# Patient Record
Sex: MALE | Race: WHITE | Employment: OTHER | URBAN - METROPOLITAN AREA
[De-identification: names, ages, dates, MRNs, and addresses within clinical notes are randomized per-mention and may not be internally consistent; named-entity substitution may affect disease eponyms.]

---

## 2017-06-21 ENCOUNTER — HOSPITAL ENCOUNTER (OUTPATIENT)
Dept: GENERAL RADIOLOGY | Age: 82
Discharge: HOME | End: 2017-06-21
Payer: MEDICARE

## 2017-06-21 DIAGNOSIS — K21.9 ESOPHAGEAL REFLUX: ICD-10-CM

## 2017-06-21 PROCEDURE — 71020 XR CHEST PA LAT: CPT

## 2017-07-27 PROBLEM — N40.1 BENIGN NON-NODULAR PROSTATIC HYPERPLASIA WITH LOWER URINARY TRACT SYMPTOMS: Status: ACTIVE | Noted: 2017-07-27

## 2017-08-16 ENCOUNTER — APPOINTMENT (RX ONLY)
Dept: URBAN - METROPOLITAN AREA CLINIC 349 | Facility: CLINIC | Age: 82
Setting detail: DERMATOLOGY
End: 2017-08-16

## 2017-08-16 DIAGNOSIS — D18.0 HEMANGIOMA: ICD-10-CM

## 2017-08-16 DIAGNOSIS — L82.0 INFLAMED SEBORRHEIC KERATOSIS: ICD-10-CM

## 2017-08-16 DIAGNOSIS — L57.8 OTHER SKIN CHANGES DUE TO CHRONIC EXPOSURE TO NONIONIZING RADIATION: ICD-10-CM

## 2017-08-16 DIAGNOSIS — L21.8 OTHER SEBORRHEIC DERMATITIS: ICD-10-CM

## 2017-08-16 DIAGNOSIS — L81.4 OTHER MELANIN HYPERPIGMENTATION: ICD-10-CM

## 2017-08-16 DIAGNOSIS — L82.1 OTHER SEBORRHEIC KERATOSIS: ICD-10-CM

## 2017-08-16 PROBLEM — D18.01 HEMANGIOMA OF SKIN AND SUBCUTANEOUS TISSUE: Status: ACTIVE | Noted: 2017-08-16

## 2017-08-16 PROBLEM — H91.90 UNSPECIFIED HEARING LOSS, UNSPECIFIED EAR: Status: ACTIVE | Noted: 2017-08-16

## 2017-08-16 PROCEDURE — 99214 OFFICE O/P EST MOD 30 MIN: CPT | Mod: 25

## 2017-08-16 PROCEDURE — ? LIQUID NITROGEN

## 2017-08-16 PROCEDURE — ? COUNSELING

## 2017-08-16 PROCEDURE — ? PRESCRIPTION

## 2017-08-16 PROCEDURE — ? RECOMMENDATIONS

## 2017-08-16 PROCEDURE — 17110 DESTRUCTION B9 LES UP TO 14: CPT

## 2017-08-16 RX ORDER — KETOCONAZOLE 20.5 MG/ML
SHAMPOO, SUSPENSION TOPICAL
Qty: 1 | Refills: 4 | Status: ERX | COMMUNITY
Start: 2017-08-16

## 2017-08-16 RX ORDER — MOMETASONE FUROATE 1 MG/ML
LOTION TOPICAL
Qty: 1 | Refills: 3 | Status: ERX | COMMUNITY
Start: 2017-08-16

## 2017-08-16 RX ADMIN — MOMETASONE FUROATE: 1 LOTION TOPICAL at 00:00

## 2017-08-16 RX ADMIN — KETOCONAZOLE: 20.5 SHAMPOO, SUSPENSION TOPICAL at 00:00

## 2017-08-16 ASSESSMENT — LOCATION ZONE DERM
LOCATION ZONE: SCALP
LOCATION ZONE: TRUNK
LOCATION ZONE: FACE
LOCATION ZONE: HAND
LOCATION ZONE: ARM

## 2017-08-16 ASSESSMENT — LOCATION DETAILED DESCRIPTION DERM
LOCATION DETAILED: STERNAL NOTCH
LOCATION DETAILED: LEFT RADIAL DORSAL HAND
LOCATION DETAILED: SUPERIOR THORACIC SPINE
LOCATION DETAILED: LEFT LATERAL SUPERIOR CHEST
LOCATION DETAILED: RIGHT PROXIMAL POSTERIOR UPPER ARM
LOCATION DETAILED: LEFT PROXIMAL POSTERIOR UPPER ARM
LOCATION DETAILED: RIGHT MEDIAL SUPERIOR CHEST
LOCATION DETAILED: LEFT INFERIOR MEDIAL FOREHEAD
LOCATION DETAILED: RIGHT SUPERIOR UPPER BACK
LOCATION DETAILED: LEFT POSTERIOR SHOULDER
LOCATION DETAILED: RIGHT SUPERIOR FOREHEAD
LOCATION DETAILED: LEFT CHIN
LOCATION DETAILED: LEFT VENTRAL PROXIMAL FOREARM
LOCATION DETAILED: RIGHT ULNAR DORSAL HAND
LOCATION DETAILED: LEFT MEDIAL INFERIOR CHEST
LOCATION DETAILED: RIGHT DISTAL LATERAL POSTERIOR UPPER ARM
LOCATION DETAILED: LEFT MEDIAL ZYGOMA
LOCATION DETAILED: PERIUMBILICAL SKIN
LOCATION DETAILED: LEFT CENTRAL MALAR CHEEK
LOCATION DETAILED: RIGHT CENTRAL MALAR CHEEK
LOCATION DETAILED: RIGHT VENTRAL PROXIMAL FOREARM
LOCATION DETAILED: RIGHT SUPERIOR PARIETAL SCALP
LOCATION DETAILED: RIGHT SUPERIOR MEDIAL MIDBACK
LOCATION DETAILED: LEFT ANTERIOR DISTAL UPPER ARM
LOCATION DETAILED: RIGHT CENTRAL FRONTAL SCALP
LOCATION DETAILED: RIGHT ANTERIOR PROXIMAL UPPER ARM
LOCATION DETAILED: LEFT SUPERIOR MEDIAL LOWER BACK
LOCATION DETAILED: RIGHT SUPERIOR LATERAL UPPER BACK

## 2017-08-16 ASSESSMENT — LOCATION SIMPLE DESCRIPTION DERM
LOCATION SIMPLE: CHIN
LOCATION SIMPLE: LEFT SHOULDER
LOCATION SIMPLE: SCALP
LOCATION SIMPLE: LEFT FOREARM
LOCATION SIMPLE: RIGHT UPPER ARM
LOCATION SIMPLE: CHEST
LOCATION SIMPLE: LEFT ZYGOMA
LOCATION SIMPLE: LEFT LOWER BACK
LOCATION SIMPLE: ABDOMEN
LOCATION SIMPLE: LEFT CHEEK
LOCATION SIMPLE: RIGHT CHEEK
LOCATION SIMPLE: RIGHT FOREHEAD
LOCATION SIMPLE: UPPER BACK
LOCATION SIMPLE: RIGHT FOREARM
LOCATION SIMPLE: RIGHT SCALP
LOCATION SIMPLE: RIGHT HAND
LOCATION SIMPLE: LEFT FOREHEAD
LOCATION SIMPLE: RIGHT UPPER BACK
LOCATION SIMPLE: LEFT UPPER ARM
LOCATION SIMPLE: LEFT HAND
LOCATION SIMPLE: RIGHT LOWER BACK

## 2017-08-16 NOTE — PROCEDURE: LIQUID NITROGEN
Render Post-Care Instructions In Note?: yes
Consent: The patient's consent was obtained including but not limited to risks of crusting, scabbing, blistering, scarring, darker or lighter pigmentary change, recurrence, incomplete removal and infection.
Detail Level: Detailed
Number Of Freeze-Thaw Cycles: 2 freeze-thaw cycles
Pared With?: 15 blade
Add 52 Modifier (Optional): no
Post-Care Instructions: I reviewed with the patient in detail post-care instructions. Patient is to wear sunprotection, and avoid picking at any of the treated lesions. Pt may apply Vaseline to crusted or scabbing areas.
Medical Necessity Information: It is in your best interest to select a reason for this procedure from the list below. All of these items fulfill various CMS LCD requirements except the new and changing color options.
Medical Necessity Clause: This procedure was medically necessary because the lesions that were treated were: irritated by cleansing and clothes

## 2017-08-16 NOTE — PROCEDURE: RECOMMENDATIONS
Detail Level: Zone
Recommendations (Free Text): Shampoo with Ketoconazole shampoo\\nMometasone solution twice daily x 2 wks then once daily as needed

## 2018-02-14 ENCOUNTER — APPOINTMENT (RX ONLY)
Dept: URBAN - METROPOLITAN AREA CLINIC 349 | Facility: CLINIC | Age: 83
Setting detail: DERMATOLOGY
End: 2018-02-14

## 2018-02-14 ENCOUNTER — RX ONLY (OUTPATIENT)
Age: 83
Setting detail: RX ONLY
End: 2018-02-14

## 2018-02-14 DIAGNOSIS — L85.3 XEROSIS CUTIS: ICD-10-CM

## 2018-02-14 DIAGNOSIS — L21.8 OTHER SEBORRHEIC DERMATITIS: ICD-10-CM | Status: IMPROVED

## 2018-02-14 DIAGNOSIS — D18.0 HEMANGIOMA: ICD-10-CM

## 2018-02-14 DIAGNOSIS — L81.4 OTHER MELANIN HYPERPIGMENTATION: ICD-10-CM

## 2018-02-14 DIAGNOSIS — L82.1 OTHER SEBORRHEIC KERATOSIS: ICD-10-CM

## 2018-02-14 PROBLEM — D18.01 HEMANGIOMA OF SKIN AND SUBCUTANEOUS TISSUE: Status: ACTIVE | Noted: 2018-02-14

## 2018-02-14 PROBLEM — J30.1 ALLERGIC RHINITIS DUE TO POLLEN: Status: ACTIVE | Noted: 2018-02-14

## 2018-02-14 PROBLEM — K21.9 GASTRO-ESOPHAGEAL REFLUX DISEASE WITHOUT ESOPHAGITIS: Status: ACTIVE | Noted: 2018-02-14

## 2018-02-14 PROCEDURE — 99214 OFFICE O/P EST MOD 30 MIN: CPT

## 2018-02-14 PROCEDURE — ? OTHER

## 2018-02-14 PROCEDURE — ? COUNSELING

## 2018-02-14 RX ORDER — KETOCONAZOLE 20.5 MG/ML
SHAMPOO, SUSPENSION TOPICAL
Qty: 1 | Refills: 4 | Status: ERX

## 2018-02-14 ASSESSMENT — LOCATION DETAILED DESCRIPTION DERM
LOCATION DETAILED: RIGHT SUPERIOR MEDIAL LOWER BACK
LOCATION DETAILED: RIGHT DISTAL PRETIBIAL REGION
LOCATION DETAILED: RIGHT CENTRAL PARIETAL SCALP
LOCATION DETAILED: RIGHT ANTECUBITAL SKIN
LOCATION DETAILED: LEFT CLAVICULAR SKIN
LOCATION DETAILED: RIGHT CLAVICULAR SKIN
LOCATION DETAILED: LEFT PROXIMAL POSTERIOR UPPER ARM
LOCATION DETAILED: LEFT LATERAL SUPERIOR CHEST
LOCATION DETAILED: LEFT DISTAL CALF
LOCATION DETAILED: LEFT CENTRAL FRONTAL SCALP
LOCATION DETAILED: LEFT SUPERIOR LATERAL MIDBACK
LOCATION DETAILED: RIGHT LATERAL SUPERIOR CHEST
LOCATION DETAILED: RIGHT ANTERIOR DISTAL THIGH
LOCATION DETAILED: EPIGASTRIC SKIN
LOCATION DETAILED: LEFT CLAVICULAR NECK
LOCATION DETAILED: RIGHT SUPERIOR LATERAL UPPER BACK
LOCATION DETAILED: RIGHT PROXIMAL PRETIBIAL REGION
LOCATION DETAILED: LEFT INFERIOR CENTRAL MALAR CHEEK
LOCATION DETAILED: LEFT CENTRAL PARIETAL SCALP
LOCATION DETAILED: LEFT SUPERIOR MEDIAL LOWER BACK
LOCATION DETAILED: LEFT INFERIOR LATERAL MALAR CHEEK
LOCATION DETAILED: RIGHT CENTRAL FRONTAL SCALP
LOCATION DETAILED: RIGHT LATERAL UPPER BACK
LOCATION DETAILED: LEFT INFERIOR FOREHEAD
LOCATION DETAILED: RIGHT INFERIOR LATERAL NECK
LOCATION DETAILED: LEFT LATERAL UPPER BACK
LOCATION DETAILED: RIGHT PROXIMAL POSTERIOR UPPER ARM
LOCATION DETAILED: LEFT DISTAL DORSAL FOREARM
LOCATION DETAILED: LEFT SUPERIOR LATERAL UPPER BACK
LOCATION DETAILED: RIGHT INFERIOR LATERAL LOWER BACK
LOCATION DETAILED: LEFT DISTAL POSTERIOR UPPER ARM
LOCATION DETAILED: LEFT LATERAL INFERIOR CHEST
LOCATION DETAILED: RIGHT CENTRAL MALAR CHEEK
LOCATION DETAILED: PERIUMBILICAL SKIN
LOCATION DETAILED: RIGHT DISTAL POSTERIOR UPPER ARM
LOCATION DETAILED: LEFT INFERIOR LATERAL UPPER BACK
LOCATION DETAILED: LEFT PROXIMAL PRETIBIAL REGION
LOCATION DETAILED: RIGHT PROXIMAL LATERAL CALF
LOCATION DETAILED: LEFT ANTERIOR PROXIMAL THIGH
LOCATION DETAILED: RIGHT SUPERIOR UPPER BACK
LOCATION DETAILED: RIGHT DISTAL POSTERIOR THIGH
LOCATION DETAILED: LEFT BUTTOCK
LOCATION DETAILED: INFERIOR THORACIC SPINE
LOCATION DETAILED: RIGHT INFERIOR LATERAL MALAR CHEEK
LOCATION DETAILED: LEFT ANTERIOR PROXIMAL UPPER ARM
LOCATION DETAILED: LEFT ANTERIOR LATERAL DISTAL UPPER ARM
LOCATION DETAILED: LEFT PROXIMAL CALF
LOCATION DETAILED: RIGHT PROXIMAL DORSAL FOREARM
LOCATION DETAILED: RIGHT DISTAL DORSAL FOREARM
LOCATION DETAILED: RIGHT SUPERIOR PARIETAL SCALP
LOCATION DETAILED: LEFT DISTAL POSTERIOR THIGH
LOCATION DETAILED: RIGHT ANTERIOR PROXIMAL UPPER ARM

## 2018-02-14 ASSESSMENT — LOCATION SIMPLE DESCRIPTION DERM
LOCATION SIMPLE: RIGHT THIGH
LOCATION SIMPLE: LEFT ANTERIOR NECK
LOCATION SIMPLE: LEFT CHEEK
LOCATION SIMPLE: LEFT UPPER BACK
LOCATION SIMPLE: LEFT LOWER BACK
LOCATION SIMPLE: LEFT UPPER ARM
LOCATION SIMPLE: LEFT BUTTOCK
LOCATION SIMPLE: LEFT THIGH
LOCATION SIMPLE: LEFT FOREARM
LOCATION SIMPLE: ABDOMEN
LOCATION SIMPLE: RIGHT CLAVICULAR SKIN
LOCATION SIMPLE: SCALP
LOCATION SIMPLE: LEFT FOREHEAD
LOCATION SIMPLE: RIGHT SCALP
LOCATION SIMPLE: RIGHT CHEEK
LOCATION SIMPLE: RIGHT ELBOW
LOCATION SIMPLE: RIGHT UPPER BACK
LOCATION SIMPLE: LEFT CALF
LOCATION SIMPLE: UPPER BACK
LOCATION SIMPLE: RIGHT POSTERIOR THIGH
LOCATION SIMPLE: RIGHT LOWER BACK
LOCATION SIMPLE: LEFT PRETIBIAL REGION
LOCATION SIMPLE: CHEST
LOCATION SIMPLE: RIGHT UPPER ARM
LOCATION SIMPLE: LEFT SCALP
LOCATION SIMPLE: RIGHT FOREARM
LOCATION SIMPLE: RIGHT ANTERIOR NECK
LOCATION SIMPLE: LEFT CLAVICULAR SKIN
LOCATION SIMPLE: LEFT POSTERIOR THIGH
LOCATION SIMPLE: RIGHT PRETIBIAL REGION
LOCATION SIMPLE: RIGHT CALF

## 2018-02-14 ASSESSMENT — LOCATION ZONE DERM
LOCATION ZONE: FACE
LOCATION ZONE: LEG
LOCATION ZONE: TRUNK
LOCATION ZONE: SCALP
LOCATION ZONE: NECK
LOCATION ZONE: ARM

## 2018-02-14 NOTE — PROCEDURE: OTHER
Note Text (......Xxx Chief Complaint.): Photographs taken with pt permission
Detail Level: Zone
Other (Free Text): Pt advised to shower with lukewarm water never hot and to use a moisturizing body wash. Apply Cetaphil moisture cream to damp skin after showering.
Other (Free Text): Advised to continue using the shampoo with the Zinc alternating with the Ketoconazole shampoo

## 2018-05-07 PROBLEM — E66.01 SEVERE OBESITY (BMI 35.0-39.9) WITH COMORBIDITY (HCC): Status: ACTIVE | Noted: 2018-05-07

## 2018-10-23 ENCOUNTER — HOSPITAL ENCOUNTER (OUTPATIENT)
Dept: GENERAL RADIOLOGY | Age: 83
Discharge: HOME | End: 2018-10-23
Payer: MEDICARE

## 2018-10-23 DIAGNOSIS — I10 HIGH BLOOD PRESSURE: ICD-10-CM

## 2018-10-23 DIAGNOSIS — I50.9 HEART FAILURE, UNSPECIFIED (HCC): ICD-10-CM

## 2018-10-23 PROCEDURE — 71046 X-RAY EXAM CHEST 2 VIEWS: CPT

## 2019-08-22 NOTE — PROGRESS NOTES
Patient pre-assessment complete for BIV ICD generator change with Dr Karely Bain scheduled for 19 at 12:30pm, arrival time 10:30am. Patient verified using . Patient instructed to bring all home medications in labeled bottles on the day of procedure. NPO status reinforced. Patient instructed to HOLD lasix the am of procedure. Instructed they can take all other medications excluding vitamins & supplements. Patient verbalizes understanding of all instructions & denies any questions at this time.

## 2019-08-22 NOTE — PROGRESS NOTES
Called to pre-assess for  ICD gen change with DR Mau Hogue, Scheduled 8/26/19. No answer & message left.

## 2019-08-23 ENCOUNTER — HOSPITAL ENCOUNTER (OUTPATIENT)
Dept: LAB | Age: 84
Discharge: HOME | End: 2019-08-23
Payer: MEDICARE

## 2019-08-23 DIAGNOSIS — I42.8 NONISCHEMIC CARDIOMYOPATHY (HCC): ICD-10-CM

## 2019-08-23 DIAGNOSIS — Z95.810 IMPLANTABLE CARDIOVERTER-DEFIBRILLATOR (ICD) IN SITU: ICD-10-CM

## 2019-08-23 LAB
ANION GAP SERPL CALC-SCNC: 7 MMOL/L (ref 7–16)
BASOPHILS # BLD: 0 K/UL (ref 0–0.2)
BASOPHILS NFR BLD: 1 % (ref 0–2)
BUN SERPL-MCNC: 18 MG/DL (ref 8–23)
CALCIUM SERPL-MCNC: 8.7 MG/DL (ref 8.3–10.4)
CHLORIDE SERPL-SCNC: 109 MMOL/L (ref 98–107)
CO2 SERPL-SCNC: 27 MMOL/L (ref 21–32)
CREAT SERPL-MCNC: 1 MG/DL (ref 0.8–1.5)
DIFFERENTIAL METHOD BLD: ABNORMAL
EOSINOPHIL # BLD: 0.2 K/UL (ref 0–0.8)
EOSINOPHIL NFR BLD: 3 % (ref 0.5–7.8)
ERYTHROCYTE [DISTWIDTH] IN BLOOD BY AUTOMATED COUNT: 13.2 % (ref 11.9–14.6)
GLUCOSE SERPL-MCNC: 100 MG/DL (ref 65–100)
HCT VFR BLD AUTO: 42.2 % (ref 41.1–50.3)
HGB BLD-MCNC: 14.1 G/DL (ref 13.6–17.2)
IMM GRANULOCYTES # BLD AUTO: 0 K/UL (ref 0–0.5)
IMM GRANULOCYTES NFR BLD AUTO: 0 % (ref 0–5)
LYMPHOCYTES # BLD: 1.4 K/UL (ref 0.5–4.6)
LYMPHOCYTES NFR BLD: 27 % (ref 13–44)
MAGNESIUM SERPL-MCNC: 2.3 MG/DL (ref 1.8–2.4)
MCH RBC QN AUTO: 31.7 PG (ref 26.1–32.9)
MCHC RBC AUTO-ENTMCNC: 33.4 G/DL (ref 31.4–35)
MCV RBC AUTO: 94.8 FL (ref 79.6–97.8)
MONOCYTES # BLD: 0.5 K/UL (ref 0.1–1.3)
MONOCYTES NFR BLD: 9 % (ref 4–12)
NEUTS SEG # BLD: 3 K/UL (ref 1.7–8.2)
NEUTS SEG NFR BLD: 59 % (ref 43–78)
NRBC # BLD: 0 K/UL (ref 0–0.2)
PLATELET # BLD AUTO: 141 K/UL (ref 150–450)
PMV BLD AUTO: 9.8 FL (ref 9.4–12.3)
POTASSIUM SERPL-SCNC: 4.1 MMOL/L (ref 3.5–5.1)
RBC # BLD AUTO: 4.45 M/UL (ref 4.23–5.6)
SODIUM SERPL-SCNC: 143 MMOL/L (ref 136–145)
WBC # BLD AUTO: 5 K/UL (ref 4.3–11.1)

## 2019-08-23 PROCEDURE — 80048 BASIC METABOLIC PNL TOTAL CA: CPT

## 2019-08-23 PROCEDURE — 83735 ASSAY OF MAGNESIUM: CPT

## 2019-08-23 PROCEDURE — 36415 COLL VENOUS BLD VENIPUNCTURE: CPT

## 2019-08-23 PROCEDURE — 85025 COMPLETE CBC W/AUTO DIFF WBC: CPT

## 2019-08-25 ENCOUNTER — ANESTHESIA EVENT (OUTPATIENT)
Dept: SURGERY | Age: 84
End: 2019-08-25
Payer: MEDICARE

## 2019-08-26 ENCOUNTER — HOSPITAL ENCOUNTER (OUTPATIENT)
Age: 84
Setting detail: OUTPATIENT SURGERY
Discharge: HOME | End: 2019-08-26
Attending: INTERNAL MEDICINE | Admitting: INTERNAL MEDICINE
Payer: MEDICARE

## 2019-08-26 ENCOUNTER — APPOINTMENT (OUTPATIENT)
Dept: CARDIAC CATH/INVASIVE PROCEDURES | Age: 84
End: 2019-08-26
Payer: MEDICARE

## 2019-08-26 ENCOUNTER — ANESTHESIA (OUTPATIENT)
Dept: SURGERY | Age: 84
End: 2019-08-26
Payer: MEDICARE

## 2019-08-26 VITALS
SYSTOLIC BLOOD PRESSURE: 114 MMHG | WEIGHT: 280 LBS | HEIGHT: 72 IN | BODY MASS INDEX: 37.93 KG/M2 | OXYGEN SATURATION: 93 % | RESPIRATION RATE: 16 BRPM | DIASTOLIC BLOOD PRESSURE: 58 MMHG | TEMPERATURE: 98.6 F | HEART RATE: 75 BPM

## 2019-08-26 LAB
ATRIAL RATE: 78 BPM
CALCULATED R AXIS, ECG10: -123 DEGREES
CALCULATED T AXIS, ECG11: 59 DEGREES
DIAGNOSIS, 93000: NORMAL
P-R INTERVAL, ECG05: 212 MS
Q-T INTERVAL, ECG07: 466 MS
QRS DURATION, ECG06: 152 MS
QTC CALCULATION (BEZET), ECG08: 534 MS
VENTRICULAR RATE, ECG03: 79 BPM

## 2019-08-26 PROCEDURE — 74011000272 HC RX REV CODE- 272: Performed by: INTERNAL MEDICINE

## 2019-08-26 PROCEDURE — 74011000250 HC RX REV CODE- 250: Performed by: INTERNAL MEDICINE

## 2019-08-26 PROCEDURE — 77030022704 HC SUT VLOC COVD -B

## 2019-08-26 PROCEDURE — C1882 AICD, OTHER THAN SING/DUAL: HCPCS

## 2019-08-26 PROCEDURE — 93005 ELECTROCARDIOGRAM TRACING: CPT | Performed by: INTERNAL MEDICINE

## 2019-08-26 PROCEDURE — 76060000032 HC ANESTHESIA 0.5 TO 1 HR: Performed by: INTERNAL MEDICINE

## 2019-08-26 PROCEDURE — 74011250636 HC RX REV CODE- 250/636: Performed by: ANESTHESIOLOGY

## 2019-08-26 PROCEDURE — 74011250636 HC RX REV CODE- 250/636

## 2019-08-26 PROCEDURE — 77030028698 HC BLD TISS PLSM MEDT -D

## 2019-08-26 PROCEDURE — 74011250636 HC RX REV CODE- 250/636: Performed by: INTERNAL MEDICINE

## 2019-08-26 PROCEDURE — 77030012935 HC DRSG AQUACEL BMS -B

## 2019-08-26 PROCEDURE — 77030031139 HC SUT VCRL2 J&J -A

## 2019-08-26 PROCEDURE — 33264 RMVL & RPLCMT DFB GEN MLT LD: CPT

## 2019-08-26 RX ORDER — LIDOCAINE HYDROCHLORIDE 20 MG/ML
INJECTION, SOLUTION EPIDURAL; INFILTRATION; INTRACAUDAL; PERINEURAL AS NEEDED
Status: DISCONTINUED | OUTPATIENT
Start: 2019-08-26 | End: 2019-08-26 | Stop reason: HOSPADM

## 2019-08-26 RX ORDER — HYDROMORPHONE HYDROCHLORIDE 2 MG/ML
0.2 INJECTION, SOLUTION INTRAMUSCULAR; INTRAVENOUS; SUBCUTANEOUS
Status: DISCONTINUED | OUTPATIENT
Start: 2019-08-26 | End: 2019-08-26 | Stop reason: HOSPADM

## 2019-08-26 RX ORDER — PROPOFOL 10 MG/ML
INJECTION, EMULSION INTRAVENOUS AS NEEDED
Status: DISCONTINUED | OUTPATIENT
Start: 2019-08-26 | End: 2019-08-26 | Stop reason: HOSPADM

## 2019-08-26 RX ORDER — CLINDAMYCIN PHOSPHATE 900 MG/50ML
900 INJECTION INTRAVENOUS
Status: COMPLETED | OUTPATIENT
Start: 2019-08-26 | End: 2019-08-26

## 2019-08-26 RX ORDER — NALOXONE HYDROCHLORIDE 0.4 MG/ML
0.04 INJECTION, SOLUTION INTRAMUSCULAR; INTRAVENOUS; SUBCUTANEOUS
Status: DISCONTINUED | OUTPATIENT
Start: 2019-08-26 | End: 2019-08-26 | Stop reason: HOSPADM

## 2019-08-26 RX ORDER — CLINDAMYCIN PHOSPHATE 900 MG/50ML
900 INJECTION INTRAVENOUS ONCE
Status: COMPLETED | OUTPATIENT
Start: 2019-08-26 | End: 2019-08-26

## 2019-08-26 RX ORDER — PROPOFOL 10 MG/ML
INJECTION, EMULSION INTRAVENOUS
Status: DISCONTINUED | OUTPATIENT
Start: 2019-08-26 | End: 2019-08-26 | Stop reason: HOSPADM

## 2019-08-26 RX ORDER — CEFAZOLIN SODIUM IN 0.9 % NACL 2 G/50 ML
2 INTRAVENOUS SOLUTION, PIGGYBACK (ML) INTRAVENOUS ONCE
Status: DISCONTINUED | OUTPATIENT
Start: 2019-08-26 | End: 2019-08-26 | Stop reason: CLARIF

## 2019-08-26 RX ORDER — SODIUM CHLORIDE 0.9 % (FLUSH) 0.9 %
5 SYRINGE (ML) INJECTION AS NEEDED
Status: DISCONTINUED | OUTPATIENT
Start: 2019-08-26 | End: 2019-08-26 | Stop reason: HOSPADM

## 2019-08-26 RX ORDER — FENTANYL CITRATE 50 UG/ML
100 INJECTION, SOLUTION INTRAMUSCULAR; INTRAVENOUS ONCE
Status: DISCONTINUED | OUTPATIENT
Start: 2019-08-26 | End: 2019-08-26 | Stop reason: HOSPADM

## 2019-08-26 RX ORDER — LIDOCAINE HYDROCHLORIDE 10 MG/ML
0.1 INJECTION INFILTRATION; PERINEURAL AS NEEDED
Status: DISCONTINUED | OUTPATIENT
Start: 2019-08-26 | End: 2019-08-26 | Stop reason: HOSPADM

## 2019-08-26 RX ORDER — MIDAZOLAM HYDROCHLORIDE 1 MG/ML
2 INJECTION, SOLUTION INTRAMUSCULAR; INTRAVENOUS
Status: DISCONTINUED | OUTPATIENT
Start: 2019-08-26 | End: 2019-08-26 | Stop reason: HOSPADM

## 2019-08-26 RX ORDER — CEFAZOLIN SODIUM/WATER 2 G/20 ML
2 SYRINGE (ML) INTRAVENOUS ONCE
Status: DISCONTINUED | OUTPATIENT
Start: 2019-08-26 | End: 2019-08-26 | Stop reason: DRUGHIGH

## 2019-08-26 RX ORDER — SODIUM CHLORIDE, SODIUM LACTATE, POTASSIUM CHLORIDE, CALCIUM CHLORIDE 600; 310; 30; 20 MG/100ML; MG/100ML; MG/100ML; MG/100ML
100 INJECTION, SOLUTION INTRAVENOUS CONTINUOUS
Status: DISCONTINUED | OUTPATIENT
Start: 2019-08-26 | End: 2019-08-26 | Stop reason: HOSPADM

## 2019-08-26 RX ORDER — BUPIVACAINE HYDROCHLORIDE AND EPINEPHRINE 5; 5 MG/ML; UG/ML
50 INJECTION, SOLUTION PERINEURAL ONCE
Status: COMPLETED | OUTPATIENT
Start: 2019-08-26 | End: 2019-08-26

## 2019-08-26 RX ORDER — MIDAZOLAM HYDROCHLORIDE 1 MG/ML
2 INJECTION, SOLUTION INTRAMUSCULAR; INTRAVENOUS ONCE
Status: DISCONTINUED | OUTPATIENT
Start: 2019-08-26 | End: 2019-08-26 | Stop reason: HOSPADM

## 2019-08-26 RX ORDER — OXYCODONE HYDROCHLORIDE 5 MG/1
5 TABLET ORAL
Status: DISCONTINUED | OUTPATIENT
Start: 2019-08-26 | End: 2019-08-26 | Stop reason: HOSPADM

## 2019-08-26 RX ADMIN — HYDROMORPHONE HYDROCHLORIDE 0.2 MG: 2 INJECTION, SOLUTION INTRAMUSCULAR; INTRAVENOUS; SUBCUTANEOUS at 14:36

## 2019-08-26 RX ADMIN — CLINDAMYCIN PHOSPHATE 900 MG: 900 INJECTION, SOLUTION INTRAVENOUS at 13:24

## 2019-08-26 RX ADMIN — SODIUM CHLORIDE, SODIUM LACTATE, POTASSIUM CHLORIDE, AND CALCIUM CHLORIDE: 600; 310; 30; 20 INJECTION, SOLUTION INTRAVENOUS at 13:18

## 2019-08-26 RX ADMIN — LIDOCAINE HYDROCHLORIDE 40 MG: 20 INJECTION, SOLUTION EPIDURAL; INFILTRATION; INTRACAUDAL; PERINEURAL at 13:23

## 2019-08-26 RX ADMIN — PROPOFOL 140 MCG/KG/MIN: 10 INJECTION, EMULSION INTRAVENOUS at 13:25

## 2019-08-26 RX ADMIN — NEOMYCIN AND POLYMYXIN B SULFATES: 40; 200000 SOLUTION IRRIGATION at 13:26

## 2019-08-26 RX ADMIN — PROPOFOL 50 MG: 10 INJECTION, EMULSION INTRAVENOUS at 13:23

## 2019-08-26 RX ADMIN — CLINDAMYCIN PHOSPHATE 900 MG: 900 INJECTION, SOLUTION INTRAVENOUS at 15:40

## 2019-08-26 RX ADMIN — HYDROMORPHONE HYDROCHLORIDE 0.2 MG: 2 INJECTION, SOLUTION INTRAMUSCULAR; INTRAVENOUS; SUBCUTANEOUS at 14:53

## 2019-08-26 RX ADMIN — BUPIVACAINE HYDROCHLORIDE AND EPINEPHRINE BITARTRATE 250 MG: 5; .005 INJECTION, SOLUTION PERINEURAL at 13:25

## 2019-08-26 NOTE — PROGRESS NOTES
Patient up to bedside, vital signs and site stable. Patient ambulated to bathroom without difficulty. Patient voided without difficulty. Vascular site stable. 1545 Discharge instructions and home medications reviewed with patient. Time allowed for questions and answers. 1600 Patient ambulated second time without difficulty. Site stable after ambulation. Peripheral IV sites dc'd without difficulty with tips intact. 1610 Patient discharged to home with family.

## 2019-08-26 NOTE — PROCEDURES
Procedure: BiV ICD Generator Removal/Replacement without DFTs    Pre-Procedure Diagnosis  1. Chronic systolic heart failure, ejection fraction of 40%. 2. Non ischemic dilated cardiomyopathy. 3. Class II heart failure symptoms. 4. BiV ICD YOLI    Procedure Performed  1. Insertion of [place] biventricular implantable cardioverter defibrillator. Anesthesia: MAC     Estimated Blood Loss: Less than 10 mL     Specimens: * No specimens in log *     Patient Information and Indications: The procedure, indications, risks, benefits, and alternatives were discussed with the patient and family members, who desired to proceed after questions were answered and informed consent was documented. Methods: After informed consent was obtained, the patient was brought to the Electrophysiology Laboratory in a fasting state and was prepped and draped in sterile fashion. Prophylactic antibiotic was administered prior to skin incision: (clindamycin 900mg). Conscious sedation was administered with continuous oxygen saturation measurement and blood pressure measurement by Anesthesia. Local anesthetic (lidocaine) was delivered to the left pectoral region and an incision was made parallel to the deltopectoral groove directly over the prior surgical scar. The subcutaneous pocket was opened using blunt dissection and electrocautery, and adequate hemostasis was established. The device was freed from overlying fibrotic tissue and the leads freed to give enough slack for device exchange. The leads were individually removed from the old generator and tested using the PSA revealing excellent pacing/sensing parameters. The lead pins were then cleaned with antibiotic soaked gauze, dried gently, and attached to a new biventricular ICD generator. Pins were directly observed to pass the tip electrode, and the ring hex wrench screws were secured, and leads tug tested. The device and leads were gently positioned within the pocket.  The pocket was irrigated copiously with a saline antimicrobial solution. The device was and leads were tested a second time prior to pocket closure. The wound was closed with multiple layers of absorbable suture followed by skin closure with staples. The patient tolerated the procedure well and left the lab in good condition. Lead Data:    Pulse Generator Model #  Serial # Location Implant   [id number] [facility] [id number] Left Pectoral EXPLANT     [id number] [facility] [id number] Left Pectoral IMPLANT     Lead Model Number  Serial Number Lead position Implant   RA [id number] [facility] [id number] RA [place] Chronic     RV 3500/54 BIO [id number] RV [place] Chronic   LV 1458Q/75 [facility] [id number]  Chronic     Lead Sensitivity and Threshold  Lead R or P sensitivity (mv) Threshold (V) Threshold PW (msec) Impedance (ohms) Final output Voltage (V) Final PW (msec)   RA 0.2 1.0 0.50 340 2.0 0.50   RV 9.6 0.75 0.50 460 2.0 0.50   LV  2.5 1.5 980 2.5 1.5     Bradycardia Settings  Aric Mode LRL URL Pace AVD (ms) Sense AVD (ms) Rate Response Mode Switching Mode SW Rate   DDDR 75 120 170 130 YES YES 80       Tachycardia Settings  Zone Type VT-1 VT-2 VF   ON/OFF/  MONITOR ON ON ON   Zone Rate 181  100 Intervals 200 250   1st Therapy Type ATP-burst x3 ATP X3 Shock   Energy (J) 85%  36   2nd Therapy Type Shock SHOCK Shock   Energy (J) 36 36 40   3rd Therapy Type Shock SHOCK Shock   Energy (J) 40 40 40   4th Therapy Type Shock SHOCK Shock   Energy (J) 40 40 40   5th Therapy Type Shock SHOCK Shock   Energy (J) 40 40 40   6th Therapy Type   Shock   Energy (J)   40     Defibrillation Threshold Testing  DFT# How induced Successful test? Shock Imped (ohms) Energy (J) Charge time (sec) Rescue needed? Defib threshold (J)   NA            Contrast: 0 ml    Complications: None    IMPRESSION: 1) Successful BiV ICD generator replacement without DFTs. Russell Marquez MD, MS  Clinical Cardiac Electrophysiology  8/26/2019  2:06 PM

## 2019-08-26 NOTE — PROGRESS NOTES
Report received from [address] [place], [place] [address]ball [place]. Procedural findings communicated. Intra procedural  medication administration reviewed. Progression of care discussed.      Patient received into [address] [facility] 5 post Gen change    Access site without bleeding or swelling yes    Dressing dry and intact yes    Patient instructed to limit movement to left upper extremity    Routine post procedural vital signs and site assessment initiated yes

## 2019-08-26 NOTE — ANESTHESIA PREPROCEDURE EVALUATION
Relevant Problems   No relevant active problems       Anesthetic History   No history of anesthetic complications            Review of Systems / Medical History  Patient summary reviewed and pertinent labs reviewed    Pulmonary  Within defined limits        Undiagnosed apnea         Neuro/Psych   Within defined limits           Cardiovascular    Hypertension: well controlled  Valvular problems/murmurs: mitral insufficiency and aortic insufficiency        Pacemaker and CAD    Exercise tolerance: >4 METS  Comments: Echo 2017 - EF 40-45%, LVH, severe AI and MR   GI/Hepatic/Renal     GERD: well controlled           Endo/Other        Obesity     Other Findings            Physical Exam    Airway  Mallampati: II  TM Distance: 4 - 6 cm  Neck ROM: normal range of motion   Mouth opening: Normal     Cardiovascular  Regular rate and rhythm,  S1 and S2 normal,  no murmur, click, rub, or gallop             Dental    Dentition: Lower partial plate     Pulmonary  Breath sounds clear to auscultation               Abdominal         Other Findings            Anesthetic Plan    ASA: 4  Anesthesia type: total IV anesthesia and general - backup          Induction: Intravenous  Anesthetic plan and risks discussed with: Patient and Spouse

## 2019-08-26 NOTE — PROGRESS NOTES
Patient received to [address] [place] [address] room # 9  Ambulatory from [facility]. Patient scheduled for BI V ICD gen change today with Dr Johny Hughes. Procedure reviewed & questions answered, voiced good understanding consent obtained & placed on chart. All medications and medical history reviewed. Will prep patient per orders. Patient & family updated on plan of care.       The patient has a fraility score of 3-MANAGING WELL, based on ability to perform ADLs by self

## 2019-08-26 NOTE — DISCHARGE INSTRUCTIONS
Post Op Device Instructions      Incision & Dressing Care   Please keep Aquacel dressing on wound until your 1-2 week follow up in device clinic. The dressing promotes healing and is meant to stay on the wound. Aquacel dressing is waterproof. After one week you may shower, cleaning the wound with soap and landers. Do not use any lotions, creams, or ointments on the incision. Avoid manipulating the device or leads with your fingers. Do not massage or excessively rub the implant site.      For four weeks after your implant   Do not lift anything heavier than a gallon of milk.   Do not raise your elbow above the level of your shoulder on the left implant side.   Avoid excessive pushing, pulling, or twisting.     Call you doctor for any of the following:   Any signs of infection, including redness, swelling or pain at the incision site, or a   temperature of 101° F or greater.   If you have twitching chest muscles, hiccups that won't stop, or a swollen arm on the   side of the incision.   Any feelings of light-headedness, chest pain, or shortness of breath.   Please notify your doctors and dentists that you have a pacemaker       You should not drive until 1 week after your implant or after your first follow-up appointment, whichever comes first. At that time, you can discuss when you may return to your regular driving routine.       About 1 month after implant, you will receive a card by mail from the company who manufactured your ICD. Your device was manufactured by [employer]. You should carry this card with you at all times.  Adriel Ryder call the device clinic or Shady Cabrera (EP Nurse) [phone] if you have questions or concerns about your device.  Please call the hospital if it is after 5 pm or the weekend please page the on call cardiologist at [facility] at [phone]  [address] will need to have your device checked 1- 2 weeks after the procedure and should have an appointment with the device clinic.

## 2019-08-26 NOTE — PROGRESS NOTES
Patient's family at bedside, patient and family updated on plan of care. Call light within reach. Patient given beverage.

## 2019-08-26 NOTE — ANESTHESIA POSTPROCEDURE EVALUATION
Procedure(s):  BIV ICD GEN CHANGE.    total IV anesthesia, general - backup    Anesthesia Post Evaluation      Multimodal analgesia: multimodal analgesia used between 6 hours prior to anesthesia start to PACU discharge  Patient location during evaluation: PACU  Patient participation: complete - patient participated  Level of consciousness: awake and awake and alert  Pain management: adequate  Airway patency: patent  Anesthetic complications: no  Cardiovascular status: acceptable  Respiratory status: acceptable  Hydration status: acceptable  Post anesthesia nausea and vomiting:  controlled      No vitals data found for the desired time range.

## 2019-11-06 ENCOUNTER — HOSPITAL ENCOUNTER (OUTPATIENT)
Dept: GENERAL RADIOLOGY | Age: 84
Discharge: HOME | End: 2019-11-06
Payer: MEDICARE

## 2019-11-06 DIAGNOSIS — R06.02 SHORTNESS OF BREATH: ICD-10-CM

## 2019-11-06 PROCEDURE — 71046 X-RAY EXAM CHEST 2 VIEWS: CPT

## 2020-01-01 ENCOUNTER — HOSPITAL ENCOUNTER (OUTPATIENT)
Dept: GENERAL RADIOLOGY | Age: 85
Discharge: HOME | End: 2020-11-05
Payer: MEDICARE

## 2020-01-01 DIAGNOSIS — I10 ESSENTIAL HYPERTENSION, MALIGNANT: ICD-10-CM

## 2020-01-01 PROCEDURE — 71046 X-RAY EXAM CHEST 2 VIEWS: CPT

## 2020-09-14 ENCOUNTER — HOSPITAL ENCOUNTER (OUTPATIENT)
Dept: LAB | Age: 85
Discharge: HOME | End: 2020-09-14
Payer: MEDICARE

## 2020-09-14 DIAGNOSIS — I48.0 PAF (PAROXYSMAL ATRIAL FIBRILLATION) (HCC): ICD-10-CM

## 2020-09-14 LAB
ANION GAP SERPL CALC-SCNC: 4 MMOL/L (ref 7–16)
BASOPHILS # BLD: 0 K/UL (ref 0–0.2)
BASOPHILS NFR BLD: 1 % (ref 0–2)
BUN SERPL-MCNC: 20 MG/DL (ref 8–23)
CALCIUM SERPL-MCNC: 8.6 MG/DL (ref 8.3–10.4)
CHLORIDE SERPL-SCNC: 107 MMOL/L (ref 98–107)
CO2 SERPL-SCNC: 27 MMOL/L (ref 21–32)
CREAT SERPL-MCNC: 1.13 MG/DL (ref 0.8–1.5)
DIFFERENTIAL METHOD BLD: ABNORMAL
EOSINOPHIL # BLD: 0.2 K/UL (ref 0–0.8)
EOSINOPHIL NFR BLD: 4 % (ref 0.5–7.8)
ERYTHROCYTE [DISTWIDTH] IN BLOOD BY AUTOMATED COUNT: 13.1 % (ref 11.9–14.6)
GLUCOSE SERPL-MCNC: 112 MG/DL (ref 65–100)
HCT VFR BLD AUTO: 43 % (ref 41.1–50.3)
HGB BLD-MCNC: 14 G/DL (ref 13.6–17.2)
IMM GRANULOCYTES # BLD AUTO: 0 K/UL (ref 0–0.5)
IMM GRANULOCYTES NFR BLD AUTO: 0 % (ref 0–5)
LYMPHOCYTES # BLD: 1.7 K/UL (ref 0.5–4.6)
LYMPHOCYTES NFR BLD: 30 % (ref 13–44)
MCH RBC QN AUTO: 31.6 PG (ref 26.1–32.9)
MCHC RBC AUTO-ENTMCNC: 32.6 G/DL (ref 31.4–35)
MCV RBC AUTO: 97.1 FL (ref 79.6–97.8)
MONOCYTES # BLD: 0.5 K/UL (ref 0.1–1.3)
MONOCYTES NFR BLD: 9 % (ref 4–12)
NEUTS SEG # BLD: 3.2 K/UL (ref 1.7–8.2)
NEUTS SEG NFR BLD: 57 % (ref 43–78)
NRBC # BLD: 0 K/UL (ref 0–0.2)
PLATELET # BLD AUTO: 145 K/UL (ref 150–450)
PMV BLD AUTO: 10.5 FL (ref 9.4–12.3)
POTASSIUM SERPL-SCNC: 4.1 MMOL/L (ref 3.5–5.1)
RBC # BLD AUTO: 4.43 M/UL (ref 4.23–5.6)
SODIUM SERPL-SCNC: 138 MMOL/L (ref 136–145)
WBC # BLD AUTO: 5.6 K/UL (ref 4.3–11.1)

## 2020-09-14 PROCEDURE — 80048 BASIC METABOLIC PNL TOTAL CA: CPT

## 2020-09-14 PROCEDURE — 85025 COMPLETE CBC W/AUTO DIFF WBC: CPT

## 2020-09-14 PROCEDURE — 36415 COLL VENOUS BLD VENIPUNCTURE: CPT

## 2020-09-24 ENCOUNTER — HOSPITAL ENCOUNTER (OUTPATIENT)
Dept: LAB | Age: 85
Discharge: HOME | End: 2020-09-24
Payer: MEDICARE

## 2020-09-24 DIAGNOSIS — I48.0 PAROXYSMAL ATRIAL FIBRILLATION (HCC): ICD-10-CM

## 2020-09-24 LAB
ANION GAP SERPL CALC-SCNC: 3 MMOL/L (ref 7–16)
BUN SERPL-MCNC: 15 MG/DL (ref 8–23)
CALCIUM SERPL-MCNC: 9 MG/DL (ref 8.3–10.4)
CHLORIDE SERPL-SCNC: 104 MMOL/L (ref 98–107)
CO2 SERPL-SCNC: 31 MMOL/L (ref 21–32)
CREAT SERPL-MCNC: 0.96 MG/DL (ref 0.8–1.5)
GLUCOSE SERPL-MCNC: 119 MG/DL (ref 65–100)
HCT VFR BLD AUTO: 42.6 % (ref 41.1–50.3)
HGB BLD-MCNC: 14 G/DL (ref 13.6–17.2)
POTASSIUM SERPL-SCNC: 4.1 MMOL/L (ref 3.5–5.1)
SODIUM SERPL-SCNC: 138 MMOL/L (ref 136–145)

## 2020-09-24 PROCEDURE — 36415 COLL VENOUS BLD VENIPUNCTURE: CPT

## 2020-09-24 PROCEDURE — 85018 HEMOGLOBIN: CPT

## 2020-09-24 PROCEDURE — 80048 BASIC METABOLIC PNL TOTAL CA: CPT

## 2020-11-17 PROBLEM — I48.0 PAROXYSMAL ATRIAL FIBRILLATION (HCC): Status: ACTIVE | Noted: 2020-01-01

## 2021-01-01 ENCOUNTER — APPOINTMENT (OUTPATIENT)
Dept: ULTRASOUND IMAGING | Age: 86
DRG: 308 | End: 2021-01-01
Attending: FAMILY MEDICINE
Payer: MEDICARE

## 2021-01-01 ENCOUNTER — HOSPITAL ENCOUNTER (INPATIENT)
Age: 86
LOS: 10 days | DRG: 308 | End: 2021-10-18
Attending: EMERGENCY MEDICINE | Admitting: FAMILY MEDICINE
Payer: MEDICARE

## 2021-01-01 ENCOUNTER — HOSPITAL ENCOUNTER (INPATIENT)
Age: 86
LOS: 6 days | Discharge: NURSING, REHAB OR LONG-TERM CARE | DRG: 871 | End: 2021-08-24
Attending: EMERGENCY MEDICINE | Admitting: INTERNAL MEDICINE
Payer: MEDICARE

## 2021-01-01 ENCOUNTER — APPOINTMENT (OUTPATIENT)
Dept: GENERAL RADIOLOGY | Age: 86
DRG: 871 | End: 2021-01-01
Attending: INTERNAL MEDICINE
Payer: MEDICARE

## 2021-01-01 ENCOUNTER — APPOINTMENT (OUTPATIENT)
Dept: GENERAL RADIOLOGY | Age: 86
DRG: 308 | End: 2021-01-01
Attending: FAMILY MEDICINE
Payer: MEDICARE

## 2021-01-01 ENCOUNTER — APPOINTMENT (OUTPATIENT)
Dept: GENERAL RADIOLOGY | Age: 86
DRG: 308 | End: 2021-01-01
Attending: INTERNAL MEDICINE
Payer: MEDICARE

## 2021-01-01 ENCOUNTER — APPOINTMENT (OUTPATIENT)
Dept: CT IMAGING | Age: 86
DRG: 308 | End: 2021-01-01
Attending: FAMILY MEDICINE
Payer: MEDICARE

## 2021-01-01 ENCOUNTER — HOSPITAL ENCOUNTER (OUTPATIENT)
Dept: LAB | Age: 86
Discharge: HOME | End: 2021-10-06

## 2021-01-01 ENCOUNTER — APPOINTMENT (OUTPATIENT)
Dept: NON INVASIVE DIAGNOSTICS | Age: 86
DRG: 308 | End: 2021-01-01
Attending: INTERNAL MEDICINE
Payer: MEDICARE

## 2021-01-01 ENCOUNTER — APPOINTMENT (OUTPATIENT)
Dept: GENERAL RADIOLOGY | Age: 86
DRG: 871 | End: 2021-01-01
Attending: EMERGENCY MEDICINE
Payer: MEDICARE

## 2021-01-01 ENCOUNTER — APPOINTMENT (OUTPATIENT)
Dept: GENERAL RADIOLOGY | Age: 86
DRG: 308 | End: 2021-01-01
Attending: EMERGENCY MEDICINE
Payer: MEDICARE

## 2021-01-01 ENCOUNTER — HOSPICE ADMISSION (OUTPATIENT)
Dept: HOSPICE | Facility: HOSPICE | Age: 86
End: 2021-01-01
Payer: MEDICARE

## 2021-01-01 VITALS
WEIGHT: 288.1 LBS | RESPIRATION RATE: 35 BRPM | DIASTOLIC BLOOD PRESSURE: 68 MMHG | BODY MASS INDEX: 38.18 KG/M2 | HEART RATE: 86 BPM | TEMPERATURE: 98.3 F | OXYGEN SATURATION: 99 % | HEIGHT: 73 IN | SYSTOLIC BLOOD PRESSURE: 110 MMHG

## 2021-01-01 VITALS
HEART RATE: 66 BPM | BODY MASS INDEX: 35.94 KG/M2 | DIASTOLIC BLOOD PRESSURE: 90 MMHG | OXYGEN SATURATION: 95 % | SYSTOLIC BLOOD PRESSURE: 134 MMHG | RESPIRATION RATE: 20 BRPM | WEIGHT: 271.17 LBS | TEMPERATURE: 97.8 F | HEIGHT: 73 IN

## 2021-01-01 DIAGNOSIS — N17.0 ACUTE KIDNEY INJURY (AKI) WITH ACUTE TUBULAR NECROSIS (ATN) (HCC): ICD-10-CM

## 2021-01-01 DIAGNOSIS — N17.9 AKI (ACUTE KIDNEY INJURY) (HCC): ICD-10-CM

## 2021-01-01 DIAGNOSIS — J96.21 ACUTE ON CHRONIC RESPIRATORY FAILURE WITH HYPOXIA AND HYPERCAPNIA (HCC): ICD-10-CM

## 2021-01-01 DIAGNOSIS — U07.1 SEPSIS DUE TO COVID-19 (HCC): ICD-10-CM

## 2021-01-01 DIAGNOSIS — R54 FRAILTY: ICD-10-CM

## 2021-01-01 DIAGNOSIS — I10 PRIMARY HYPERTENSION: Chronic | ICD-10-CM

## 2021-01-01 DIAGNOSIS — I43 CARDIOMYOPATHY DUE TO COVID-19 VIRUS (HCC): ICD-10-CM

## 2021-01-01 DIAGNOSIS — K72.00 ACUTE LIVER FAILURE WITHOUT HEPATIC COMA: ICD-10-CM

## 2021-01-01 DIAGNOSIS — I48.0 PAROXYSMAL ATRIAL FIBRILLATION (HCC): ICD-10-CM

## 2021-01-01 DIAGNOSIS — A41.89 SEPSIS DUE TO COVID-19 (HCC): ICD-10-CM

## 2021-01-01 DIAGNOSIS — I50.43 ACUTE ON CHRONIC COMBINED SYSTOLIC AND DIASTOLIC CONGESTIVE HEART FAILURE (HCC): ICD-10-CM

## 2021-01-01 DIAGNOSIS — R57.0 CARDIOGENIC SHOCK (HCC): ICD-10-CM

## 2021-01-01 DIAGNOSIS — G93.40 ENCEPHALOPATHY: ICD-10-CM

## 2021-01-01 DIAGNOSIS — U07.1 PNEUMONIA DUE TO COVID-19 VIRUS: ICD-10-CM

## 2021-01-01 DIAGNOSIS — U07.1 ACUTE HYPOXEMIC RESPIRATORY FAILURE DUE TO COVID-19 (HCC): ICD-10-CM

## 2021-01-01 DIAGNOSIS — I42.8 NONISCHEMIC CARDIOMYOPATHY (HCC): ICD-10-CM

## 2021-01-01 DIAGNOSIS — E78.01 FAMILIAL HYPERCHOLESTEROLEMIA: Chronic | ICD-10-CM

## 2021-01-01 DIAGNOSIS — I10 ESSENTIAL HYPERTENSION: ICD-10-CM

## 2021-01-01 DIAGNOSIS — Z51.5 ENCOUNTER FOR PALLIATIVE CARE: ICD-10-CM

## 2021-01-01 DIAGNOSIS — R41.82 ALTERED MENTAL STATUS, UNSPECIFIED ALTERED MENTAL STATUS TYPE: ICD-10-CM

## 2021-01-01 DIAGNOSIS — Z86.16 PERSONAL HISTORY OF COVID-19: Chronic | ICD-10-CM

## 2021-01-01 DIAGNOSIS — J96.22 ACUTE ON CHRONIC RESPIRATORY FAILURE WITH HYPOXIA AND HYPERCAPNIA (HCC): ICD-10-CM

## 2021-01-01 DIAGNOSIS — E66.01 SEVERE OBESITY (BMI 35.0-39.9) WITH COMORBIDITY (HCC): ICD-10-CM

## 2021-01-01 DIAGNOSIS — U07.1 CARDIOMYOPATHY DUE TO COVID-19 VIRUS (HCC): ICD-10-CM

## 2021-01-01 DIAGNOSIS — I48.91 ATRIAL FIBRILLATION WITH RVR (HCC): ICD-10-CM

## 2021-01-01 DIAGNOSIS — J96.01 ACUTE HYPOXEMIC RESPIRATORY FAILURE DUE TO COVID-19 (HCC): ICD-10-CM

## 2021-01-01 DIAGNOSIS — I50.20 HFREF (HEART FAILURE WITH REDUCED EJECTION FRACTION) (HCC): Chronic | ICD-10-CM

## 2021-01-01 DIAGNOSIS — J12.82 PNEUMONIA DUE TO COVID-19 VIRUS: ICD-10-CM

## 2021-01-01 DIAGNOSIS — A41.9 SEPSIS, DUE TO UNSPECIFIED ORGANISM, UNSPECIFIED WHETHER ACUTE ORGAN DYSFUNCTION PRESENT (HCC): Primary | ICD-10-CM

## 2021-01-01 DIAGNOSIS — J81.0 ACUTE PULMONARY EDEMA (HCC): ICD-10-CM

## 2021-01-01 DIAGNOSIS — I50.23 ACUTE ON CHRONIC SYSTOLIC HEART FAILURE (HCC): ICD-10-CM

## 2021-01-01 DIAGNOSIS — J96.01 ACUTE RESPIRATORY FAILURE WITH HYPOXIA (HCC): Primary | ICD-10-CM

## 2021-01-01 LAB
ALBUMIN SERPL-MCNC: 2.5 G/DL (ref 3.2–4.6)
ALBUMIN SERPL-MCNC: 2.6 G/DL (ref 3.2–4.6)
ALBUMIN SERPL-MCNC: 2.7 G/DL (ref 3.2–4.6)
ALBUMIN SERPL-MCNC: 2.8 G/DL (ref 3.2–4.6)
ALBUMIN SERPL-MCNC: 2.9 G/DL (ref 3.2–4.6)
ALBUMIN SERPL-MCNC: 2.9 G/DL (ref 3.2–4.6)
ALBUMIN SERPL-MCNC: 3 G/DL (ref 3.2–4.6)
ALBUMIN SERPL-MCNC: 3.1 G/DL (ref 3.2–4.6)
ALBUMIN SERPL-MCNC: 3.1 G/DL (ref 3.2–4.6)
ALBUMIN SERPL-MCNC: 3.3 G/DL (ref 3.2–4.6)
ALBUMIN/GLOB SERPL: 0.8 {RATIO} (ref 1.2–3.5)
ALBUMIN/GLOB SERPL: 0.9 {RATIO} (ref 1.2–3.5)
ALBUMIN/GLOB SERPL: 1 {RATIO} (ref 1.2–3.5)
ALBUMIN/GLOB SERPL: 1 {RATIO} (ref 1.2–3.5)
ALBUMIN/GLOB SERPL: 1.1 {RATIO} (ref 1.2–3.5)
ALBUMIN/GLOB SERPL: 1.1 {RATIO} (ref 1.2–3.5)
ALBUMIN/GLOB SERPL: 1.2 {RATIO} (ref 1.2–3.5)
ALBUMIN/GLOB SERPL: 1.4 {RATIO} (ref 1.2–3.5)
ALP SERPL-CCNC: 111 U/L (ref 50–136)
ALP SERPL-CCNC: 117 U/L (ref 50–136)
ALP SERPL-CCNC: 47 U/L (ref 50–136)
ALP SERPL-CCNC: 47 U/L (ref 50–136)
ALP SERPL-CCNC: 49 U/L (ref 50–136)
ALP SERPL-CCNC: 49 U/L (ref 50–136)
ALP SERPL-CCNC: 57 U/L (ref 50–136)
ALP SERPL-CCNC: 62 U/L (ref 50–136)
ALP SERPL-CCNC: 65 U/L (ref 50–136)
ALP SERPL-CCNC: 68 U/L (ref 50–136)
ALP SERPL-CCNC: 80 U/L (ref 50–136)
ALP SERPL-CCNC: 86 U/L (ref 50–136)
ALP SERPL-CCNC: 90 U/L (ref 50–136)
ALP SERPL-CCNC: 92 U/L (ref 50–136)
ALT SERPL-CCNC: 112 U/L (ref 12–65)
ALT SERPL-CCNC: 221 U/L (ref 12–65)
ALT SERPL-CCNC: 274 U/L (ref 12–65)
ALT SERPL-CCNC: 30 U/L (ref 12–65)
ALT SERPL-CCNC: 354 U/L (ref 12–65)
ALT SERPL-CCNC: 355 U/L (ref 12–65)
ALT SERPL-CCNC: 396 U/L (ref 12–65)
ALT SERPL-CCNC: 48 U/L (ref 12–65)
ALT SERPL-CCNC: 537 U/L (ref 12–65)
ALT SERPL-CCNC: 54 U/L (ref 12–65)
ALT SERPL-CCNC: 648 U/L (ref 12–65)
ALT SERPL-CCNC: 891 U/L (ref 12–65)
ALT SERPL-CCNC: 91 U/L (ref 12–65)
ALT SERPL-CCNC: 990 U/L (ref 12–65)
AMMONIA PLAS-SCNC: 26 UMOL/L (ref 11–32)
AMMONIA PLAS-SCNC: 55 UMOL/L (ref 11–32)
ANION GAP SERPL CALC-SCNC: 2 MMOL/L (ref 7–16)
ANION GAP SERPL CALC-SCNC: 3 MMOL/L (ref 7–16)
ANION GAP SERPL CALC-SCNC: 3 MMOL/L (ref 7–16)
ANION GAP SERPL CALC-SCNC: 4 MMOL/L (ref 7–16)
ANION GAP SERPL CALC-SCNC: 6 MMOL/L (ref 7–16)
ANION GAP SERPL CALC-SCNC: 7 MMOL/L (ref 7–16)
ANION GAP SERPL CALC-SCNC: 9 MMOL/L (ref 7–16)
ANION GAP SERPL CALC-SCNC: 9 MMOL/L (ref 7–16)
APPEARANCE UR: CLEAR
APTT PPP: 33.4 SEC (ref 24.1–35.1)
ARTERIAL PATENCY WRIST A: POSITIVE
AST SERPL-CCNC: 110 U/L (ref 15–37)
AST SERPL-CCNC: 139 U/L (ref 15–37)
AST SERPL-CCNC: 24 U/L (ref 15–37)
AST SERPL-CCNC: 241 U/L (ref 15–37)
AST SERPL-CCNC: 241 U/L (ref 15–37)
AST SERPL-CCNC: 297 U/L (ref 15–37)
AST SERPL-CCNC: 336 U/L (ref 15–37)
AST SERPL-CCNC: 374 U/L (ref 15–37)
AST SERPL-CCNC: 45 U/L (ref 15–37)
AST SERPL-CCNC: 50 U/L (ref 15–37)
AST SERPL-CCNC: 63 U/L (ref 15–37)
AST SERPL-CCNC: 64 U/L (ref 15–37)
AST SERPL-CCNC: 91 U/L (ref 15–37)
AST SERPL-CCNC: 92 U/L (ref 15–37)
ATRIAL RATE: 102 BPM
ATRIAL RATE: 119 BPM
ATRIAL RATE: 125 BPM
ATRIAL RATE: 394 BPM
BACTERIA SPEC CULT: NORMAL
BACTERIA URNS QL MICRO: ABNORMAL /HPF
BASE DEFICIT BLD-SCNC: 0.4 MMOL/L
BASE DEFICIT BLD-SCNC: 0.5 MMOL/L
BASE DEFICIT BLD-SCNC: 1.9 MMOL/L
BASE DEFICIT BLD-SCNC: 2 MMOL/L
BASE DEFICIT BLD-SCNC: 2 MMOL/L
BASE DEFICIT BLD-SCNC: 2.6 MMOL/L
BASE DEFICIT BLD-SCNC: 4 MMOL/L
BASE EXCESS BLD CALC-SCNC: 0.8 MMOL/L
BASE EXCESS BLD CALC-SCNC: 3.1 MMOL/L
BASOPHILS # BLD: 0 K/UL (ref 0–0.2)
BASOPHILS # BLD: 0.1 K/UL (ref 0–0.2)
BASOPHILS NFR BLD: 0 % (ref 0–2)
BASOPHILS NFR BLD: 1 % (ref 0–2)
BDY SITE: ABNORMAL
BDY SITE: NORMAL
BILIRUB DIRECT SERPL-MCNC: 0.8 MG/DL
BILIRUB DIRECT SERPL-MCNC: 0.9 MG/DL
BILIRUB DIRECT SERPL-MCNC: 0.9 MG/DL
BILIRUB DIRECT SERPL-MCNC: 1 MG/DL
BILIRUB SERPL-MCNC: 0.6 MG/DL (ref 0.2–1.1)
BILIRUB SERPL-MCNC: 0.7 MG/DL (ref 0.2–1.1)
BILIRUB SERPL-MCNC: 0.8 MG/DL (ref 0.2–1.1)
BILIRUB SERPL-MCNC: 0.9 MG/DL (ref 0.2–1.1)
BILIRUB SERPL-MCNC: 1 MG/DL (ref 0.2–1.1)
BILIRUB SERPL-MCNC: 1.4 MG/DL (ref 0.2–1.1)
BILIRUB SERPL-MCNC: 1.5 MG/DL (ref 0.2–1.1)
BILIRUB SERPL-MCNC: 1.6 MG/DL (ref 0.2–1.1)
BILIRUB SERPL-MCNC: 1.7 MG/DL (ref 0.2–1.1)
BILIRUB SERPL-MCNC: 1.7 MG/DL (ref 0.2–1.1)
BILIRUB SERPL-MCNC: 1.9 MG/DL (ref 0.2–1.1)
BILIRUB UR QL: NEGATIVE
BNP SERPL-MCNC: 7218 PG/ML
BNP SERPL-MCNC: 8992 PG/ML
BNP SERPL-MCNC: ABNORMAL PG/ML
BUN SERPL-MCNC: 19 MG/DL (ref 8–23)
BUN SERPL-MCNC: 27 MG/DL (ref 8–23)
BUN SERPL-MCNC: 31 MG/DL (ref 8–23)
BUN SERPL-MCNC: 32 MG/DL (ref 8–23)
BUN SERPL-MCNC: 32 MG/DL (ref 8–23)
BUN SERPL-MCNC: 33 MG/DL (ref 8–23)
BUN SERPL-MCNC: 34 MG/DL (ref 8–23)
BUN SERPL-MCNC: 37 MG/DL (ref 8–23)
BUN SERPL-MCNC: 38 MG/DL (ref 8–23)
BUN SERPL-MCNC: 39 MG/DL (ref 8–23)
BUN SERPL-MCNC: 41 MG/DL (ref 8–23)
BUN SERPL-MCNC: 44 MG/DL (ref 8–23)
BUN SERPL-MCNC: 57 MG/DL (ref 8–23)
BUN SERPL-MCNC: 59 MG/DL (ref 8–23)
BUN SERPL-MCNC: 65 MG/DL (ref 8–23)
BUN SERPL-MCNC: 68 MG/DL (ref 8–23)
CALCIUM SERPL-MCNC: 8 MG/DL (ref 8.3–10.4)
CALCIUM SERPL-MCNC: 8.4 MG/DL (ref 8.3–10.4)
CALCIUM SERPL-MCNC: 8.4 MG/DL (ref 8.3–10.4)
CALCIUM SERPL-MCNC: 8.5 MG/DL (ref 8.3–10.4)
CALCIUM SERPL-MCNC: 8.6 MG/DL (ref 8.3–10.4)
CALCIUM SERPL-MCNC: 8.8 MG/DL (ref 8.3–10.4)
CALCIUM SERPL-MCNC: 8.9 MG/DL (ref 8.3–10.4)
CALCIUM SERPL-MCNC: 9 MG/DL (ref 8.3–10.4)
CALCIUM SERPL-MCNC: 9.1 MG/DL (ref 8.3–10.4)
CALCIUM SERPL-MCNC: 9.2 MG/DL (ref 8.3–10.4)
CALCIUM SERPL-MCNC: 9.2 MG/DL (ref 8.3–10.4)
CALCIUM SERPL-MCNC: 9.3 MG/DL (ref 8.3–10.4)
CALCIUM SERPL-MCNC: 9.3 MG/DL (ref 8.3–10.4)
CALCIUM SERPL-MCNC: 9.4 MG/DL (ref 8.3–10.4)
CALCIUM SERPL-MCNC: 9.5 MG/DL (ref 8.3–10.4)
CALCIUM SERPL-MCNC: 9.6 MG/DL (ref 8.3–10.4)
CALCULATED R AXIS, ECG10: -177 DEGREES
CALCULATED R AXIS, ECG10: -24 DEGREES
CALCULATED R AXIS, ECG10: -60 DEGREES
CALCULATED R AXIS, ECG10: -64 DEGREES
CALCULATED T AXIS, ECG11: 115 DEGREES
CALCULATED T AXIS, ECG11: 118 DEGREES
CALCULATED T AXIS, ECG11: 150 DEGREES
CALCULATED T AXIS, ECG11: 26 DEGREES
CASTS URNS QL MICRO: ABNORMAL /LPF
CHLORIDE SERPL-SCNC: 102 MMOL/L (ref 98–107)
CHLORIDE SERPL-SCNC: 103 MMOL/L (ref 98–107)
CHLORIDE SERPL-SCNC: 103 MMOL/L (ref 98–107)
CHLORIDE SERPL-SCNC: 104 MMOL/L (ref 98–107)
CHLORIDE SERPL-SCNC: 105 MMOL/L (ref 98–107)
CHLORIDE SERPL-SCNC: 105 MMOL/L (ref 98–107)
CHLORIDE SERPL-SCNC: 106 MMOL/L (ref 98–107)
CHLORIDE SERPL-SCNC: 106 MMOL/L (ref 98–107)
CHLORIDE SERPL-SCNC: 107 MMOL/L (ref 98–107)
CHLORIDE SERPL-SCNC: 108 MMOL/L (ref 98–107)
CHLORIDE SERPL-SCNC: 109 MMOL/L (ref 98–107)
CHLORIDE SERPL-SCNC: 113 MMOL/L (ref 98–107)
CO2 BLD-SCNC: 27 MMOL/L (ref 13–23)
CO2 BLD-SCNC: 30 MMOL/L (ref 13–23)
CO2 BLD-SCNC: 30 MMOL/L (ref 13–23)
CO2 SERPL-SCNC: 25 MMOL/L (ref 21–32)
CO2 SERPL-SCNC: 26 MMOL/L (ref 21–32)
CO2 SERPL-SCNC: 27 MMOL/L (ref 21–32)
CO2 SERPL-SCNC: 28 MMOL/L (ref 21–32)
CO2 SERPL-SCNC: 28 MMOL/L (ref 21–32)
CO2 SERPL-SCNC: 29 MMOL/L (ref 21–32)
CO2 SERPL-SCNC: 30 MMOL/L (ref 21–32)
CO2 SERPL-SCNC: 32 MMOL/L (ref 21–32)
COLOR UR: ABNORMAL
CORTIS AM PEAK SERPL-MCNC: 54 UG/DL (ref 7–25)
CORTIS BS SERPL-MCNC: 38.4 UG/DL
COVID-19 RAPID TEST, COVR: DETECTED
COVID-19 RAPID TEST, COVR: NOT DETECTED
CREAT SERPL-MCNC: 0.88 MG/DL (ref 0.8–1.5)
CREAT SERPL-MCNC: 0.89 MG/DL (ref 0.8–1.5)
CREAT SERPL-MCNC: 0.89 MG/DL (ref 0.8–1.5)
CREAT SERPL-MCNC: 0.9 MG/DL (ref 0.8–1.5)
CREAT SERPL-MCNC: 0.96 MG/DL (ref 0.8–1.5)
CREAT SERPL-MCNC: 1.03 MG/DL (ref 0.8–1.5)
CREAT SERPL-MCNC: 1.08 MG/DL (ref 0.8–1.5)
CREAT SERPL-MCNC: 1.24 MG/DL (ref 0.8–1.5)
CREAT SERPL-MCNC: 1.35 MG/DL (ref 0.8–1.5)
CREAT SERPL-MCNC: 1.38 MG/DL (ref 0.8–1.5)
CREAT SERPL-MCNC: 1.48 MG/DL (ref 0.8–1.5)
CREAT SERPL-MCNC: 1.55 MG/DL (ref 0.8–1.5)
CREAT SERPL-MCNC: 1.63 MG/DL (ref 0.8–1.5)
CREAT SERPL-MCNC: 1.64 MG/DL (ref 0.8–1.5)
CREAT SERPL-MCNC: 1.65 MG/DL (ref 0.8–1.5)
CREAT SERPL-MCNC: 1.81 MG/DL (ref 0.8–1.5)
CREAT SERPL-MCNC: 1.92 MG/DL (ref 0.8–1.5)
CRP SERPL-MCNC: 0.7 MG/DL (ref 0–0.9)
CRP SERPL-MCNC: 4.8 MG/DL (ref 0–0.9)
CRP SERPL-MCNC: 5.2 MG/DL (ref 0–0.9)
CRP SERPL-MCNC: 7 MG/DL (ref 0–0.9)
CRYSTALS URNS QL MICRO: ABNORMAL /LPF
D DIMER PPP FEU-MCNC: 0.83 UG/ML(FEU)
D DIMER PPP FEU-MCNC: 1.48 UG/ML(FEU)
DIAGNOSIS, 93000: NORMAL
DIFFERENTIAL METHOD BLD: ABNORMAL
ECHO AO ASC DIAM: 4.33 CM
ECHO AO ROOT DIAM: 4.5 CM
ECHO AV AREA PEAK VELOCITY: 1.97 CM2
ECHO AV AREA PEAK VELOCITY: 3.31 CM2
ECHO AV AREA PEAK VELOCITY: 3.33 CM2
ECHO AV AREA PEAK VELOCITY: 3.58 CM2
ECHO AV AREA PEAK VELOCITY: 6.04 CM2
ECHO AV PEAK GRADIENT: 5.18 MMHG
ECHO AV PEAK VELOCITY: 106.52 CM/S
ECHO EST RA PRESSURE: 5 MMHG
ECHO LA AREA 4C: 29.09 CM2
ECHO LA MAJOR AXIS: 5.66 CM
ECHO LA MINOR AXIS: 2.38 CM
ECHO LA VOL 2C: 103.25 ML (ref 18–58)
ECHO LA VOL 4C: 95.49 ML (ref 18–58)
ECHO LA VOL BP: 105.75 ML (ref 18–58)
ECHO LA VOL/BSA BIPLANE: 44.43 ML/M2 (ref 16–28)
ECHO LA VOLUME INDEX A2C: 43.38 ML/M2 (ref 16–28)
ECHO LA VOLUME INDEX A4C: 40.12 ML/M2 (ref 16–28)
ECHO LV INTERNAL DIMENSION DIASTOLIC: 6.81 CM (ref 4.2–5.9)
ECHO LV INTERNAL DIMENSION SYSTOLIC: 5.1 CM
ECHO LV IVSD: 1.15 CM (ref 0.6–1)
ECHO LV MASS 2D: 362.7 G (ref 88–224)
ECHO LV MASS INDEX 2D: 152.4 G/M2 (ref 49–115)
ECHO LV POSTERIOR WALL DIASTOLIC: 1.13 CM (ref 0.6–1)
ECHO LVOT DIAM: 2.21 CM
ECHO LVOT PEAK GRADIENT: 4.09 MMHG
ECHO LVOT PEAK VELOCITY: 99.14 CM/S
ECHO MV EROA PISA: 0.16 CM2
ECHO MV REGURGITANT RADIUS PISA: 0.51 CM
ECHO MV REGURGITANT VOLUME: 15.01 ML
ECHO MV REGURGITANT VTIA: 96.13 CM
ECHO RIGHT VENTRICULAR SYSTOLIC PRESSURE (RVSP): 35 MMHG
ECHO RV INTERNAL DIMENSION: 4.46 CM
ECHO RV TAPSE: 1.7 CM (ref 1.5–2)
ECHO TV REGURGITANT MAX VELOCITY: 245.66 CM/S
ECHO TV REGURGITANT MAX VELOCITY: 386.64 CM/S
ECHO TV REGURGITANT PEAK GRADIENT: 24.3 MMHG
EOSINOPHIL # BLD: 0 K/UL (ref 0–0.8)
EOSINOPHIL # BLD: 0.1 K/UL (ref 0–0.8)
EOSINOPHIL NFR BLD: 0 % (ref 0.5–7.8)
EOSINOPHIL NFR BLD: 1 % (ref 0.5–7.8)
EPI CELLS #/AREA URNS HPF: ABNORMAL /HPF
ERYTHROCYTE [DISTWIDTH] IN BLOOD BY AUTOMATED COUNT: 13.2 % (ref 11.9–14.6)
ERYTHROCYTE [DISTWIDTH] IN BLOOD BY AUTOMATED COUNT: 13.5 % (ref 11.9–14.6)
ERYTHROCYTE [DISTWIDTH] IN BLOOD BY AUTOMATED COUNT: 13.6 % (ref 11.9–14.6)
ERYTHROCYTE [DISTWIDTH] IN BLOOD BY AUTOMATED COUNT: 13.6 % (ref 11.9–14.6)
ERYTHROCYTE [DISTWIDTH] IN BLOOD BY AUTOMATED COUNT: 13.9 % (ref 11.9–14.6)
ERYTHROCYTE [DISTWIDTH] IN BLOOD BY AUTOMATED COUNT: 15.9 % (ref 11.9–14.6)
ERYTHROCYTE [DISTWIDTH] IN BLOOD BY AUTOMATED COUNT: 15.9 % (ref 11.9–14.6)
ERYTHROCYTE [DISTWIDTH] IN BLOOD BY AUTOMATED COUNT: 16 % (ref 11.9–14.6)
ERYTHROCYTE [DISTWIDTH] IN BLOOD BY AUTOMATED COUNT: 16.1 % (ref 11.9–14.6)
ERYTHROCYTE [DISTWIDTH] IN BLOOD BY AUTOMATED COUNT: 16.2 % (ref 11.9–14.6)
ERYTHROCYTE [DISTWIDTH] IN BLOOD BY AUTOMATED COUNT: 16.3 % (ref 11.9–14.6)
ERYTHROCYTE [DISTWIDTH] IN BLOOD BY AUTOMATED COUNT: 16.5 % (ref 11.9–14.6)
EST. AVERAGE GLUCOSE BLD GHB EST-MCNC: 140 MG/DL
FIO2, L/MIN - FIO2P: 10
FIO2, L/MIN - FIO2P: 4
GAS FLOW.O2 O2 DELIVERY SYS: ABNORMAL L/MIN
GAS FLOW.O2 O2 DELIVERY SYS: NORMAL L/MIN
GLOBULIN SER CALC-MCNC: 2.3 G/DL (ref 2.3–3.5)
GLOBULIN SER CALC-MCNC: 2.4 G/DL (ref 2.3–3.5)
GLOBULIN SER CALC-MCNC: 2.6 G/DL (ref 2.3–3.5)
GLOBULIN SER CALC-MCNC: 2.7 G/DL (ref 2.3–3.5)
GLOBULIN SER CALC-MCNC: 2.8 G/DL (ref 2.3–3.5)
GLOBULIN SER CALC-MCNC: 2.9 G/DL (ref 2.3–3.5)
GLOBULIN SER CALC-MCNC: 2.9 G/DL (ref 2.3–3.5)
GLOBULIN SER CALC-MCNC: 3.1 G/DL (ref 2.3–3.5)
GLOBULIN SER CALC-MCNC: 3.2 G/DL (ref 2.3–3.5)
GLOBULIN SER CALC-MCNC: 3.4 G/DL (ref 2.3–3.5)
GLOBULIN SER CALC-MCNC: 3.7 G/DL (ref 2.3–3.5)
GLOBULIN SER CALC-MCNC: 3.8 G/DL (ref 2.3–3.5)
GLUCOSE BLD STRIP.AUTO-MCNC: 105 MG/DL (ref 65–100)
GLUCOSE BLD STRIP.AUTO-MCNC: 115 MG/DL (ref 65–100)
GLUCOSE BLD STRIP.AUTO-MCNC: 116 MG/DL (ref 65–100)
GLUCOSE BLD STRIP.AUTO-MCNC: 117 MG/DL (ref 65–100)
GLUCOSE BLD STRIP.AUTO-MCNC: 120 MG/DL (ref 65–100)
GLUCOSE BLD STRIP.AUTO-MCNC: 123 MG/DL (ref 65–100)
GLUCOSE BLD STRIP.AUTO-MCNC: 129 MG/DL (ref 65–100)
GLUCOSE BLD STRIP.AUTO-MCNC: 130 MG/DL (ref 65–100)
GLUCOSE BLD STRIP.AUTO-MCNC: 131 MG/DL (ref 65–100)
GLUCOSE BLD STRIP.AUTO-MCNC: 132 MG/DL (ref 65–100)
GLUCOSE BLD STRIP.AUTO-MCNC: 135 MG/DL (ref 65–100)
GLUCOSE BLD STRIP.AUTO-MCNC: 136 MG/DL (ref 65–100)
GLUCOSE BLD STRIP.AUTO-MCNC: 138 MG/DL (ref 65–100)
GLUCOSE BLD STRIP.AUTO-MCNC: 138 MG/DL (ref 65–100)
GLUCOSE BLD STRIP.AUTO-MCNC: 139 MG/DL (ref 65–100)
GLUCOSE BLD STRIP.AUTO-MCNC: 140 MG/DL (ref 65–100)
GLUCOSE BLD STRIP.AUTO-MCNC: 141 MG/DL (ref 65–100)
GLUCOSE BLD STRIP.AUTO-MCNC: 142 MG/DL (ref 65–100)
GLUCOSE BLD STRIP.AUTO-MCNC: 143 MG/DL (ref 65–100)
GLUCOSE BLD STRIP.AUTO-MCNC: 146 MG/DL (ref 65–100)
GLUCOSE BLD STRIP.AUTO-MCNC: 146 MG/DL (ref 65–100)
GLUCOSE BLD STRIP.AUTO-MCNC: 147 MG/DL (ref 65–100)
GLUCOSE BLD STRIP.AUTO-MCNC: 148 MG/DL (ref 65–100)
GLUCOSE BLD STRIP.AUTO-MCNC: 150 MG/DL (ref 65–100)
GLUCOSE BLD STRIP.AUTO-MCNC: 151 MG/DL (ref 65–100)
GLUCOSE BLD STRIP.AUTO-MCNC: 151 MG/DL (ref 65–100)
GLUCOSE BLD STRIP.AUTO-MCNC: 152 MG/DL (ref 65–100)
GLUCOSE BLD STRIP.AUTO-MCNC: 153 MG/DL (ref 65–100)
GLUCOSE BLD STRIP.AUTO-MCNC: 153 MG/DL (ref 65–100)
GLUCOSE BLD STRIP.AUTO-MCNC: 154 MG/DL (ref 65–100)
GLUCOSE BLD STRIP.AUTO-MCNC: 155 MG/DL (ref 65–100)
GLUCOSE BLD STRIP.AUTO-MCNC: 155 MG/DL (ref 65–100)
GLUCOSE BLD STRIP.AUTO-MCNC: 157 MG/DL (ref 65–100)
GLUCOSE BLD STRIP.AUTO-MCNC: 158 MG/DL (ref 65–100)
GLUCOSE BLD STRIP.AUTO-MCNC: 159 MG/DL (ref 65–100)
GLUCOSE BLD STRIP.AUTO-MCNC: 161 MG/DL (ref 65–100)
GLUCOSE BLD STRIP.AUTO-MCNC: 162 MG/DL (ref 65–100)
GLUCOSE BLD STRIP.AUTO-MCNC: 164 MG/DL (ref 65–100)
GLUCOSE BLD STRIP.AUTO-MCNC: 166 MG/DL (ref 65–100)
GLUCOSE BLD STRIP.AUTO-MCNC: 166 MG/DL (ref 65–100)
GLUCOSE BLD STRIP.AUTO-MCNC: 168 MG/DL (ref 65–100)
GLUCOSE BLD STRIP.AUTO-MCNC: 168 MG/DL (ref 65–100)
GLUCOSE BLD STRIP.AUTO-MCNC: 170 MG/DL (ref 65–100)
GLUCOSE BLD STRIP.AUTO-MCNC: 174 MG/DL (ref 65–100)
GLUCOSE BLD STRIP.AUTO-MCNC: 176 MG/DL (ref 65–100)
GLUCOSE BLD STRIP.AUTO-MCNC: 183 MG/DL (ref 65–100)
GLUCOSE BLD STRIP.AUTO-MCNC: 187 MG/DL (ref 65–100)
GLUCOSE BLD STRIP.AUTO-MCNC: 196 MG/DL (ref 65–100)
GLUCOSE BLD STRIP.AUTO-MCNC: 198 MG/DL (ref 65–100)
GLUCOSE BLD STRIP.AUTO-MCNC: 272 MG/DL (ref 65–100)
GLUCOSE SERPL-MCNC: 106 MG/DL (ref 65–100)
GLUCOSE SERPL-MCNC: 122 MG/DL (ref 65–100)
GLUCOSE SERPL-MCNC: 135 MG/DL (ref 65–100)
GLUCOSE SERPL-MCNC: 143 MG/DL (ref 65–100)
GLUCOSE SERPL-MCNC: 143 MG/DL (ref 65–100)
GLUCOSE SERPL-MCNC: 147 MG/DL (ref 65–100)
GLUCOSE SERPL-MCNC: 151 MG/DL (ref 65–100)
GLUCOSE SERPL-MCNC: 153 MG/DL (ref 65–100)
GLUCOSE SERPL-MCNC: 154 MG/DL (ref 65–100)
GLUCOSE SERPL-MCNC: 160 MG/DL (ref 65–100)
GLUCOSE SERPL-MCNC: 161 MG/DL (ref 65–100)
GLUCOSE SERPL-MCNC: 165 MG/DL (ref 65–100)
GLUCOSE SERPL-MCNC: 169 MG/DL (ref 65–100)
GLUCOSE SERPL-MCNC: 180 MG/DL (ref 65–100)
GLUCOSE SERPL-MCNC: 252 MG/DL (ref 65–100)
GLUCOSE SERPL-MCNC: 84 MG/DL (ref 65–100)
GLUCOSE UR STRIP.AUTO-MCNC: NEGATIVE MG/DL
GRAM STN SPEC: NORMAL
HAV IGM SER QL: NONREACTIVE
HBA1C MFR BLD: 6.5 % (ref 4.2–6.3)
HBV CORE IGM SER QL: NONREACTIVE
HBV SURFACE AG SER QL: NONREACTIVE
HCO3 BLD-SCNC: 22.3 MMOL/L (ref 22–26)
HCO3 BLD-SCNC: 23.4 MMOL/L (ref 22–26)
HCO3 BLD-SCNC: 24.5 MMOL/L (ref 22–26)
HCO3 BLD-SCNC: 24.6 MMOL/L (ref 22–26)
HCO3 BLD-SCNC: 25.2 MMOL/L (ref 22–26)
HCO3 BLD-SCNC: 25.8 MMOL/L (ref 22–26)
HCO3 BLD-SCNC: 26.6 MMOL/L (ref 22–26)
HCO3 BLD-SCNC: 28.7 MMOL/L (ref 22–26)
HCO3 BLD-SCNC: 29.2 MMOL/L (ref 22–26)
HCT VFR BLD AUTO: 33.2 % (ref 41.1–50.3)
HCT VFR BLD AUTO: 33.5 % (ref 41.1–50.3)
HCT VFR BLD AUTO: 33.5 % (ref 41.1–50.3)
HCT VFR BLD AUTO: 35.1 % (ref 41.1–50.3)
HCT VFR BLD AUTO: 35.6 % (ref 41.1–50.3)
HCT VFR BLD AUTO: 36 % (ref 41.1–50.3)
HCT VFR BLD AUTO: 37.7 % (ref 41.1–50.3)
HCT VFR BLD AUTO: 38.5 % (ref 41.1–50.3)
HCT VFR BLD AUTO: 38.9 % (ref 41.1–50.3)
HCT VFR BLD AUTO: 39.8 % (ref 41.1–50.3)
HCT VFR BLD AUTO: 40.5 % (ref 41.1–50.3)
HCT VFR BLD AUTO: 40.6 % (ref 41.1–50.3)
HCT VFR BLD AUTO: 42 % (ref 41.1–50.3)
HCT VFR BLD AUTO: 42.6 % (ref 41.1–50.3)
HCT VFR BLD AUTO: 43.5 % (ref 41.1–50.3)
HCT VFR BLD AUTO: 43.6 % (ref 41.1–50.3)
HCV AB SER QL: NONREACTIVE
HGB BLD-MCNC: 10.1 G/DL (ref 13.6–17.2)
HGB BLD-MCNC: 10.2 G/DL (ref 13.6–17.2)
HGB BLD-MCNC: 10.7 G/DL (ref 13.6–17.2)
HGB BLD-MCNC: 10.8 G/DL (ref 13.6–17.2)
HGB BLD-MCNC: 11 G/DL (ref 13.6–17.2)
HGB BLD-MCNC: 11.4 G/DL (ref 13.6–17.2)
HGB BLD-MCNC: 11.8 G/DL (ref 13.6–17.2)
HGB BLD-MCNC: 12 G/DL (ref 13.6–17.2)
HGB BLD-MCNC: 12.6 G/DL (ref 13.6–17.2)
HGB BLD-MCNC: 12.9 G/DL (ref 13.6–17.2)
HGB BLD-MCNC: 13.1 G/DL (ref 13.6–17.2)
HGB BLD-MCNC: 13.4 G/DL (ref 13.6–17.2)
HGB BLD-MCNC: 13.4 G/DL (ref 13.6–17.2)
HGB BLD-MCNC: 13.8 G/DL (ref 13.6–17.2)
HGB BLD-MCNC: 14.1 G/DL (ref 13.6–17.2)
HGB BLD-MCNC: 9.9 G/DL (ref 13.6–17.2)
HGB UR QL STRIP: ABNORMAL
IMM GRANULOCYTES # BLD AUTO: 0.1 K/UL (ref 0–0.5)
IMM GRANULOCYTES # BLD AUTO: 0.2 K/UL (ref 0–0.5)
IMM GRANULOCYTES # BLD AUTO: 0.2 K/UL (ref 0–0.5)
IMM GRANULOCYTES # BLD AUTO: 0.4 K/UL (ref 0–0.5)
IMM GRANULOCYTES # BLD AUTO: 0.5 K/UL (ref 0–0.5)
IMM GRANULOCYTES # BLD AUTO: 0.6 K/UL (ref 0–0.5)
IMM GRANULOCYTES NFR BLD AUTO: 1 % (ref 0–5)
IMM GRANULOCYTES NFR BLD AUTO: 2 % (ref 0–5)
IMM GRANULOCYTES NFR BLD AUTO: 2 % (ref 0–5)
IMM GRANULOCYTES NFR BLD AUTO: 3 % (ref 0–5)
IMM GRANULOCYTES NFR BLD AUTO: 3 % (ref 0–5)
IMM GRANULOCYTES NFR BLD AUTO: 5 % (ref 0–5)
INR PPP: 1.4
INSPIRATION.DURATION SETTING TIME VENT: 0.9 SEC
INSPIRATION.DURATION SETTING TIME VENT: 1 SEC
INSPIRATION.DURATION SETTING TIME VENT: 1 SEC
KETONES UR QL STRIP.AUTO: ABNORMAL MG/DL
LACTATE SERPL-SCNC: 1 MMOL/L (ref 0.4–2)
LACTATE SERPL-SCNC: 1.2 MMOL/L (ref 0.4–2)
LACTATE SERPL-SCNC: 1.5 MMOL/L (ref 0.4–2)
LACTATE SERPL-SCNC: 2 MMOL/L (ref 0.4–2)
LACTATE SERPL-SCNC: 2.1 MMOL/L (ref 0.4–2)
LACTATE SERPL-SCNC: 2.2 MMOL/L (ref 0.4–2)
LACTATE SERPL-SCNC: 3.1 MMOL/L (ref 0.4–2)
LEUKOCYTE ESTERASE UR QL STRIP.AUTO: ABNORMAL
LIPASE SERPL-CCNC: 338 U/L (ref 73–393)
LYMPHOCYTES # BLD: 0.7 K/UL (ref 0.5–4.6)
LYMPHOCYTES # BLD: 0.7 K/UL (ref 0.5–4.6)
LYMPHOCYTES # BLD: 0.8 K/UL (ref 0.5–4.6)
LYMPHOCYTES # BLD: 0.9 K/UL (ref 0.5–4.6)
LYMPHOCYTES # BLD: 2.3 K/UL (ref 0.5–4.6)
LYMPHOCYTES # BLD: 2.6 K/UL (ref 0.5–4.6)
LYMPHOCYTES NFR BLD: 11 % (ref 13–44)
LYMPHOCYTES NFR BLD: 12 % (ref 13–44)
LYMPHOCYTES NFR BLD: 14 % (ref 13–44)
LYMPHOCYTES NFR BLD: 19 % (ref 13–44)
LYMPHOCYTES NFR BLD: 33 % (ref 13–44)
LYMPHOCYTES NFR BLD: 6 % (ref 13–44)
LYMPHOCYTES NFR BLD: 7 % (ref 13–44)
LYMPHOCYTES NFR BLD: 8 % (ref 13–44)
MAGNESIUM SERPL-MCNC: 2.2 MG/DL (ref 1.8–2.4)
MAGNESIUM SERPL-MCNC: 2.3 MG/DL (ref 1.8–2.4)
MAGNESIUM SERPL-MCNC: 2.3 MG/DL (ref 1.8–2.4)
MAGNESIUM SERPL-MCNC: 2.4 MG/DL (ref 1.8–2.4)
MAGNESIUM SERPL-MCNC: 2.4 MG/DL (ref 1.8–2.4)
MAGNESIUM SERPL-MCNC: 2.5 MG/DL (ref 1.8–2.4)
MAGNESIUM SERPL-MCNC: 2.5 MG/DL (ref 1.8–2.4)
MAGNESIUM SERPL-MCNC: 2.6 MG/DL (ref 1.8–2.4)
MCH RBC QN AUTO: 30.2 PG (ref 26.1–32.9)
MCH RBC QN AUTO: 30.7 PG (ref 26.1–32.9)
MCH RBC QN AUTO: 31 PG (ref 26.1–32.9)
MCH RBC QN AUTO: 31.1 PG (ref 26.1–32.9)
MCH RBC QN AUTO: 31.2 PG (ref 26.1–32.9)
MCH RBC QN AUTO: 31.3 PG (ref 26.1–32.9)
MCH RBC QN AUTO: 31.3 PG (ref 26.1–32.9)
MCH RBC QN AUTO: 31.4 PG (ref 26.1–32.9)
MCH RBC QN AUTO: 31.5 PG (ref 26.1–32.9)
MCH RBC QN AUTO: 31.5 PG (ref 26.1–32.9)
MCH RBC QN AUTO: 31.6 PG (ref 26.1–32.9)
MCH RBC QN AUTO: 31.6 PG (ref 26.1–32.9)
MCH RBC QN AUTO: 31.7 PG (ref 26.1–32.9)
MCH RBC QN AUTO: 32.3 PG (ref 26.1–32.9)
MCH RBC QN AUTO: 32.3 PG (ref 26.1–32.9)
MCH RBC QN AUTO: 32.6 PG (ref 26.1–32.9)
MCHC RBC AUTO-ENTMCNC: 29.8 G/DL (ref 31.4–35)
MCHC RBC AUTO-ENTMCNC: 30.1 G/DL (ref 31.4–35)
MCHC RBC AUTO-ENTMCNC: 30.1 G/DL (ref 31.4–35)
MCHC RBC AUTO-ENTMCNC: 30.2 G/DL (ref 31.4–35)
MCHC RBC AUTO-ENTMCNC: 30.3 G/DL (ref 31.4–35)
MCHC RBC AUTO-ENTMCNC: 30.4 G/DL (ref 31.4–35)
MCHC RBC AUTO-ENTMCNC: 30.6 G/DL (ref 31.4–35)
MCHC RBC AUTO-ENTMCNC: 30.8 G/DL (ref 31.4–35)
MCHC RBC AUTO-ENTMCNC: 31.2 G/DL (ref 31.4–35)
MCHC RBC AUTO-ENTMCNC: 31.5 G/DL (ref 31.4–35)
MCHC RBC AUTO-ENTMCNC: 31.7 G/DL (ref 31.4–35)
MCHC RBC AUTO-ENTMCNC: 31.7 G/DL (ref 31.4–35)
MCHC RBC AUTO-ENTMCNC: 31.8 G/DL (ref 31.4–35)
MCHC RBC AUTO-ENTMCNC: 31.9 G/DL (ref 31.4–35)
MCHC RBC AUTO-ENTMCNC: 32.3 G/DL (ref 31.4–35)
MCHC RBC AUTO-ENTMCNC: 32.3 G/DL (ref 31.4–35)
MCV RBC AUTO: 100.5 FL (ref 79.6–97.8)
MCV RBC AUTO: 102.3 FL (ref 79.6–97.8)
MCV RBC AUTO: 102.6 FL (ref 79.6–97.8)
MCV RBC AUTO: 102.6 FL (ref 79.6–97.8)
MCV RBC AUTO: 103.4 FL (ref 79.6–97.8)
MCV RBC AUTO: 103.8 FL (ref 79.6–97.8)
MCV RBC AUTO: 104.6 FL (ref 79.6–97.8)
MCV RBC AUTO: 106.1 FL (ref 79.6–97.8)
MCV RBC AUTO: 106.8 FL (ref 79.6–97.8)
MCV RBC AUTO: 107 FL (ref 79.6–97.8)
MCV RBC AUTO: 93.4 FL (ref 79.6–97.8)
MCV RBC AUTO: 96.3 FL (ref 79.6–97.8)
MCV RBC AUTO: 96.7 FL (ref 79.6–97.8)
MCV RBC AUTO: 97.8 FL (ref 79.6–97.8)
MCV RBC AUTO: 98.8 FL (ref 79.6–97.8)
MCV RBC AUTO: 99.8 FL (ref 79.6–97.8)
MM INDURATION POC: 0 MM (ref 0–5)
MONOCYTES # BLD: 0.2 K/UL (ref 0.1–1.3)
MONOCYTES # BLD: 0.3 K/UL (ref 0.1–1.3)
MONOCYTES # BLD: 0.4 K/UL (ref 0.1–1.3)
MONOCYTES # BLD: 0.5 K/UL (ref 0.1–1.3)
MONOCYTES # BLD: 0.5 K/UL (ref 0.1–1.3)
MONOCYTES # BLD: 0.7 K/UL (ref 0.1–1.3)
MONOCYTES # BLD: 0.8 K/UL (ref 0.1–1.3)
MONOCYTES # BLD: 1 K/UL (ref 0.1–1.3)
MONOCYTES NFR BLD: 2 % (ref 4–12)
MONOCYTES NFR BLD: 4 % (ref 4–12)
MONOCYTES NFR BLD: 5 % (ref 4–12)
MONOCYTES NFR BLD: 5 % (ref 4–12)
MONOCYTES NFR BLD: 6 % (ref 4–12)
MONOCYTES NFR BLD: 6 % (ref 4–12)
MONOCYTES NFR BLD: 7 % (ref 4–12)
MONOCYTES NFR BLD: 7 % (ref 4–12)
MUCOUS THREADS URNS QL MICRO: ABNORMAL /LPF
NEUTS SEG # BLD: 10.1 K/UL (ref 1.7–8.2)
NEUTS SEG # BLD: 10.6 K/UL (ref 1.7–8.2)
NEUTS SEG # BLD: 4 K/UL (ref 1.7–8.2)
NEUTS SEG # BLD: 5.3 K/UL (ref 1.7–8.2)
NEUTS SEG # BLD: 6.1 K/UL (ref 1.7–8.2)
NEUTS SEG # BLD: 7.1 K/UL (ref 1.7–8.2)
NEUTS SEG # BLD: 7.6 K/UL (ref 1.7–8.2)
NEUTS SEG # BLD: 9 K/UL (ref 1.7–8.2)
NEUTS SEG # BLD: 9.3 K/UL (ref 1.7–8.2)
NEUTS SEG # BLD: 9.6 K/UL (ref 1.7–8.2)
NEUTS SEG NFR BLD: 57 % (ref 43–78)
NEUTS SEG NFR BLD: 70 % (ref 43–78)
NEUTS SEG NFR BLD: 79 % (ref 43–78)
NEUTS SEG NFR BLD: 82 % (ref 43–78)
NEUTS SEG NFR BLD: 83 % (ref 43–78)
NEUTS SEG NFR BLD: 85 % (ref 43–78)
NEUTS SEG NFR BLD: 86 % (ref 43–78)
NITRITE UR QL STRIP.AUTO: NEGATIVE
NRBC # BLD: 0 K/UL (ref 0–0.2)
NRBC # BLD: 0.03 K/UL (ref 0–0.2)
NRBC # BLD: 0.03 K/UL (ref 0–0.2)
NRBC # BLD: 0.07 K/UL (ref 0–0.2)
NRBC # BLD: 0.08 K/UL (ref 0–0.2)
NRBC # BLD: 0.19 K/UL (ref 0–0.2)
O2/TOTAL GAS SETTING VFR VENT: 100 %
O2/TOTAL GAS SETTING VFR VENT: 30 %
O2/TOTAL GAS SETTING VFR VENT: 35 %
O2/TOTAL GAS SETTING VFR VENT: 40 %
O2/TOTAL GAS SETTING VFR VENT: 50 %
O2/TOTAL GAS SETTING VFR VENT: 60 %
O2/TOTAL GAS SETTING VFR VENT: 60 %
OTHER OBSERVATIONS,UCOM: ABNORMAL
PAW @ MEAN EXP FLOW ON VENT: 14 CMH2O
PCO2 BLD: 36 MMHG (ref 35–45)
PCO2 BLD: 40.9 MMHG (ref 35–45)
PCO2 BLD: 41 MMHG (ref 35–45)
PCO2 BLD: 44.7 MMHG (ref 35–45)
PCO2 BLD: 46.3 MMHG (ref 35–45)
PCO2 BLD: 50.7 MMHG (ref 35–45)
PCO2 BLD: 56.9 MMHG (ref 35–45)
PCO2 BLD: 57.5 MMHG (ref 35–45)
PCO2 BLD: 88 MMHG (ref 35–45)
PEEP RESPIRATORY: 10 CMH2O
PEEP RESPIRATORY: 8 CMH2O
PEEP RESPIRATORY: 8 CMH2O
PH BLD: 7.12 [PH] (ref 7.35–7.45)
PH BLD: 7.24 [PH] (ref 7.35–7.45)
PH BLD: 7.27 [PH] (ref 7.35–7.45)
PH BLD: 7.36 [PH] (ref 7.35–7.45)
PH BLD: 7.37 [PH] (ref 7.35–7.45)
PH BLD: 7.39 [PH] (ref 7.35–7.45)
PH BLD: 7.4 [PH] (ref 7.35–7.45)
PH UR STRIP: 6 [PH] (ref 5–9)
PHOSPHATE SERPL-MCNC: 2.8 MG/DL (ref 2.3–3.7)
PHOSPHATE SERPL-MCNC: 3.1 MG/DL (ref 2.3–3.7)
PHOSPHATE SERPL-MCNC: 3.5 MG/DL (ref 2.3–3.7)
PHOSPHATE SERPL-MCNC: 5 MG/DL (ref 2.3–3.7)
PHOSPHATE SERPL-MCNC: 5 MG/DL (ref 2.3–3.7)
PIP ISTAT,IPIP: 16
PIP ISTAT,IPIP: 16
PIP ISTAT,IPIP: 22
PIP ISTAT,IPIP: 6
PLATELET # BLD AUTO: 107 K/UL (ref 150–450)
PLATELET # BLD AUTO: 118 K/UL (ref 150–450)
PLATELET # BLD AUTO: 120 K/UL (ref 150–450)
PLATELET # BLD AUTO: 132 K/UL (ref 150–450)
PLATELET # BLD AUTO: 143 K/UL (ref 150–450)
PLATELET # BLD AUTO: 143 K/UL (ref 150–450)
PLATELET # BLD AUTO: 152 K/UL (ref 150–450)
PLATELET # BLD AUTO: 155 K/UL (ref 150–450)
PLATELET # BLD AUTO: 157 K/UL (ref 150–450)
PLATELET # BLD AUTO: 172 K/UL (ref 150–450)
PLATELET # BLD AUTO: 198 K/UL (ref 150–450)
PLATELET # BLD AUTO: 203 K/UL (ref 150–450)
PLATELET # BLD AUTO: 221 K/UL (ref 150–450)
PLATELET # BLD AUTO: 247 K/UL (ref 150–450)
PLATELET # BLD AUTO: 251 K/UL (ref 150–450)
PLATELET # BLD AUTO: 98 K/UL (ref 150–450)
PMV BLD AUTO: 10 FL (ref 9.4–12.3)
PMV BLD AUTO: 10.3 FL (ref 9.4–12.3)
PMV BLD AUTO: 10.6 FL (ref 9.4–12.3)
PMV BLD AUTO: 10.9 FL (ref 9.4–12.3)
PMV BLD AUTO: 11.1 FL (ref 9.4–12.3)
PMV BLD AUTO: 11.2 FL (ref 9.4–12.3)
PMV BLD AUTO: 11.3 FL (ref 9.4–12.3)
PMV BLD AUTO: 11.4 FL (ref 9.4–12.3)
PMV BLD AUTO: 11.4 FL (ref 9.4–12.3)
PMV BLD AUTO: 11.7 FL (ref 9.4–12.3)
PMV BLD AUTO: 11.9 FL (ref 9.4–12.3)
PO2 BLD: 120 MMHG (ref 75–100)
PO2 BLD: 241 MMHG (ref 75–100)
PO2 BLD: 346 MMHG (ref 75–100)
PO2 BLD: 67 MMHG (ref 75–100)
PO2 BLD: 73 MMHG (ref 75–100)
PO2 BLD: 79 MMHG (ref 75–100)
PO2 BLD: 82 MMHG (ref 75–100)
PO2 BLD: 87 MMHG (ref 75–100)
PO2 BLD: 99 MMHG (ref 75–100)
POTASSIUM SERPL-SCNC: 4 MMOL/L (ref 3.5–5.1)
POTASSIUM SERPL-SCNC: 4 MMOL/L (ref 3.5–5.1)
POTASSIUM SERPL-SCNC: 4.1 MMOL/L (ref 3.5–5.1)
POTASSIUM SERPL-SCNC: 4.2 MMOL/L (ref 3.5–5.1)
POTASSIUM SERPL-SCNC: 4.4 MMOL/L (ref 3.5–5.1)
POTASSIUM SERPL-SCNC: 4.4 MMOL/L (ref 3.5–5.1)
POTASSIUM SERPL-SCNC: 4.5 MMOL/L (ref 3.5–5.1)
POTASSIUM SERPL-SCNC: 4.6 MMOL/L (ref 3.5–5.1)
POTASSIUM SERPL-SCNC: 4.7 MMOL/L (ref 3.5–5.1)
POTASSIUM SERPL-SCNC: 4.8 MMOL/L (ref 3.5–5.1)
POTASSIUM SERPL-SCNC: 5 MMOL/L (ref 3.5–5.1)
POTASSIUM SERPL-SCNC: 5.1 MMOL/L (ref 3.5–5.1)
POTASSIUM SERPL-SCNC: 5.2 MMOL/L (ref 3.5–5.1)
POTASSIUM SERPL-SCNC: 5.3 MMOL/L (ref 3.5–5.1)
PPD POC: NEGATIVE NEGATIVE
PRESSURE SUPPORT SETTING VENT: 10 CMH2O
PRESSURE SUPPORT SETTING VENT: 10 CMH2O
PROCALCITONIN SERPL-MCNC: 0.2 NG/ML
PROCALCITONIN SERPL-MCNC: <0.05 NG/ML
PROT SERPL-MCNC: 5.2 G/DL (ref 6.3–8.2)
PROT SERPL-MCNC: 5.4 G/DL (ref 6.3–8.2)
PROT SERPL-MCNC: 5.4 G/DL (ref 6.3–8.2)
PROT SERPL-MCNC: 5.5 G/DL (ref 6.3–8.2)
PROT SERPL-MCNC: 5.5 G/DL (ref 6.3–8.2)
PROT SERPL-MCNC: 5.6 G/DL (ref 6.3–8.2)
PROT SERPL-MCNC: 5.7 G/DL (ref 6.3–8.2)
PROT SERPL-MCNC: 5.9 G/DL (ref 6.3–8.2)
PROT SERPL-MCNC: 6 G/DL (ref 6.3–8.2)
PROT SERPL-MCNC: 6 G/DL (ref 6.3–8.2)
PROT SERPL-MCNC: 6.1 G/DL (ref 6.3–8.2)
PROT SERPL-MCNC: 6.1 G/DL (ref 6.3–8.2)
PROT SERPL-MCNC: 6.6 G/DL (ref 6.3–8.2)
PROT SERPL-MCNC: 6.9 G/DL (ref 6.3–8.2)
PROT UR STRIP-MCNC: 30 MG/DL
PROTHROMBIN TIME: 17.2 SEC (ref 12.6–14.5)
Q-T INTERVAL, ECG07: 324 MS
Q-T INTERVAL, ECG07: 342 MS
Q-T INTERVAL, ECG07: 342 MS
Q-T INTERVAL, ECG07: 398 MS
QRS DURATION, ECG06: 136 MS
QRS DURATION, ECG06: 150 MS
QRS DURATION, ECG06: 152 MS
QRS DURATION, ECG06: 154 MS
QTC CALCULATION (BEZET), ECG08: 460 MS
QTC CALCULATION (BEZET), ECG08: 500 MS
QTC CALCULATION (BEZET), ECG08: 526 MS
QTC CALCULATION (BEZET), ECG08: 535 MS
RBC # BLD AUTO: 3.13 M/UL (ref 4.23–5.6)
RBC # BLD AUTO: 3.13 M/UL (ref 4.23–5.6)
RBC # BLD AUTO: 3.24 M/UL (ref 4.23–5.6)
RBC # BLD AUTO: 3.43 M/UL (ref 4.23–5.6)
RBC # BLD AUTO: 3.43 M/UL (ref 4.23–5.6)
RBC # BLD AUTO: 3.51 M/UL (ref 4.23–5.6)
RBC # BLD AUTO: 3.53 M/UL (ref 4.23–5.6)
RBC # BLD AUTO: 3.68 M/UL (ref 4.23–5.6)
RBC # BLD AUTO: 3.79 M/UL (ref 4.23–5.6)
RBC # BLD AUTO: 4.07 M/UL (ref 4.23–5.6)
RBC # BLD AUTO: 4.11 M/UL (ref 4.23–5.6)
RBC # BLD AUTO: 4.19 M/UL (ref 4.23–5.6)
RBC # BLD AUTO: 4.24 M/UL (ref 4.23–5.6)
RBC # BLD AUTO: 4.36 M/UL (ref 4.23–5.6)
RBC # BLD AUTO: 4.36 M/UL (ref 4.23–5.6)
RBC # BLD AUTO: 4.67 M/UL (ref 4.23–5.6)
RBC #/AREA URNS HPF: ABNORMAL /HPF
SAO2 % BLD: 91.1 % (ref 95–98)
SAO2 % BLD: 92 % (ref 95–98)
SAO2 % BLD: 95 % (ref 95–98)
SAO2 % BLD: 95.6 % (ref 95–98)
SAO2 % BLD: 96.2 % (ref 95–98)
SAO2 % BLD: 97.7 % (ref 95–98)
SAO2 % BLD: 98.6 % (ref 95–98)
SAO2 % BLD: 99.7 % (ref 95–98)
SAO2 % BLD: 99.9 % (ref 95–98)
SARS-COV-2 AB, IGG QL: REACTIVE
SARS-COV-2, COV2: NORMAL
SERVICE CMNT-IMP: 5
SERVICE CMNT-IMP: ABNORMAL
SERVICE CMNT-IMP: NORMAL
SODIUM SERPL-SCNC: 134 MMOL/L (ref 136–145)
SODIUM SERPL-SCNC: 134 MMOL/L (ref 138–145)
SODIUM SERPL-SCNC: 135 MMOL/L (ref 138–145)
SODIUM SERPL-SCNC: 138 MMOL/L (ref 136–145)
SODIUM SERPL-SCNC: 138 MMOL/L (ref 136–145)
SODIUM SERPL-SCNC: 138 MMOL/L (ref 138–145)
SODIUM SERPL-SCNC: 139 MMOL/L (ref 136–145)
SODIUM SERPL-SCNC: 140 MMOL/L (ref 136–145)
SODIUM SERPL-SCNC: 140 MMOL/L (ref 138–145)
SODIUM SERPL-SCNC: 141 MMOL/L (ref 138–145)
SODIUM SERPL-SCNC: 142 MMOL/L (ref 136–145)
SODIUM SERPL-SCNC: 142 MMOL/L (ref 138–145)
SOURCE, COVRS: ABNORMAL
SOURCE, COVRS: NORMAL
SPECIMEN TYPE: ABNORMAL
SPECIMEN TYPE: NORMAL
T4 FREE SERPL-MCNC: 1.3 NG/DL (ref 0.78–1.46)
TROPONIN-HIGH SENSITIVITY: 41 PG/ML (ref 0–14)
TSH SERPL DL<=0.005 MIU/L-ACNC: 0.99 UIU/ML (ref 0.36–3.74)
TSH SERPL DL<=0.005 MIU/L-ACNC: 1.84 UIU/ML (ref 0.36–3.74)
UROBILINOGEN UR QL STRIP.AUTO: 1 EU/DL (ref 0.2–1)
VANCOMYCIN SERPL-MCNC: 12.3 UG/ML
VANCOMYCIN SERPL-MCNC: 13.5 UG/ML
VANCOMYCIN SERPL-MCNC: 15.7 UG/ML
VANCOMYCIN SERPL-MCNC: 17.9 UG/ML
VANCOMYCIN SERPL-MCNC: 19.6 UG/ML
VANCOMYCIN SERPL-MCNC: 22 UG/ML
VANCOMYCIN SERPL-MCNC: 23 UG/ML
VENTILATION MODE VENT: ABNORMAL
VENTILATION MODE VENT: NORMAL
VENTILATION MODE VENT: NORMAL
VENTRICULAR RATE, ECG03: 109 BPM
VENTRICULAR RATE, ECG03: 109 BPM
VENTRICULAR RATE, ECG03: 142 BPM
VENTRICULAR RATE, ECG03: 143 BPM
VT SETTING VENT: 500 ML
WBC # BLD AUTO: 10.4 K/UL (ref 4.3–11.1)
WBC # BLD AUTO: 11.1 K/UL (ref 4.3–11.1)
WBC # BLD AUTO: 11.9 K/UL (ref 4.3–11.1)
WBC # BLD AUTO: 13 K/UL (ref 4.3–11.1)
WBC # BLD AUTO: 13.4 K/UL (ref 4.3–11.1)
WBC # BLD AUTO: 14.3 K/UL (ref 4.3–11.1)
WBC # BLD AUTO: 6.4 K/UL (ref 4.3–11.1)
WBC # BLD AUTO: 6.6 K/UL (ref 4.3–11.1)
WBC # BLD AUTO: 6.7 K/UL (ref 4.3–11.1)
WBC # BLD AUTO: 7 K/UL (ref 4.3–11.1)
WBC # BLD AUTO: 7.1 K/UL (ref 4.3–11.1)
WBC # BLD AUTO: 7.2 K/UL (ref 4.3–11.1)
WBC # BLD AUTO: 7.3 K/UL (ref 4.3–11.1)
WBC # BLD AUTO: 8.3 K/UL (ref 4.3–11.1)
WBC # BLD AUTO: 8.8 K/UL (ref 4.3–11.1)
WBC # BLD AUTO: 9.3 K/UL (ref 4.3–11.1)
WBC URNS QL MICRO: ABNORMAL /HPF

## 2021-01-01 PROCEDURE — 74011250637 HC RX REV CODE- 250/637: Performed by: FAMILY MEDICINE

## 2021-01-01 PROCEDURE — 80053 COMPREHEN METABOLIC PANEL: CPT

## 2021-01-01 PROCEDURE — 71045 X-RAY EXAM CHEST 1 VIEW: CPT

## 2021-01-01 PROCEDURE — 80074 ACUTE HEPATITIS PANEL: CPT

## 2021-01-01 PROCEDURE — 74011000258 HC RX REV CODE- 258: Performed by: INTERNAL MEDICINE

## 2021-01-01 PROCEDURE — 74011250636 HC RX REV CODE- 250/636: Performed by: INTERNAL MEDICINE

## 2021-01-01 PROCEDURE — 65270000029 HC RM PRIVATE

## 2021-01-01 PROCEDURE — 74011636637 HC RX REV CODE- 636/637: Performed by: INTERNAL MEDICINE

## 2021-01-01 PROCEDURE — 36556 INSERT NON-TUNNEL CV CATH: CPT

## 2021-01-01 PROCEDURE — 74011250637 HC RX REV CODE- 250/637: Performed by: INTERNAL MEDICINE

## 2021-01-01 PROCEDURE — 84100 ASSAY OF PHOSPHORUS: CPT

## 2021-01-01 PROCEDURE — 74011250636 HC RX REV CODE- 250/636: Performed by: EMERGENCY MEDICINE

## 2021-01-01 PROCEDURE — 74011636637 HC RX REV CODE- 636/637: Performed by: FAMILY MEDICINE

## 2021-01-01 PROCEDURE — 86140 C-REACTIVE PROTEIN: CPT

## 2021-01-01 PROCEDURE — 82962 GLUCOSE BLOOD TEST: CPT

## 2021-01-01 PROCEDURE — 94640 AIRWAY INHALATION TREATMENT: CPT

## 2021-01-01 PROCEDURE — 99233 SBSQ HOSP IP/OBS HIGH 50: CPT | Performed by: INTERNAL MEDICINE

## 2021-01-01 PROCEDURE — 84145 PROCALCITONIN (PCT): CPT

## 2021-01-01 PROCEDURE — 77030041974 HC CATH SYS PERIPH TELE -B

## 2021-01-01 PROCEDURE — 77010033678 HC OXYGEN DAILY

## 2021-01-01 PROCEDURE — 94003 VENT MGMT INPAT SUBQ DAY: CPT

## 2021-01-01 PROCEDURE — 74011000250 HC RX REV CODE- 250: Performed by: FAMILY MEDICINE

## 2021-01-01 PROCEDURE — 36600 WITHDRAWAL OF ARTERIAL BLOOD: CPT

## 2021-01-01 PROCEDURE — 82140 ASSAY OF AMMONIA: CPT

## 2021-01-01 PROCEDURE — 36592 COLLECT BLOOD FROM PICC: CPT

## 2021-01-01 PROCEDURE — 86580 TB INTRADERMAL TEST: CPT | Performed by: INTERNAL MEDICINE

## 2021-01-01 PROCEDURE — 36415 COLL VENOUS BLD VENIPUNCTURE: CPT

## 2021-01-01 PROCEDURE — 77030034112 HC PRB ESOPH/RET TEMP CSZ -A

## 2021-01-01 PROCEDURE — 87635 SARS-COV-2 COVID-19 AMP PRB: CPT

## 2021-01-01 PROCEDURE — 51702 INSERT TEMP BLADDER CATH: CPT

## 2021-01-01 PROCEDURE — 74011000250 HC RX REV CODE- 250: Performed by: INTERNAL MEDICINE

## 2021-01-01 PROCEDURE — 93005 ELECTROCARDIOGRAM TRACING: CPT | Performed by: STUDENT IN AN ORGANIZED HEALTH CARE EDUCATION/TRAINING PROGRAM

## 2021-01-01 PROCEDURE — 2709999900 HC NON-CHARGEABLE SUPPLY

## 2021-01-01 PROCEDURE — 92526 ORAL FUNCTION THERAPY: CPT

## 2021-01-01 PROCEDURE — P9047 ALBUMIN (HUMAN), 25%, 50ML: HCPCS | Performed by: FAMILY MEDICINE

## 2021-01-01 PROCEDURE — 51798 US URINE CAPACITY MEASURE: CPT

## 2021-01-01 PROCEDURE — 71046 X-RAY EXAM CHEST 2 VIEWS: CPT

## 2021-01-01 PROCEDURE — 76450000000

## 2021-01-01 PROCEDURE — 82803 BLOOD GASES ANY COMBINATION: CPT

## 2021-01-01 PROCEDURE — 77030041247 HC PROTECTOR HEEL HEELMEDIX MDII -B

## 2021-01-01 PROCEDURE — 85025 COMPLETE CBC W/AUTO DIFF WBC: CPT

## 2021-01-01 PROCEDURE — 65610000006 HC RM INTENSIVE CARE

## 2021-01-01 PROCEDURE — 74011250636 HC RX REV CODE- 250/636: Performed by: FAMILY MEDICINE

## 2021-01-01 PROCEDURE — 97530 THERAPEUTIC ACTIVITIES: CPT

## 2021-01-01 PROCEDURE — 94660 CPAP INITIATION&MGMT: CPT

## 2021-01-01 PROCEDURE — 99285 EMERGENCY DEPT VISIT HI MDM: CPT

## 2021-01-01 PROCEDURE — 80048 BASIC METABOLIC PNL TOTAL CA: CPT

## 2021-01-01 PROCEDURE — 65660000000 HC RM CCU STEPDOWN

## 2021-01-01 PROCEDURE — 83690 ASSAY OF LIPASE: CPT

## 2021-01-01 PROCEDURE — 80202 ASSAY OF VANCOMYCIN: CPT

## 2021-01-01 PROCEDURE — 74011000258 HC RX REV CODE- 258: Performed by: FAMILY MEDICINE

## 2021-01-01 PROCEDURE — 80076 HEPATIC FUNCTION PANEL: CPT

## 2021-01-01 PROCEDURE — 94761 N-INVAS EAR/PLS OXIMETRY MLT: CPT

## 2021-01-01 PROCEDURE — 99223 1ST HOSP IP/OBS HIGH 75: CPT | Performed by: INTERNAL MEDICINE

## 2021-01-01 PROCEDURE — 83735 ASSAY OF MAGNESIUM: CPT

## 2021-01-01 PROCEDURE — 96376 TX/PRO/DX INJ SAME DRUG ADON: CPT

## 2021-01-01 PROCEDURE — 94760 N-INVAS EAR/PLS OXIMETRY 1: CPT

## 2021-01-01 PROCEDURE — 83036 HEMOGLOBIN GLYCOSYLATED A1C: CPT

## 2021-01-01 PROCEDURE — 97166 OT EVAL MOD COMPLEX 45 MIN: CPT

## 2021-01-01 PROCEDURE — 96365 THER/PROPH/DIAG IV INF INIT: CPT

## 2021-01-01 PROCEDURE — 87040 BLOOD CULTURE FOR BACTERIA: CPT

## 2021-01-01 PROCEDURE — 84439 ASSAY OF FREE THYROXINE: CPT

## 2021-01-01 PROCEDURE — 99232 SBSQ HOSP IP/OBS MODERATE 35: CPT | Performed by: INTERNAL MEDICINE

## 2021-01-01 PROCEDURE — 97112 NEUROMUSCULAR REEDUCATION: CPT

## 2021-01-01 PROCEDURE — 77030021668 HC NEB PREFIL KT VYRM -A

## 2021-01-01 PROCEDURE — 84443 ASSAY THYROID STIM HORMONE: CPT

## 2021-01-01 PROCEDURE — 83605 ASSAY OF LACTIC ACID: CPT

## 2021-01-01 PROCEDURE — B548ZZA ULTRASONOGRAPHY OF SUPERIOR VENA CAVA, GUIDANCE: ICD-10-PCS | Performed by: EMERGENCY MEDICINE

## 2021-01-01 PROCEDURE — 99291 CRITICAL CARE FIRST HOUR: CPT | Performed by: INTERNAL MEDICINE

## 2021-01-01 PROCEDURE — 74011000258 HC RX REV CODE- 258: Performed by: EMERGENCY MEDICINE

## 2021-01-01 PROCEDURE — 97165 OT EVAL LOW COMPLEX 30 MIN: CPT

## 2021-01-01 PROCEDURE — 96374 THER/PROPH/DIAG INJ IV PUSH: CPT

## 2021-01-01 PROCEDURE — 93005 ELECTROCARDIOGRAM TRACING: CPT

## 2021-01-01 PROCEDURE — 82533 TOTAL CORTISOL: CPT

## 2021-01-01 PROCEDURE — 85027 COMPLETE CBC AUTOMATED: CPT

## 2021-01-01 PROCEDURE — 97162 PT EVAL MOD COMPLEX 30 MIN: CPT

## 2021-01-01 PROCEDURE — 77030040393 HC DRSG OPTIFOAM GENT MDII -B

## 2021-01-01 PROCEDURE — 86769 SARS-COV-2 COVID-19 ANTIBODY: CPT

## 2021-01-01 PROCEDURE — 74018 RADEX ABDOMEN 1 VIEW: CPT

## 2021-01-01 PROCEDURE — 83880 ASSAY OF NATRIURETIC PEPTIDE: CPT

## 2021-01-01 PROCEDURE — XW033E5 INTRODUCTION OF REMDESIVIR ANTI-INFECTIVE INTO PERIPHERAL VEIN, PERCUTANEOUS APPROACH, NEW TECHNOLOGY GROUP 5: ICD-10-PCS | Performed by: INTERNAL MEDICINE

## 2021-01-01 PROCEDURE — 85730 THROMBOPLASTIN TIME PARTIAL: CPT

## 2021-01-01 PROCEDURE — 94002 VENT MGMT INPAT INIT DAY: CPT

## 2021-01-01 PROCEDURE — 99231 SBSQ HOSP IP/OBS SF/LOW 25: CPT | Performed by: INTERNAL MEDICINE

## 2021-01-01 PROCEDURE — 0T9B70Z DRAINAGE OF BLADDER WITH DRAINAGE DEVICE, VIA NATURAL OR ARTIFICIAL OPENING: ICD-10-PCS | Performed by: EMERGENCY MEDICINE

## 2021-01-01 PROCEDURE — 85379 FIBRIN DEGRADATION QUANT: CPT

## 2021-01-01 PROCEDURE — 99222 1ST HOSP IP/OBS MODERATE 55: CPT | Performed by: INTERNAL MEDICINE

## 2021-01-01 PROCEDURE — 74011250637 HC RX REV CODE- 250/637: Performed by: EMERGENCY MEDICINE

## 2021-01-01 PROCEDURE — 92610 EVALUATE SWALLOWING FUNCTION: CPT

## 2021-01-01 PROCEDURE — BT40ZZZ ULTRASONOGRAPHY OF BLADDER: ICD-10-PCS | Performed by: FAMILY MEDICINE

## 2021-01-01 PROCEDURE — 75810000455 HC PLCMT CENT VENOUS CATH LVL 2 5182

## 2021-01-01 PROCEDURE — 84484 ASSAY OF TROPONIN QUANT: CPT

## 2021-01-01 PROCEDURE — 82565 ASSAY OF CREATININE: CPT

## 2021-01-01 PROCEDURE — 87086 URINE CULTURE/COLONY COUNT: CPT

## 2021-01-01 PROCEDURE — 93005 ELECTROCARDIOGRAM TRACING: CPT | Performed by: EMERGENCY MEDICINE

## 2021-01-01 PROCEDURE — 86580 TB INTRADERMAL TEST: CPT | Performed by: FAMILY MEDICINE

## 2021-01-01 PROCEDURE — 77030020847 HC STATLOK BARD -A

## 2021-01-01 PROCEDURE — 94664 DEMO&/EVAL PT USE INHALER: CPT

## 2021-01-01 PROCEDURE — 76937 US GUIDE VASCULAR ACCESS: CPT

## 2021-01-01 PROCEDURE — 96375 TX/PRO/DX INJ NEW DRUG ADDON: CPT

## 2021-01-01 PROCEDURE — C8929 TTE W OR WO FOL WCON,DOPPLER: HCPCS

## 2021-01-01 PROCEDURE — 96361 HYDRATE IV INFUSION ADD-ON: CPT

## 2021-01-01 PROCEDURE — 65610000001 HC ROOM ICU GENERAL

## 2021-01-01 PROCEDURE — 77030014008 HC SPNG HEMSTAT J&J -C

## 2021-01-01 PROCEDURE — 70450 CT HEAD/BRAIN W/O DYE: CPT

## 2021-01-01 PROCEDURE — 02HV33Z INSERTION OF INFUSION DEVICE INTO SUPERIOR VENA CAVA, PERCUTANEOUS APPROACH: ICD-10-PCS | Performed by: EMERGENCY MEDICINE

## 2021-01-01 PROCEDURE — 5A1945Z RESPIRATORY VENTILATION, 24-96 CONSECUTIVE HOURS: ICD-10-PCS | Performed by: INTERNAL MEDICINE

## 2021-01-01 PROCEDURE — 99233 SBSQ HOSP IP/OBS HIGH 50: CPT | Performed by: NURSE PRACTITIONER

## 2021-01-01 PROCEDURE — 76700 US EXAM ABDOM COMPLETE: CPT

## 2021-01-01 PROCEDURE — 97535 SELF CARE MNGMENT TRAINING: CPT

## 2021-01-01 PROCEDURE — 74011250637 HC RX REV CODE- 250/637: Performed by: PHYSICIAN ASSISTANT

## 2021-01-01 PROCEDURE — 77030013032 HC MSK BPAP/CPAP FISP -B

## 2021-01-01 PROCEDURE — 94762 N-INVAS EAR/PLS OXIMTRY CONT: CPT

## 2021-01-01 PROCEDURE — 74011000250 HC RX REV CODE- 250

## 2021-01-01 PROCEDURE — 85610 PROTHROMBIN TIME: CPT

## 2021-01-01 PROCEDURE — 36591 DRAW BLOOD OFF VENOUS DEVICE: CPT

## 2021-01-01 PROCEDURE — 87070 CULTURE OTHR SPECIMN AEROBIC: CPT

## 2021-01-01 PROCEDURE — 81001 URINALYSIS AUTO W/SCOPE: CPT

## 2021-01-01 PROCEDURE — 3336500001 HSPC ELECTION

## 2021-01-01 RX ORDER — FAMOTIDINE 20 MG/1
20 TABLET, FILM COATED ORAL DAILY
Status: DISCONTINUED | OUTPATIENT
Start: 2021-01-01 | End: 2021-01-01

## 2021-01-01 RX ORDER — VANCOMYCIN HYDROCHLORIDE
1250 ONCE
Status: COMPLETED | OUTPATIENT
Start: 2021-01-01 | End: 2021-01-01

## 2021-01-01 RX ORDER — VANCOMYCIN HYDROCHLORIDE
1250 EVERY 24 HOURS
Status: DISCONTINUED | OUTPATIENT
Start: 2021-01-01 | End: 2021-01-01

## 2021-01-01 RX ORDER — PANTOPRAZOLE SODIUM 40 MG/1
40 TABLET, DELAYED RELEASE ORAL DAILY
Status: DISCONTINUED | OUTPATIENT
Start: 2021-01-01 | End: 2021-01-01

## 2021-01-01 RX ORDER — CARVEDILOL 3.12 MG/1
3.12 TABLET ORAL 2 TIMES DAILY WITH MEALS
Status: DISCONTINUED | OUTPATIENT
Start: 2021-01-01 | End: 2021-01-01

## 2021-01-01 RX ORDER — METFORMIN HYDROCHLORIDE 500 MG/1
500 TABLET ORAL
Qty: 30 TABLET | Refills: 0 | Status: ON HOLD
Start: 2021-01-01 | End: 2021-01-01

## 2021-01-01 RX ORDER — SODIUM CHLORIDE 0.9 % (FLUSH) 0.9 %
5-10 SYRINGE (ML) INJECTION EVERY 8 HOURS
Status: DISCONTINUED | OUTPATIENT
Start: 2021-01-01 | End: 2021-01-01

## 2021-01-01 RX ORDER — DEXAMETHASONE SODIUM PHOSPHATE 100 MG/10ML
6 INJECTION INTRAMUSCULAR; INTRAVENOUS EVERY 24 HOURS
Status: DISCONTINUED | OUTPATIENT
Start: 2021-01-01 | End: 2021-01-01 | Stop reason: HOSPADM

## 2021-01-01 RX ORDER — DEXTROSE 40 %
15 GEL (GRAM) ORAL AS NEEDED
Status: DISCONTINUED | OUTPATIENT
Start: 2021-01-01 | End: 2021-01-01 | Stop reason: HOSPADM

## 2021-01-01 RX ORDER — ARFORMOTEROL TARTRATE 15 UG/2ML
15 SOLUTION RESPIRATORY (INHALATION)
Status: DISCONTINUED | OUTPATIENT
Start: 2021-01-01 | End: 2021-01-01

## 2021-01-01 RX ORDER — BENZONATATE 100 MG/1
100 CAPSULE ORAL
Status: DISCONTINUED | OUTPATIENT
Start: 2021-01-01 | End: 2021-01-01 | Stop reason: HOSPADM

## 2021-01-01 RX ORDER — DILTIAZEM HYDROCHLORIDE 30 MG/1
30 TABLET, FILM COATED ORAL EVERY 6 HOURS
Status: DISCONTINUED | OUTPATIENT
Start: 2021-01-01 | End: 2021-01-01

## 2021-01-01 RX ORDER — NOREPINEPHRINE BITARTRATE/D5W 4MG/250ML
PLASTIC BAG, INJECTION (ML) INTRAVENOUS
Status: ACTIVE
Start: 2021-01-01 | End: 2021-01-01

## 2021-01-01 RX ORDER — ALBUMIN HUMAN 250 G/1000ML
12.5 SOLUTION INTRAVENOUS ONCE
Status: COMPLETED | OUTPATIENT
Start: 2021-01-01 | End: 2021-01-01

## 2021-01-01 RX ORDER — ACETAMINOPHEN 500 MG
1000 TABLET ORAL
Status: COMPLETED | OUTPATIENT
Start: 2021-01-01 | End: 2021-01-01

## 2021-01-01 RX ORDER — TAMSULOSIN HYDROCHLORIDE 0.4 MG/1
0.4 CAPSULE ORAL DAILY
Status: DISCONTINUED | OUTPATIENT
Start: 2021-01-01 | End: 2021-01-01 | Stop reason: HOSPADM

## 2021-01-01 RX ORDER — SODIUM CHLORIDE, SODIUM LACTATE, POTASSIUM CHLORIDE, CALCIUM CHLORIDE 600; 310; 30; 20 MG/100ML; MG/100ML; MG/100ML; MG/100ML
75 INJECTION, SOLUTION INTRAVENOUS CONTINUOUS
Status: DISCONTINUED | OUTPATIENT
Start: 2021-01-01 | End: 2021-01-01

## 2021-01-01 RX ORDER — DEXMEDETOMIDINE HYDROCHLORIDE 4 UG/ML
.1-1.5 INJECTION, SOLUTION INTRAVENOUS
Status: DISCONTINUED | OUTPATIENT
Start: 2021-01-01 | End: 2021-01-01

## 2021-01-01 RX ORDER — ACETAMINOPHEN 325 MG/1
650 TABLET ORAL
COMMUNITY

## 2021-01-01 RX ORDER — MIDODRINE HYDROCHLORIDE 5 MG/1
10 TABLET ORAL EVERY 8 HOURS
Status: DISCONTINUED | OUTPATIENT
Start: 2021-01-01 | End: 2021-01-01

## 2021-01-01 RX ORDER — DILTIAZEM HYDROCHLORIDE 120 MG/1
240 CAPSULE, COATED, EXTENDED RELEASE ORAL DAILY
Status: DISCONTINUED | OUTPATIENT
Start: 2021-01-01 | End: 2021-01-01 | Stop reason: HOSPADM

## 2021-01-01 RX ORDER — SODIUM CHLORIDE 0.9 % (FLUSH) 0.9 %
5-40 SYRINGE (ML) INJECTION AS NEEDED
Status: DISCONTINUED | OUTPATIENT
Start: 2021-01-01 | End: 2021-01-01 | Stop reason: HOSPADM

## 2021-01-01 RX ORDER — NOREPINEPHRINE BITARTRATE/D5W 4MG/250ML
.5-3 PLASTIC BAG, INJECTION (ML) INTRAVENOUS
Status: DISCONTINUED | OUTPATIENT
Start: 2021-01-01 | End: 2021-01-01

## 2021-01-01 RX ORDER — ALBUMIN HUMAN 250 G/1000ML
25 SOLUTION INTRAVENOUS ONCE
Status: COMPLETED | OUTPATIENT
Start: 2021-01-01 | End: 2021-01-01

## 2021-01-01 RX ORDER — ATROPINE SULFATE 10 MG/ML
3 SOLUTION/ DROPS OPHTHALMIC
Status: DISCONTINUED | OUTPATIENT
Start: 2021-01-01 | End: 2021-01-01 | Stop reason: HOSPADM

## 2021-01-01 RX ORDER — DOXYCYCLINE 100 MG/1
100 CAPSULE ORAL 2 TIMES DAILY
COMMUNITY
Start: 2021-01-01 | End: 2021-01-01

## 2021-01-01 RX ORDER — AZITHROMYCIN 250 MG/1
250 TABLET, FILM COATED ORAL DAILY
COMMUNITY
Start: 2021-01-01 | End: 2021-01-01

## 2021-01-01 RX ORDER — SODIUM CHLORIDE 0.9 % (FLUSH) 0.9 %
5-10 SYRINGE (ML) INJECTION AS NEEDED
Status: DISCONTINUED | OUTPATIENT
Start: 2021-01-01 | End: 2021-01-01 | Stop reason: HOSPADM

## 2021-01-01 RX ORDER — DEXTROSE 50 % IN WATER (D50W) INTRAVENOUS SYRINGE
25-50 AS NEEDED
Status: DISCONTINUED | OUTPATIENT
Start: 2021-01-01 | End: 2021-01-01 | Stop reason: HOSPADM

## 2021-01-01 RX ORDER — LISINOPRIL 5 MG/1
5 TABLET ORAL DAILY
Status: DISCONTINUED | OUTPATIENT
Start: 2021-01-01 | End: 2021-01-01 | Stop reason: HOSPADM

## 2021-01-01 RX ORDER — ONDANSETRON 2 MG/ML
4 INJECTION INTRAMUSCULAR; INTRAVENOUS
Status: DISCONTINUED | OUTPATIENT
Start: 2021-01-01 | End: 2021-01-01

## 2021-01-01 RX ORDER — ALBUTEROL SULFATE 0.83 MG/ML
2.5 SOLUTION RESPIRATORY (INHALATION)
Status: DISCONTINUED | OUTPATIENT
Start: 2021-01-01 | End: 2021-01-01 | Stop reason: HOSPADM

## 2021-01-01 RX ORDER — ONDANSETRON 4 MG/1
4 TABLET, ORALLY DISINTEGRATING ORAL
Status: DISCONTINUED | OUTPATIENT
Start: 2021-01-01 | End: 2021-01-01

## 2021-01-01 RX ORDER — INSULIN GLARGINE 100 [IU]/ML
4 INJECTION, SOLUTION SUBCUTANEOUS
COMMUNITY

## 2021-01-01 RX ORDER — FLUCONAZOLE 2 MG/ML
200 INJECTION, SOLUTION INTRAVENOUS ONCE
Status: DISPENSED | OUTPATIENT
Start: 2021-01-01 | End: 2021-01-01

## 2021-01-01 RX ORDER — DILTIAZEM HYDROCHLORIDE 240 MG/1
240 CAPSULE, COATED, EXTENDED RELEASE ORAL DAILY
Qty: 30 CAPSULE | Refills: 0 | Status: ON HOLD
Start: 2021-01-01 | End: 2021-01-01

## 2021-01-01 RX ORDER — LABETALOL HYDROCHLORIDE 5 MG/ML
10 INJECTION, SOLUTION INTRAVENOUS
Status: DISCONTINUED | OUTPATIENT
Start: 2021-01-01 | End: 2021-01-01 | Stop reason: HOSPADM

## 2021-01-01 RX ORDER — FAMOTIDINE 40 MG/5ML
40 POWDER, FOR SUSPENSION ORAL DAILY
Status: DISCONTINUED | OUTPATIENT
Start: 2021-01-01 | End: 2021-01-01

## 2021-01-01 RX ORDER — AMIODARONE HYDROCHLORIDE 200 MG/1
200 TABLET ORAL DAILY
COMMUNITY

## 2021-01-01 RX ORDER — INSULIN LISPRO 100 [IU]/ML
INJECTION, SOLUTION INTRAVENOUS; SUBCUTANEOUS
Status: DISCONTINUED | OUTPATIENT
Start: 2021-01-01 | End: 2021-01-01 | Stop reason: HOSPADM

## 2021-01-01 RX ORDER — INSULIN LISPRO 100 [IU]/ML
INJECTION, SOLUTION INTRAVENOUS; SUBCUTANEOUS
Qty: 1 VIAL | Refills: 0 | Status: ON HOLD
Start: 2021-01-01 | End: 2021-01-01

## 2021-01-01 RX ORDER — ALBUMIN HUMAN 250 G/1000ML
25 SOLUTION INTRAVENOUS EVERY 6 HOURS
Status: COMPLETED | OUTPATIENT
Start: 2021-01-01 | End: 2021-01-01

## 2021-01-01 RX ORDER — FUROSEMIDE 40 MG/1
40 TABLET ORAL DAILY
Status: DISCONTINUED | OUTPATIENT
Start: 2021-01-01 | End: 2021-01-01 | Stop reason: HOSPADM

## 2021-01-01 RX ORDER — FAMOTIDINE 20 MG/1
20 TABLET, FILM COATED ORAL 2 TIMES DAILY
Status: DISCONTINUED | OUTPATIENT
Start: 2021-01-01 | End: 2021-01-01 | Stop reason: HOSPADM

## 2021-01-01 RX ORDER — VANCOMYCIN/0.9 % SOD CHLORIDE 1.5G/250ML
1500 PLASTIC BAG, INJECTION (ML) INTRAVENOUS EVERY 24 HOURS
Status: DISCONTINUED | OUTPATIENT
Start: 2021-01-01 | End: 2021-01-01

## 2021-01-01 RX ORDER — PHENYLEPHRINE 40 MG/250 ML (160 MCG/ML) IN 0.9 % SODIUM CHLORIDE IV
10-300 SOLUTION
Status: DISCONTINUED | OUTPATIENT
Start: 2021-01-01 | End: 2021-01-01 | Stop reason: SDUPTHER

## 2021-01-01 RX ORDER — BUMETANIDE 0.25 MG/ML
0.5 INJECTION INTRAMUSCULAR; INTRAVENOUS 2 TIMES DAILY
Status: DISPENSED | OUTPATIENT
Start: 2021-01-01 | End: 2021-01-01

## 2021-01-01 RX ORDER — CLOTRIMAZOLE 10 MG/1
10 LOZENGE ORAL; TOPICAL
Status: DISCONTINUED | OUTPATIENT
Start: 2021-01-01 | End: 2021-01-01

## 2021-01-01 RX ORDER — FAMOTIDINE 20 MG/1
20 TABLET, FILM COATED ORAL 2 TIMES DAILY
Status: DISCONTINUED | OUTPATIENT
Start: 2021-01-01 | End: 2021-01-01

## 2021-01-01 RX ORDER — INSULIN GLARGINE 100 [IU]/ML
10 INJECTION, SOLUTION SUBCUTANEOUS DAILY
Status: DISCONTINUED | OUTPATIENT
Start: 2021-01-01 | End: 2021-01-01

## 2021-01-01 RX ORDER — NYSTATIN 100000 [USP'U]/ML
500000 SUSPENSION ORAL 4 TIMES DAILY
Status: DISCONTINUED | OUTPATIENT
Start: 2021-01-01 | End: 2021-01-01

## 2021-01-01 RX ORDER — SODIUM CHLORIDE 0.9 % (FLUSH) 0.9 %
5-40 SYRINGE (ML) INJECTION EVERY 8 HOURS
Status: DISCONTINUED | OUTPATIENT
Start: 2021-01-01 | End: 2021-01-01 | Stop reason: HOSPADM

## 2021-01-01 RX ORDER — FUROSEMIDE 10 MG/ML
40 INJECTION INTRAMUSCULAR; INTRAVENOUS EVERY 12 HOURS
Status: DISCONTINUED | OUTPATIENT
Start: 2021-01-01 | End: 2021-01-01

## 2021-01-01 RX ORDER — PREDNISONE 20 MG/1
20 TABLET ORAL
COMMUNITY
Start: 2021-01-01 | End: 2021-01-01

## 2021-01-01 RX ORDER — ALBUTEROL SULFATE 90 UG/1
2 AEROSOL, METERED RESPIRATORY (INHALATION)
COMMUNITY

## 2021-01-01 RX ORDER — CARVEDILOL 25 MG/1
25 TABLET ORAL 2 TIMES DAILY WITH MEALS
Status: DISCONTINUED | OUTPATIENT
Start: 2021-01-01 | End: 2021-01-01 | Stop reason: HOSPADM

## 2021-01-01 RX ORDER — DILTIAZEM HYDROCHLORIDE 30 MG/1
60 TABLET, FILM COATED ORAL
Status: DISCONTINUED | OUTPATIENT
Start: 2021-01-01 | End: 2021-01-01

## 2021-01-01 RX ORDER — VANCOMYCIN HYDROCHLORIDE
1250
Status: DISCONTINUED | OUTPATIENT
Start: 2021-01-01 | End: 2021-01-01

## 2021-01-01 RX ORDER — OMEPRAZOLE 20 MG/1
20 CAPSULE, DELAYED RELEASE ORAL DAILY
COMMUNITY

## 2021-01-01 RX ORDER — ACETAMINOPHEN 650 MG/1
650 SUPPOSITORY RECTAL
Status: DISCONTINUED | OUTPATIENT
Start: 2021-01-01 | End: 2021-01-01 | Stop reason: HOSPADM

## 2021-01-01 RX ORDER — FENTANYL CITRATE-0.9 % NACL/PF 25 MCG/ML
0-200 PLASTIC BAG, INJECTION (ML) INJECTION
Status: DISCONTINUED | OUTPATIENT
Start: 2021-01-01 | End: 2021-01-01

## 2021-01-01 RX ORDER — DEXAMETHASONE 6 MG/1
6 TABLET ORAL DAILY
Qty: 3 TABLET | Refills: 0 | Status: ON HOLD
Start: 2021-01-01 | End: 2021-01-01

## 2021-01-01 RX ORDER — DEXAMETHASONE SODIUM PHOSPHATE 100 MG/10ML
10 INJECTION INTRAMUSCULAR; INTRAVENOUS
Status: COMPLETED | OUTPATIENT
Start: 2021-01-01 | End: 2021-01-01

## 2021-01-01 RX ORDER — MORPHINE SULFATE 2 MG/ML
2 INJECTION, SOLUTION INTRAMUSCULAR; INTRAVENOUS
Status: DISCONTINUED | OUTPATIENT
Start: 2021-01-01 | End: 2021-01-01 | Stop reason: HOSPADM

## 2021-01-01 RX ORDER — SPIRONOLACTONE 25 MG/1
25 TABLET ORAL DAILY
Status: DISCONTINUED | OUTPATIENT
Start: 2021-01-01 | End: 2021-01-01

## 2021-01-01 RX ORDER — IPRATROPIUM BROMIDE AND ALBUTEROL SULFATE 2.5; .5 MG/3ML; MG/3ML
3 SOLUTION RESPIRATORY (INHALATION)
Status: DISCONTINUED | OUTPATIENT
Start: 2021-01-01 | End: 2021-01-01 | Stop reason: HOSPADM

## 2021-01-01 RX ORDER — DOBUTAMINE HYDROCHLORIDE 200 MG/100ML
0-10 INJECTION INTRAVENOUS
Status: DISCONTINUED | OUTPATIENT
Start: 2021-01-01 | End: 2021-01-01

## 2021-01-01 RX ORDER — DILTIAZEM HYDROCHLORIDE 5 MG/ML
INJECTION INTRAVENOUS
Status: COMPLETED
Start: 2021-01-01 | End: 2021-01-01

## 2021-01-01 RX ORDER — LANOLIN ALCOHOL/MO/W.PET/CERES
100 CREAM (GRAM) TOPICAL DAILY
Status: DISCONTINUED | OUTPATIENT
Start: 2021-01-01 | End: 2021-01-01

## 2021-01-01 RX ORDER — ONDANSETRON 2 MG/ML
4 INJECTION INTRAMUSCULAR; INTRAVENOUS
Status: DISCONTINUED | OUTPATIENT
Start: 2021-01-01 | End: 2021-01-01 | Stop reason: HOSPADM

## 2021-01-01 RX ORDER — BUDESONIDE 0.5 MG/2ML
500 INHALANT ORAL
Status: DISCONTINUED | OUTPATIENT
Start: 2021-01-01 | End: 2021-01-01

## 2021-01-01 RX ORDER — LORAZEPAM 2 MG/ML
1 INJECTION INTRAMUSCULAR
Status: DISCONTINUED | OUTPATIENT
Start: 2021-01-01 | End: 2021-01-01 | Stop reason: HOSPADM

## 2021-01-01 RX ORDER — ACETAMINOPHEN 325 MG/1
650 TABLET ORAL
Status: DISCONTINUED | OUTPATIENT
Start: 2021-01-01 | End: 2021-01-01 | Stop reason: HOSPADM

## 2021-01-01 RX ORDER — POLYETHYLENE GLYCOL 3350 17 G/17G
17 POWDER, FOR SOLUTION ORAL DAILY PRN
Status: DISCONTINUED | OUTPATIENT
Start: 2021-01-01 | End: 2021-01-01 | Stop reason: HOSPADM

## 2021-01-01 RX ORDER — DILTIAZEM HYDROCHLORIDE 5 MG/ML
10 INJECTION INTRAVENOUS ONCE
Status: COMPLETED | OUTPATIENT
Start: 2021-01-01 | End: 2021-01-01

## 2021-01-01 RX ORDER — ASCORBIC ACID 500 MG
1000 TABLET ORAL 2 TIMES DAILY
COMMUNITY

## 2021-01-01 RX ORDER — SODIUM CHLORIDE 0.9 % (FLUSH) 0.9 %
5-40 SYRINGE (ML) INJECTION EVERY 8 HOURS
Status: DISCONTINUED | OUTPATIENT
Start: 2021-01-01 | End: 2021-01-01

## 2021-01-01 RX ADMIN — Medication 10 ML: at 05:19

## 2021-01-01 RX ADMIN — ALBUMIN (HUMAN) 25 G: 0.25 INJECTION, SOLUTION INTRAVENOUS at 00:17

## 2021-01-01 RX ADMIN — ALBUMIN (HUMAN) 25 G: 0.25 INJECTION, SOLUTION INTRAVENOUS at 05:42

## 2021-01-01 RX ADMIN — CEFEPIME HYDROCHLORIDE 2 G: 2 INJECTION, POWDER, FOR SOLUTION INTRAVENOUS at 16:54

## 2021-01-01 RX ADMIN — NOREPINEPHRINE-DEXTROSE IV SOLUTION 4 MG/250ML-5% 10 MCG/MIN: 4-5/250 SOLUTION at 08:09

## 2021-01-01 RX ADMIN — Medication 10 ML: at 05:20

## 2021-01-01 RX ADMIN — NOREPINEPHRINE-DEXTROSE IV SOLUTION 4 MG/250ML-5% 10 MCG/MIN: 4-5/250 SOLUTION at 14:04

## 2021-01-01 RX ADMIN — Medication 5 ML: at 17:32

## 2021-01-01 RX ADMIN — NYSTATIN 500000 UNITS: 100000 SUSPENSION ORAL at 13:00

## 2021-01-01 RX ADMIN — BUMETANIDE 0.5 MG: 0.25 INJECTION INTRAMUSCULAR; INTRAVENOUS at 11:17

## 2021-01-01 RX ADMIN — Medication 10 ML: at 22:22

## 2021-01-01 RX ADMIN — CARVEDILOL 25 MG: 25 TABLET, FILM COATED ORAL at 18:02

## 2021-01-01 RX ADMIN — Medication 10 ML: at 23:32

## 2021-01-01 RX ADMIN — APIXABAN 2.5 MG: 2.5 TABLET, FILM COATED ORAL at 20:57

## 2021-01-01 RX ADMIN — Medication 10 ML: at 15:09

## 2021-01-01 RX ADMIN — Medication 10 ML: at 06:00

## 2021-01-01 RX ADMIN — PHENYLEPHRINE HYDROCHLORIDE 30 MCG/MIN: 10 INJECTION INTRAVENOUS at 14:03

## 2021-01-01 RX ADMIN — NYSTATIN 500000 UNITS: 100000 SUSPENSION ORAL at 09:18

## 2021-01-01 RX ADMIN — CEFEPIME HYDROCHLORIDE 2 G: 2 INJECTION, POWDER, FOR SOLUTION INTRAVENOUS at 18:02

## 2021-01-01 RX ADMIN — FUROSEMIDE 40 MG: 40 TABLET ORAL at 08:00

## 2021-01-01 RX ADMIN — SODIUM CHLORIDE 5 MG/HR: 900 INJECTION, SOLUTION INTRAVENOUS at 22:24

## 2021-01-01 RX ADMIN — APIXABAN 2.5 MG: 2.5 TABLET, FILM COATED ORAL at 20:15

## 2021-01-01 RX ADMIN — CEFEPIME HYDROCHLORIDE 2 G: 2 INJECTION, POWDER, FOR SOLUTION INTRAVENOUS at 05:40

## 2021-01-01 RX ADMIN — AMIODARONE HYDROCHLORIDE 0.5 MG/MIN: 50 INJECTION, SOLUTION INTRAVENOUS at 14:03

## 2021-01-01 RX ADMIN — Medication 10 ML: at 22:19

## 2021-01-01 RX ADMIN — INSULIN HUMAN 2 UNITS: 100 INJECTION, SOLUTION PARENTERAL at 00:07

## 2021-01-01 RX ADMIN — AMIODARONE HYDROCHLORIDE 0.5 MG/MIN: 50 INJECTION, SOLUTION INTRAVENOUS at 14:30

## 2021-01-01 RX ADMIN — BUDESONIDE 500 MCG: 0.5 INHALANT RESPIRATORY (INHALATION) at 20:43

## 2021-01-01 RX ADMIN — Medication 5 ML: at 05:16

## 2021-01-01 RX ADMIN — PERFLUTREN 1 ML: 6.52 INJECTION, SUSPENSION INTRAVENOUS at 14:20

## 2021-01-01 RX ADMIN — INSULIN HUMAN 2 UNITS: 100 INJECTION, SOLUTION PARENTERAL at 19:57

## 2021-01-01 RX ADMIN — FUROSEMIDE 40 MG: 40 TABLET ORAL at 08:46

## 2021-01-01 RX ADMIN — BUDESONIDE 500 MCG: 0.5 INHALANT RESPIRATORY (INHALATION) at 20:27

## 2021-01-01 RX ADMIN — CARVEDILOL 25 MG: 25 TABLET, FILM COATED ORAL at 08:40

## 2021-01-01 RX ADMIN — NYSTATIN 500000 UNITS: 100000 SUSPENSION ORAL at 21:43

## 2021-01-01 RX ADMIN — INSULIN HUMAN 2 UNITS: 100 INJECTION, SOLUTION PARENTERAL at 17:37

## 2021-01-01 RX ADMIN — ALBUMIN (HUMAN) 25 G: 0.25 INJECTION, SOLUTION INTRAVENOUS at 18:13

## 2021-01-01 RX ADMIN — BUDESONIDE 500 MCG: 0.5 INHALANT RESPIRATORY (INHALATION) at 20:44

## 2021-01-01 RX ADMIN — NYSTATIN 500000 UNITS: 100000 SUSPENSION ORAL at 21:53

## 2021-01-01 RX ADMIN — VANCOMYCIN HYDROCHLORIDE 1250 MG: 10 INJECTION, POWDER, LYOPHILIZED, FOR SOLUTION INTRAVENOUS at 14:32

## 2021-01-01 RX ADMIN — TAMSULOSIN HYDROCHLORIDE 0.4 MG: 0.4 CAPSULE ORAL at 08:46

## 2021-01-01 RX ADMIN — ARFORMOTEROL TARTRATE 15 MCG: 15 SOLUTION RESPIRATORY (INHALATION) at 21:48

## 2021-01-01 RX ADMIN — DILTIAZEM HYDROCHLORIDE 60 MG: 30 TABLET, FILM COATED ORAL at 21:26

## 2021-01-01 RX ADMIN — CARVEDILOL 3.12 MG: 3.12 TABLET, FILM COATED ORAL at 08:17

## 2021-01-01 RX ADMIN — DEXAMETHASONE SODIUM PHOSPHATE 6 MG: 10 INJECTION, SOLUTION INTRAMUSCULAR; INTRAVENOUS at 23:15

## 2021-01-01 RX ADMIN — NOREPINEPHRINE-DEXTROSE IV SOLUTION 4 MG/250ML-5% 4 MCG/MIN: 4-5/250 SOLUTION at 05:16

## 2021-01-01 RX ADMIN — CEFEPIME HYDROCHLORIDE 2 G: 2 INJECTION, POWDER, FOR SOLUTION INTRAVENOUS at 17:38

## 2021-01-01 RX ADMIN — Medication 5 ML: at 21:34

## 2021-01-01 RX ADMIN — APIXABAN 5 MG: 5 TABLET, FILM COATED ORAL at 08:00

## 2021-01-01 RX ADMIN — Medication 10 ML: at 05:00

## 2021-01-01 RX ADMIN — DILTIAZEM HYDROCHLORIDE 60 MG: 30 TABLET, FILM COATED ORAL at 21:06

## 2021-01-01 RX ADMIN — Medication 10 ML: at 05:14

## 2021-01-01 RX ADMIN — PHENYLEPHRINE HYDROCHLORIDE 245 MCG/MIN: 10 INJECTION INTRAVENOUS at 09:25

## 2021-01-01 RX ADMIN — Medication 10 ML: at 21:53

## 2021-01-01 RX ADMIN — BUDESONIDE 500 MCG: 0.5 INHALANT RESPIRATORY (INHALATION) at 20:32

## 2021-01-01 RX ADMIN — INSULIN HUMAN 2 UNITS: 100 INJECTION, SOLUTION PARENTERAL at 22:11

## 2021-01-01 RX ADMIN — INSULIN HUMAN 2 UNITS: 100 INJECTION, SOLUTION PARENTERAL at 11:52

## 2021-01-01 RX ADMIN — REMDESIVIR 100 MG: 100 INJECTION, POWDER, LYOPHILIZED, FOR SOLUTION INTRAVENOUS at 13:29

## 2021-01-01 RX ADMIN — FAMOTIDINE 20 MG: 20 TABLET, FILM COATED ORAL at 16:53

## 2021-01-01 RX ADMIN — INSULIN HUMAN 2 UNITS: 100 INJECTION, SOLUTION PARENTERAL at 16:05

## 2021-01-01 RX ADMIN — NYSTATIN 500000 UNITS: 100000 SUSPENSION ORAL at 11:21

## 2021-01-01 RX ADMIN — Medication 10 ML: at 06:52

## 2021-01-01 RX ADMIN — NYSTATIN 500000 UNITS: 100000 SUSPENSION ORAL at 14:34

## 2021-01-01 RX ADMIN — Medication 10 ML: at 13:25

## 2021-01-01 RX ADMIN — Medication 40 ML: at 05:01

## 2021-01-01 RX ADMIN — PHENYLEPHRINE HYDROCHLORIDE 225 MCG/MIN: 10 INJECTION INTRAVENOUS at 16:14

## 2021-01-01 RX ADMIN — VANCOMYCIN HYDROCHLORIDE 1000 MG: 1 INJECTION, POWDER, LYOPHILIZED, FOR SOLUTION INTRAVENOUS at 19:32

## 2021-01-01 RX ADMIN — INSULIN LISPRO 1 UNITS: 100 INJECTION, SOLUTION INTRAVENOUS; SUBCUTANEOUS at 08:41

## 2021-01-01 RX ADMIN — ALBUMIN (HUMAN) 25 G: 0.25 INJECTION, SOLUTION INTRAVENOUS at 17:55

## 2021-01-01 RX ADMIN — CEFEPIME HYDROCHLORIDE 2 G: 2 INJECTION, POWDER, FOR SOLUTION INTRAVENOUS at 05:58

## 2021-01-01 RX ADMIN — Medication 10 ML: at 14:05

## 2021-01-01 RX ADMIN — FUROSEMIDE 40 MG: 40 TABLET ORAL at 11:50

## 2021-01-01 RX ADMIN — TAMSULOSIN HYDROCHLORIDE 0.4 MG: 0.4 CAPSULE ORAL at 09:08

## 2021-01-01 RX ADMIN — Medication 10 ML: at 21:16

## 2021-01-01 RX ADMIN — PHENYLEPHRINE HYDROCHLORIDE 115 MCG/MIN: 10 INJECTION INTRAVENOUS at 01:51

## 2021-01-01 RX ADMIN — NOREPINEPHRINE BITARTRATE 15 MCG/MIN: 1 INJECTION, SOLUTION INTRAVENOUS at 11:02

## 2021-01-01 RX ADMIN — CEFEPIME HYDROCHLORIDE 2 G: 2 INJECTION, POWDER, FOR SOLUTION INTRAVENOUS at 05:47

## 2021-01-01 RX ADMIN — REMDESIVIR 100 MG: 100 INJECTION, POWDER, LYOPHILIZED, FOR SOLUTION INTRAVENOUS at 14:05

## 2021-01-01 RX ADMIN — APIXABAN 5 MG: 5 TABLET, FILM COATED ORAL at 08:44

## 2021-01-01 RX ADMIN — VANCOMYCIN HYDROCHLORIDE 1500 MG: 10 INJECTION, POWDER, LYOPHILIZED, FOR SOLUTION INTRAVENOUS at 11:15

## 2021-01-01 RX ADMIN — AMIODARONE HYDROCHLORIDE 0.5 MG/MIN: 50 INJECTION, SOLUTION INTRAVENOUS at 22:16

## 2021-01-01 RX ADMIN — APIXABAN 5 MG: 5 TABLET, FILM COATED ORAL at 08:46

## 2021-01-01 RX ADMIN — CARVEDILOL 25 MG: 25 TABLET, FILM COATED ORAL at 07:59

## 2021-01-01 RX ADMIN — CEFEPIME HYDROCHLORIDE 2 G: 2 INJECTION, POWDER, FOR SOLUTION INTRAVENOUS at 17:20

## 2021-01-01 RX ADMIN — DILTIAZEM HYDROCHLORIDE 60 MG: 30 TABLET, FILM COATED ORAL at 16:52

## 2021-01-01 RX ADMIN — DILTIAZEM HYDROCHLORIDE 60 MG: 30 TABLET, FILM COATED ORAL at 05:18

## 2021-01-01 RX ADMIN — SODIUM CHLORIDE 10 MG/HR: 900 INJECTION, SOLUTION INTRAVENOUS at 05:46

## 2021-01-01 RX ADMIN — Medication 10 ML: at 21:12

## 2021-01-01 RX ADMIN — VANCOMYCIN HYDROCHLORIDE 1000 MG: 1 INJECTION, POWDER, LYOPHILIZED, FOR SOLUTION INTRAVENOUS at 08:56

## 2021-01-01 RX ADMIN — BUDESONIDE 500 MCG: 0.5 INHALANT RESPIRATORY (INHALATION) at 10:36

## 2021-01-01 RX ADMIN — Medication 100 MG: at 09:06

## 2021-01-01 RX ADMIN — SODIUM CHLORIDE 2.5 MG/HR: 900 INJECTION, SOLUTION INTRAVENOUS at 17:49

## 2021-01-01 RX ADMIN — PHENYLEPHRINE HYDROCHLORIDE 245 MCG/MIN: 10 INJECTION INTRAVENOUS at 02:22

## 2021-01-01 RX ADMIN — NYSTATIN 500000 UNITS: 100000 SUSPENSION ORAL at 17:37

## 2021-01-01 RX ADMIN — Medication 10 ML: at 13:08

## 2021-01-01 RX ADMIN — Medication 10 ML: at 13:48

## 2021-01-01 RX ADMIN — ARFORMOTEROL TARTRATE 15 MCG: 15 SOLUTION RESPIRATORY (INHALATION) at 08:11

## 2021-01-01 RX ADMIN — THIAMINE HYDROCHLORIDE 100 MG: 100 INJECTION, SOLUTION INTRAMUSCULAR; INTRAVENOUS at 09:08

## 2021-01-01 RX ADMIN — ARFORMOTEROL TARTRATE 15 MCG: 15 SOLUTION RESPIRATORY (INHALATION) at 20:44

## 2021-01-01 RX ADMIN — DEXAMETHASONE SODIUM PHOSPHATE 6 MG: 10 INJECTION, SOLUTION INTRAMUSCULAR; INTRAVENOUS at 23:10

## 2021-01-01 RX ADMIN — ARFORMOTEROL TARTRATE 15 MCG: 15 SOLUTION RESPIRATORY (INHALATION) at 19:13

## 2021-01-01 RX ADMIN — AMIODARONE HYDROCHLORIDE 150 MG: 50 INJECTION, SOLUTION INTRAVENOUS at 18:54

## 2021-01-01 RX ADMIN — NOREPINEPHRINE-DEXTROSE IV SOLUTION 4 MG/250ML-5% 8 MCG/MIN: 4-5/250 SOLUTION at 15:09

## 2021-01-01 RX ADMIN — APIXABAN 5 MG: 5 TABLET, FILM COATED ORAL at 23:05

## 2021-01-01 RX ADMIN — AMIODARONE HYDROCHLORIDE 150 MG: 50 INJECTION, SOLUTION INTRAVENOUS at 10:46

## 2021-01-01 RX ADMIN — APIXABAN 5 MG: 5 TABLET, FILM COATED ORAL at 08:03

## 2021-01-01 RX ADMIN — CARVEDILOL 25 MG: 25 TABLET, FILM COATED ORAL at 08:44

## 2021-01-01 RX ADMIN — VANCOMYCIN HYDROCHLORIDE 2500 MG: 10 INJECTION, POWDER, LYOPHILIZED, FOR SOLUTION INTRAVENOUS at 10:12

## 2021-01-01 RX ADMIN — NOREPINEPHRINE BITARTRATE 8 MCG/MIN: 1 INJECTION, SOLUTION INTRAVENOUS at 23:59

## 2021-01-01 RX ADMIN — INSULIN HUMAN 2 UNITS: 100 INJECTION, SOLUTION PARENTERAL at 16:41

## 2021-01-01 RX ADMIN — APIXABAN 5 MG: 5 TABLET, FILM COATED ORAL at 21:16

## 2021-01-01 RX ADMIN — APIXABAN 5 MG: 5 TABLET, FILM COATED ORAL at 10:21

## 2021-01-01 RX ADMIN — REMDESIVIR 100 MG: 100 INJECTION, POWDER, LYOPHILIZED, FOR SOLUTION INTRAVENOUS at 15:13

## 2021-01-01 RX ADMIN — NYSTATIN 500000 UNITS: 100000 SUSPENSION ORAL at 08:05

## 2021-01-01 RX ADMIN — APIXABAN 2.5 MG: 2.5 TABLET, FILM COATED ORAL at 20:53

## 2021-01-01 RX ADMIN — Medication 10 ML: at 14:15

## 2021-01-01 RX ADMIN — FAMOTIDINE 20 MG: 20 TABLET ORAL at 09:06

## 2021-01-01 RX ADMIN — BUDESONIDE 500 MCG: 0.5 INHALANT RESPIRATORY (INHALATION) at 08:45

## 2021-01-01 RX ADMIN — NYSTATIN 500000 UNITS: 100000 SUSPENSION ORAL at 18:03

## 2021-01-01 RX ADMIN — DOBUTAMINE IN DEXTROSE 10 MCG/KG/MIN: 200 INJECTION, SOLUTION INTRAVENOUS at 12:11

## 2021-01-01 RX ADMIN — APIXABAN 5 MG: 5 TABLET, FILM COATED ORAL at 17:25

## 2021-01-01 RX ADMIN — Medication 10 ML: at 13:07

## 2021-01-01 RX ADMIN — NYSTATIN 500000 UNITS: 100000 SUSPENSION ORAL at 12:11

## 2021-01-01 RX ADMIN — APIXABAN 5 MG: 5 TABLET, FILM COATED ORAL at 08:06

## 2021-01-01 RX ADMIN — Medication 5 ML: at 21:37

## 2021-01-01 RX ADMIN — APIXABAN 5 MG: 5 TABLET, FILM COATED ORAL at 08:20

## 2021-01-01 RX ADMIN — MIDODRINE HYDROCHLORIDE 10 MG: 5 TABLET ORAL at 05:20

## 2021-01-01 RX ADMIN — APIXABAN 5 MG: 5 TABLET, FILM COATED ORAL at 12:30

## 2021-01-01 RX ADMIN — DILTIAZEM HYDROCHLORIDE 60 MG: 30 TABLET, FILM COATED ORAL at 17:22

## 2021-01-01 RX ADMIN — DILTIAZEM HYDROCHLORIDE 10 MG: 5 INJECTION INTRAVENOUS at 13:15

## 2021-01-01 RX ADMIN — APIXABAN 5 MG: 5 TABLET, FILM COATED ORAL at 08:41

## 2021-01-01 RX ADMIN — BUDESONIDE 500 MCG: 0.5 INHALANT RESPIRATORY (INHALATION) at 19:20

## 2021-01-01 RX ADMIN — NYSTATIN 500000 UNITS: 100000 SUSPENSION ORAL at 17:19

## 2021-01-01 RX ADMIN — Medication 10 ML: at 06:16

## 2021-01-01 RX ADMIN — AMIODARONE HYDROCHLORIDE 0.5 MG/MIN: 50 INJECTION, SOLUTION INTRAVENOUS at 05:06

## 2021-01-01 RX ADMIN — APIXABAN 5 MG: 5 TABLET, FILM COATED ORAL at 21:14

## 2021-01-01 RX ADMIN — PHENYLEPHRINE HYDROCHLORIDE 10 MCG/MIN: 10 INJECTION INTRAVENOUS at 10:08

## 2021-01-01 RX ADMIN — CARVEDILOL 3.12 MG: 3.12 TABLET, FILM COATED ORAL at 16:06

## 2021-01-01 RX ADMIN — PHENYLEPHRINE HYDROCHLORIDE 250 MCG/MIN: 10 INJECTION INTRAVENOUS at 19:04

## 2021-01-01 RX ADMIN — DILTIAZEM HYDROCHLORIDE 60 MG: 30 TABLET, FILM COATED ORAL at 12:00

## 2021-01-01 RX ADMIN — FENTANYL CITRATE 50 MCG/HR: 50 INJECTION, SOLUTION INTRAMUSCULAR; INTRAVENOUS at 15:25

## 2021-01-01 RX ADMIN — SODIUM CHLORIDE, SODIUM LACTATE, POTASSIUM CHLORIDE, AND CALCIUM CHLORIDE 100 ML/HR: 600; 310; 30; 20 INJECTION, SOLUTION INTRAVENOUS at 17:28

## 2021-01-01 RX ADMIN — FAMOTIDINE 20 MG: 20 TABLET ORAL at 08:18

## 2021-01-01 RX ADMIN — REMDESIVIR 200 MG: 100 INJECTION, POWDER, LYOPHILIZED, FOR SOLUTION INTRAVENOUS at 15:13

## 2021-01-01 RX ADMIN — Medication 10 ML: at 21:55

## 2021-01-01 RX ADMIN — CEFEPIME HYDROCHLORIDE 2 G: 2 INJECTION, POWDER, FOR SOLUTION INTRAVENOUS at 16:46

## 2021-01-01 RX ADMIN — Medication 40 ML: at 05:49

## 2021-01-01 RX ADMIN — CARVEDILOL 3.12 MG: 3.12 TABLET, FILM COATED ORAL at 09:06

## 2021-01-01 RX ADMIN — FAMOTIDINE 20 MG: 20 TABLET, FILM COATED ORAL at 08:40

## 2021-01-01 RX ADMIN — Medication 10 ML: at 01:52

## 2021-01-01 RX ADMIN — SODIUM CHLORIDE 500 ML: 900 INJECTION, SOLUTION INTRAVENOUS at 13:28

## 2021-01-01 RX ADMIN — APIXABAN 5 MG: 5 TABLET, FILM COATED ORAL at 21:34

## 2021-01-01 RX ADMIN — INSULIN GLARGINE 10 UNITS: 100 INJECTION, SOLUTION SUBCUTANEOUS at 11:50

## 2021-01-01 RX ADMIN — Medication 10 ML: at 21:44

## 2021-01-01 RX ADMIN — Medication 100 MG: at 10:20

## 2021-01-01 RX ADMIN — APIXABAN 5 MG: 5 TABLET, FILM COATED ORAL at 20:00

## 2021-01-01 RX ADMIN — DEXAMETHASONE SODIUM PHOSPHATE 6 MG: 10 INJECTION, SOLUTION INTRAMUSCULAR; INTRAVENOUS at 23:59

## 2021-01-01 RX ADMIN — CARVEDILOL 25 MG: 25 TABLET, FILM COATED ORAL at 16:52

## 2021-01-01 RX ADMIN — NYSTATIN 500000 UNITS: 100000 SUSPENSION ORAL at 21:16

## 2021-01-01 RX ADMIN — DEXAMETHASONE SODIUM PHOSPHATE 10 MG: 10 INJECTION, SOLUTION INTRAMUSCULAR; INTRAVENOUS at 09:33

## 2021-01-01 RX ADMIN — Medication 5 ML: at 21:46

## 2021-01-01 RX ADMIN — NYSTATIN 500000 UNITS: 100000 SUSPENSION ORAL at 09:06

## 2021-01-01 RX ADMIN — ARFORMOTEROL TARTRATE 15 MCG: 15 SOLUTION RESPIRATORY (INHALATION) at 07:30

## 2021-01-01 RX ADMIN — TUBERCULIN PURIFIED PROTEIN DERIVATIVE 5 UNITS: 5 INJECTION, SOLUTION INTRADERMAL at 14:19

## 2021-01-01 RX ADMIN — NYSTATIN 500000 UNITS: 100000 SUSPENSION ORAL at 18:19

## 2021-01-01 RX ADMIN — VANCOMYCIN HYDROCHLORIDE 1250 MG: 10 INJECTION, POWDER, LYOPHILIZED, FOR SOLUTION INTRAVENOUS at 15:08

## 2021-01-01 RX ADMIN — NYSTATIN 500000 UNITS: 100000 SUSPENSION ORAL at 08:17

## 2021-01-01 RX ADMIN — BUMETANIDE 0.5 MG: 0.25 INJECTION INTRAMUSCULAR; INTRAVENOUS at 18:19

## 2021-01-01 RX ADMIN — TAMSULOSIN HYDROCHLORIDE 0.4 MG: 0.4 CAPSULE ORAL at 08:41

## 2021-01-01 RX ADMIN — FAMOTIDINE 20 MG: 20 TABLET ORAL at 11:16

## 2021-01-01 RX ADMIN — ARFORMOTEROL TARTRATE 15 MCG: 15 SOLUTION RESPIRATORY (INHALATION) at 08:41

## 2021-01-01 RX ADMIN — CEFEPIME HYDROCHLORIDE 2 G: 2 INJECTION, POWDER, FOR SOLUTION INTRAVENOUS at 05:11

## 2021-01-01 RX ADMIN — AMIODARONE HYDROCHLORIDE 0.5 MG/MIN: 50 INJECTION, SOLUTION INTRAVENOUS at 20:00

## 2021-01-01 RX ADMIN — LORAZEPAM 1 MG: 2 INJECTION INTRAMUSCULAR; INTRAVENOUS at 04:38

## 2021-01-01 RX ADMIN — BUDESONIDE 500 MCG: 0.5 INHALANT RESPIRATORY (INHALATION) at 19:13

## 2021-01-01 RX ADMIN — ACETAMINOPHEN 1000 MG: 500 TABLET ORAL at 15:35

## 2021-01-01 RX ADMIN — SODIUM CHLORIDE, SODIUM LACTATE, POTASSIUM CHLORIDE, AND CALCIUM CHLORIDE 75 ML/HR: 600; 310; 30; 20 INJECTION, SOLUTION INTRAVENOUS at 04:27

## 2021-01-01 RX ADMIN — DILTIAZEM HYDROCHLORIDE 60 MG: 30 TABLET, FILM COATED ORAL at 12:34

## 2021-01-01 RX ADMIN — DILTIAZEM HYDROCHLORIDE 60 MG: 30 TABLET, FILM COATED ORAL at 16:45

## 2021-01-01 RX ADMIN — INSULIN LISPRO 1 UNITS: 100 INJECTION, SOLUTION INTRAVENOUS; SUBCUTANEOUS at 16:56

## 2021-01-01 RX ADMIN — Medication 30 ML: at 22:19

## 2021-01-01 RX ADMIN — APIXABAN 2.5 MG: 2.5 TABLET, FILM COATED ORAL at 08:17

## 2021-01-01 RX ADMIN — AMIODARONE HYDROCHLORIDE 0.5 MG/MIN: 50 INJECTION, SOLUTION INTRAVENOUS at 11:25

## 2021-01-01 RX ADMIN — APIXABAN 2.5 MG: 2.5 TABLET, FILM COATED ORAL at 09:06

## 2021-01-01 RX ADMIN — DOBUTAMINE IN DEXTROSE 10 MCG/KG/MIN: 200 INJECTION, SOLUTION INTRAVENOUS at 05:20

## 2021-01-01 RX ADMIN — BUDESONIDE 500 MCG: 0.5 INHALANT RESPIRATORY (INHALATION) at 07:30

## 2021-01-01 RX ADMIN — Medication 20 ML: at 14:00

## 2021-01-01 RX ADMIN — Medication 100 MG: at 10:46

## 2021-01-01 RX ADMIN — DOBUTAMINE IN DEXTROSE 10 MCG/KG/MIN: 200 INJECTION, SOLUTION INTRAVENOUS at 19:57

## 2021-01-01 RX ADMIN — DOBUTAMINE IN DEXTROSE 10 MCG/KG/MIN: 200 INJECTION, SOLUTION INTRAVENOUS at 21:34

## 2021-01-01 RX ADMIN — FUROSEMIDE 40 MG: 40 TABLET ORAL at 08:40

## 2021-01-01 RX ADMIN — CARVEDILOL 3.12 MG: 3.12 TABLET, FILM COATED ORAL at 17:56

## 2021-01-01 RX ADMIN — FENTANYL CITRATE 50 MCG/HR: 50 INJECTION, SOLUTION INTRAMUSCULAR; INTRAVENOUS at 07:02

## 2021-01-01 RX ADMIN — PHENYLEPHRINE HYDROCHLORIDE 250 MCG/MIN: 10 INJECTION INTRAVENOUS at 12:25

## 2021-01-01 RX ADMIN — CEFEPIME HYDROCHLORIDE 1 G: 1 INJECTION, POWDER, FOR SOLUTION INTRAMUSCULAR; INTRAVENOUS at 15:45

## 2021-01-01 RX ADMIN — DILTIAZEM HYDROCHLORIDE 240 MG: 120 CAPSULE, COATED, EXTENDED RELEASE ORAL at 10:35

## 2021-01-01 RX ADMIN — APIXABAN 5 MG: 5 TABLET, FILM COATED ORAL at 21:07

## 2021-01-01 RX ADMIN — Medication 40 ML: at 22:21

## 2021-01-01 RX ADMIN — ALBUMIN (HUMAN) 25 G: 0.25 INJECTION, SOLUTION INTRAVENOUS at 17:15

## 2021-01-01 RX ADMIN — BUDESONIDE 500 MCG: 0.5 INHALANT RESPIRATORY (INHALATION) at 08:12

## 2021-01-01 RX ADMIN — FAMOTIDINE 20 MG: 20 TABLET, FILM COATED ORAL at 09:08

## 2021-01-01 RX ADMIN — INSULIN HUMAN 2 UNITS: 100 INJECTION, SOLUTION PARENTERAL at 23:54

## 2021-01-01 RX ADMIN — DILTIAZEM HYDROCHLORIDE 60 MG: 30 TABLET, FILM COATED ORAL at 21:14

## 2021-01-01 RX ADMIN — VANCOMYCIN HYDROCHLORIDE 2500 MG: 10 INJECTION, POWDER, LYOPHILIZED, FOR SOLUTION INTRAVENOUS at 15:50

## 2021-01-01 RX ADMIN — VANCOMYCIN HYDROCHLORIDE 1000 MG: 1 INJECTION, POWDER, LYOPHILIZED, FOR SOLUTION INTRAVENOUS at 08:09

## 2021-01-01 RX ADMIN — FAMOTIDINE 20 MG: 20 TABLET ORAL at 17:34

## 2021-01-01 RX ADMIN — DILTIAZEM HYDROCHLORIDE 60 MG: 30 TABLET, FILM COATED ORAL at 08:40

## 2021-01-01 RX ADMIN — CEFEPIME HYDROCHLORIDE 2 G: 2 INJECTION, POWDER, FOR SOLUTION INTRAVENOUS at 19:24

## 2021-01-01 RX ADMIN — LORAZEPAM 1 MG: 2 INJECTION INTRAMUSCULAR; INTRAVENOUS at 14:27

## 2021-01-01 RX ADMIN — INSULIN LISPRO 1 UNITS: 100 INJECTION, SOLUTION INTRAVENOUS; SUBCUTANEOUS at 08:06

## 2021-01-01 RX ADMIN — FAMOTIDINE 20 MG: 20 TABLET ORAL at 09:18

## 2021-01-01 RX ADMIN — FAMOTIDINE 20 MG: 20 TABLET ORAL at 11:33

## 2021-01-01 RX ADMIN — Medication 10 ML: at 15:17

## 2021-01-01 RX ADMIN — INSULIN GLARGINE 10 UNITS: 100 INJECTION, SOLUTION SUBCUTANEOUS at 08:00

## 2021-01-01 RX ADMIN — DILTIAZEM HYDROCHLORIDE 60 MG: 30 TABLET, FILM COATED ORAL at 11:39

## 2021-01-01 RX ADMIN — DOBUTAMINE IN DEXTROSE 6 MCG/KG/MIN: 200 INJECTION, SOLUTION INTRAVENOUS at 14:31

## 2021-01-01 RX ADMIN — DOBUTAMINE IN DEXTROSE 10 MCG/KG/MIN: 200 INJECTION, SOLUTION INTRAVENOUS at 13:38

## 2021-01-01 RX ADMIN — Medication 10 ML: at 21:41

## 2021-01-01 RX ADMIN — ARFORMOTEROL TARTRATE 15 MCG: 15 SOLUTION RESPIRATORY (INHALATION) at 08:45

## 2021-01-01 RX ADMIN — IPRATROPIUM BROMIDE AND ALBUTEROL SULFATE 3 ML: .5; 3 SOLUTION RESPIRATORY (INHALATION) at 10:37

## 2021-01-01 RX ADMIN — DILTIAZEM HYDROCHLORIDE 60 MG: 30 TABLET, FILM COATED ORAL at 18:02

## 2021-01-01 RX ADMIN — ARFORMOTEROL TARTRATE 15 MCG: 15 SOLUTION RESPIRATORY (INHALATION) at 20:10

## 2021-01-01 RX ADMIN — TAMSULOSIN HYDROCHLORIDE 0.4 MG: 0.4 CAPSULE ORAL at 08:06

## 2021-01-01 RX ADMIN — TUBERCULIN PURIFIED PROTEIN DERIVATIVE 5 UNITS: 5 INJECTION, SOLUTION INTRADERMAL at 15:58

## 2021-01-01 RX ADMIN — APIXABAN 5 MG: 5 TABLET, FILM COATED ORAL at 11:33

## 2021-01-01 RX ADMIN — Medication 10 ML: at 05:07

## 2021-01-01 RX ADMIN — CEFEPIME HYDROCHLORIDE 2 G: 2 INJECTION, POWDER, FOR SOLUTION INTRAVENOUS at 23:20

## 2021-01-01 RX ADMIN — DOBUTAMINE IN DEXTROSE 8 MCG/KG/MIN: 200 INJECTION, SOLUTION INTRAVENOUS at 03:41

## 2021-01-01 RX ADMIN — Medication 10 ML: at 05:41

## 2021-01-01 RX ADMIN — CEFEPIME HYDROCHLORIDE 2 G: 2 INJECTION, POWDER, FOR SOLUTION INTRAVENOUS at 05:32

## 2021-01-01 RX ADMIN — CEFEPIME HYDROCHLORIDE 2 G: 2 INJECTION, POWDER, FOR SOLUTION INTRAVENOUS at 17:25

## 2021-01-01 RX ADMIN — MIDODRINE HYDROCHLORIDE 10 MG: 5 TABLET ORAL at 21:00

## 2021-01-01 RX ADMIN — DILTIAZEM HYDROCHLORIDE 60 MG: 30 TABLET, FILM COATED ORAL at 12:32

## 2021-01-01 RX ADMIN — INSULIN LISPRO 1 UNITS: 100 INJECTION, SOLUTION INTRAVENOUS; SUBCUTANEOUS at 08:04

## 2021-01-01 RX ADMIN — FAMOTIDINE 20 MG: 20 TABLET, FILM COATED ORAL at 08:04

## 2021-01-01 RX ADMIN — Medication 10 ML: at 05:06

## 2021-01-01 RX ADMIN — FAMOTIDINE 20 MG: 20 TABLET ORAL at 17:19

## 2021-01-01 RX ADMIN — FAMOTIDINE 20 MG: 20 TABLET ORAL at 17:07

## 2021-01-01 RX ADMIN — Medication 10 ML: at 14:00

## 2021-01-01 RX ADMIN — MIDODRINE HYDROCHLORIDE 10 MG: 5 TABLET ORAL at 13:44

## 2021-01-01 RX ADMIN — CARVEDILOL 25 MG: 25 TABLET, FILM COATED ORAL at 08:03

## 2021-01-01 RX ADMIN — Medication 100 MG: at 08:18

## 2021-01-01 RX ADMIN — FAMOTIDINE 20 MG: 20 TABLET ORAL at 10:20

## 2021-01-01 RX ADMIN — BENZONATATE 100 MG: 100 CAPSULE ORAL at 21:58

## 2021-01-01 RX ADMIN — INSULIN HUMAN 2 UNITS: 100 INJECTION, SOLUTION PARENTERAL at 17:08

## 2021-01-01 RX ADMIN — NYSTATIN 500000 UNITS: 100000 SUSPENSION ORAL at 17:08

## 2021-01-01 RX ADMIN — AMIODARONE HYDROCHLORIDE 0.5 MG/MIN: 50 INJECTION, SOLUTION INTRAVENOUS at 19:59

## 2021-01-01 RX ADMIN — Medication 10 ML: at 06:17

## 2021-01-01 RX ADMIN — SODIUM CHLORIDE 2.5 MG/HR: 900 INJECTION, SOLUTION INTRAVENOUS at 15:18

## 2021-01-01 RX ADMIN — CEFEPIME HYDROCHLORIDE 2 G: 2 INJECTION, POWDER, FOR SOLUTION INTRAVENOUS at 05:14

## 2021-01-01 RX ADMIN — NOREPINEPHRINE-DEXTROSE IV SOLUTION 4 MG/250ML-5% 15 MCG/MIN: 4-5/250 SOLUTION at 07:00

## 2021-01-01 RX ADMIN — Medication 100 MG: at 08:05

## 2021-01-01 RX ADMIN — FAMOTIDINE 20 MG: 20 TABLET ORAL at 18:24

## 2021-01-01 RX ADMIN — DILTIAZEM HYDROCHLORIDE 60 MG: 30 TABLET, FILM COATED ORAL at 09:08

## 2021-01-01 RX ADMIN — APIXABAN 5 MG: 5 TABLET, FILM COATED ORAL at 17:37

## 2021-01-01 RX ADMIN — FUROSEMIDE 40 MG: 40 TABLET ORAL at 08:06

## 2021-01-01 RX ADMIN — PHENYLEPHRINE HYDROCHLORIDE 235 MCG/MIN: 10 INJECTION INTRAVENOUS at 22:21

## 2021-01-01 RX ADMIN — BUDESONIDE 500 MCG: 0.5 INHALANT RESPIRATORY (INHALATION) at 21:48

## 2021-01-01 RX ADMIN — CEFEPIME HYDROCHLORIDE 2 G: 2 INJECTION, POWDER, FOR SOLUTION INTRAVENOUS at 05:34

## 2021-01-01 RX ADMIN — FAMOTIDINE 20 MG: 20 TABLET ORAL at 08:20

## 2021-01-01 RX ADMIN — SODIUM CHLORIDE, SODIUM LACTATE, POTASSIUM CHLORIDE, AND CALCIUM CHLORIDE 75 ML/HR: 600; 310; 30; 20 INJECTION, SOLUTION INTRAVENOUS at 05:16

## 2021-01-01 RX ADMIN — REMDESIVIR 100 MG: 100 INJECTION, POWDER, LYOPHILIZED, FOR SOLUTION INTRAVENOUS at 12:32

## 2021-01-01 RX ADMIN — FAMOTIDINE 20 MG: 20 TABLET, FILM COATED ORAL at 16:46

## 2021-01-01 RX ADMIN — FAMOTIDINE 20 MG: 20 TABLET, FILM COATED ORAL at 18:02

## 2021-01-01 RX ADMIN — AMIODARONE HYDROCHLORIDE 1 MG/MIN: 50 INJECTION, SOLUTION INTRAVENOUS at 12:13

## 2021-01-01 RX ADMIN — BUDESONIDE 500 MCG: 0.5 INHALANT RESPIRATORY (INHALATION) at 08:11

## 2021-01-01 RX ADMIN — SPIRONOLACTONE 25 MG: 25 TABLET ORAL at 17:55

## 2021-01-01 RX ADMIN — CARVEDILOL 3.12 MG: 3.12 TABLET, FILM COATED ORAL at 08:05

## 2021-01-01 RX ADMIN — AMIODARONE HYDROCHLORIDE 0.5 MG/MIN: 50 INJECTION, SOLUTION INTRAVENOUS at 18:54

## 2021-01-01 RX ADMIN — APIXABAN 5 MG: 5 TABLET, FILM COATED ORAL at 11:16

## 2021-01-01 RX ADMIN — CEFEPIME HYDROCHLORIDE 2 G: 2 INJECTION, POWDER, FOR SOLUTION INTRAVENOUS at 05:00

## 2021-01-01 RX ADMIN — CARVEDILOL 25 MG: 25 TABLET, FILM COATED ORAL at 17:22

## 2021-01-01 RX ADMIN — FUROSEMIDE 40 MG: 10 INJECTION, SOLUTION INTRAMUSCULAR; INTRAVENOUS at 17:55

## 2021-01-01 RX ADMIN — ALBUMIN (HUMAN) 25 G: 0.25 INJECTION, SOLUTION INTRAVENOUS at 11:49

## 2021-01-01 RX ADMIN — CEFEPIME HYDROCHLORIDE 2 G: 2 INJECTION, POWDER, FOR SOLUTION INTRAVENOUS at 05:50

## 2021-01-01 RX ADMIN — VANCOMYCIN HYDROCHLORIDE 1250 MG: 10 INJECTION, POWDER, LYOPHILIZED, FOR SOLUTION INTRAVENOUS at 06:24

## 2021-01-01 RX ADMIN — CARVEDILOL 25 MG: 25 TABLET, FILM COATED ORAL at 08:46

## 2021-01-01 RX ADMIN — NYSTATIN 500000 UNITS: 100000 SUSPENSION ORAL at 17:07

## 2021-01-01 RX ADMIN — APIXABAN 5 MG: 5 TABLET, FILM COATED ORAL at 21:26

## 2021-01-01 RX ADMIN — Medication 10 ML: at 05:40

## 2021-01-01 RX ADMIN — Medication 10 ML: at 14:17

## 2021-01-01 RX ADMIN — Medication 100 MG: at 11:33

## 2021-01-01 RX ADMIN — ACETAMINOPHEN 1000 MG: 500 TABLET ORAL at 09:23

## 2021-01-01 RX ADMIN — DILTIAZEM HYDROCHLORIDE 30 MG: 30 TABLET, FILM COATED ORAL at 14:16

## 2021-01-01 RX ADMIN — NOREPINEPHRINE-DEXTROSE IV SOLUTION 4 MG/250ML-5% 15 MCG/MIN: 4-5/250 SOLUTION at 01:51

## 2021-01-01 RX ADMIN — BUDESONIDE 500 MCG: 0.5 INHALANT RESPIRATORY (INHALATION) at 08:41

## 2021-01-01 RX ADMIN — APIXABAN 2.5 MG: 2.5 TABLET, FILM COATED ORAL at 08:05

## 2021-01-01 RX ADMIN — Medication 10 ML: at 21:26

## 2021-01-01 RX ADMIN — NYSTATIN 500000 UNITS: 100000 SUSPENSION ORAL at 21:00

## 2021-01-01 RX ADMIN — ARFORMOTEROL TARTRATE 15 MCG: 15 SOLUTION RESPIRATORY (INHALATION) at 19:56

## 2021-01-01 RX ADMIN — FAMOTIDINE 20 MG: 20 TABLET, FILM COATED ORAL at 17:22

## 2021-01-01 RX ADMIN — FAMOTIDINE 40 MG: 40 POWDER, FOR SUSPENSION ORAL at 08:44

## 2021-01-01 RX ADMIN — INSULIN HUMAN 6 UNITS: 100 INJECTION, SOLUTION PARENTERAL at 21:47

## 2021-01-01 RX ADMIN — INSULIN HUMAN 2 UNITS: 100 INJECTION, SOLUTION PARENTERAL at 05:58

## 2021-01-01 RX ADMIN — INSULIN LISPRO 1 UNITS: 100 INJECTION, SOLUTION INTRAVENOUS; SUBCUTANEOUS at 16:52

## 2021-01-01 RX ADMIN — DOBUTAMINE IN DEXTROSE 10 MCG/KG/MIN: 200 INJECTION, SOLUTION INTRAVENOUS at 22:21

## 2021-01-01 RX ADMIN — CEFEPIME HYDROCHLORIDE 2 G: 2 INJECTION, POWDER, FOR SOLUTION INTRAVENOUS at 17:37

## 2021-01-01 RX ADMIN — CARVEDILOL 25 MG: 25 TABLET, FILM COATED ORAL at 17:25

## 2021-01-01 RX ADMIN — Medication 10 ML: at 06:53

## 2021-01-01 RX ADMIN — FAMOTIDINE 20 MG: 20 TABLET, FILM COATED ORAL at 08:06

## 2021-01-01 RX ADMIN — ARFORMOTEROL TARTRATE 15 MCG: 15 SOLUTION RESPIRATORY (INHALATION) at 20:43

## 2021-01-01 RX ADMIN — INSULIN HUMAN 2 UNITS: 100 INJECTION, SOLUTION PARENTERAL at 06:03

## 2021-01-01 RX ADMIN — DILTIAZEM HYDROCHLORIDE 60 MG: 30 TABLET, FILM COATED ORAL at 08:46

## 2021-01-01 RX ADMIN — ARFORMOTEROL TARTRATE 15 MCG: 15 SOLUTION RESPIRATORY (INHALATION) at 08:34

## 2021-01-01 RX ADMIN — Medication 100 MG: at 11:16

## 2021-01-01 RX ADMIN — SODIUM CHLORIDE, SODIUM LACTATE, POTASSIUM CHLORIDE, AND CALCIUM CHLORIDE 75 ML/HR: 600; 310; 30; 20 INJECTION, SOLUTION INTRAVENOUS at 09:51

## 2021-01-01 RX ADMIN — BUDESONIDE 500 MCG: 0.5 INHALANT RESPIRATORY (INHALATION) at 08:34

## 2021-01-01 RX ADMIN — INSULIN LISPRO 1 UNITS: 100 INJECTION, SOLUTION INTRAVENOUS; SUBCUTANEOUS at 08:46

## 2021-01-01 RX ADMIN — ARFORMOTEROL TARTRATE 15 MCG: 15 SOLUTION RESPIRATORY (INHALATION) at 20:27

## 2021-01-01 RX ADMIN — Medication 10 ML: at 21:01

## 2021-01-01 RX ADMIN — Medication 10 ML: at 13:11

## 2021-01-01 RX ADMIN — NOREPINEPHRINE-DEXTROSE IV SOLUTION 4 MG/250ML-5% 10 MCG/MIN: 4-5/250 SOLUTION at 00:34

## 2021-01-01 RX ADMIN — FAMOTIDINE 20 MG: 20 TABLET, FILM COATED ORAL at 08:46

## 2021-01-01 RX ADMIN — NOREPINEPHRINE-DEXTROSE IV SOLUTION 4 MG/250ML-5% 8 MCG/MIN: 4-5/250 SOLUTION at 20:51

## 2021-01-01 RX ADMIN — CEFEPIME HYDROCHLORIDE 2 G: 2 INJECTION, POWDER, FOR SOLUTION INTRAVENOUS at 06:43

## 2021-01-01 RX ADMIN — CLOTRIMAZOLE 10 MG: 10 LOZENGE ORAL; TOPICAL at 08:20

## 2021-01-01 RX ADMIN — NOREPINEPHRINE BITARTRATE 10 MCG/MIN: 1 INJECTION, SOLUTION INTRAVENOUS at 06:59

## 2021-01-01 RX ADMIN — ALBUMIN (HUMAN) 25 G: 0.25 INJECTION, SOLUTION INTRAVENOUS at 05:18

## 2021-01-01 RX ADMIN — Medication 100 MG: at 09:18

## 2021-01-01 RX ADMIN — FAMOTIDINE 20 MG: 20 TABLET ORAL at 08:05

## 2021-01-01 RX ADMIN — NYSTATIN 500000 UNITS: 100000 SUSPENSION ORAL at 12:06

## 2021-01-01 RX ADMIN — ALBUMIN (HUMAN) 25 G: 0.25 INJECTION, SOLUTION INTRAVENOUS at 12:01

## 2021-01-01 RX ADMIN — DILTIAZEM HYDROCHLORIDE 60 MG: 30 TABLET, FILM COATED ORAL at 21:46

## 2021-01-01 RX ADMIN — CLOTRIMAZOLE 10 MG: 10 LOZENGE ORAL; TOPICAL at 11:49

## 2021-01-01 RX ADMIN — NYSTATIN 500000 UNITS: 100000 SUSPENSION ORAL at 10:21

## 2021-01-01 RX ADMIN — DEXAMETHASONE SODIUM PHOSPHATE 6 MG: 10 INJECTION, SOLUTION INTRAMUSCULAR; INTRAVENOUS at 01:52

## 2021-01-01 RX ADMIN — BUDESONIDE 500 MCG: 0.5 INHALANT RESPIRATORY (INHALATION) at 19:56

## 2021-01-01 RX ADMIN — ACETAMINOPHEN 650 MG: 325 TABLET ORAL at 17:39

## 2021-01-01 RX ADMIN — CARVEDILOL 25 MG: 25 TABLET, FILM COATED ORAL at 16:46

## 2021-01-01 RX ADMIN — INSULIN HUMAN 2 UNITS: 100 INJECTION, SOLUTION PARENTERAL at 13:25

## 2021-01-01 RX ADMIN — Medication 10 ML: at 13:10

## 2021-01-01 RX ADMIN — DEXAMETHASONE SODIUM PHOSPHATE 6 MG: 10 INJECTION, SOLUTION INTRAMUSCULAR; INTRAVENOUS at 23:21

## 2021-01-01 RX ADMIN — Medication 10 ML: at 23:15

## 2021-01-01 RX ADMIN — FUROSEMIDE 40 MG: 40 TABLET ORAL at 08:03

## 2021-01-01 RX ADMIN — ALBUMIN (HUMAN) 25 G: 0.25 INJECTION, SOLUTION INTRAVENOUS at 23:53

## 2021-01-01 RX ADMIN — BUDESONIDE 500 MCG: 0.5 INHALANT RESPIRATORY (INHALATION) at 20:10

## 2021-01-01 RX ADMIN — DOBUTAMINE IN DEXTROSE 10 MCG/KG/MIN: 200 INJECTION, SOLUTION INTRAVENOUS at 06:21

## 2021-01-01 RX ADMIN — APIXABAN 5 MG: 5 TABLET, FILM COATED ORAL at 09:18

## 2021-01-01 RX ADMIN — APIXABAN 5 MG: 5 TABLET, FILM COATED ORAL at 20:22

## 2021-01-01 RX ADMIN — ARFORMOTEROL TARTRATE 15 MCG: 15 SOLUTION RESPIRATORY (INHALATION) at 19:20

## 2021-01-01 RX ADMIN — ARFORMOTEROL TARTRATE 15 MCG: 15 SOLUTION RESPIRATORY (INHALATION) at 08:12

## 2021-01-01 RX ADMIN — Medication 10 ML: at 17:36

## 2021-01-01 RX ADMIN — Medication 10 ML: at 14:14

## 2021-01-01 RX ADMIN — INSULIN HUMAN 2 UNITS: 100 INJECTION, SOLUTION PARENTERAL at 08:40

## 2021-01-01 RX ADMIN — ARFORMOTEROL TARTRATE 15 MCG: 15 SOLUTION RESPIRATORY (INHALATION) at 20:32

## 2021-01-01 RX ADMIN — Medication 10 ML: at 05:32

## 2021-01-01 RX ADMIN — Medication 10 ML: at 17:37

## 2021-01-01 RX ADMIN — Medication 10 ML: at 05:59

## 2021-01-01 RX ADMIN — INSULIN HUMAN 2 UNITS: 100 INJECTION, SOLUTION PARENTERAL at 17:25

## 2021-01-01 RX ADMIN — CEFEPIME HYDROCHLORIDE 1 G: 1 INJECTION, POWDER, FOR SOLUTION INTRAMUSCULAR; INTRAVENOUS at 09:25

## 2021-01-01 RX ADMIN — APIXABAN 5 MG: 5 TABLET, FILM COATED ORAL at 21:46

## 2021-01-01 RX ADMIN — ALBUMIN (HUMAN) 12.5 G: 0.25 INJECTION, SOLUTION INTRAVENOUS at 09:02

## 2021-01-01 RX ADMIN — CEFEPIME HYDROCHLORIDE 2 G: 2 INJECTION, POWDER, FOR SOLUTION INTRAVENOUS at 05:42

## 2021-01-01 RX ADMIN — PHENYLEPHRINE HYDROCHLORIDE 265 MCG/MIN: 10 INJECTION INTRAVENOUS at 05:37

## 2021-01-01 RX ADMIN — VANCOMYCIN HYDROCHLORIDE 750 MG: 750 INJECTION, POWDER, LYOPHILIZED, FOR SOLUTION INTRAVENOUS at 11:52

## 2021-01-01 RX ADMIN — CEFEPIME HYDROCHLORIDE 2 G: 2 INJECTION, POWDER, FOR SOLUTION INTRAVENOUS at 17:26

## 2021-01-01 RX ADMIN — INSULIN HUMAN 2 UNITS: 100 INJECTION, SOLUTION PARENTERAL at 12:53

## 2021-01-01 RX ADMIN — LORAZEPAM 1 MG: 2 INJECTION INTRAMUSCULAR; INTRAVENOUS at 17:43

## 2021-01-01 RX ADMIN — TAMSULOSIN HYDROCHLORIDE 0.4 MG: 0.4 CAPSULE ORAL at 08:03

## 2021-01-01 RX ADMIN — ACETAMINOPHEN 650 MG: 325 TABLET ORAL at 07:40

## 2021-01-01 RX ADMIN — INSULIN LISPRO 1 UNITS: 100 INJECTION, SOLUTION INTRAVENOUS; SUBCUTANEOUS at 11:52

## 2021-01-01 RX ADMIN — CARVEDILOL 25 MG: 25 TABLET, FILM COATED ORAL at 08:06

## 2021-01-01 RX ADMIN — FAMOTIDINE 20 MG: 20 TABLET ORAL at 10:46

## 2021-01-01 RX ADMIN — DOBUTAMINE IN DEXTROSE 5 MCG/KG/MIN: 200 INJECTION, SOLUTION INTRAVENOUS at 15:21

## 2021-01-01 RX ADMIN — NYSTATIN 500000 UNITS: 100000 SUSPENSION ORAL at 21:34

## 2021-01-01 RX ADMIN — Medication 40 ML: at 05:21

## 2021-01-01 RX ADMIN — INSULIN LISPRO 1 UNITS: 100 INJECTION, SOLUTION INTRAVENOUS; SUBCUTANEOUS at 18:03

## 2021-01-01 RX ADMIN — APIXABAN 2.5 MG: 2.5 TABLET, FILM COATED ORAL at 10:46

## 2021-01-01 RX ADMIN — CEFEPIME HYDROCHLORIDE 2 G: 2 INJECTION, POWDER, FOR SOLUTION INTRAVENOUS at 17:22

## 2021-01-01 RX ADMIN — DEXAMETHASONE SODIUM PHOSPHATE 6 MG: 10 INJECTION, SOLUTION INTRAMUSCULAR; INTRAVENOUS at 00:07

## 2021-01-01 RX ADMIN — INSULIN HUMAN 2 UNITS: 100 INJECTION, SOLUTION PARENTERAL at 08:13

## 2021-08-18 PROBLEM — R73.9 HYPERGLYCEMIA: Status: ACTIVE | Noted: 2021-01-01

## 2021-08-18 PROBLEM — N17.9 AKI (ACUTE KIDNEY INJURY) (HCC): Status: ACTIVE | Noted: 2021-01-01

## 2021-08-18 PROBLEM — U07.1 ACUTE HYPOXEMIC RESPIRATORY FAILURE DUE TO COVID-19 (HCC): Status: ACTIVE | Noted: 2021-01-01

## 2021-08-18 PROBLEM — A41.89 SEPSIS DUE TO COVID-19 (HCC): Status: ACTIVE | Noted: 2021-01-01

## 2021-08-18 PROBLEM — U07.1 SEPSIS DUE TO COVID-19 (HCC): Status: ACTIVE | Noted: 2021-01-01

## 2021-08-18 PROBLEM — J96.01 ACUTE HYPOXEMIC RESPIRATORY FAILURE DUE TO COVID-19 (HCC): Status: ACTIVE | Noted: 2021-01-01

## 2021-08-18 NOTE — PROGRESS NOTES
Ventilator check complete; patient has a #8. 0 ET tube secured at the 26 at the lip. Breath sounds are diminished. Trachea is midline, Negative for subcutaneous air, and chest excursion is symmetric. Patient is also Negative for cyanosis and is Negative for pitting edema. All alarms are set and audible. Resuscitation bag is at the head of the bed. Ventilator Settings  Mode FIO2 Rate Tidal Volume Pressure PEEP I:E Ratio      100 %       10 cm H2O  10 cm H20         Peak airway pressure: 25 cm H2O   Minute ventilation:       ABG: No results for input(s): PH, PCO2, PO2, HCO3 in the last 72 hours.       Ben Reyez, RT

## 2021-08-18 NOTE — PROGRESS NOTES
Chart reviewed s/p admission to ICU covid positive isolation. Currently intubated/vent -100%. Precedex, fentanyl, Law ordered per MAR. Spoke with wife, Joyce Joshi, via phone. Unit number/security code given. She was surprised when she tested positive on Sunday in ED. States pt took her due to fever and recommendations of CC as due for infusion. She confirms demographics. Pt independent of ADL's and drives self. Using DME last couple of days due to weakness. Unclear if cane/walker. States he has been taking care of her, as she has cancer. They do have one daughter, Kenia [place], that lives in [facility]. No current HH or rehab. Spouse aware CM to follow for d/c needs/POC when stable. Care Management Interventions  PCP Verified by CM: Yes  Mode of Transport at Discharge:  Other (see comment)  Transition of Care Consult (CM Consult): Discharge Planning  Discharge Durable Medical Equipment:  (none currently)  Current Support Network: Lives with Spouse (lives with spouse, Joyce Joshi)  Confirm Follow Up Transport: Self  Freedom of Choice List was Provided with Basic Dialogue that Supports the Patient's Individualized Plan of Care/Goals, Treatment Preferences and Shares the Quality Data Associated with the Providers?: Yes  [employer] Information Provided?:  ([facility]/supplemental)  Discharge Location  Discharge Placement: Unable to determine at this time

## 2021-08-18 NOTE — H&P
Critical Care Note: 8/18/2021    Admission Date: 8/18/2021     Length of Stay: 0 days    Background: 55-year-old male with a history of CAD, Afib, NICM, HTN, LBBB presenting for acute respiratory distress and failure. Both he and his wife have been vaccinated for COVID-19 but apparently she began feeling unwell last week. She then tested positive for COVID-19 infection. Over the past 3 days the patient himself is started feeling poorly, increasing shortness of breath and ultimately was sleeping in a recliner last night due to shortness of breath. Patient's wife woke up this morning and found him laboring to breathe and minimally responsive. EMS were called to the house who found him much the same way. He would open his eyes to prompting but was not really following commands. O2 sats in the 50s and 60s on room air with no history of chronic oxygen requirement. Placed on nonrebreather with some improvement into the high 80s but mentation was not improved. The decision was made in the field to intubate the patient due to concerns about airway compromise and persistent respiratory distress. Patient was given an initial dose of vecuronium and then rocuronium 30 minutes later when transport time was go to be prolonged. EMS reported frothy bloody sputum in the trachea that required suctioning prior to intubation.     ED Events: Intubated in the field, central line placed in ED, fever to 103.5, Vanc, Cefepime, Dexamathasone and tylenol given    ROS: unable to assess/negative except as listed elsewhere  ASSESSMENT AND PLAN (MDM):     NEUROLOGY/PSYCH:                                 Exam: not-responsive currently   Sedation: will add precedex, not needing anything currently, but may wake up from paralytics given by EMS  Analgesia: add fentanyl  Acute Metabolic Encephalopathy: acute illness, COVID  DRIPS: Add Precedex, Fentanyl  IMAGES: none  CARDIOVASCULAR:                                    Exam: IRR, tachy, no edema   Sepsis due to COVID: may need some pressors support, BPs are borderline, will add law with afib RVR   NICM: Will hold lasix for now with borderline BPs and AYLIN, hold BB and ACE with borderline BPs  Afib: may have to give Dilt or digoxin or involve cardiology, perhaps precedex will help slow down a bit as well  Blood pressure (!) 104/55, pulse (!) 118, temperature (!) 103.5 °F (39.7 °C), resp. rate (!) 52, height 6' 1\" (1.854 m), weight 350 lb (158.8 kg), SpO2 93 %. No intake or output data in the 24 hours ending 08/18/21 0955  Recent Labs     08/18/21  0752   BNPNT 7,218*     VASOPRESSORS: add Law if needed    PULMONARY:                                              Exam: clear   Acute hypoxemic/hypercapneic respiratory failure: LTVV, sedation, consider paralysis and proning pending today's course  Pneumonia: probably just COVID, but will get sputum culture, blood cultures, on abx  CXR 8/18: new bilateral/lower pred infiltrates    Results from [place] encounter on 08/18/21    XR CHEST PORT    Narrative  CHEST X-RAY, one view. HISTORY:  COVID pneumonia. Line placement. TECHNIQUE:  AP portable supine view. COMPARISON: Exam earlier today, preprocedure. FINDINGS:  Lungs: No pneumothorax. Infiltrates in both lungs. Costophrenic angles: Blunted on the.  Heart size: is normal.  Pulmonary vasculature: is unremarkable. Aorta: Unremarkable. Included portion of the upper abdomen: is unremarkable. Bones: No gross bony lesions. Other: Left-sided IJ catheter is present with tip over the SVC at the level of  the deena. Cardiac pacemaker, ET tube and feeding tubes are present. Impression  Negative for pneumothorax status post left IJ central venous  catheter placement, with tip projected over SVC at the level of the deena.       Ventilator Settings: Ideal body weight: 79.9 kg (176 lb 2.4 oz)   Mode FIO2 Rate Tidal Volume Pressure PEEP   SIMV, Pressure support  100 %       14 cm H2O 10 cm H20    Peak pressure: 25 cm H2O  MVe: 12.1 l/min   ABG:     Recent Labs     21  0805   PHI 7.24*   PCO2I 57.5*   PO2I 73*   HCO3I 24.5   FIO2I 100     NEPHROLOGY:   AYLIN: hold diuretics for now, get some fluids with meds, will monitor  Recent Labs     21  075      K 5.0   CO2 25      BUN 27*   CREA 1.63*   *   MG 2.2     GASTROENTEROLOGY:                              Exam: soft, + bowel sounds   Nutrition: NPO for now, consider TF tomorrow  Recent Labs     21  075   ALB 3.1*   AST 50*   ALT 30   AP 47*     HEMATOLOGY:                                             Exam: no bleeding, bruising   Anticoagulation: on Eliquis for Afib, continue  Recent Labs     21  075   HGB 13.8   HCT 43.5   *   INR 1.4     INFECTIOUS DISEASE:                                Constitutional Exam: no diaphoresis   Pneumonia due to COVID: Dexa, Remdesivir, he has been vaccinated. Will check abs and consider monoclonal abs if negative. CRP 5 so will hold on Actemra. Severe Sepsis: Check cultures, abx, COVID   Temp (24hrs), Av.5 °F (39.7 °C), Min:103.5 °F (39.7 °C), Max:103.5 °F (39.7 °C)     Recent Labs     21  0752   WBC 8.3   CRP 5.2*      Recent Labs     21  0752   CULT PENDING     ENDOCRINOLOGY/ENT:                               ENT Exam: no goiter, stridor   Hyperglycemia: SSI  No results for input(s): GLUCPOC in the last 72 hours.     No lab exists for component: A1C No results found for: HBA1C, DKX4LVMF, NGG3CLTT, DNT8MWTC  SKIN/MSK:                                                     Exam: no rash, joint pain or redness   no decub, turns, preventive care    PROPHYLAXIS:   Eliquis, PPI    Prior     The patient is critically ill with respiratory failure, circulatory failure and requires high complexity decision making for assessment and support including frequent ventilator adjustment , frequent evaluation and titration of therapies , application of advanced monitoring technologies and extensive interpretation of multiple databases  Time devoted to patient care services described in this note- 15 min face to face/ 30 min total evaluation time    Cumulative time devoted to patient care services by me for day of service -45 min     Geneva Allen MD    LINES:   Triple Lumen Quad lumen central line 08/18/21 Anterior; Left Neck  Urinary Catheter 08/18/21 [place]  Nasogastric Tube 08/18/21  Airway - Endotracheal Tube 08/18/21

## 2021-08-18 NOTE — ED PROVIDER NOTES
24-year-old male with a history of coronary artery disease and cardiomyopathy presenting for acute respiratory distress and failure. Both he and his wife have been vaccinated for COVID-19 but apparently she began feeling unwell last week. She then tested positive for COVID-19 infection. Over the past 3 days the patient himself is started feeling poorly, increasing shortness of breath and ultimately was sleeping in a recliner last night due to shortness of breath. Patient's wife woke up this morning and found him laboring to breathe and minimally responsive. EMS were called to the house who found him much the same way. He would open his eyes to prompting but was not really following commands. O2 sats in the 50s and 60s on room air with no history of chronic oxygen requirement. Placed on nonrebreather with some improvement into the high 80s but mentation was not improved. The decision was made in the field to intubate the patient due to concerns about airway compromise and persistent respiratory distress. Patient was given an initial dose of vecuronium and then rocuronium 30 minutes later when transport time was go to be prolonged. EMS reported frothy bloody sputum in the trachea that required suctioning prior to intubation. The history is provided by the EMS personnel. Respiratory Distress  This is a new problem. The current episode started more than 2 days ago. The problem has been rapidly worsening. Associated symptoms include a fever, cough and orthopnea. It is unknown what precipitated the problem. He has tried nothing for the symptoms. The treatment provided no relief. He has had prior hospitalizations. He has had prior ED visits. He has had prior ICU admissions. Associated medical issues include pneumonia, CAD and heart failure.         Past Medical History:   Diagnosis Date    Abnormal EKG     Arrhythmia     pacemaker/defib placed 2/20/13    CAD (coronary artery disease)     MINIMAL DISEASE 2010    Cardiomyopathy ([facility] [address]     Edema     Ankle    GERD (gastroesophageal reflux disease)     Heart failure (HCC)     HLD (hyperlipidemia) 2016    Hypertension     ICD (implantable cardiac defibrillator) in place 2013    LBBB (left bundle branch block) 2016    Prostatitis        Past Surgical History:   Procedure Laterality Date    HX CHOLECYSTECTOMY      HX COLONOSCOPY      HX HEENT      t and     HX OTHER SURGICAL      ing hernia rpr    HX PACEMAKER  2013    St. flora ICD    HX UROLOGICAL  2012    PROSTATE TUMT    RI ABDOMEN SURGERY PROC UNLISTED      cholecystectomy         Family History:   Problem Relation Age of Onset    Stroke Mother     Heart Surgery Brother 76        CABG       Social History     Socioeconomic History    Marital status:      Spouse name: Not on file    Number of children: Not on file    Years of education: Not on file    Highest education level: Not on file   Occupational History    Not on file   Tobacco Use    Smoking status: Former Smoker     Packs/day: 0.25     Years: 10.00     Pack years: 2.50     Quit date: 1965     Years since quittin.0    Smokeless tobacco: Never Used   Substance and Sexual Activity    Alcohol use: No    Drug use: No    Sexual activity: Not on file   Other Topics Concern    Not on file   Social History Narrative    Not on file     Social Determinants of Health     Financial Resource Strain:     Difficulty of Paying Living Expenses:    Food Insecurity:     Worried About Running Out of Food in the Last Year:     [address] in the Last Year:    Transportation Needs:     Lack of Transportation (Medical):      Lack of Transportation (Non-Medical):    Physical Activity:     Days of Exercise per Week:     Minutes of Exercise per Session:    Stress:     Feeling of Stress :    Social Connections:     Frequency of Communication with Friends and Family:     Frequency of Social Gatherings with Friends and Family:     Attends [employer] witness Services:     Active Member of Clubs or Organizations:     Attends Club or Organization Meetings:     Marital Status:    Intimate Partner Violence:     Fear of Current or Ex-Partner:     Emotionally Abused:     Physically Abused:     Sexually Abused: ALLERGIES: Pcn [penicillins]    Review of Systems   Unable to perform ROS: Acuity of condition   Constitutional: Positive for fever. Respiratory: Positive for cough. Cardiovascular: Positive for orthopnea. Vitals:    08/18/21 0737 08/18/21 0746   BP: (!) 163/95    Pulse: (!) 120 82   Resp: 20 20   Temp: (!) 103.5 °F (39.7 °C)    SpO2: 93% 92%            Physical Exam  Vitals and nursing note reviewed. Constitutional:       General: He is in acute distress. Appearance: He is well-developed. He is obese. He is ill-appearing and toxic-appearing. Comments: Intubated and unresponsive   HENT:      Head: Normocephalic and atraumatic. Mouth/Throat:      Comments: ET tube at 27 at the lips  Eyes:      Conjunctiva/sclera: Conjunctivae normal.      Pupils: Pupils are equal, round, and reactive to light. Cardiovascular:      Rate and Rhythm: Tachycardia present. Rhythm irregular. Heart sounds: Normal heart sounds. Pulmonary:      Effort: Respiratory distress present. Breath sounds: Rhonchi present. Abdominal:      General: Bowel sounds are normal.      Palpations: Abdomen is soft. Genitourinary:     Penis: Normal.    Musculoskeletal:         General: No deformity or signs of injury. Cervical back: Normal range of motion and neck supple. Right lower leg: No edema. Left lower leg: No edema. Skin:     General: Skin is warm. Capillary Refill: Capillary refill takes 2 to 3 seconds. Comments: Diaphoretic   Neurological:      Cranial Nerves: No cranial nerve deficit.       Comments: Intubated and unresponsive          MDM  Number of Diagnoses or Management Options  Diagnosis management comments: 63-year-old male presenting for acute respiratory failure likely secondary to COVID-19.        Amount and/or Complexity of Data Reviewed  Clinical lab tests: ordered and reviewed (Results for orders placed or performed during the hospital encounter of 08/18/21  -COVID-19 RAPID TEST       Result                      Value             Ref Range           Specimen source             NASAL                                 COVID-19 rapid test         Detected (AA)     NOTD           -CULTURE, BLOOD  Specimen: Blood       Result                      Value             Ref Range           Special Requests:                                             LEFT   Antecubital          Culture result:             PENDING                          -CBC WITH AUTOMATED DIFF       Result                      Value             Ref Range           WBC                         8.3               4.3 - 11.1 K*       RBC                         4.36              4.23 - 5.6 M*       HGB                         13.8              13.6 - 17.2 *       HCT                         43.5              41.1 - 50.3 %       MCV                         99.8 (H)          79.6 - 97.8 *       MCH                         31.7              26.1 - 32.9 *       MCHC                        31.7              31.4 - 35.0 *       RDW                         13.5              11.9 - 14.6 %       PLATELET                    107 (L)           150 - 450 K/*       MPV                         11.1              9.4 - 12.3 FL       ABSOLUTE NRBC               0.00              0.0 - 0.2 K/*       DF                          AUTOMATED                             NEUTROPHILS                 86 (H)            43 - 78 %           LYMPHOCYTES                 11 (L)            13 - 44 %           MONOCYTES                   2 (L)             4.0 - 12.0 %        EOSINOPHILS                 0 (L)             0.5 - 7.8 %         BASOPHILS 0                 0.0 - 2.0 %         IMMATURE GRANULOCYTES       1                 0.0 - 5.0 %         ABS. NEUTROPHILS            7.1               1.7 - 8.2 K/*       ABS. LYMPHOCYTES            0.9               0.5 - 4.6 K/*       ABS. MONOCYTES              0.2               0.1 - 1.3 K/*       ABS. EOSINOPHILS            0.0               0.0 - 0.8 K/*       ABS. BASOPHILS              0.0               0.0 - 0.2 K/*       ABS. IMM.  GRANS.            0.1               0.0 - 0.5 K/*  -PROTHROMBIN TIME + INR       Result                      Value             Ref Range           Prothrombin time            17.2 (H)          12.6 - 14.5 *       INR                         1.4                              -PTT       Result                      Value             Ref Range           aPTT                        33.4              24.1 - 35.1 *  -LIPASE       Result                      Value             Ref Range           Lipase                      338               73 - 393 U/L   -MAGNESIUM       Result                      Value             Ref Range           Magnesium                   2.2               1.8 - 2.4 mg*  -METABOLIC PANEL, COMPREHENSIVE       Result                      Value             Ref Range           Sodium                      138               136 - 145 mm*       Potassium                   5.0               3.5 - 5.1 mm*       Chloride                    107               98 - 107 mmo*       CO2                         25                21 - 32 mmol*       Anion gap                   6 (L)             7 - 16 mmol/L       Glucose                     252 (H)           65 - 100 mg/*       BUN                         27 (H)            8 - 23 MG/DL        Creatinine                  1.63 (H)          0.8 - 1.5 MG*       GFR est AA                  52 (L)            >60 ml/min/1*       GFR est non-AA              43 (L)            >60 ml/min/1*       Calcium                     8.0 (L) 8.3 - 10.4 M*       Bilirubin, total            0.7               0.2 - 1.1 MG*       ALT (SGPT)                  30                12 - 65 U/L         AST (SGOT)                  50 (H)            15 - 37 U/L         Alk.  phosphatase            47 (L)            50 - 136 U/L        Protein, total              6.9               6.3 - 8.2 g/*       Albumin                     3.1 (L)           3.2 - 4.6 g/*       Globulin                    3.8 (H)           2.3 - 3.5 g/*       A-G Ratio                   0.8 (L)           1.2 - 3.5      -C REACTIVE PROTEIN, QT       Result                      Value             Ref Range           C-Reactive protein          5.2 (H)           0.0 - 0.9 mg*  -D DIMER       Result                      Value             Ref Range           D DIMER                     1.48 (H)          <0.56 ug/ml(*  -NT-PRO BNP       Result                      Value             Ref Range           NT pro-BNP                  7,218 (H)         <450 PG/ML     -SARS-COV-2       Result                      Value             Ref Range           SARS-CoV-2                                                    Please find results under separate order  -BLOOD GAS, ARTERIAL POC       Result                      Value             Ref Range           Device:                     ADULT VENT                            FIO2 (POC)                  100               %                   pH (POC)                    7.24 (LL)         7.35 - 7.45         pCO2 (POC)                  57.5 (H)          35 - 45 MMHG        pO2 (POC)                   73 (L)            75 - 100 MMHG       HCO3 (POC)                  24.5              22 - 26 MMOL*       sO2 (POC)                   91.1 (L)          95 - 98 %           Base deficit (POC)          4.0               mmol/L              Mode                        SIMV                                  Tidal volume                500               ml PEEP/CPAP (POC)             10                cmH2O               Pressure support            10                cmH2O               Allens test (POC)           Positive                              Site                        RIGHT RADIAL                          Specimen type (POC)         ARTERIAL                              Performed by                                                  Mendez Juarez  )  Tests in the radiology section of CPT®: ordered and reviewed (XR ABD (KUB)    Result Date: 8/18/2021  CHEST X-RAY, one view. HISTORY:  Respiratory failure and history of cardiomyopathy. TECHNIQUE:  AP upright portable view. COMPARISON: November 2020 FINDINGS: Lungs: Diffuse bilateral infiltrates with relative sparing of the upper lung zones. Costophrenic angles: Blunted on the left. Heart size: Borderline. Pulmonary vasculature: Obscured. Aorta: Unremarkable. Included portion of the upper abdomen: is unremarkable. Bones: No gross bony lesions. Other: Left-sided cardiac pacemaker is present. ET tube tip 3 to 4 cm above the deena, below the clavicular heads. Diffuse bilateral infiltrates suspicious for pulmonary edema. Pneumonia could have a similar appearance ET tube in expected location, 3 to 4 cm above deena. Abdominal film for feeding GI tube placement. History: GI tube placement. Technique: Single AP view of the abdomen. IMPRESSION: NG tube tip is projected over the proximal stomach. Advancing 5 to 10 cm to be considered. . . XR CHEST PORT    Result Date: 8/18/2021  CHEST X-RAY, one view. HISTORY:  Respiratory failure and history of cardiomyopathy. TECHNIQUE:  AP upright portable view. COMPARISON: November 2020 FINDINGS: Lungs: Diffuse bilateral infiltrates with relative sparing of the upper lung zones. Costophrenic angles: Blunted on the left. Heart size: Borderline. Pulmonary vasculature: Obscured. Aorta: Unremarkable. Included portion of the upper abdomen: is unremarkable.  Bones: No gross bony lesions. Other: Left-sided cardiac pacemaker is present. ET tube tip 3 to 4 cm above the deena, below the clavicular heads. Diffuse bilateral infiltrates suspicious for pulmonary edema. Pneumonia could have a similar appearance ET tube in expected location, 3 to 4 cm above deena. Abdominal film for feeding GI tube placement. History: GI tube placement. Technique: Single AP view of the abdomen. IMPRESSION: NG tube tip is projected over the proximal stomach. Advancing 5 to 10 cm to be considered. . .     )  Tests in the medicine section of CPT®: ordered and reviewed  Discuss the patient with other providers: yes (Consulted intensivist for admission)  Independent visualization of images, tracings, or specimens: yes ( reviewed imaging)    Risk of Complications, Morbidity, and/or Mortality  Presenting problems: high  Diagnostic procedures: high  Management options: high  General comments: I personally reviewed the patient's vital signs, laboratory tests, and/or radiological findings. I discussed these findings with the patient and their significance. I answered all questions and explained that given these findings there is significant concern for increased morbidity and/or mortality without immediate intervention. As a result, I recommended admission to the hospital, consulted the appropriate service, and transitioned care to that service in improved condition      Patient Progress  Patient progress: improved    ED Course as of Aug 18 0903   Wed Aug 18, 2021   0741 EKG performed interpreted by me shows A. fib rate 143 with a left bundle branch block. QTc is 500. [JS]   9238 Patient's pH shows 7.24 so we will change vent settings to have improved oxygenation.     [JS]      ED Course User Index  [JS] Asad Segovia MD       Critical Care  Performed by: Asad Segovia MD  Authorized by: Asad Segovia MD     Critical care provider statement:     Critical care time (minutes):  60    Critical care was necessary to treat or prevent imminent or life-threatening deterioration of the following conditions:  Sepsis    Critical care was time spent personally by me on the following activities:  Blood draw for specimens, ordering and performing treatments and interventions, development of treatment plan with patient or surrogate, ordering and review of laboratory studies, discussions with consultants, ordering and review of radiographic studies, discussions with primary provider, pulse oximetry, evaluation of patient's response to treatment, re-evaluation of patient's condition, examination of patient, review of old charts, gastric intubation, interpretation of cardiac output measurements, obtaining history from patient or surrogate and vascular access procedures    I assumed direction of critical care for this patient from another provider in my specialty: no    Central Line    Date/Time: 8/18/2021 9:05 AM  Performed by: Vesta Presley MD  Authorized by: Vesta Presley MD     Consent:     Consent obtained:  Emergent situation    Risks discussed:  Arterial puncture and bleeding    Alternatives discussed:  No treatment  Pre-procedure details:     Hand hygiene: Hand hygiene performed prior to insertion      Sterile barrier technique: All elements of maximal sterile technique followed      Skin preparation:  2% chlorhexidine    Skin preparation agent: Skin preparation agent completely dried prior to procedure    Sedation:     Sedation type:  Deep  Anesthesia (see MAR for exact dosages): Anesthesia method:  None  Procedure details:     Location:  L internal jugular    Site selection rationale:  Ease of aCCESS    Patient position:  Flat    Procedural supplies: quad lumen.     Catheter size:  7.5 Fr    Landmarks identified: yes      Ultrasound guidance: yes      Sterile ultrasound techniques: Sterile gel and sterile probe covers were used      Number of attempts:  1    Successful placement: yes    Post-procedure details:     Post-procedure:  Dressing applied and line sutured    Assessment:  Blood return through all ports, free fluid flow, no pneumothorax on x-ray and placement verified by x-ray    Patient tolerance of procedure:   Tolerated well, no immediate complications

## 2021-08-18 NOTE — PROGRESS NOTES
Bedside shift change report given to Ana Mckeon (oncoming nurse) by Jarett Perez (offgoing nurse). Report included the following information SBAR, Kardex, Intake/Output, MAR, Accordion, Recent Results, Med Rec Status, Cardiac Rhythm Afib and Alarm Parameters  dual sign off on Fentanyl.

## 2021-08-18 NOTE — ED TRIAGE NOTES
Pt BIB EMS from home, called out for respiratory distress and decreased mental status. Found in the chair with sats of 50%, placed on a NRB and sats increased to 90%. Intubated with a 8.0 by EMS. Reports that he was answering questions on their arrival. Given 38 mg etomidate, 127mg jennifer, 10 mg vec, and 5 mg versed. States that his wife is COVID +, reports that he had been ill for the last 3 days. States was vaccinated earlier in the year. .  /82

## 2021-08-18 NOTE — PROGRESS NOTES
Initial visit made to patient and a prayer was provided. He was wearing a BIPAP mask and appeared to be resting.       Izabella Farmer, [address] [facility], [facility]

## 2021-08-18 NOTE — PROGRESS NOTES
Pharmacokinetic Consult to Pharmacist    Lynda Obregon is a 80 y.o. male being treated for HAP with vancomycin. Height: 6' 1\" (185.4 cm)  Weight: 158.8 kg (350 lb)  Lab Results   Component Value Date/Time    BUN 27 (H) 08/18/2021 07:52 AM    Creatinine 1.63 (H) 08/18/2021 07:52 AM    WBC 8.3 08/18/2021 07:52 AM    Lactic acid 1.6 09/06/2013 02:46 AM      Estimated Creatinine Clearance: 49.4 mL/min (A) (based on SCr of 1.63 mg/dL (H)). No results found for: Carmell Raw    Day 1 of vancomycin. Goal trough is 15-20. Will give a 2500 mg loading dose, then dose intermittently based on random levels due to poor renal function. Will continue to follow patient and order levels when clinically indicated. Thank you,  Peña Cool, PharmD.   Clinical Pharmacist

## 2021-08-18 NOTE — ACP (ADVANCE CARE PLANNING)
Advance Care Planning     General Advance Care Planning (ACP) Conversation      Date of Conversation: 8/18/2021      Healthcare Decision Maker:     Primary Decision Maker: Ramiro Im - Spouse - [phone]  Click here to complete [employer] including selection of the Healthcare Decision Maker Relationship (ie \"Primary\")      Today we documented Decision Maker(s) consistent with Legal Next of Kin hierarchy. Content/Action Overview:   No LW/HCPOA on file currently. Spouse, legal NOK, unless document presented stating otherwise, if pt can not make own decisions. (currently intubated/vent)    Full code per MD orders.      Length of Voluntary ACP Conversation in minutes:  <16 minutes (Non-Billable)    Tiki Platt RN

## 2021-08-19 PROBLEM — N40.1 BENIGN NON-NODULAR PROSTATIC HYPERPLASIA WITH LOWER URINARY TRACT SYMPTOMS: Chronic | Status: ACTIVE | Noted: 2017-07-27

## 2021-08-19 PROBLEM — E66.01 SEVERE OBESITY (BMI 35.0-39.9) WITH COMORBIDITY (HCC): Chronic | Status: ACTIVE | Noted: 2018-05-07

## 2021-08-19 NOTE — PROGRESS NOTES
This was a visit to a covid patient. I had prayer for the patient outside of the patient's room and I did not enter the room. I received updated information from the patient's nurse concerning this patient's needs. I will continue to follow-up with this patient and offer assistance and prayer.           Damian Johnson, [address] [facility], [facility]

## 2021-08-19 NOTE — PROGRESS NOTES
Ventilator check complete; patient has a #8. 0 ET tube secured at the 26 at the lip. Patient is sedated. Patient is not able to follow commands. Breath sounds are diminished. Trachea is midline, Negative for subcutaneous air, and chest excursion is symmetric. Patient is also Negative for cyanosis and is Negative for pitting edema. All alarms are set and audible. Resuscitation bag is at the head of the bed.       Ventilator Settings  Mode FIO2 Rate Tidal Volume Pressure PEEP I:E Ratio   VC+, SIMV  50 %  24 bpm  500 ml  14 cm H2O  10 cm H20         Peak airway pressure: 24 cm H2O   Minute ventilation: 11.8 l/min       Lilia Flores, RT

## 2021-08-19 NOTE — PROGRESS NOTES
Critical Care Note: 8/19/2021    Admission Date: 8/18/2021     Length of Stay: 1 days    Background: 15-year-old male with a history of CAD, Afib, NICM, HTN, LBBB presenting for acute respiratory distress and failure. Both he and his wife have been vaccinated for COVID-19 but apparently she began feeling unwell last week. She then tested positive for COVID-19 infection. Over the past 3 days the patient himself is started feeling poorly, increasing shortness of breath and ultimately was sleeping in a recliner last night due to shortness of breath. Patient's wife woke up this morning and found him laboring to breathe and minimally responsive. EMS were called to the house who found him much the same way. He would open his eyes to prompting but was not really following commands. O2 sats in the 50s and 60s on room air with no history of chronic oxygen requirement. Placed on nonrebreather with some improvement into the high 80s but mentation was not improved. The decision was made in the field to intubate the patient due to concerns about airway compromise and persistent respiratory distress. Patient was given an initial dose of vecuronium and then rocuronium 30 minutes later when transport time was go to be prolonged. EMS reported frothy bloody sputum in the trachea that required suctioning prior to intubation. 24H Events: awake this morning on minimal sedation, on PSV since 0400, watching TV, calm. On 40% 12/10 PSV. Hard of hearing, but calm and responsive.      ROS: unable to assess/negative except as listed elsewhere  ASSESSMENT AND PLAN (MDM):     NEUROLOGY/PSYCH:                                 Exam: not-responsive currently   Sedation: turn off  Analgesia: off  Acute Metabolic Encephalopathy: acute illness, COVID, improved, calm  DRIPS: turn off  IMAGES: none  CARDIOVASCULAR:                                    Exam: IRR, tachy, no edema   Sepsis due to COVID: BPs improved, difficulty controlling afib currently  NICM: Will hold lasix for now with borderline BPs and AYLIN, hold BB and ACE with borderline BPs  Afib: restart Coreg, may need additional rate control agents as well   Blood pressure (!) 115/59, pulse (!) 117, temperature 99.1 °F (37.3 °C), resp. rate 12, height 6' 1\" (1.854 m), weight 350 lb (158.8 kg), SpO2 97 %. Intake/Output Summary (Last 24 hours) at 8/19/2021 1036  Last data filed at 8/19/2021 9870  Gross per 24 hour   Intake 979.03 ml   Output 825 ml   Net 154.03 ml     Recent Labs     08/18/21  0752   BNPNT 7,218*     VASOPRESSORS: none    PULMONARY:                                              Exam: clear   Acute hypoxemic/hypercapneic respiratory failure: much better today and was able to extubate to AirVo. May remain high risk given age, but hopefully will do well with AirVo. Will monitor in ICU today given high risk with age and COVID  Pneumonia: probably just COVID, but will get sputum culture, blood cultures, on abx  CXR 8/19: stable bilateral/lower pred infiltrates    Results from Hospital Encounter encounter on 08/18/21    XR CHEST SNGL V    Narrative  EXAM: Chest x-ray. INDICATION: Dyspnea. Covid 19. COMPARISON: Yesterday's chest x-ray. TECHNIQUE: Frontal view chest x-ray. FINDINGS: Bilateral lung infiltrates are unchanged on the right and mildly  improved on the left. Again noted is cardiomegaly and a left chest wall  biventricular pacemaker. No pneumothorax or pleural effusion is seen. Support  catheters appear to be in good position. Impression  Unchanged right and mildly improved left lung infiltrates.       Ventilator Settings: Ideal body weight: 79.9 kg (176 lb 2.4 oz)   Mode FIO2 Rate Tidal Volume Pressure PEEP   Spontaneous, Pressure support  40 %    500 ml  8 cm H2O  10 cm H20    Peak pressure: 20 cm H2O  MVe: 13.8 l/min   ABG:     Recent Labs     08/19/21  0430 08/18/21  1725 08/18/21  0805   PHI 7.40 7.37 7.24*   PCO2I 36.0 41.0 57.5*   PO2I 99 82 73*   HCO3I 22.3 23.4 24.5   FIO2I 50 60 100     NEPHROLOGY:   AYLIN: hold diuretics for now, get some fluids with meds, will monitor  Recent Labs     21  0402 21  1213 21  0752     --  138   K 4.5  --  5.0   CO2 26  --  25   *  --  107   BUN 41*  --  27*   CREA 1.35  --  1.63*   *  --  252*   PHOS  --  5.0*  --    MG  --  2.4 2.2     GASTROENTEROLOGY:                              Exam: soft, + bowel sounds   Nutrition: bedside swallow evaluation, and start diet  Recent Labs     21  0752   ALB 3.1*   AST 50*   ALT 30   AP 47*     HEMATOLOGY:                                             Exam: no bleeding, bruising   Anticoagulation: on Eliquis for Afib, continue  Recent Labs     21  0402 21  0752   HGB 13.4* 13.8   HCT 42.0 43.5   PLT 98* 107*   INR  --  1.4     INFECTIOUS DISEASE:                                Constitutional Exam: no diaphoresis   Pneumonia due to COVID: Dexa, Remdesivir, he has been vaccinated. Will check abs and consider monoclonal abs if negative. CRP 5, 7 today so will hold on Actemra with improvement for now.   Severe Sepsis: Check cultures, abx, COVID   Temp (24hrs), Av.8 °F (37.1 °C), Min:97.1 °F (36.2 °C), Max:101.3 °F (38.5 °C)     Recent Labs     21  0402 21  1213 21  0752   WBC 7.3  --  8.3   CRP  --  7.0* 5.2*      Recent Labs     21  1115 21  0752   CULT LIGHT NORMAL RESPIRATORY MARCOS PENDING     ENDOCRINOLOGY/ENT:                               ENT Exam: no goiter, stridor   Hyperglycemia: SSI+Lantus 10  Recent Labs     21  0602 21  1736 21  1253   GLUCPOC 150* 168* 176*    No results found for: HBA1C, XQG9TZOK, CMI7VGNX, PWS1WDLJ  SKIN/MSK:                                                     Exam: no rash, joint pain or redness   no decub, turns, preventive care    PROPHYLAXIS:   Eliquis, PPI    Full Code     The patient is critically ill with respiratory failure, circulatory failure and requires high complexity decision making for assessment and support including frequent ventilator adjustment , frequent evaluation and titration of therapies , application of advanced monitoring technologies and extensive interpretation of multiple databases  Time devoted to patient care services described in this note- 10 min face to face/ 20 min total evaluation time    Cumulative time devoted to patient care services by me for day of service -30 min     Han Sandy MD    LINES:   Triple Lumen Quad lumen central line 08/18/21 Anterior; Left Neck  Urinary Catheter 08/18/21 [place]  Nasogastric Tube 08/18/21  Airway - Endotracheal Tube 08/18/21

## 2021-08-19 NOTE — PROGRESS NOTES
Respiratory Mechanics completed and are as follows:  Weaning Parameters  Spontaneous Breathing Trial Complete: Yes  Resp Rate Observed: 18  VT: 500  Ellis Agitation Sedation Scale (RASS): Drowsy  Patient extubated to 50L /50% AIRVO. Patient is able to communicate and is negative for stridor. Breath sounds are coarse. No complications with extubation.    Extubated Per Dr. Bri Grant, RT

## 2021-08-20 NOTE — PROGRESS NOTES
A follow up visit was made to the patient. Emotional support, spiritual presence and   prayer were provided for the patient.  He was awake and eating breakfast.      Carol Sanford, [address] [facility], [facility]

## 2021-08-20 NOTE — PROGRESS NOTES
Chart reviewed by CM for discharge planning. Per chart review, patient transferred from ICU 8/20. Patient remains on COVID positive isolation. Therapy following. Per therapy evaluation this day, STR vs HH PT pending progress. PPD has been ordered for [facility] potential. CM will assist further with discharge planning, when therapy needs have been confirmed. CM continues to follow plan of care. Disposition: HH vs REHAB.

## 2021-08-20 NOTE — PROGRESS NOTES
TRANSFER - OUT REPORT:    Verbal report given to [facility]) on Venecia Cabello  being transferred to [facility](unit) for routine progression of care       Report consisted of patients Situation, Background, Assessment and   Recommendations(SBAR). Information from the following report(s) SBAR, Kardex, Intake/Output, MAR, Recent Results and Cardiac Rhythm AV Paced/ A. Fib was reviewed with the receiving nurse. Lines:   Peripheral IV 08/20/21 Posterior;Right Forearm (Active)        Opportunity for questions and clarification was provided.       Patient transported with:   O2 @ 3 liters  Registered Nurse   Belongings  Chart

## 2021-08-20 NOTE — PROGRESS NOTES
Lyssa Glover  Admission Date: 8/18/2021         Daily Progress Note: 8/20/2021    The patient's chart is reviewed and the patient is discussed with the staff. Background: 80-year-old male with a history of CAD, Afib, NICM, HTN, LBBB presenting for acute respiratory distress and failure.  Both he and his wife have been vaccinated for COVID-19 but apparently she began feeling unwell last week.  She then tested positive for COVID-19 infection.  Over the past 3 days the patient himself is started feeling poorly, increasing shortness of breath and ultimately was sleeping in a recliner last night due to shortness of breath.  Patient's wife woke up this morning and found him laboring to breathe and minimally responsive.  EMS were called to the house who found him much the same way. Karthik Madison would open his eyes to prompting but was not really following commands.  O2 sats in the 50s and 60s on room air with no history of chronic oxygen requirement.  Placed on nonrebreather with some improvement into the high 80s but mentation was not improved.  The decision was made in the field to intubate the patient due to concerns about airway compromise and persistent respiratory distress.  Patient was given an initial dose of vecuronium and then rocuronium 30 minutes later when transport time was go to be prolonged. EMS reported frothy bloody sputum in the trachea that required suctioning prior to intubation. Subjective:     Extubated yesterday. Did well. Weaned to 3L NC overnight. Still had some difficulty with afib RVR and remained on diltiazem gtt this morning despite being back on Coreg 25 BID. Did get Dilt PO Q6 this morning and HR improving.      Current Facility-Administered Medications   Medication Dose Route Frequency    insulin regular (NOVOLIN R, HUMULIN R) injection   SubCUTAneous AC&HS    tamsulosin (FLOMAX) capsule 0.4 mg  0.4 mg Oral DAILY    famotidine (PEPCID) tablet 20 mg  20 mg Oral BID    apixaban (ELIQUIS) tablet 5 mg  5 mg Oral BID    dilTIAZem IR (CARDIZEM) tablet 60 mg  60 mg Oral AC&HS    [START ON 8/21/2021] vancomycin trough reminder   Other ONCE    vancomycin (VANCOCIN) 1500 mg in  ml infusion  1,500 mg IntraVENous Q24H    furosemide (LASIX) tablet 40 mg  40 mg Oral DAILY    insulin glargine (LANTUS) injection 10 Units  10 Units SubCUTAneous DAILY    dilTIAZem (CARDIZEM) 100 mg in 0.9% sodium chloride (MBP/ADV) 100 mL infusion  0-15 mg/hr IntraVENous TITRATE    carvediloL (COREG) tablet 25 mg  25 mg Oral BID WITH MEALS    [Held by provider] lisinopriL (PRINIVIL, ZESTRIL) tablet 5 mg  5 mg Oral DAILY    sodium chloride (NS) flush 5-40 mL  5-40 mL IntraVENous Q8H    sodium chloride (NS) flush 5-40 mL  5-40 mL IntraVENous PRN    cefepime (MAXIPIME) 2 g in 0.9% sodium chloride (MBP/ADV) 100 mL MBP  2 g IntraVENous Q12H    dexamethasone (DECADRON) 10 mg/mL injection 6 mg  6 mg IntraVENous Q24H    remdesivir 100 mg in 0.9% sodium chloride 250 mL IVPB  100 mg IntraVENous Q24H    acetaminophen (TYLENOL) solution 650 mg  650 mg Per NG tube Q4H PRN     Review of Systems  Constitutional: negative for fever, chills, sweats  Cardiovascular: negative for chest pain, palpitations, syncope, edema  Gastrointestinal:  negative for dysphagia, reflux, vomiting, diarrhea, abdominal pain, or melena  Neurologic:  negative for focal weakness, numbness, headache  Objective:     Vitals:    08/20/21 0802 08/20/21 0831 08/20/21 0902 08/20/21 0931   BP: (!) 144/70 131/70 132/65 122/68   Pulse: 81 78 81 81   Resp: 16  25 26   Temp:       SpO2: 92% 94% 92% 93%   Weight:       Height:           Intake/Output Summary (Last 24 hours) at 8/20/2021 1028  Last data filed at 8/20/2021 0604  Gross per 24 hour   Intake    Output 1200 ml   Net -1200 ml     Physical Exam:   Constitution:  the patient is well developed and in no acute distress  HEENT:  Sclera clear, pupils equal, oral mucosa moist  Respiratory: mildly coarse, some cough with deep breathing, 3L NC  Cardiovascular:  Irr, Irr, rate controlled currently  Gastrointestinal: soft and non-tender; with positive bowel sounds. Musculoskeletal: warm without cyanosis. There is no lower extremity edema. Skin:  no jaundice or rashes, no wounds   Neurologic: no gross neuro deficits     Psychiatric:  alert and oriented x 3    CXR: none today, bilateral infiltrates 8/19    LAB:  Recent Labs     08/20/21 0312 08/19/21  0402 08/18/21  0752   WBC 10.4 7.3 8.3   HGB 12.9* 13.4* 13.8   HCT 40.6* 42.0 43.5   * 98* 107*   INR  --   --  1.4     Recent Labs     08/20/21  0312 08/19/21  0402 08/18/21  1213 08/18/21  0752    141  --  138   K 4.6 4.5  --  5.0   * 109*  --  107   CO2 27 26  --  25   * 153*  --  252*   BUN 38* 41*  --  27*   CREA 0.89 1.35  --  1.63*   MG 2.6*  --  2.4 2.2   CA 8.4 8.5  --  8.0*   PHOS 2.8  --  5.0*  --    ALB  --   --   --  3.1*   AST  --   --   --  50*   ALT  --   --   --  30   AP  --   --   --  47*     Recent Labs     08/20/21  0312 08/18/21  1213 08/18/21  0752   BNPNT  --   --  7,218*   CRP 4.8* 7.0* 5.2*     Recent Labs     08/19/21  0430 08/18/21  1725 08/18/21  0805   PHI 7.40 7.37 7.24*   PCO2I 36.0 41.0 57.5*   PO2I 99 82 73*   HCO3I 22.3 23.4 24.5     Recent Labs     08/18/21  1115 08/18/21  0752   CULT LIGHT NORMAL RESPIRATORY MARCOS NO GROWTH 2 DAYS     Assessment and Plan:  (Medical Decision Making)   Principal Problem:    Acute hypoxemic respiratory failure due to COVID-19 ([facility] (8/18/2021)    Improved, weaned to 3L NC, will move to floor and consult hospitalist to assume care    Active Problems:    Nonischemic cardiomyopathy ([facility] (2/21/2013)    Cardiology evaluation with poor afib control, but EF was improved on most recent echo      HTN (hypertension) (2/21/2013)    Well controlled, ACE I on hold currently      Severe obesity (BMI 35.0-39. 9) with comorbidity ([facility] (5/7/2018)    Increases risk for COVID outcome      Paroxysmal atrial fibrillation (HCC) (11/17/2020)    Better control on Coreg 25 BID and added dilt, cardiology following, appreciate help      Sepsis due to COVID-19 [facility]) (8/18/2021)    Improved, afebrile, CRP downtrend, continue dexa, remdesivir      AYLIN (acute kidney injury) ([facility] (8/18/2021)    resolved      Hyperglycemia (8/18/2021)    SSI, due to steroids and acute illness. More than 50% of the time documented was spent in face-to-face contact with the patient and in the care of the patient on the floor/unit where the patient is located.     Aleks Zafar MD

## 2021-08-20 NOTE — PROGRESS NOTES
ACUTE PHYSICAL THERAPY GOALS:  (Developed with and agreed upon by patient and/or caregiver. )  LTG:  (1.)Mr. Teodora Levin will move from supine to sit and sit to supine, scoot up and down and roll side to side in bed with INDEPENDENCE within 7 treatment day(s). (2.)Mr. Teodora Levin will transfer from bed to chair and chair to bed with MODIFIED INDEPENDENCE using the least restrictive device within 7 treatment day(s). (3.)Mr. Teodora Levin will ambulate with SUPERVISION for 150+ feet with the least restrictive device within 7 treatment day(s). (4.)Mr. Teodora Levin will perform exercises per HEP independently to improve strength and mobility within 7 days.   ________________________________________________________________________________________________      PHYSICAL THERAPY ASSESSMENT: Initial Assessment and PM PT Treatment Day # 1      Sawyer Dan is a 80 y.o. male   PRIMARY DIAGNOSIS: Acute hypoxemic respiratory failure due to COVID-19 [facility]  Acute hypoxemic respiratory failure due to COVID-19 ([facility] [U07.1, J96.01]       Reason for Referral:  COVID respiratory failure  ICD-10: Treatment Diagnosis: Generalized Muscle Weakness (M62.81)  Difficulty in walking, Not elsewhere classified (R26.2)  Other abnormalities of gait and mobility (R26.89)  [place]PAT[place] / [place] / Product Type: Medicare /     ASSESSMENT:     REHAB RECOMMENDATIONS:   Recommendation to date pending progress:  Setting:   Short-term Rehab vs  PT pending progress  Equipment:    To Be Determined     PRIOR LEVEL OF FUNCTION:  (Prior to Hospitalization) INITIAL/CURRENT LEVEL OF FUNCTION:  (Most Recently Demonstrated)   Bed Mobility:   Independent  Sit to Stand:   Independent  Transfers:   Independent  Gait/Mobility:   Modified Independent Bed Mobility:   Not tested  Sit to Stand:   Minimal Assistance  Transfers:   Minimal Assistance  Gait/Mobility:   Minimal Assistance     ASSESSMENT:  Mr. Teodora Levin is admitted from home with COVID 19 respiratory failure. He lives with wife and is independent to modified independent at baseline using cane as needed. Presents sitting up in chair on room air, [place] and seems a little delayed at times during interview/questions. Min assist for sit-stand transfer from low chair with immediate loss of balance and fall back onto chair. Stood again and practiced standing pre-gait activities/marching in place with CGA-min assist. Ambulatory x 30 ft in room with RW, min assist, and chair follow for safety. Performs seated LE exercises with good participation. Currently would recommend rehab at [facility] due to weakness and fall risk; maybe  PT pending progress. SUBJECTIVE:   Mr. Nagi Villa states, \"I know what you're asking, I'm thinking about it. \"    SOCIAL HISTORY/LIVING ENVIRONMENT: Lives with wife. Independent to mod I with cane at times. Drives. No falls.    Home Environment: Private residence  # Steps to Enter: 3  One/Two Story Residence: One story  Living Alone: No  Support Systems: Spouse/Significant Other/Partner  OBJECTIVE:     PAIN: VITAL SIGNS: LINES/DRAINS:   Pre Treatment: Pain Screen  Pain Scale 1: Numeric (0 - 10)  Pain Intensity 1: 0  Post Treatment: 0/10 Vital Signs  O2 Device: None (Room air) none  O2 Device: None (Room air)     GROSS EVALUATION:   Within Functional Limits Abnormal/ Functional Abnormal/ Non-Functional (see comments) Not Tested Comments:   AROM [x] [] [] []    PROM [] [] [] []    Strength [] [x] [] []    Balance [] [x] [] []    Posture [] [x] [] []    Sensation [] [] [] []    Coordination [] [] [] []    Tone [] [] [] []    Edema [] [] [] []    Activity Tolerance [] [x] [] []     [] [] [] []      COGNITION/  PERCEPTION: Intact Impaired   (see comments) Comments:   Orientation [] [x]    Vision [x] []    Hearing [] [x] Hearing aides placed in ears during session   Command Following [] [x] Delayed    Safety Awareness [] [x]     [] []      MOBILITY: I Mod I S SBA CGA Min Mod Max Total NT x2 Comments:   Bed Mobility    Rolling [] [] [] [] [] [] [] [] [] [] []    Supine to Sit [] [] [] [] [] [] [] [] [] [] []    Scooting [] [] [] [] [] [] [] [] [] [] []    Sit to Supine [] [] [] [] [] [] [] [] [] [] []    Transfers    Sit to Stand [] [] [] [] [] [x] [] [] [] [] []    Bed to Chair [] [] [] [] [] [x] [] [] [] [] []    Stand to Sit [] [] [] [] [] [x] [] [] [] [] []    I=Independent, Mod I=Modified Independent, S=Supervision, SBA=Standby Assistance, CGA=Contact Guard Assistance,   Min=Minimal Assistance, Mod=Moderate Assistance, Max=Maximal Assistance, Total=Total Assistance, NT=Not Tested  GAIT: I Mod I S SBA CGA Min Mod Max Total  NT x2 Comments:   Level of Assistance [] [] [] [] [] [x] [] [] [] [] []    Distance 30 ft    DME Rolling Walker    Gait Quality Weakness, unsteady    Weightbearing Status N/A     I=Independent, Mod I=Modified Independent, S=Supervision, SBA=Standby Assistance, CGA=Contact Guard Assistance,   Min=Minimal Assistance, Mod=Moderate Assistance, Max=Maximal Assistance, Total=Total Assistance, NT=Not Tested    [address] [facility] [address] [address] AM-[facility]       How much difficulty does the patient currently have. .. Unable A Lot A Little None   1. Turning over in bed (including adjusting bedclothes, sheets and blankets)? [] 1   [] 2   [x] 3   [] 4   2. Sitting down on and standing up from a chair with arms ( e.g., wheelchair, bedside commode, etc.)   [] 1   [] 2   [x] 3   [] 4   3. Moving from lying on back to sitting on the side of the bed? [] 1   [] 2   [x] 3   [] 4   How much help from another person does the patient currently need. .. Total A Lot A Little None   4. Moving to and from a bed to a chair (including a wheelchair)? [] 1   [] 2   [x] 3   [] 4   5. Need to walk in hospital room? [] 1   [x] 2   [] 3   [] 4   6. Climbing 3-5 steps with a railing?    [] 1   [x] 2   [] 3   [] 4   © 2007, Trustees of [address] [place] [address] [address] [id number], under license der[employer]. All rights reserved     Score:  Initial: 16 Most Recent: X (Date: -- )    Interpretation of Tool:  Represents activities that are increasingly more difficult (i.e. Bed mobility, Transfers, Gait). PLAN:   FREQUENCY/DURATION: PT Plan of Care: 3 times/week for duration of hospital stay or until stated goals are met, whichever comes first.    PROBLEM LIST:   (Skilled intervention is medically necessary to address:)  1. Decreased Activity Tolerance  2. Decreased Balance  3. Decreased Cognition  4. Decreased Coordination  5. Decreased Gait Ability  6. Decreased Strength  7. Decreased Transfer Abilities   INTERVENTIONS PLANNED:   (Benefits and precautions of physical therapy have been discussed with the patient.)  1. Therapeutic Activity  2. Therapeutic Exercise/HEP  3. Neuromuscular Re-education  4. Gait Training  5. Manual Therapy  6. Education     TREATMENT:     EVALUATION: Moderate Complexity : (Untimed Charge)    TREATMENT:   ($$ Therapeutic Activity: 8-22 mins    )  Therapeutic Activity (10 Minutes): Therapeutic activity included Scooting, Transfer Training, Ambulation on level ground, Sitting balance , Standing balance and standing pre-gait activities, LE exercises to improve functional Mobility, Strength, Activity tolerance and balance.     TREATMENT GRID:  N/A    AFTER TREATMENT POSITION/PRECAUTIONS:  Chair, Needs within reach and RN notified    INTERDISCIPLINARY COLLABORATION:  RN/PCT and PT/PTA    TOTAL TREATMENT DURATION:  PT Patient Time In/Time Out  Time In: 1430  Time Out: Gretta [address], [place]

## 2021-08-20 NOTE — PROGRESS NOTES
Bedside and verbal shift change report received from  [address] (offgoing nurse). Report included the following information SBAR, Kardex, Intake/Output, Recent Results and Cardiac Rhythm A Fib. Dual skin assessment completed at bedside:   No pressure injuries noted   (list pertinent skin assessment findings)    Dual verification of gtts completed (name of gtts verified):   Cardizem @ 5 mg/h    Patient is in bed, watching tv. He states pain level 0/10. He is alert and oriented x4. VSS.

## 2021-08-20 NOTE — H&P
[facility] Cardiology Initial Cardiac Evaluation                Date of  Admission: 8/18/2021  7:32 AM     PCP: Yin Song MD  Requested by: Dr Lyla Oseguera  Primary Cardiologist: Dr Mateo Ordonez Attending: Dr Monica Siddiqi    Reason for Evaluation: Uncontrolled A Fib      Celester Fester is a 80 y.o. male with hx of Minimal CAD, nonischemic cardiomyopathy status post ICD, bundle branch block, GERD, hyperlipidemia, hypertension, and PAF. Patient presents emergency department with acute respiratory distress and respiratory failure. Both patient and his wife have been vaccinated for COVID-19 but his wife started to feel ill last week. She went was tested and found to be positive for COVID-19. The last 3 days patient himself started feeling weak, malaise, fatigue with increased shortness of breath. Patient ultimately started sleeping upright due to shortness of breath. His wife found him in the recliner minimally responsive and labored breathing called 911. When EMS arrived his O2 sat was found to 50s and 60s on room air was placed on a nonrebreather mask with O2 sat increased into the high 80s. In the field the patient was intubated by RSI the patient was found to have frothy blood tinged sputum that required suctioning prior to intubation. Patient received a central line, IV vancomycin and, IV cefepime, steroids, and Tylenol. He is noted to have a fever of 103.5 been tested positive for Covid by rapid test.  Patient was admitted to ICU on the ventilator. Patient continued to do well and was extubated on 8/19 and placed on [address] Patient is now on nasal cannula is alert oriented at bedside. Since admission patient has been having runs of atrial fibrillation with RVR with a history of A. fib. At home he has not missed any doses of his Eliquis. Patient was placed on a Cardizem drip and transition to p.o.  Cardizem with rates dropping into the 80s to 90s but still in atrial fibrillation while trying to wean IV Cardizem. The patient missed two days of BB due to hypotension with the need or pressor support. Currently the patient is feeling much better and cant tell that he is in A Fib. Recent Cardiac Synopsis  LHC/NST: Minimal CAd 2010  Echo: 2019 EF 45%, Mild LVH  EKG: A Fib Rate Conrolled  PPM Interrogation: 8/13 Codie Hodge had an alert remote noting 1 episode in the VT 2 zone on  8/12/21 at 0840 am. Strip reviewed showing AF w/ VT noting V rates in the   200's noting 1 pace terminated episode.  AMS episodes w/ some atrial   undersensing. Hx paf on AC. BIV 99%. Normal CRT-D function. The device   report was generated from the remote website, and the results were   forwarded to the ordering provider for review. Please see Interrogation   report for the final results.      Past Medical History:   Diagnosis Date    Abnormal EKG     Arrhythmia     pacemaker/defib placed 2/20/13    CAD (coronary artery disease)     MINIMAL DISEASE 2010    Cardiomyopathy ([address]     Edema     Ankle    GERD (gastroesophageal reflux disease)     Heart failure (HCC)     HLD (hyperlipidemia) 1/7/2016    Hypertension     ICD (implantable cardiac defibrillator) in place 2/2013    LBBB (left bundle branch block) 1/7/2016    Prostatitis       Past Surgical History:   Procedure Laterality Date    HX CHOLECYSTECTOMY      HX COLONOSCOPY      HX HEENT      t and     HX OTHER SURGICAL      ing hernia rpr    HX PACEMAKER  2/20/2013    St. flora ICD    HX UROLOGICAL  7/2012    PROSTATE TUMT    FL ABDOMEN SURGERY PROC UNLISTED      cholecystectomy     Allergies   Allergen Reactions    Pcn [Penicillins] Itching and Swelling     DIFFICULTY SWALLOWING        Family History   Problem Relation Age of Onset    Stroke Mother     Heart Surgery Brother 76        CABG      Social History     Socioeconomic History    Marital status:      Spouse name: Not on file    Number of children: Not on file    Years of education: Not on file  Highest education level: Not on file   Occupational History    Not on file   Tobacco Use    Smoking status: Former Smoker     Packs/day: 0.25     Years: 10.00     Pack years: 2.50     Quit date: 1965     Years since quittin.0    Smokeless tobacco: Never Used   Substance and Sexual Activity    Alcohol use: No    Drug use: No    Sexual activity: Not on file   Other Topics Concern    Not on file   Social History Narrative    Not on file     Social Determinants of Health     Financial Resource Strain:     Difficulty of Paying Living Expenses:    Food Insecurity:     Worried About Running Out of Food in the Last Year:     [address] in the Last Year:    Transportation Needs:     Lack of Transportation (Medical):  Lack of Transportation (Non-Medical):    Physical Activity:     Days of Exercise per Week:     Minutes of Exercise per Session:    Stress:     Feeling of Stress :    Social Connections:     Frequency of Communication with Friends and Family:     Frequency of Social Gatherings with Friends and Family:     Attends [employer] Services:     Active Member of Clubs or Organizations:     Attends Club or Organization Meetings:     Marital Status:    Intimate Partner Violence:     Fear of Current or Ex-Partner:     Emotionally Abused:     Physically Abused:     Sexually Abused:        Prior to Admission Medications   Prescriptions Last Dose Informant Patient Reported? Taking? B.infantis-B.ani-B.long-B.bifi (PROBIOTIC 4X) 10-15 mg TbEC 2021  Yes Yes   Sig: Take 1 Tab by mouth daily. Eliquis 5 mg tablet 2021  No Yes   Sig: TAKE 1 TABLET BY MOUTH TWICE DAILY   PROCTOZONE-HC 2.5 % rectal cream 2021  Yes Yes   Sig: as directed. Indications: Pt takes as needed   amitriptyline (ELAVIL) 25 mg tablet 2021  Yes Yes   Sig: Take 25 mg by mouth nightly. azelastine (ASTELIN) 137 mcg nasal spray 2021  Yes Yes   Si Spray by Nasal route as needed.  Use in each nostril as directed    carvediloL (COREG) 25 mg tablet 8/17/2021  No Yes   Sig: Take 1 Tab by mouth two (2) times daily (with meals). cholecalciferol (Vitamin D3) (1000 Units /25 mcg) tablet 8/17/2021  Yes Yes   Sig: Take  by mouth daily. colestipol (COLESTID) 1 gram tablet 8/17/2021  Yes Yes   Sig: Take 2 g by mouth daily. cyanocobalamin (VITAMIN B-12) 500 mcg tablet 8/17/2021  Yes Yes   Sig: Take 500 mcg by mouth daily. dextromethorphan-guaiFENesin [facility] DM) 60-1,200 mg Tb12 8/17/2021  Yes Yes   Sig: Take  by mouth as needed. furosemide (LASIX) 40 mg tablet 8/17/2021  No Yes   Sig: TAKE 1 TABLET BY MOUTH DAILY   pantoprazole (PROTONIX) 40 mg tablet 8/17/2021  Yes Yes   Sig: Take 40 mg by mouth daily. ramipriL (ALTACE) 5 mg capsule 8/17/2021  No Yes   Sig: TAKE 1 CAPSULE BY MOUTH EVERY EVENING   tamsulosin (FLOMAX) 0.4 mg capsule 8/17/2021  No Yes   Sig: Take 1 Cap by mouth daily.  Indications: enlarged prostate with urination problem      Facility-Administered Medications: None       Current Facility-Administered Medications   Medication Dose Route Frequency    insulin regular (NOVOLIN R, HUMULIN R) injection   SubCUTAneous AC&HS    tamsulosin (FLOMAX) capsule 0.4 mg  0.4 mg Oral DAILY    famotidine (PEPCID) tablet 20 mg  20 mg Oral BID    apixaban (ELIQUIS) tablet 5 mg  5 mg Oral BID    dilTIAZem IR (CARDIZEM) tablet 60 mg  60 mg Oral AC&HS    [START ON 8/21/2021] vancomycin trough reminder   Other ONCE    vancomycin (VANCOCIN) 1500 mg in  ml infusion  1,500 mg IntraVENous Q24H    furosemide (LASIX) tablet 40 mg  40 mg Oral DAILY    insulin glargine (LANTUS) injection 10 Units  10 Units SubCUTAneous DAILY    dilTIAZem (CARDIZEM) 100 mg in 0.9% sodium chloride (MBP/ADV) 100 mL infusion  0-15 mg/hr IntraVENous TITRATE    carvediloL (COREG) tablet 25 mg  25 mg Oral BID WITH MEALS    [Held by provider] lisinopriL (PRINIVIL, ZESTRIL) tablet 5 mg  5 mg Oral DAILY    sodium chloride (NS) flush 5-40 mL  5-40 mL IntraVENous Q8H    sodium chloride (NS) flush 5-40 mL  5-40 mL IntraVENous PRN    cefepime (MAXIPIME) 2 g in 0.9% sodium chloride (MBP/ADV) 100 mL MBP  2 g IntraVENous Q12H    dexamethasone (DECADRON) 10 mg/mL injection 6 mg  6 mg IntraVENous Q24H    remdesivir 100 mg in 0.9% sodium chloride 250 mL IVPB  100 mg IntraVENous Q24H    acetaminophen (TYLENOL) solution 650 mg  650 mg Per NG tube Q4H PRN       Review of Systems   Constitutional: Positive for diaphoresis, fever and malaise/fatigue. Negative for weight gain and weight loss. HENT: Negative. Eyes: Negative. Cardiovascular: Positive for dyspnea on exertion. Negative for chest pain (currently pain free), claudication, cyanosis, irregular heartbeat, leg swelling, near-syncope, orthopnea, palpitations, paroxysmal nocturnal dyspnea and syncope. Respiratory: Positive for shortness of breath and wheezing. Negative for cough. Endocrine: Negative. Skin: Negative. Musculoskeletal: Negative. Gastrointestinal: Negative for nausea and vomiting. Genitourinary: Negative. Neurological: Negative for dizziness. Psychiatric/Behavioral: Negative. Allergic/Immunologic: Negative. Physical Exam  Vitals:    08/20/21 0731 08/20/21 0802 08/20/21 0831 08/20/21 0902   BP: 123/64 (!) 144/70 131/70 132/65   Pulse: 79 81 78 81   Resp: 19 16  25   Temp:       SpO2: 93% 92% 94% 92%   Weight:       Height:           Last 3 Recorded Weights in this Encounter    08/18/21 0737 08/18/21 0831   Weight: 136.1 kg (300 lb) 158.8 kg (350 lb)         Intake/Output Summary (Last 24 hours) at 8/20/2021 0930  Last data filed at 8/20/2021 0604  Gross per 24 hour   Intake    Output 1375 ml   Net -1375 ml       Net IO Since Admission: -995. 97 mL [08/20/21 0930]      Physical Exam:  General: Well Developed, Well Nourished, No Acute Distress  HEENT: pupils equal and round, no abnormalities noted  Neck: supple, no JVD, no carotid bruits  Heart: S1S2 with irregular without murmurs or gallops  Lungs: clear apex, diminished crackles in bases  Abd: soft, nontender, nondistended, with good bowel sounds  Ext: warm, no edema, calves supple/nontender, pulses 2+ bilaterally  Skin: warm and dry  Psychiatric: Normal mood and affect  Neurologic: Alert and oriented X 3      Recent Results (from the past 24 hour(s))   GLUCOSE, POC    Collection Time: 08/19/21 11:50 AM   Result Value Ref Range    Glucose (POC) 147 (H) 65 - 100 mg/dL    Performed by Cahrlene    GLUCOSE, POC    Collection Time: 08/19/21  5:23 PM   Result Value Ref Range    Glucose (POC) 168 (H) 65 - 100 mg/dL    Performed by Charlene    GLUCOSE, POC    Collection Time: 08/19/21 11:57 PM   Result Value Ref Range    Glucose (POC) 150 (H) 65 - 100 mg/dL    Performed by Carie Santana    METABOLIC PANEL, BASIC    Collection Time: 08/20/21  3:12 AM   Result Value Ref Range    Sodium 142 136 - 145 mmol/L    Potassium 4.6 3.5 - 5.1 mmol/L    Chloride 113 (H) 98 - 107 mmol/L    CO2 27 21 - 32 mmol/L    Anion gap 2 (L) 7 - 16 mmol/L    Glucose 160 (H) 65 - 100 mg/dL    BUN 38 (H) 8 - 23 MG/DL    Creatinine 0.89 0.8 - 1.5 MG/DL    GFR est AA >60 >60 ml/min/1.73m2    GFR est non-AA >60 >60 ml/min/1.73m2    Calcium 8.4 8.3 - 10.4 MG/DL   CBC WITH AUTOMATED DIFF    Collection Time: 08/20/21  3:12 AM   Result Value Ref Range    WBC 10.4 4.3 - 11.1 K/uL    RBC 4.11 (L) 4.23 - 5.6 M/uL    HGB 12.9 (L) 13.6 - 17.2 g/dL    HCT 40.6 (L) 41.1 - 50.3 %    MCV 98.8 (H) 79.6 - 97.8 FL    MCH 31.4 26.1 - 32.9 PG    MCHC 31.8 31.4 - 35.0 g/dL    RDW 13.9 11.9 - 14.6 %    PLATELET 614 (L) 679 - 450 K/uL    MPV 11.7 9.4 - 12.3 FL    ABSOLUTE NRBC 0.00 0.0 - 0.2 K/uL    DF AUTOMATED      NEUTROPHILS 86 (H) 43 - 78 %    LYMPHOCYTES 8 (L) 13 - 44 %    MONOCYTES 5 4.0 - 12.0 %    EOSINOPHILS 0 (L) 0.5 - 7.8 %    BASOPHILS 0 0.0 - 2.0 %    IMMATURE GRANULOCYTES 1 0.0 - 5.0 %    ABS.  NEUTROPHILS 9.0 (H) 1.7 - 8.2 K/UL    ABS. LYMPHOCYTES 0.8 0.5 - 4.6 K/UL    ABS. MONOCYTES 0.5 0.1 - 1.3 K/UL    ABS. EOSINOPHILS 0.0 0.0 - 0.8 K/UL    ABS. BASOPHILS 0.0 0.0 - 0.2 K/UL    ABS. IMM. GRANS. 0.1 0.0 - 0.5 K/UL   C REACTIVE PROTEIN, QT    Collection Time: 08/20/21  3:12 AM   Result Value Ref Range    C-Reactive protein 4.8 (H) 0.0 - 0.9 mg/dL   MAGNESIUM    Collection Time: 08/20/21  3:12 AM   Result Value Ref Range    Magnesium 2.6 (H) 1.8 - 2.4 mg/dL   PHOSPHORUS    Collection Time: 08/20/21  3:12 AM   Result Value Ref Range    Phosphorus 2.8 2.3 - 3.7 MG/DL   GLUCOSE, POC    Collection Time: 08/20/21  5:52 AM   Result Value Ref Range    Glucose (POC) 152 (H) 65 - 100 mg/dL    Performed by Blinda Row         XR CHEST SNGL V    Result Date: 8/19/2021  Unchanged right and mildly improved left lung infiltrates. XR ABD (KUB)    Result Date: 8/18/2021  Diffuse bilateral infiltrates suspicious for pulmonary edema. Pneumonia could have a similar appearance ET tube in expected location, 3 to 4 cm above deena. Abdominal film for feeding GI tube placement. History: GI tube placement. Technique: Single AP view of the abdomen. IMPRESSION: NG tube tip is projected over the proximal stomach. Advancing 5 to 10 cm to be considered. . . XR CHEST PORT    Result Date: 8/18/2021  Negative for pneumothorax status post left IJ central venous catheter placement, with tip projected over SVC at the level of the deena. XR CHEST PORT    Result Date: 8/18/2021  Diffuse bilateral infiltrates suspicious for pulmonary edema. Pneumonia could have a similar appearance ET tube in expected location, 3 to 4 cm above deena. Abdominal film for feeding GI tube placement. History: GI tube placement. Technique: Single AP view of the abdomen. IMPRESSION: NG tube tip is projected over the proximal stomach. Advancing 5 to 10 cm to be considered. . .        Echo Results  (Last 48 hours)    None            Initial Recommendations:      Cardiac Problems:    Hx of HFrEF now HFiEF: Pt is note in acute HF at this time. The patient will continue BB therapy, ICD in place, no ACE/ARB/ARNI due to needed room for rate control. Will need to be reinitiated when COVID symptoms are improved. Ok to use CCB for now but will need to be transitioned back to BB only when further rate control is not needed. Repeat echo in the outpatient setting. Further recommendations pending clinical course and attending recommendations. A Fib RVR:  Rates now improved. Continue Cardizem 60mg 4 times a day, wean drip, reduce and removed Cardizem when COVID symptoms improved and rates are stable. Continue Eliquis. Keep lytes in normal ranges. May be difficult to control with other underlying and driving factors. Further recommendations pending clinical course and attending recommendations. COVID: Per Primary Team.    HYPERTENSION-stable continue meds    NON-ISCHEMIC CARDIOMYOPATHY-clinically fairly euvolemic and no recent heart failure exacerbations by history. Avoid echocardiogram at this time given euvolemia and stable physical exam.  Consider echo once off Covid precautions, or if acute volume overload symptoms occur while hospitalized. Tejal Pearson continue meds with outpatient surveillance    Thank you very much for requesting a Cardiology Evaluation. We appreciate the opportunity to participate in this patient's care. We will follow along with above stated plan. This is initial management plan but please see attendings consult note for full plan of care. Mary Meyers NP [facility]    ATTENDING ADDENDUM:    Patient seen and examined by me. Agree with above note by physician extender. Key findings are: Acute respiratory failure with history of nonischemic cardiomyopathy and multiple other cardiac risk factors, currently complicated by Covid pulmonary infection despite completion of 2 vaccinations earlier this year.   He is breathing better and extubated, fairly comfortable on nasal cannula at the present time with an exam suggesting euvolemia. He was in rapid atrial fibrillation which is most likely being driven by his acute pulmonary illness, now back in sinus rhythm with intermittent premature atrial contractions and premature ventricular contractions. He has a biventricular device in place. He is currently on oral beta-blocker and calcium channel blocker therapy, Cardizem drip discontinued this morning. Blood pressure stable and he has an atrial lead in place. We will continue current oral rate slowing therapies, continue anticoagulation, and follow with you. Further recommendations be forthcoming pending telemetry monitoring over the next 24 hours. CV- RRR without murmur  Lungs- Clear bilaterally apically, decreased/coarse bibasilar  Abd- soft, nontender, nondistended  Ext- no edema    Plan: As above.     Jag Barnett MD  [facility] Cardiology  Pager [phone]

## 2021-08-20 NOTE — PROGRESS NOTES
ACUTE OT GOALS:  (Developed with and agreed upon by patient and/or caregiver.)  1) Patient will complete lower body bathing and dressing with SBA and adaptive equipment as needed. 2) Patient will complete toileting with SBA. 3) Patient will complete functional transfers with SBA and adaptive equipment as needed. 4) Patient will tolerate at least 30 minutes of OT activity with 2-3 rest breaks while maintaining O2 sats >90%. 5) Patient will verbalize at least 3 energy conservation technique to utilize during ADL/IADL. Timeframe: 7 visits         OCCUPATIONAL THERAPY ASSESSMENT: Initial Assessment and Daily Note OT Treatment Day # 1    Supriya Fall is a 80 y.o. male   PRIMARY DIAGNOSIS: Acute hypoxemic respiratory failure due to COVID-19 [facility])  Acute hypoxemic respiratory failure due [facility] [U07.1, J96.01]       Reason for Referral:   ICD-10: Treatment Diagnosis: Generalized Muscle Weakness (M62.81)  [place] / [place] / Product Type: Medicare /   ASSESSMENT:     REHAB RECOMMENDATIONS:   Recommendation to date pending progress:  Setting:   Short-term Rehab   vs HH pending progress  Equipment:    To Be Determined     PRIOR LEVEL OF FUNCTION:  (Prior to Hospitalization)  INITIAL/CURRENT LEVEL OF FUNCTION:  (Based on today's evaluation)   Bathing:   Independent  Dressing:   Independent  Feeding/Grooming:   Independent  Toileting:   Independent  Functional Mobility:   Independent Bathing:   Moderate Assistance  Dressing:   Moderate Assistance  Feeding/Grooming:   Set Up  Toileting:   Moderate Assistance  Functional Mobility:   Minimal Assistance     ASSESSMENT:  Mr. Elena Watters presents with deficits in overall strength, activity tolerance, ADL performance and functional mobility. Currently in the ICU for acute respiratory failure d/t Covid-19. Extubated yesterday to 3L 02; on cardizem d/t elevated HR.   Min A x 2 for functional bed mobility/transfers; good EOB sitting balance. Completed self-grooming tasks, including washing face and brushing teeth, with set-up while seated EOB. Stood with mod A; min A for side steps towards chair (would definitely benefit from using RW). Sats stable during entire session, maintaining > 90%. At this time, Fransisco Ha is functioning below baseline for ADLs and functional mobility. Pt would benefit from skilled OT services to address OT goals and and plan of care. .     SUBJECTIVE:   Mr. Melinda Gaytan states, \"I've been in this bed for a week. \"    SOCIAL HISTORY/LIVING ENVIRONMENT: Lives with wife; independent at baseline; occasional use of cane.        OBJECTIVE:     PAIN: VITAL SIGNS: LINES/DRAINS:   Pre Treatment: Pain Screen  Pain Scale 1: Numeric (0 - 10)  Pain Intensity 1: 0  Post Treatment: 0   none  O2 Device: Nasal cannula     GROSS EVALUATION:  BUEs Within Functional Limits Abnormal/ Functional Abnormal/ Non-Functional (see comments) Not Tested Comments:   AROM [] [x] [] []    PROM [] [x] [] []    Strength [] [x] [] [] 3+/5   Balance [] [x] [] [] Good EOB sitting balance; fair dynamic standing balance despite constant manual support   Posture [] [x] [] []    Sensation [x] [] [] []    Coordination [x] [] [] []    Tone [x] [] [] []    Edema [] [x] [] []    Activity Tolerance [] [x] [] [] 3L 02    [] [] [] []      COGNITION/  PERCEPTION: Intact Impaired   (see comments) Comments:   Orientation [x] []    Vision [x] []    Hearing [x] []    Judgment/ Insight [] [x] Slightly impulsive   Attention [x] []    Memory [x] []    Command Following [x] []    Emotional Regulation [x] []     [] []      ACTIVITIES OF DAILY LIVING: I Mod I S SBA CGA Min Mod Max Total NT Comments   BASIC ADLs:              Bathing/ Showering [] [] [] [] [] [] [] [] [] [x]    Toileting [] [] [] [] [] [] [] [] [] [x]    Dressing [] [] [] [] [] [] [] [] [] [x]    Feeding [] [] [] [] [] [] [] [] [] [x]    Grooming [] [] [x] [] [] [] [] [] [] [] Washing face and brushing teeth while seated EOB   Personal Device Care [] [] [] [] [] [] [] [] [] [x]    Functional Mobility [] [] [] [] [] [x] [x] [] [] []    I=Independent, Mod I=Modified Independent, S=Supervision, SBA=Standby Assistance, CGA=Contact Guard Assistance,   Min=Minimal Assistance, Mod=Moderate Assistance, Max=Maximal Assistance, Total=Total Assistance, NT=Not Tested    MOBILITY: I Mod I S SBA CGA Min Mod Max Total  NT x2 Comments:   Supine to sit [] [] [] [] [] [x] [] [] [] [] [x]    Sit to supine [] [] [] [] [] [] [] [] [] [x] []    Sit to stand [] [] [] [] [] [x] [x] [] [] [] []    Bed to chair [] [] [] [] [] [x] [x] [] [] [] []    I=Independent, Mod I=Modified Independent, S=Supervision, SBA=Standby Assistance, CGA=Contact Guard Assistance,   Min=Minimal Assistance, Mod=Moderate Assistance, Max=Maximal Assistance, Total=Total Assistance, NT=Not Tested    [facility] AM-PAC 6 Clicks   Daily Activity Inpatient Short Form        How much help from another person does the patient currently need. .. Total A Lot A Little None   1. Putting on and taking off regular lower body clothing? [] 1   [x] 2   [] 3   [] 4   2. Bathing (including washing, rinsing, drying)? [] 1   [x] 2   [] 3   [] 4   3. Toileting, which includes using toilet, bedpan or urinal?   [] 1   [x] 2   [] 3   [] 4   4. Putting on and taking off regular upper body clothing? [] 1   [] 2   [x] 3   [] 4   5. Taking care of personal grooming such as brushing teeth? [] 1   [] 2   [x] 3   [] 4   6. Eating meals? [] 1   [] 2   [x] 3   [] 4   © 2007, Trustees of [facility], under license to [employer]. All rights reserved     Score:  Initial: 15 Most Recent: X (Date: -- )   Interpretation of Tool:  Represents activities that are increasingly more difficult (i.e. Bed mobility, Transfers, Gait).     PLAN:   FREQUENCY/DURATION: OT Plan of Care: 3 times/week for duration of hospital stay or until stated goals are met, whichever comes first.    PROBLEM LIST:   (Skilled intervention is medically necessary to address:)  1. Decreased ADL/Functional Activities  2. Decreased Activity Tolerance  3. Decreased Balance  4. Decreased Coordination  5. Decreased Gait Ability  6. Decreased Strength  7. Decreased Transfer Abilities   INTERVENTIONS PLANNED:   (Benefits and precautions of occupational therapy have been discussed with the patient.)  1. Self Care Training  2. Therapeutic Activity  3. Therapeutic Exercise/HEP  4. Neuromuscular Re-education  5. Education     TREATMENT:     EVALUATION: Low Complexity : (Untimed Charge)    TREATMENT:   ($$ Self Care/Home Management: 8-22 mins$$ Therapeutic Activity: 8-22 mins   )  Therapeutic Activity (13 Minutes): Therapeutic activity included Rolling, Supine to Sit, Lateral Scooting, Transfer Training, Ambulation on level ground, Sitting balance  and Standing balance to improve functional Mobility, Strength and Activity tolerance. Self Care (10 Minutes): Self care including Grooming to increase independence and decrease level of assistance required.     TREATMENT GRID:  N/A    AFTER TREATMENT POSITION/PRECAUTIONS:  Chair, Needs within reach and RN notified    INTERDISCIPLINARY COLLABORATION:  RN/PCT and OT/MCFARLANE    TOTAL TREATMENT DURATION:  OT Patient Time In/Time Out  Time In: 1027  Time Out: [address], OT

## 2021-08-20 NOTE — PROGRESS NOTES
Chart reviewed as pt remains covid positive isolation ICU. Now extubated and on NC per RN. Cardizem gtt for afib. CM following for d/c needs/ POC. LOS day 2.

## 2021-08-20 NOTE — CONSULTS
[facility] Hospitalist Consult   Admit Date:  2021  7:32 AM   Name:  Olivia Cabral   Age:  80 y.o. Sex:  male  :  3/5/1933   MRN:  [id number]     Presenting Complaint: Respiratory Distress    Reason(s) for Admission: Acute hypoxemic respiratory failure due to COVID-19 ([facility] [U07.1, J96.01]     Reason for consult: Resume as per primary team    History of Presenting Illness:   Olivia Cabral is a 80 y.o. male with history of coronary artery disease, nonischemic cardiomyopathy status post ICD, GERD, hypertension, paroxysmal atrial fibrillation and hyperlipidemia admitted to ICU with acute respiratory distress and acute hypoxic respiratory failure requiring ventilation. Patient was intubated on  and was extubated on . Per report, patient is on 3 L oxygen and now being transferred to floor. In the ICU, patient was treated with steroid, remdesivir with for Covid infection. I have verified with the pharmacy and patient did not receive Actemra during hospitalization. In addition, patient found to be in atrial fibrillation with rapid ventricular rate. His AV manjit blocking agent been adjusted and cardiology is on board. Is currently on anticoagulation with Eliquis. He was also started on broad-spectrum antibiotics for concern for sepsis and pancultures ordered. As per patient is feeling much better. COVID-19 infection: As per patient he is not sure how he got Covid infection because he got Covid shot. His shortness of breath is much better he denies any shortness of breath at present. Atrial fibrillation: Denies any palpitations. Take blood thinner at home. Hypoglycemia: Patient denies any history of diabetes. Denies any cigarette/alcohol or illicit drug use. Family history positive for diabetes in brother and mother. I have also called patient's wife who also provided history for the patient.   As per patient wife, she has talked to patient on phone and he seems back to normal.    Denies any chest pain, palpitation, shortness of breath, nausea or vomiting. Rest review of system negative except mentioned above    Assessment & Plan:   Principal Problem:  #   Acute hypoxemic respiratory failure due to COVID-19 [facility])   Likely secondary to COVID-19 infection.  -On dexamethasone with end date on August 18.  -On remdesivir.  -Proning 8 to 12 hours in 24 hours as tolerated. -Incentive spirometer.  -On Eliquis.  -Wean down oxygen as tolerated. #Sepsis: Likely secondary to COVID-19 infection. Cannot rule out bacterial pneumonia. On broad-spectrum antibiotics including vancomycin and cefepime. Cultures negative for 48 hours so we will discontinue vancomycin. Continue cefepime for now. #Atrial fibrillation with rapid ventricular rate: Paroxysmal.  On anticoagulation and AV manjit blocking agent with diltiazem and Coreg. Appreciate cardiology recs. -TSH ordered. #   Nonischemic cardiomyopathy ([facility] (2/21/2013)  -On Lasix 40 mg daily. # HTN (hypertension) (2/21/2013)  - Continue current medication. As needed labetalol ordered. #Hyperglycemia: Not on any diabetic medication. We will stop Lantus and continue sliding scale. HbA1c ordered. On Eliquis. Patient is AOx3. Patient wants his wife to be power of  and he wants to be full code. I have called patient's wife. Both verbalized understanding that  response to Covid infection is unpredictable and his condition meditated in spite of treatment. Thanks for consultation. Medicine team will assume care as primary. Active Hospital Problems    Diagnosis Date Noted    Acute hypoxemic respiratory failure due to COVID-19 [facility]) 08/18/2021    Sepsis due to COVID-19 [facility]) 08/18/2021    AYLIN (acute kidney injury) ([facility] 08/18/2021    Hyperglycemia 08/18/2021    Paroxysmal atrial fibrillation (HCC) 11/17/2020    Severe obesity (BMI 35.0-39. 9) with comorbidity ([facility] 05/07/2018    HTN (hypertension) 2013    Nonischemic cardiomyopathy ([facility] 2013     EF 45%    \"severe AI\"          Past Medical History:   Diagnosis Date    Abnormal EKG     Arrhythmia     pacemaker/defib placed 13    CAD (coronary artery disease)     MINIMAL DISEASE     Cardiomyopathy ([facility]     Edema     Ankle    GERD (gastroesophageal reflux disease)     Heart failure (HCC)     HLD (hyperlipidemia) 2016    Hypertension     ICD (implantable cardiac defibrillator) in place 2013    LBBB (left bundle branch block) 2016    Prostatitis       Past Surgical History:   Procedure Laterality Date    HX CHOLECYSTECTOMY      HX COLONOSCOPY      HX HEENT      t and     HX OTHER SURGICAL      ing hernia rpr    HX PACEMAKER  2013    St. flora ICD    HX UROLOGICAL  2012    PROSTATE TUMT    DE ABDOMEN SURGERY PROC UNLISTED      cholecystectomy      Allergies   Allergen Reactions    Pcn [Penicillins] Itching and Swelling     DIFFICULTY SWALLOWING        Social History     Tobacco Use    Smoking status: Former Smoker     Packs/day: 0.25     Years: 10.00     Pack years: 2.50     Quit date: 1965     Years since quittin.0    Smokeless tobacco: Never Used   Substance Use Topics    Alcohol use: No      Family History   Problem Relation Age of Onset    Stroke Mother     Heart Surgery Brother 76        CABG      Family history reviewed and negative unless otherwise noted above.   Immunization History   Administered Date(s) Administered    COVID-19, PFIZER, MRNA, LNP-S, PF, 30MCG/0.3ML DOSE 2021       Objective:     Patient Vitals for the past 24 hrs:   Temp Pulse Resp BP SpO2   21 1202 97.8 °F (36.6 °C) 75 24 (!) 112/59 92 %   21 1131  77 23 (!) 112/57 93 %   21 1102  78 21 (!) 108/57 91 %   21 1031  83 24 109/73 95 %   21 1002  77 26 114/74 96 %   21 0931  81 26 122/68 93 %   21 0902  81 25 132/65 92 %   21 0831  78  131/70 94 %   08/20/21 0802  81 16 (!) 144/70 92 %   08/20/21 0731  79 19 123/64 93 %   08/20/21 0702 98.6 °F (37 °C) 95 21 107/66 92 %   08/20/21 0603  80 19 (!) 139/56 93 %   08/20/21 0601  88 18 (!) 139/56 92 %   08/20/21 0532  91 15 (!) 140/64 95 %   08/20/21 0432  85 20 109/68 92 %   08/20/21 0401  88 20 117/71 92 %   08/20/21 0331 97.9 °F (36.6 °C) (!) 102 21 (!) 105/56 95 %   08/20/21 0301  78 20 109/60 96 %   08/20/21 0231  81 19 121/82 93 %   08/20/21 0201  80 22 115/70 94 %   08/20/21 0131  82 22 107/61 92 %   08/20/21 0101  89 17 112/67 94 %   08/20/21 0031  (!) 109 15 106/75 95 %   08/20/21 0001  90 30 123/67 95 %   08/20/21 0000 97.9 °F (36.6 °C) 77 17 118/66 95 %   08/19/21 2346  79 16 118/66 93 %   08/19/21 2331  82 19 130/62 94 %   08/19/21 2301  81 19 (!) 122/59 94 %   08/19/21 2231  79 23 126/67 94 %   08/19/21 2201  86 23 100/63 95 %   08/19/21 2146  88 23 122/66 96 %   08/19/21 2132  100 18 (!) 121/56 96 %   08/19/21 2102     95 %   08/19/21 2101  80 22 113/66 95 %   08/19/21 2031  84 23 116/68 95 %   08/19/21 2001  97 21 (!) 112/56 95 %   08/19/21 1955 98 °F (36.7 °C) 76 18 116/61 95 %   08/19/21 1932  91 20 (!) 118/59 93 %   08/19/21 1901  (!) 114 23 103/61 94 %   08/19/21 1832  (!) 127 20 117/75 95 %   08/19/21 1747    (!) 97/54    08/19/21 1732    (!) 106/56 90 %   08/19/21 1701  (!) 110 21 (!) 122/59 91 %   08/19/21 1632  (!) 101 21 128/82 92 %   08/19/21 1616  (!) 102 22 120/77 91 %   08/19/21 1601  (!) 126 23 133/75 92 %   08/19/21 1532  (!) 117 18 (!) 126/59 94 %   08/19/21 1501    (!) 110/55 94 %   08/19/21 1500 97 °F (36.1 °C)       08/19/21 1432  (!) 102 19 125/60 93 %     Oxygen Therapy  O2 Sat (%): 92 % (08/20/21 1202)  Pulse via Oximetry: 77 beats per minute (08/20/21 1202)  O2 Device: Nasal cannula (08/20/21 0715)  Skin Assessment: Clean, dry, & intact (08/20/21 0715)  Skin Protection for O2 Device: Yes (08/18/21 1916)  Orientation: Bilateral (08/18/21 1916)  Location: Cheek (08/18/21 1916)  O2 Flow Rate (L/min): 3 l/min (08/20/21 0715)  FIO2 (%): 40 % (08/19/21 9642)    Estimated body mass index is 46.18 kg/m² as calculated from the following:    Height as of this encounter: 6' 1\" (1.854 m). Weight as of this encounter: 158.8 kg (350 lb). Intake/Output Summary (Last 24 hours) at 8/20/2021 1407  Last data filed at 8/20/2021 1145  Gross per 24 hour   Intake    Output 1850 ml   Net -1850 ml         Physical Exam:    General:    BMI 46. Speaking in full sentences. Head:  Normocephalic, atraumatic  Eyes:  Sclerae appear normal.  Pupils equally round. ENT:  Nares appear normal, no drainage. Moist oral mucosa  Neck:  Supple, no JVD. CV:   RRR. No m/r/g. No jugular venous distension. Lungs:   Decrease air entry bilateral lower lobe with some crackles otherwise CTAB. No wheezing, rhonchi, or rales. Respirations even, unlabored  Abdomen: Bowel sounds present. Soft, nontender, nondistended. Extremities: No cyanosis or clubbing. No edema  Skin:     No rashes and normal coloration. Warm and dry. Neuro:  AOx3, moving all 4 extremities, speech normal  Psych:  Normal mood and affect.   Alert and oriented x3    I have reviewed ordered lab tests and independently visualized imaging below:    Last 24hr Labs:  Recent Results (from the past 24 hour(s))   GLUCOSE, POC    Collection Time: 08/19/21  5:23 PM   Result Value Ref Range    Glucose (POC) 168 (H) 65 - 100 mg/dL    Performed by Charlene    GLUCOSE, POC    Collection Time: 08/19/21 11:57 PM   Result Value Ref Range    Glucose (POC) 150 (H) 65 - 100 mg/dL    Performed by Affine    METABOLIC PANEL, BASIC    Collection Time: 08/20/21  3:12 AM   Result Value Ref Range    Sodium 142 136 - 145 mmol/L    Potassium 4.6 3.5 - 5.1 mmol/L    Chloride 113 (H) 98 - 107 mmol/L    CO2 27 21 - 32 mmol/L    Anion gap 2 (L) 7 - 16 mmol/L    Glucose 160 (H) 65 - 100 mg/dL    BUN 38 (H) 8 - 23 MG/DL    Creatinine 0.89 0.8 - 1.5 MG/DL    GFR est AA >60 >60 ml/min/1.73m2    GFR est non-AA >60 >60 ml/min/1.73m2    Calcium 8.4 8.3 - 10.4 MG/DL   CBC WITH AUTOMATED DIFF    Collection Time: 08/20/21  3:12 AM   Result Value Ref Range    WBC 10.4 4.3 - 11.1 K/uL    RBC 4.11 (L) 4.23 - 5.6 M/uL    HGB 12.9 (L) 13.6 - 17.2 g/dL    HCT 40.6 (L) 41.1 - 50.3 %    MCV 98.8 (H) 79.6 - 97.8 FL    MCH 31.4 26.1 - 32.9 PG    MCHC 31.8 31.4 - 35.0 g/dL    RDW 13.9 11.9 - 14.6 %    PLATELET 204 (L) 570 - 450 K/uL    MPV 11.7 9.4 - 12.3 FL    ABSOLUTE NRBC 0.00 0.0 - 0.2 K/uL    DF AUTOMATED      NEUTROPHILS 86 (H) 43 - 78 %    LYMPHOCYTES 8 (L) 13 - 44 %    MONOCYTES 5 4.0 - 12.0 %    EOSINOPHILS 0 (L) 0.5 - 7.8 %    BASOPHILS 0 0.0 - 2.0 %    IMMATURE GRANULOCYTES 1 0.0 - 5.0 %    ABS. NEUTROPHILS 9.0 (H) 1.7 - 8.2 K/UL    ABS. LYMPHOCYTES 0.8 0.5 - 4.6 K/UL    ABS. MONOCYTES 0.5 0.1 - 1.3 K/UL    ABS. EOSINOPHILS 0.0 0.0 - 0.8 K/UL    ABS. BASOPHILS 0.0 0.0 - 0.2 K/UL    ABS. IMM.  GRANS. 0.1 0.0 - 0.5 K/UL   C REACTIVE PROTEIN, QT    Collection Time: 08/20/21  3:12 AM   Result Value Ref Range    C-Reactive protein 4.8 (H) 0.0 - 0.9 mg/dL   MAGNESIUM    Collection Time: 08/20/21  3:12 AM   Result Value Ref Range    Magnesium 2.6 (H) 1.8 - 2.4 mg/dL   PHOSPHORUS    Collection Time: 08/20/21  3:12 AM   Result Value Ref Range    Phosphorus 2.8 2.3 - 3.7 MG/DL   GLUCOSE, POC    Collection Time: 08/20/21  5:52 AM   Result Value Ref Range    Glucose (POC) 152 (H) 65 - 100 mg/dL    Performed by Cadence Santiago    GLUCOSE, POC    Collection Time: 08/20/21 11:14 AM   Result Value Ref Range    Glucose (POC) 142 (H) 65 - 100 mg/dL    Performed by Yoshi([facility])        All Micro Results     Procedure Component Value Units Date/Time    CULTURE, RESPIRATORY/SPUTUM/BRONCH [place] [[id number]] Collected: 08/18/21 1115    Order Status: Completed Specimen: Sputum,ET Suction Updated: 08/20/21 2830 Special Requests: NO SPECIAL REQUESTS        GRAM STAIN 0 TO 5 WBCS SEEN PER OIF      NO EPITHELIAL CELLS SEEN         FEW GRAM POSITIVE COCCI         FEW GRAM POSITIVE RODS        Culture result:       LIGHT NORMAL RESPIRATORY MARCOS          CULTURE, BLOOD [[id number]] Collected: 08/18/21 0752    Order Status: Completed Specimen: Blood Updated: 08/20/21 0712     Special Requests: --        LEFT  Antecubital       Culture result: NO GROWTH 2 DAYS       COVID-19 RAPID TEST [[id number]]  (Abnormal) Collected: 08/18/21 0752    Order Status: Completed Specimen: Nasopharyngeal Updated: 08/18/21 0833     Specimen source NASAL        COVID-19 rapid test Detected        Comment:      The specimen is POSITIVE for SARS-CoV-2, the novel coronavirus associated with COVID-19. This test has been authorized by the FDA under an Emergency Use Authorization (EUA) for use by authorized laboratories. Fact sheet for Healthcare Providers: Yoono.co.nz  Fact sheet for Patients: Planspotco.nz       Methodology: Isothermal Nucleic Acid Amplification         CULTURE, BLOOD [[id number]]     Order Status: Canceled Specimen: Blood           Other Studies:  No results found.     Current Meds:  Current Facility-Administered Medications   Medication Dose Route Frequency    insulin regular (NOVOLIN R, HUMULIN R) injection   SubCUTAneous AC&HS    tamsulosin (FLOMAX) capsule 0.4 mg  0.4 mg Oral DAILY    famotidine (PEPCID) tablet 20 mg  20 mg Oral BID    apixaban (ELIQUIS) tablet 5 mg  5 mg Oral BID    dilTIAZem IR (CARDIZEM) tablet 60 mg  60 mg Oral AC&HS    [START ON 8/21/2021] vancomycin trough reminder   Other ONCE    tuberculin injection 5 Units  5 Units IntraDERMal ONCE    vancomycin (VANCOCIN) 1500 mg in  ml infusion  1,500 mg IntraVENous Q24H    furosemide (LASIX) tablet 40 mg  40 mg Oral DAILY    insulin glargine (LANTUS) injection 10 Units  10 Units SubCUTAneous DAILY    carvediloL (COREG) tablet 25 mg  25 mg Oral BID WITH MEALS    [Held by provider] lisinopriL (PRINIVIL, ZESTRIL) tablet 5 mg  5 mg Oral DAILY    sodium chloride (NS) flush 5-40 mL  5-40 mL IntraVENous Q8H    sodium chloride (NS) flush 5-40 mL  5-40 mL IntraVENous PRN    cefepime (MAXIPIME) 2 g in 0.9% sodium chloride (MBP/ADV) 100 mL MBP  2 g IntraVENous Q12H    dexamethasone (DECADRON) 10 mg/mL injection 6 mg  6 mg IntraVENous Q24H    remdesivir 100 mg in 0.9% sodium chloride 250 mL IVPB  100 mg IntraVENous Q24H    acetaminophen (TYLENOL) solution 650 mg  650 mg Per NG tube Q4H PRN       Signed:  Paresh Trent MD    Part of this note may have been written by using a voice dictation software. The note has been proof read but may still contain some grammatical/other typographical errors.

## 2021-08-21 NOTE — PROGRESS NOTES
[facility] CARDIOLOGY PROGRESS NOTE    8/21/2021 9:34 AM    Admit Date: 8/18/2021        Subjective:   Stable overnight without angina, CHF, or palpitations. Vitals stable and controlled. No other complaints overnight. Tolerating meds well. Objective:      Vitals:    08/21/21 0200 08/21/21 0412 08/21/21 0513 08/21/21 0753   BP:  116/69  131/69   Pulse:  79  90   Resp:  20  20   Temp:  98.3 °F (36.8 °C)  97.6 °F (36.4 °C)   SpO2: 93% 94%  91%   Weight:   283 lb 3.2 oz (128.5 kg)    Height:           Physical Exam:  Neck- supple, no JVD sitting upright  CV- irregular rate and rhythm no MRG  Lung- clear bilaterally apically, decreased/coarse mildly bibasilar  Abd- soft, nontender, nondistended  Ext- no edema  Skin- warm and dry    Data Review:   Recent Labs     08/20/21  0312 08/19/21  0402 08/18/21  1213    141  --    K 4.6 4.5  --    MG 2.6*  --  2.4   BUN 38* 41*  --    CREA 0.89 1.35  --    * 153*  --    WBC 10.4 7.3  --    HGB 12.9* 13.4*  --    HCT 40.6* 42.0  --    * 98*  --        Assessment and Plan:     Principal Problem:    Acute hypoxemic respiratory failure due to COVID-19 ([address] (8/18/2021) improving, comfortable on nasal cannula. Per primary physicians. Active Problems:    Nonischemic cardiomyopathy ([address] -fairly euvolemic today. Continue medications and titrate as needed      Overview: EF 45% 2017   \"severe AI\"       HTN (hypertension) (2/21/2013) stable, continue meds      Severe obesity (BMI 35.0-39. 9) with comorbidity ([address] (5/7/2018) outpatient nutrition counseling      Paroxysmal atrial fibrillation ([address] (11/17/2020) driven by underlying acute pulmonary illness. Heart rate fairly well controlled, on oral rate slowing meds and Eliquis with good tolerance thus far. Appears to be back in A. fib on telemetry but lots of baseline artifact with intermittent ventricular pacing. Continue meds and repeat check rhythm in the morning.       Sepsis due to COVID-19 [facility]) (8/18/2021) improving, continue meds      AYLIN (acute kidney injury) ([facility] (8/18/2021) normalized, continue meds and follow daily      Hyperglycemia (8/18/2021)        ALICIA Pickering MD  [facility] Cardiology  Pager [phone]

## 2021-08-21 NOTE — PROGRESS NOTES
Admit Date:  2021  7:32 AM   Name:  Sawyer Dan   Age:  80 y.o. Sex:  male  :  3/5/1933   MRN:  [id number]     Presenting Complaint: Respiratory Distress    Reason(s) for Admission: Acute hypoxemic respiratory failure due to COVID-19 ([facility] [U07.1, J96.01]         History of Presenting Illness:   Sawyer Dan is a 80 y.o. male with history of coronary artery disease, nonischemic cardiomyopathy status post ICD, GERD, hypertension, paroxysmal atrial fibrillation and hyperlipidemia admitted to ICU with acute respiratory distress and acute hypoxic respiratory failure requiring ventilation. Patient was intubated on  and was extubated on . Per report, patient is on 3 L oxygen and now being transferred to floor. In the ICU, patient was treated with steroid, remdesivir with for Covid infection. I have verified with the pharmacy and patient did not receive Actemra during hospitalization. In addition, patient found to be in atrial fibrillation with rapid ventricular rate. His AV manjit blocking agent been adjusted and cardiology is on board. Is currently on anticoagulation with Eliquis. He was also started on broad-spectrum antibiotics for concern for sepsis and pancultures ordered. As per patient is feeling much better. COVID-19 infection: As per patient he is not sure how he got Covid infection because he got Covid shot. His shortness of breath is much better he denies any shortness of breath at present. Atrial fibrillation: Denies any palpitations. Take blood thinner at home. Hypoglycemia: Patient denies any history of diabetes. : Feeling much better. Sitting in the room with 3 L oxygen. Denies any chest pain, shortness of breath or nausea or vomiting or palpitation. As per patient would like to go home once he gets better. Rest review of system negative except mentioned above.       Assessment & Plan:   Principal Problem:  #   Acute hypoxemic respiratory failure due to COVID-19 [facility])   Likely secondary to COVID-19 infection.  -On dexamethasone with end date on August 28.  -On remdesivir.  -Proning 8 to 12 hours in 24 hours as tolerated. -Incentive spirometer.  -On Eliquis.  -Wean down oxygen as tolerated. August 21: See above. #Sepsis: Likely secondary to COVID-19 infection. Cannot rule out bacterial pneumonia. On broad-spectrum antibiotics including vancomycin and cefepime. Cultures negative for 48 hours so we will discontinue vancomycin. Continue cefepime for now. August 21:  given patient's allergies we will continue cefepime for now. #Atrial fibrillation with rapid ventricular rate: Paroxysmal.  On anticoagulation and AV manjit blocking agent with diltiazem and Coreg. Appreciate cardiology recs. -TSH ordered. #   Nonischemic cardiomyopathy ([facility] (2/21/2013)  -On Lasix 40 mg daily. # HTN (hypertension) (2/21/2013)  - Continue current medication. As needed labetalol ordered. #Hyperglycemia: Not on any diabetic medication. We will stop Lantus and continue sliding scale. HbA1c ordered. On Eliquis. Patient is AOx3. Patient wants his wife to be power of  and he wants to be full code. I have called patient's wife. Both verbalized understanding that  response to Covid infection is unpredictable and his condition meditated in spite of treatment.       Objective:     Patient Vitals for the past 24 hrs:   Temp Pulse Resp BP SpO2   08/21/21 1203 97.5 °F (36.4 °C) 82 20 112/69 94 %   08/21/21 0753 97.6 °F (36.4 °C) 90 20 131/69 91 %   08/21/21 0412 98.3 °F (36.8 °C) 79 20 116/69 94 %   08/21/21 0200     93 %   08/20/21 2258 97.3 °F (36.3 °C) 75 20 120/76 93 %   08/20/21 2019 97.6 °F (36.4 °C) 76 20 109/74 93 %   08/20/21 1741 97.3 °F (36.3 °C) 91 24 115/65 92 %     Oxygen Therapy  O2 Sat (%): 94 % (08/21/21 1203)  Pulse via Oximetry: 77 beats per minute (08/20/21 1202)  O2 Device: None (Room air) (92%) (08/21/21 0804)  Skin Assessment: Clean, dry, & intact (08/21/21 0200)  Skin Protection for O2 Device: Yes (08/18/21 1916)  Orientation: Bilateral (08/18/21 1916)  Location: Cheek (08/18/21 1916)  O2 Flow Rate (L/min): 3 l/min (08/21/21 0412)  FIO2 (%): 40 % (08/19/21 0502)    Estimated body mass index is 37.36 kg/m² as calculated from the following:    Height as of this encounter: 6' 1\" (1.854 m). Weight as of this encounter: 128.5 kg (283 lb 3.2 oz). No intake or output data in the 24 hours ending 08/21/21 1522      Physical Exam:    General:    BMI 46. Speaking in full sentences. Head:  Normocephalic, atraumatic  Eyes:  Sclerae appear normal.  Pupils equally round. ENT:  Nares appear normal, no drainage. Moist oral mucosa  Neck:  Supple, no JVD. CV:   RRR. No m/r/g. No jugular venous distension. Lungs:   Decrease air entry bilateral lower lobe with some crackles otherwise CTAB. No wheezing, rhonchi, or rales. Respirations even, unlabored  Abdomen: Bowel sounds present. Soft, nontender, nondistended. Extremities: No cyanosis or clubbing. No edema  Skin:     No rashes and normal coloration. Warm and dry. Neuro:  AOx3, moving all 4 extremities, speech normal  Psych:  Normal mood and affect.   Alert and oriented x3    I have reviewed ordered lab tests and independently visualized imaging below:  Procedures done this admission:  * No surgery found *    All Micro Results     Procedure Component Value Units Date/Time    CULTURE, BLOOD [[id number]] Collected: 08/18/21 0752    Order Status: Completed Specimen: Blood Updated: 08/21/21 1144     Special Requests: --        LEFT  Antecubital       Culture result: NO GROWTH 3 DAYS       CULTURE, RESPIRATORY/SPUTUM/BRONCH Patty Sears STAIN [[id number]] Collected: 08/18/21 1115    Order Status: Completed Specimen: Sputum,ET Suction Updated: 08/20/21 0838     Special Requests: NO SPECIAL REQUESTS        GRAM STAIN 0 TO 5 WBCS SEEN PER OIF      NO EPITHELIAL CELLS SEEN         FEW GRAM POSITIVE COCCI         FEW GRAM POSITIVE RODS        Culture result:       LIGHT NORMAL RESPIRATORY MARCOS          COVID-19 RAPID TEST [[id number]]  (Abnormal) Collected: 08/18/21 0752    Order Status: Completed Specimen: Nasopharyngeal Updated: 08/18/21 0833     Specimen source NASAL        COVID-19 rapid test Detected        Comment:      The specimen is POSITIVE for SARS-CoV-2, the novel coronavirus associated with COVID-19. This test has been authorized by the FDA under an Emergency Use Authorization (EUA) for use by authorized laboratories.         Fact sheet for Healthcare Providers: [employer]nz  Fact sheet for Patients: [employer].nz       Methodology: Isothermal Nucleic Acid Amplification         CULTURE, BLOOD [[id number]]     Order Status: Canceled Specimen: Blood           SARS-CoV-2 Lab Results  \"Novel Coronavirus\" Test: No results found for: COV2NT   \"Emergent Disease\" Test: No results found for: EDPR  \"SARS-COV-2\" Test: No results found for: XGCOVT  \"Precision Labs\" Test: No results found for: RSLT  Rapid Test:   Lab Results   Component Value Date/Time    COVR Detected (AA) 08/18/2021 07:52 AM            Labs: Results:       BMP, Mg, Phos Recent Labs     08/21/21  0647 08/20/21  0312 08/19/21  0402    142 141   K 4.1 4.6 4.5   * 113* 109*   CO2 25 27 26   AGAP 9 2* 6*   BUN 31* 38* 41*   CREA 0.96 0.89 1.35   CA 8.6 8.4 8.5   * 160* 153*   MG 2.4 2.6*  --    PHOS 3.1 2.8  --       CBC Recent Labs     08/21/21  0647 08/20/21  0312 08/19/21  0402   WBC 11.1 10.4 7.3   RBC 4.24 4.11* 4.36   HGB 13.4* 12.9* 13.4*   HCT 42.6 40.6* 42.0    118* 98*   GRANS 86* 86* 83*   LYMPH 8* 8* 12*   EOS 0* 0* 0*   MONOS 4 5 4   BASOS 0 0 0   IG 1 1 1   ANEU 9.6* 9.0* 6.1   ABL 0.9 0.8 0.9   [place] 0.0 0.0 0.0   ABM 0.4 0.5 0.3   ABB 0.0 0.0 0.0   AIG 0.1 0.1 0.1      LFT Recent Labs     08/21/21  0647   ALT 54 AP 49*   TP 6.6   ALB 2.9*   GLOB 3.7*   AGRAT 0.8*      Cardiac Testing Lab Results   Component Value Date/Time     09/06/2013 01:11 AM      Coagulation Tests Lab Results   Component Value Date/Time    Prothrombin time 17.2 (H) 08/18/2021 07:52 AM    Prothrombin time 11.4 (H) 09/06/2013 02:00 AM    Prothrombin time 10.6 02/20/2013 10:22 AM    INR 1.4 08/18/2021 07:52 AM    INR 1.1 09/06/2013 02:00 AM    INR 1.0 02/20/2013 10:22 AM    aPTT 33.4 08/18/2021 07:52 AM    aPTT 28.2 09/06/2013 02:00 AM    aPTT 24.5 02/20/2013 10:22 AM      A1c Lab Results   Component Value Date/Time    Hemoglobin A1c 6.5 (H) 08/21/2021 06:47 AM      Lipid Panel No results found for: CHOL, CHOLPOCT, CHOLX, CHLST, CHOLV, 018915, HDL, HDLP, LDL, LDLC, DLDLP, 198993, VLDLC, VLDL, TGLX, TRIGL, TRIGP, TGLPOCT, CHHD, [facility]   Thyroid Panel Lab Results   Component Value Date/Time    TSH 0.989 08/21/2021 06:47 AM    TSH 2.337 08/25/2010 10:00 AM    T4, Total 8.3 08/25/2010 10:00 AM    T4, Free 1.3 08/21/2021 06:47 AM    T3 Uptake 37 08/25/2010 10:00 AM        Most Recent UA Lab Results   Component Value Date/Time    WBC 5-10 09/06/2013 05:00 AM    RBC 5-10 09/06/2013 05:00 AM    Epithelial cells 10-20 09/06/2013 05:00 AM    Bacteria 0 09/06/2013 05:00 AM    Casts 0 09/06/2013 05:00 AM    Crystals, urine 0 09/06/2013 05:00 AM    Mucus 4+ (H) 09/06/2013 05:00 AM            All Micro Results     Procedure Component Value Units Date/Time    CULTURE, BLOOD [[id number]] Collected: 08/18/21 0752    Order Status: Completed Specimen: Blood Updated: 08/21/21 1144     Special Requests: --        LEFT  Antecubital       Culture result: NO GROWTH 3 DAYS       CULTURE, RESPIRATORY/SPUTUM/BRONCH Piper Speed STAIN [[id number]] Collected: 08/18/21 1115    Order Status: Completed Specimen: Sputum,ET Suction Updated: 08/20/21 0845     Special Requests: NO SPECIAL REQUESTS        GRAM STAIN 0 TO 5 WBCS SEEN PER OIF      NO EPITHELIAL CELLS SEEN         FEW GRAM POSITIVE COCCI         FEW GRAM POSITIVE RODS        Culture result:       LIGHT NORMAL RESPIRATORY MARCOS          COVID-19 RAPID TEST [[id number]]  (Abnormal) Collected: 08/18/21 0752    Order Status: Completed Specimen: Nasopharyngeal Updated: 08/18/21 0833     Specimen source NASAL        COVID-19 rapid test Detected        Comment:      The specimen is POSITIVE for SARS-CoV-2, the novel coronavirus associated with COVID-19. This test has been authorized by the FDA under an Emergency Use Authorization (EUA) for use by authorized laboratories. Fact sheet for Healthcare Providers: [employer]  Fact sheet for Patients: [facility]       Methodology: Isothermal Nucleic Acid Amplification         CULTURE, BLOOD [[id number]]     Order Status: Canceled Specimen: Blood           Other Studies:  XR CHEST SNGL V    Result Date: 8/21/2021   Portable view of the chest COMPARISON: August 19 CLINICAL HISTORY: Respiratory failure. FINDINGS: Stable left-sided cardiac pacer. Heart is enlarged. There are bilateral airspace densities. No pneumothorax. Endotracheal and enteric tubes are no longer seen.      1. Bilateral airspace disease, similar to prior exam.      Current Meds:  Current Facility-Administered Medications   Medication Dose Route Frequency    tamsulosin (FLOMAX) capsule 0.4 mg  0.4 mg Oral DAILY    famotidine (PEPCID) tablet 20 mg  20 mg Oral BID    apixaban (ELIQUIS) tablet 5 mg  5 mg Oral BID    dilTIAZem IR (CARDIZEM) tablet 60 mg  60 mg Oral AC&HS    labetaloL (NORMODYNE;TRANDATE) injection 10 mg  10 mg IntraVENous Q4H PRN    insulin lispro (HUMALOG) injection   SubCUTAneous TIDAC    furosemide (LASIX) tablet 40 mg  40 mg Oral DAILY    carvediloL (COREG) tablet 25 mg  25 mg Oral BID WITH MEALS    [Held by provider] lisinopriL (PRINIVIL, ZESTRIL) tablet 5 mg  5 mg Oral DAILY    sodium chloride (NS) flush 5-40 mL  5-40 mL IntraVENous Q8H  sodium chloride (NS) flush 5-40 mL  5-40 mL IntraVENous PRN    cefepime (MAXIPIME) 2 g in 0.9% sodium chloride (MBP/ADV) 100 mL MBP  2 g IntraVENous Q12H    dexamethasone (DECADRON) 10 mg/mL injection 6 mg  6 mg IntraVENous Q24H    remdesivir 100 mg in 0.9% sodium chloride 250 mL IVPB  100 mg IntraVENous Q24H    acetaminophen (TYLENOL) solution 650 mg  650 mg Per NG tube Q4H PRN       Signed:  Andre Campos MD    Part of this note may have been written by using a voice dictation software. The note has been proof read but may still contain some grammatical/other typographical errors.

## 2021-08-21 NOTE — PROGRESS NOTES
Hourly rounds completed on patient. Patient on 3l NC. Patient denies any needs at this time. Will report to oncoming nurse.

## 2021-08-22 NOTE — PROGRESS NOTES
ACUTE OT GOALS:  (Developed with and agreed upon by patient and/or caregiver.)  1) Patient will complete lower body bathing and dressing with SBA and adaptive equipment as needed. 2) Patient will complete toileting with SBA. 3) Patient will complete functional transfers with SBA and adaptive equipment as needed. 4) Patient will tolerate at least 30 minutes of OT activity with 2-3 rest breaks while maintaining O2 sats >90%. 5) Patient will verbalize at least 3 energy conservation technique to utilize during ADL/IADL. Timeframe: 7 visits     OCCUPATIONAL THERAPY: Daily Note OT Treatment Day # 2    Felecia Barr is a 80 y.o. male   PRIMARY DIAGNOSIS: Acute hypoxemic respiratory failure due to COVID-19 [facility]  Acute hypoxemic respiratory failure [facility] [U07.1, J96.01]       [place] / [place] / Product Type: Medicare /   ASSESSMENT:     REHAB RECOMMENDATIONS: CURRENT LEVEL OF FUNCTION:  (Most Recently Demonstrated)   Recommendation to date pending progress:  Setting:   Short-term Rehab   Equipment:    To Be Determined Bathing:   Minimal Assistance  Dressing:   Not tested  Feeding/Grooming:   Standby Assistance  Toileting:   Not tested  Functional Mobility:   Minimal Assistance     ASSESSMENT:  Mr. Gabi Jacobs is slowly progressing with OT services. Today, pt was received sitting in the chair. Completed grooming task sitting in chair SBA. Sit>stand Mary. Ambulation with RW Mary with max VCs for safety. Appeared confused--inconsistencies with answers, delayed responses, and repetition of questions required. Required rest breaks. O2 sats remained in the 90s throughout entire session with patient on RA. Mr. Gabi Jacobs demonstrates overall deficits in strength, balance, activity tolerance,, and ADL performance. Continue OT efforts and POC in order to address functional deficits and OT goals stated above.       SUBJECTIVE:   Mr. Gabi Jacobs states, \"My wife is sick too.\"    SOCIAL HISTORY/LIVING ENVIRONMENT: Lives with wife; independent at baseline; occasional use of cane.   Home Environment: Private residence  # Steps to Enter: 3  One/Two Story Residence: One story  Living Alone: No  Support Systems: Spouse/Significant Other/Partner    OBJECTIVE:     PAIN: VITAL SIGNS: LINES/DRAINS:   Pre Treatment: Pain Screen  Pain Scale 1: Numeric (0 - 10)  Pain Intensity 1: 0  Post Treatment: no change  Vital Signs  O2 Device: None (Room air) none  O2 Device: None (Room air)     ACTIVITIES OF DAILY LIVING: I Mod I S SBA CGA Min Mod Max Total NT Comments   BASIC ADLs:              Bathing/ Showering [] [] [] [] [] [] [] [] [] [x]    Toileting [] [] [] [] [] [] [] [] [] [x]    Dressing [] [] [] [] [] [] [] [] [] [x]    Feeding [] [] [] [] [] [] [] [] [] [x]    Grooming [] [] [] [x] [] [] [] [] [] [] Brushed hair in sitting    Personal Device Care [] [] [] [] [] [] [] [] [] [x]    Functional Mobility [] [] [] [] [] [x] [] [] [] [] With RW--VCs for safety   I=Independent, Mod I=Modified Independent, S=Supervision, SBA=Standby Assistance, CGA=Contact Guard Assistance,   Min=Minimal Assistance, Mod=Moderate Assistance, Max=Maximal Assistance, Total=Total Assistance, NT=Not Tested    MOBILITY: I Mod I S SBA CGA Min Mod Max Total  NT x2 Comments:   Supine to sit [] [] [] [] [] [] [] [] [] [x] [] Received in chair    Sit to supine [] [] [] [] [] [] [] [] [] [x] [] Left in chair    Sit to stand [] [] [] [] [] [x] [] [] [] [] []    Bed to chair [] [] [] [] [] [x] [] [] [] [] [] Alisia for mobility in room with RW   I=Independent, Mod I=Modified Independent, S=Supervision, SBA=Standby Assistance, CGA=Contact Guard Assistance,   Min=Minimal Assistance, Mod=Moderate Assistance, Max=Maximal Assistance, Total=Total Assistance, NT=Not Tested    BALANCE: Good Fair+ Fair Fair- Poor NT Comments   Sitting Static [] [x] [] [] [] []    Sitting Dynamic [] [x] [] [] [] []              Standing Static [] [] [x] [] [] []    Standing Dynamic [] [] [x] [] [] []      PLAN:   FREQUENCY/DURATION: OT Plan of Care: 3 times/week for duration of hospital stay or until stated goals are met, whichever comes first.    TREATMENT:   TREATMENT:   ( $$ Neuromuscular Re-Education: 8-22 mins   )  Co-Treatment PT/OT necessary due to patient's decreased overall endurance/tolerance levels, as well as need for high level skilled assistance to complete functional transfers/mobility and functional tasks  Neuromuscular Re-education (19 Minutes): Neuromuscular Re-education included Balance Training, Coordination training, Postural training, Sitting balance training and Standing balance training to improve Balance, Coordination and Functional Mobility.     TREATMENT GRID:  N/A    AFTER TREATMENT POSITION/PRECAUTIONS:  Chair, Needs within reach and RN notified    INTERDISCIPLINARY COLLABORATION:  MD/PA/NP, RN/PCT, PT/PTA and OT/MCFARLANE    TOTAL TREATMENT DURATION:  OT Patient Time In/Time Out  Time In: 1022  Time Out: [address], OT

## 2021-08-22 NOTE — PROGRESS NOTES
Ambulatory SAT complete. Patient on RA at rest, SpO2 97%. Patient on RA ambulatory, SpO2 95%. Patient appears dyspneic with exertion.

## 2021-08-22 NOTE — PROGRESS NOTES
[facility] CARDIOLOGY PROGRESS NOTE    8/22/2021 8:58 AM    Admit Date: 8/18/2021        Subjective:   Stable overnight without angina, CHF, or palpitations. Vitals stable and controlled. No other complaints overnight. Tolerating meds well. Objective:      Vitals:    08/22/21 0116 08/22/21 0520 08/22/21 0528 08/22/21 0845   BP: 108/66  113/63 135/68   Pulse: 77  85 86   Resp: 18  16 18   Temp: 97.8 °F (36.6 °C)  97.8 °F (36.6 °C) 97.5 °F (36.4 °C)   SpO2: 93%  96% 98%   Weight:  280 lb 3.2 oz (127.1 kg)     Height:           Physical Exam:  Neck- supple, no JVD  CV- regular rate and rhythm no MRG  Lung- coarse right base>left, clear apices  Abd- soft, nontender, nondistended  Ext- no edema  Skin- warm and dry    Data Review:   Recent Labs     08/22/21  0620 08/21/21  0647    142   K 4.2 4.1   MG 2.3 2.4   BUN 32* 31*   CREA 0.90 0.96   * 161*   WBC 8.8 11.1   HGB 12.6* 13.4*   HCT 39.8* 42.6    152       Assessment and Plan:     Principal Problem:    Acute hypoxemic respiratory failure due to COVID-19 (HCC) (8/18/2021) improving, comfortable on nasal cannula. Per primary physicians.     Active Problems:    Nonischemic cardiomyopathy ([address] -fairly euvolemic today. Continue medications and titrate as needed         HTN (hypertension) (2/21/2013) stable, continue meds       Severe obesity (BMI 35.0-39. 9) with comorbidity ([address] (5/7/2018) outpatient nutrition counseling       Paroxysmal atrial fibrillation ([address] (11/17/2020) driven by underlying acute pulmonary illness. Heart rate fairly well controlled, on oral rate slowing meds and Eliquis with good tolerance thus far. Appears to be back in A. fib on telemetry but clinically asymptomatic. .. Continue meds and repeat check rhythm in the morning.       Sepsis due to COVID-19 [facility]) (8/18/2021) improving, continue meds       AYLIN (acute kidney injury) ([facility] (8/18/2021) normalized, continue meds and follow daily         ALICIA Gallegos MD  [facility] Cardiology  Pager [phone]

## 2021-08-22 NOTE — PROGRESS NOTES
Hourly rounds on patient completed, during rounds tonight patient was found with oxygen taken off by patient, oxygen replaced by nurse and pt reeducated on importance of keeping nasal cannula on. Pt verbalizes understanding, but oxygen found removed during hourly rounding. Patient remains on 3 liters of Oxygen via nasal cannula at 0600 rounds, denies any needs. Report will be given to oncoming nurse.

## 2021-08-22 NOTE — PROGRESS NOTES
Problem: Gas Exchange - Impaired  Goal: Absence of hypoxia  Outcome: Progressing Towards Goal     Problem: Fatigue  Goal: Verbalize increase energy and improved vitality  Outcome: Progressing Towards Goal

## 2021-08-22 NOTE — PROGRESS NOTES
Admit Date:  2021  7:32 AM   Name:  Lyssa Glover   Age:  80 y.o. Sex:  male  :  3/5/1933   MRN:  [id number]     Presenting Complaint: Respiratory Distress    Reason(s) for Admission: Acute hypoxemic respiratory failure due to COVID-19 ([facility] [U07.1, J96.01]         History of Presenting Illness:   Lyssa Glover is a 80 y.o. male with history of coronary artery disease, nonischemic cardiomyopathy status post ICD, GERD, hypertension, paroxysmal atrial fibrillation and hyperlipidemia admitted to ICU with acute respiratory distress and acute hypoxic respiratory failure requiring ventilation. Patient was intubated on  and was extubated on . Per report, patient is on 3 L oxygen and now being transferred to floor. In the ICU, patient was treated with steroid, remdesivir with for Covid infection. I have verified with the pharmacy and patient did not receive Actemra during hospitalization. In addition, patient found to be in atrial fibrillation with rapid ventricular rate. His AV manjit blocking agent been adjusted and cardiology is on board. Is currently on anticoagulation with Eliquis. He was also started on broad-spectrum antibiotics for concern for sepsis and pancultures ordered. As per patient is feeling much better. COVID-19 infection: As per patient he is not sure how he got Covid infection because he got Covid shot. His shortness of breath is much better he denies any shortness of breath at present. Atrial fibrillation: Denies any palpitations. Take blood thinner at home. Hypoglycemia: Patient denies any history of diabetes. : Feeling much better. Sitting in the room with 3 L oxygen. Denies any chest pain, shortness of breath or nausea or vomiting or palpitation. As per patient would like to go home once he gets better. : Feeling better.   I discussed rehab versus home health with the patient and as per him he will go with therapy recommendations. Patient used the restroom and came out and his oxygen saturation was persistently above 92 on room air. Rest review of system negative except mentioned above. Assessment & Plan:   Principal Problem:  #   Acute hypoxemic respiratory failure due to COVID-19 [facility])   Likely secondary to COVID-19 infection.  -On dexamethasone with end date on August 28.  -On remdesivir.  -Proning 8 to 12 hours in 24 hours as tolerated. -Incentive spirometer.  -On Eliquis.  -Wean down oxygen as tolerated. August 21: See above. August 22: See above. Doing much better. #Sepsis: Likely secondary to COVID-19 infection. Cannot rule out bacterial pneumonia. On broad-spectrum antibiotics including vancomycin and cefepime. Cultures negative for 48 hours so we will discontinue vancomycin. Continue cefepime for now. August 21:  given patient's allergies we will continue cefepime for now. August 22: End date for cefepime place. #Atrial fibrillation with rapid ventricular rate: Paroxysmal.  On anticoagulation and AV manjit blocking agent with diltiazem and Coreg. Appreciate cardiology recs. -TSH ordered. August 22: TSH within normal limit. #   Nonischemic cardiomyopathy ([facility] (2/21/2013)  -On Lasix 40 mg daily. #Liver enzymes: Likely multifactorial.  Less than five times upper normal limits we will continue remdesivir. Hepatitis panel ordered. # HTN (hypertension) (2/21/2013)  - Continue current medication. As needed labetalol ordered. #Hyperglycemia: Not on any diabetic medication. We will stop Lantus and continue sliding scale. HbA1c ordered. August 22: HbA1c 6.5. On Eliquis. Patient is AOx3. Patient wants his wife to be power of  and he wants to be full code. I have called patient's wife and have also talked to his daughter.   Both verbalized understanding that  response to Covid infection is unpredictable and his condition meditated in spite of treatment. Objective:     Patient Vitals for the past 24 hrs:   Temp Pulse Resp BP SpO2   08/22/21 1140 97.7 °F (36.5 °C) 78 18 115/86 96 %   08/22/21 0845 97.5 °F (36.4 °C) 86 18 135/68 98 %   08/22/21 0528 97.8 °F (36.6 °C) 85 16 113/63 96 %   08/22/21 0116 97.8 °F (36.6 °C) 77 18 108/66 93 %   08/21/21 2042 98.2 °F (36.8 °C) 80 18 122/79 92 %   08/21/21 1558 97.9 °F (36.6 °C) 78 18 110/60 91 %     Oxygen Therapy  O2 Sat (%): 96 % (08/22/21 1140)  Pulse via Oximetry: 77 beats per minute (08/20/21 1202)  O2 Device: None (Room air) (08/22/21 1023)  Skin Assessment: Clean, dry, & intact (08/21/21 0200)  Skin Protection for O2 Device: Yes (08/18/21 1916)  Orientation: Bilateral (08/18/21 1916)  Location: Cheek (08/18/21 1916)  O2 Flow Rate (L/min): 1 l/min (08/22/21 0717)  FIO2 (%): 40 % (08/19/21 0502)    Estimated body mass index is 36.97 kg/m² as calculated from the following:    Height as of this encounter: 6' 1\" (1.854 m). Weight as of this encounter: 127.1 kg (280 lb 3.2 oz). No intake or output data in the 24 hours ending 08/22/21 1416      Physical Exam:    General:    BMI 46. Speaking in full sentences. Head:  Normocephalic, atraumatic  Eyes:  Sclerae appear normal.  Pupils equally round. ENT:  Nares appear normal, no drainage. Moist oral mucosa  Neck:  Supple, no JVD. CV:   RRR. No m/r/g. No jugular venous distension. Lungs:   Decrease air entry bilateral lower lobe with some crackles otherwise CTAB. No wheezing, rhonchi, or rales. Respirations even, unlabored  Abdomen: Bowel sounds present. Soft, nontender, nondistended. Extremities: No cyanosis or clubbing. No edema  Skin:     No rashes and normal coloration. Warm and dry. Neuro:  AOx3, moving all 4 extremities, speech normal  Psych:  Normal mood and affect.   Alert and oriented x3    I have reviewed ordered lab tests and independently visualized imaging below:  Procedures done this admission:  * No surgery found *    All Micro Results     Procedure Component Value Units Date/Time    CULTURE, BLOOD [[id number]] Collected: 08/18/21 0752    Order Status: Completed Specimen: Blood Updated: 08/22/21 1016     Special Requests: --        LEFT  Antecubital       Culture result: NO GROWTH 4 DAYS       CULTURE, RESPIRATORY/SPUTUM/BRONCH [place] STAIN [[id number]] Collected: 08/18/21 1115    Order Status: Completed Specimen: Sputum,ET Suction Updated: 08/20/21 0838     Special Requests: NO SPECIAL REQUESTS        GRAM STAIN 0 TO 5 WBCS SEEN PER OIF      NO EPITHELIAL CELLS SEEN         FEW GRAM POSITIVE COCCI         FEW GRAM POSITIVE RODS        Culture result:       LIGHT NORMAL RESPIRATORY MARCOS          COVID-19 RAPID TEST [[id number]]  (Abnormal) Collected: 08/18/21 0752    Order Status: Completed Specimen: Nasopharyngeal Updated: 08/18/21 0833     Specimen source NASAL        COVID-19 rapid test Detected        Comment:      The specimen is POSITIVE for SARS-CoV-2, the novel coronavirus associated with COVID-19. This test has been authorized by the FDA under an Emergency Use Authorization (EUA) for use by authorized laboratories.         Fact sheet for Healthcare Providers: ConventionI-Worksdate.co.nz  Fact sheet for Patients: Portal SolutionsUpdate.co.nz       Methodology: Isothermal Nucleic Acid Amplification         CULTURE, BLOOD [[id number]]     Order Status: Canceled Specimen: Blood           SARS-CoV-2 Lab Results  \"Novel Coronavirus\" Test: No results found for: COV2NT   \"Emergent Disease\" Test: No results found for: EDPR  \"SARS-COV-2\" Test: No results found for: XGCOVT  \"Precision Labs\" Test: No results found for: RSLT  Rapid Test:   Lab Results   Component Value Date/Time    COVR Detected (AA) 08/18/2021 07:52 AM            Labs: Results:       BMP, Mg, Phos Recent Labs     08/22/21  0620 08/21/21  0647 08/20/21  0312    142 142   K 4.2 4.1 4.6   * 108* 113*   CO2 28 25 27   AGAP 4* 9 2* BUN 32* 31* 38*   CREA 0.90 0.96 0.89   CA 8.4 8.6 8.4   * 161* 160*   MG 2.3 2.4 2.6*   PHOS  --  3.1 2.8      CBC Recent Labs     08/22/21  0620 08/21/21  0647 08/20/21  0312   WBC 8.8 11.1 10.4   RBC 4.07* 4.24 4.11*   HGB 12.6* 13.4* 12.9*   HCT 39.8* 42.6 40.6*    152 118*   GRANS 86* 86* 86*   LYMPH 8* 8* 8*   EOS 0* 0* 0*   MONOS 4 4 5   BASOS 0 0 0   IG 2 1 1   ANEU 7.6 9.6* 9.0*   ABL 0.7 0.9 0.8   [place] 0.0 0.0 0.0   ABM 0.3 0.4 0.5   ABB 0.0 0.0 0.0   AIG 0.2 0.1 0.1      LFT Recent Labs     08/22/21  0620 08/21/21  0647   * 54   AP 47* 49*   TP 6.1* 6.6   ALB 2.9* 2.9*   GLOB 3.2 3.7*   AGRAT 0.9* 0.8*      Cardiac Testing Lab Results   Component Value Date/Time     09/06/2013 01:11 AM      Coagulation Tests Lab Results   Component Value Date/Time    Prothrombin time 17.2 (H) 08/18/2021 07:52 AM    Prothrombin time 11.4 (H) 09/06/2013 02:00 AM    Prothrombin time 10.6 02/20/2013 10:22 AM    INR 1.4 08/18/2021 07:52 AM    INR 1.1 09/06/2013 02:00 AM    INR 1.0 02/20/2013 10:22 AM    aPTT 33.4 08/18/2021 07:52 AM    aPTT 28.2 09/06/2013 02:00 AM    aPTT 24.5 02/20/2013 10:22 AM      A1c Lab Results   Component Value Date/Time    Hemoglobin A1c 6.5 (H) 08/21/2021 06:47 AM      Lipid Panel No results found for: CHOL, CHOLPOCT, CHOLX, CHLST, CHOLV, [id number], HDL, HDLP, LDL, LDLC, DLDLP, 988698, VLDLC, VLDL, TGLX, TRIGL, TRIGP, TGLPOCT, CHHD, [facility]   Thyroid Panel Lab Results   Component Value Date/Time    TSH 0.989 08/21/2021 06:47 AM    TSH 2.337 08/25/2010 10:00 AM    T4, Total 8.3 08/25/2010 10:00 AM    T4, Free 1.3 08/21/2021 06:47 AM    T3 Uptake 37 08/25/2010 10:00 AM        Most Recent UA Lab Results   Component Value Date/Time    WBC 5-10 09/06/2013 05:00 AM    RBC 5-10 09/06/2013 05:00 AM    Epithelial cells 10-20 09/06/2013 05:00 AM    Bacteria 0 09/06/2013 05:00 AM    Casts 0 09/06/2013 05:00 AM    Crystals, urine 0 09/06/2013 05:00 AM    Mucus 4+ (H) 09/06/2013 05:00 AM All Micro Results     Procedure Component Value Units Date/Time    CULTURE, BLOOD [[id number]] Collected: 08/18/21 0752    Order Status: Completed Specimen: Blood Updated: 08/22/21 1016     Special Requests: --        LEFT  Antecubital       Culture result: NO GROWTH 4 DAYS       CULTURE, RESPIRATORY/SPUTUM/BRONCH Dory Asa STAIN [[id number]] Collected: 08/18/21 1115    Order Status: Completed Specimen: Sputum,ET Suction Updated: 08/20/21 0838     Special Requests: NO SPECIAL REQUESTS        GRAM STAIN 0 TO 5 WBCS SEEN PER OIF      NO EPITHELIAL CELLS SEEN         FEW GRAM POSITIVE COCCI         FEW GRAM POSITIVE RODS        Culture result:       LIGHT NORMAL RESPIRATORY MARCOS          COVID-19 RAPID TEST [[id number]]  (Abnormal) Collected: 08/18/21 0752    Order Status: Completed Specimen: Nasopharyngeal Updated: 08/18/21 0833     Specimen source NASAL        COVID-19 rapid test Detected        Comment:      The specimen is POSITIVE for SARS-CoV-2, the novel coronavirus associated with COVID-19. This test has been authorized by the FDA under an Emergency Use Authorization (EUA) for use by authorized laboratories. Fact sheet for Healthcare Providers: ConventionUpdate.co.nz  Fact sheet for Patients: ConventionUpdate.co.nz       Methodology: Isothermal Nucleic Acid Amplification         CULTURE, BLOOD [[id number]]     Order Status: Canceled Specimen: Blood           Other Studies:  No results found.     Current Meds:  Current Facility-Administered Medications   Medication Dose Route Frequency    benzonatate (TESSALON) capsule 100 mg  100 mg Oral TID PRN    tamsulosin (FLOMAX) capsule 0.4 mg  0.4 mg Oral DAILY    famotidine (PEPCID) tablet 20 mg  20 mg Oral BID    apixaban (ELIQUIS) tablet 5 mg  5 mg Oral BID    dilTIAZem IR (CARDIZEM) tablet 60 mg  60 mg Oral AC&HS    labetaloL (NORMODYNE;TRANDATE) injection 10 mg  10 mg IntraVENous Q4H PRN    insulin lispro (HUMALOG) injection   SubCUTAneous TIDAC    furosemide (LASIX) tablet 40 mg  40 mg Oral DAILY    carvediloL (COREG) tablet 25 mg  25 mg Oral BID WITH MEALS    [Held by provider] lisinopriL (PRINIVIL, ZESTRIL) tablet 5 mg  5 mg Oral DAILY    sodium chloride (NS) flush 5-40 mL  5-40 mL IntraVENous Q8H    sodium chloride (NS) flush 5-40 mL  5-40 mL IntraVENous PRN    cefepime (MAXIPIME) 2 g in 0.9% sodium chloride (MBP/ADV) 100 mL MBP  2 g IntraVENous Q12H    dexamethasone (DECADRON) 10 mg/mL injection 6 mg  6 mg IntraVENous Q24H    remdesivir 100 mg in 0.9% sodium chloride 250 mL IVPB  100 mg IntraVENous Q24H    acetaminophen (TYLENOL) solution 650 mg  650 mg Per NG tube Q4H PRN       Signed:  Kristie Navarro MD    Part of this note may have been written by using a voice dictation software. The note has been proof read but may still contain some grammatical/other typographical errors.

## 2021-08-22 NOTE — PROGRESS NOTES
ACUTE PHYSICAL THERAPY GOALS:  (Developed with and agreed upon by patient and/or caregiver. )  LTG:  (1.)Mr. Albert Vega will move from supine to sit and sit to supine, scoot up and down and roll side to side in bed with INDEPENDENCE within 7 treatment day(s). (2.)Mr. Albert Vega will transfer from bed to chair and chair to bed with MODIFIED INDEPENDENCE using the least restrictive device within 7 treatment day(s). (3.)Mr. Albert Vega will ambulate with SUPERVISION for 150+ feet with the least restrictive device within 7 treatment day(s). (4.)Mr. Albert Vega will perform exercises per HEP independently to improve strength and mobility within 7 days. PHYSICAL THERAPY: Daily Note and AM Treatment Day # 2    Chayo Sanchez is a 80 y.o. male   PRIMARY DIAGNOSIS: Acute hypoxemic respiratory failure due to COVID-19 [facility])  Acute hypoxemic respiratory failure due to COVID-19 ([facility] [U07.1, J96.01]         ASSESSMENT:     REHAB RECOMMENDATIONS: CURRENT LEVEL OF FUNCTION:  (Most Recently Demonstrated)   Recommendation to date pending progress:  Setting:   Short-term Rehab  Equipment:    To Be Determined Bed Mobility:   Not tested  Sit to Stand:  [employer] Assistance  Transfers:   Minimal Assistance  Gait/Mobility:   Minimal Assistance     ASSESSMENT:  Mr. Albert Vega is admitted from home with COVID 19 respiratory failure. He lives with wife and is independent to modified independent at baseline using cane as needed. Presents today sitting up in chair on room air. His cognition seems delayed; needs additional time to answer questions and gives inconsistent answers. Seems to have decreased safety awareness as well throughout session. CGA-min assist for transfers and ambulation using walker. Heavy cues for gait safety. Slow, slightly unsteady gait and is at risk for falls. Pt performed gait x 2 trials with seated rest break between. Positioned comfortably in chair after activity with needs in reach.  RN notified of pt status and PT recommendation for short term rehab. SUBJECTIVE:   Mr. Adame states, \"I wasn't confused until you girls came in here. \"    SOCIAL HISTORY/ LIVING ENVIRONMENT: Lives with wife. Independent to mod I with cane at times. Drives. No falls.   Home Environment: Private residence  # Steps to Enter: 3  One/Two Story Residence: One story  Living Alone: No  Support Systems: Spouse/Significant Other/Partner  OBJECTIVE:     PAIN: VITAL SIGNS: LINES/DRAINS:   Pre Treatment: Pain Screen  Pain Scale 1: Numeric (0 - 10)  Pain Intensity 1: 0  Post Treatment: 0/10 Vital Signs  O2 Device: None (Room air) none  O2 Device: None (Room air)     MOBILITY: I Mod I S SBA CGA Min Mod Max Total  NT x2 Comments:   Bed Mobility    Rolling [] [] [] [] [] [] [] [] [] [] []    Supine to Sit [] [] [] [] [] [] [] [] [] [] []    Scooting [] [] [] [] [] [] [] [] [] [] []    Sit to Supine [] [] [] [] [] [] [] [] [] [] []    Transfers    Sit to Stand [] [] [] [] [x] [x] [] [] [] [] []    Bed to Chair [] [] [] [] [x] [] [] [] [] [] []    Stand to Sit [] [] [] [] [x] [x] [] [] [] [] []    I=Independent, Mod I=Modified Independent, S=Supervision, SBA=Standby Assistance, CGA=Contact Guard Assistance,   Min=Minimal Assistance, Mod=Moderate Assistance, Max=Maximal Assistance, Total=Total Assistance, NT=Not Tested    BALANCE: Good Fair+ Fair Fair- Poor NT Comments   Sitting Static [] [x] [] [] [] []    Sitting Dynamic [] [x] [] [] [] []              Standing Static [] [] [x] [] [] []    Standing Dynamic [] [] [x] [] [] []      GAIT: I Mod I S SBA CGA Min Mod Max Total  NT x2 Comments:   Level of Assistance [] [] [] [] [x] [x] [] [] [] [] []    Distance 30 ft x2    DME Rolling Walker    Gait Quality Unsteady, decreased safety awareness    Weightbearing  Status N/A     I=Independent, Mod I=Modified Independent, S=Supervision, SBA=Standby Assistance, CGA=Contact Guard Assistance,   Min=Minimal Assistance, Mod=Moderate Assistance, Max=Maximal Assistance, Total=Total Assistance, NT=Not Tested    PLAN:   FREQUENCY/DURATION: PT Plan of Care: 3 times/week for duration of hospital stay or until stated goals are met, whichever comes first.  TREATMENT:     TREATMENT:   ($$ Therapeutic Activity: 8-22 mins    )  Co-Treatment PT/OT necessary due to patient's decreased overall endurance/tolerance levels, as well as need for high level skilled assistance to complete functional transfers/mobility and functional tasks  Therapeutic Activity (19 Minutes): Therapeutic activity included Scooting, Transfer Training, Ambulation on level ground, Sitting balance , Standing balance and instruction on walker use/management to improve functional Mobility, Strength, Activity tolerance and balance.     TREATMENT GRID:  N/A    AFTER TREATMENT POSITION/PRECAUTIONS:  Chair, Needs within reach and RN notified    INTERDISCIPLINARY COLLABORATION:  MD/PA/NP, RN/PCT, PT/PTA and OT/MCFARLANE    TOTAL TREATMENT DURATION:  PT Patient Time In/Time Out  Time In: 1022  Time Out: [address] DPT

## 2021-08-23 NOTE — PROGRESS NOTES
The patient has been accepted to [facility] for [address] [place]. SNF has been selected. Patient to discharge 8/24. Attending MD Vikas Whtifield notified. [address] scheduled for 1400 8/24, as SNF liaison requested .      Disposition: STR

## 2021-08-23 NOTE — PROGRESS NOTES
CM received consult to assist with STR placement. CM attempted to reach patient via phone in room, patient is hard of hearing, per attending MD. CM contacted the patient's spouse Alisha Sinclair [phone] to discuss discharge planning. Spouse is in agreement with STR placement, as she is unable to care for patient due to having cancer. Spouse has requested SW CM send referrals to   1. [address] [place] [address]. Referrals sent. CM to follow up. CM continues to follow plan of care.

## 2021-08-23 NOTE — PROGRESS NOTES
ACUTE PHYSICAL THERAPY GOALS:  (Developed with and agreed upon by patient and/or caregiver. )  LTG:  (1.)Mr. Ariel Booker will move from supine to sit and sit to supine, scoot up and down and roll side to side in bed with INDEPENDENCE within 7 treatment day(s). (2.)Mr. Ariel Booker will transfer from bed to chair and chair to bed with MODIFIED INDEPENDENCE using the least restrictive device within 7 treatment day(s). (3.)Mr. Ariel Booker will ambulate with SUPERVISION for 150+ feet with the least restrictive device within 7 treatment day(s). (4.)Mr. Ariel Booker will perform exercises per HEP independently to improve strength and mobility within 7 days. PHYSICAL THERAPY: Daily Note and PM Treatment Day # 3    Beatriz Ribera is a 80 y.o. male   PRIMARY DIAGNOSIS: Acute hypoxemic respiratory failure due to COVID-19 [facility])  Acute hypoxemic respiratory failure due to COVID-19 ([facility] [U07.1, J96.01]         ASSESSMENT:     REHAB RECOMMENDATIONS: CURRENT LEVEL OF FUNCTION:  (Most Recently Demonstrated)   Recommendation to date pending progress:  Setting:   Short-term Rehab  Equipment:    To Be Determined Bed Mobility:   Not tested  Sit to Stand:  [employer] Assistance  Transfers:   Contact Guard Assistance  Gait/Mobility:   Contact Guard Assistance     ASSESSMENT:  Mr. Ariel Booker is admitted from home with COVID 19 respiratory failure. He lives with wife and is independent to modified independent at baseline using cane as needed. Presents today sitting up in chair on room air. His cognition seems delayed and he does not answer direct questions. He did stand and walk a few laps of the room with CGA and unsteady gait. sats in high 90's throughout. SUBJECTIVE:   Mr. Ariel Booker did not talk much even when asked questions    SOCIAL HISTORY/ LIVING ENVIRONMENT: Lives with wife. Independent to mod I with cane at times. Drives. No falls.   Home Environment: Private residence  # Steps to Enter: 3  One/Two Story Residence: One story  Living Alone: No  Support Systems: Spouse/Significant Other/Partner  OBJECTIVE:     PAIN: VITAL SIGNS: LINES/DRAINS:   Pre Treatment: Pain Screen  Pain Scale 1: FLACC  Pain Intensity 1: 0  Post Treatment: 0/10   none  O2 Device: None (Room air)     MOBILITY: I Mod I S SBA CGA Min Mod Max Total  NT x2 Comments:   Bed Mobility    Rolling [] [] [] [] [] [] [] [] [] [] []    Supine to Sit [] [] [] [] [] [] [] [] [] [] []    Scooting [] [] [] [] [] [] [] [] [] [] []    Sit to Supine [] [] [] [] [] [] [] [] [] [] []    Transfers    Sit to Stand [] [] [] [] [x] [x] [] [] [] [] []    Bed to Chair [] [] [] [] [x] [] [] [] [] [] []    Stand to Sit [] [] [] [] [x] [] [] [] [] [] []    I=Independent, Mod I=Modified Independent, S=Supervision, SBA=Standby Assistance, CGA=Contact Guard Assistance,   Min=Minimal Assistance, Mod=Moderate Assistance, Max=Maximal Assistance, Total=Total Assistance, NT=Not Tested    BALANCE: Good Fair+ Fair Fair- Poor NT Comments   Sitting Static [] [x] [] [] [] []    Sitting Dynamic [] [x] [] [] [] []              Standing Static [] [] [x] [] [] []    Standing Dynamic [] [] [x] [] [] []      GAIT: I Mod I S SBA CGA Min Mod Max Total  NT x2 Comments:   Level of Assistance [] [] [] [] [x] [] [] [] [] [] []    Distance 40 ft x2    DME None    Gait Quality Unsteady, decreased safety awareness    Weightbearing  Status N/A     I=Independent, Mod I=Modified Independent, S=Supervision, SBA=Standby Assistance, CGA=Contact Guard Assistance,   Min=Minimal Assistance, Mod=Moderate Assistance, Max=Maximal Assistance, Total=Total Assistance, NT=Not Tested    PLAN:   FREQUENCY/DURATION: PT Plan of Care: 3 times/week for duration of hospital stay or until stated goals are met, whichever comes first.  TREATMENT:     TREATMENT:   ($$ Therapeutic Activity: 8-22 mins    )  Therapeutic Activity (15 Minutes):  Therapeutic activity included Transfer Training, Ambulation on level ground and Standing balance to improve functional Mobility, Strength and Activity tolerance.     TREATMENT GRID:  N/A    AFTER TREATMENT POSITION/PRECAUTIONS:  Chair, Needs within reach and RN notified    INTERDISCIPLINARY COLLABORATION:  RN/PCT and PT/PTA    TOTAL TREATMENT DURATION:  PT Patient Time In/Time Out  Time In: 1425  Time Out: [address], [facility]

## 2021-08-23 NOTE — PROGRESS NOTES
[facility] PROGRESS NOTE    8/23/2021 8:19 AM    Admit Date: 8/18/2021        Subjective:   Stable overnight without angina, CHF, or palpitations. Vitals stable and controlled. No other complaints overnight. Tolerating meds well. Objective:      Vitals:    08/22/21 2038 08/23/21 0024 08/23/21 0449 08/23/21 0753   BP: 119/69 108/80 129/82 118/68   Pulse: 80 73 82 86   Resp: 18 18 18 18   Temp: 98 °F (36.7 °C) 97.9 °F (36.6 °C) 97.5 °F (36.4 °C) 97.5 °F (36.4 °C)   SpO2: 93% 91% 93% 95%   Weight:       Height:           Physical Exam:  Neck- supple, no JVD  CV- irregular rate and rhythm no MRG  Lung- clear bilaterally apically, decreased bibasilar/slightly coarse  Abd- soft, nontender, nondistended  Ext- no edema  Skin- warm and dry    Data Review:   Recent Labs     08/22/21  0620 08/21/21  0647    142   K 4.2 4.1   MG 2.3 2.4   BUN 32* 31*   CREA 0.90 0.96   * 161*   WBC 8.8 11.1   HGB 12.6* 13.4*   HCT 39.8* 42.6    152       Assessment and Plan:     Principal Problem:    Acute hypoxemic respiratory failure due to COVID-19 (HCC) (8/18/2021) improving, comfortable on nasal cannula.  Per primary physicians.     Active Problems:    Nonischemic cardiomyopathy (HCC) -fairly euvolemic today.  Continue medications and titrate as needed         HTN (hypertension) (2/21/2013) stable, continue meds       Severe obesity (BMI 35.0-39. 9) with comorbidity ([address] (5/7/2018) outpatient nutrition counseling       Paroxysmal atrial fibrillation ([facility] (11/17/2020) driven by underlying acute pulmonary illness.  Heart rate fairly well controlled, on oral rate slowing meds and Eliquis with good tolerance thus far.  Appears to be back in A. fib on telemetry but clinically asymptomatic. .. 1% AF burden on ICD interrogation prior to admit a couple of weeks ago.  Continue meds and repeat check rhythm in the morning.       Sepsis due to COVID-19 ([facility] [address] (8/18/2021) improving, continue meds       AYLIN (acute kidney injury) ([facility] (8/18/2021) normalized, continue meds and follow daily    ALICIA Godoy MD  [facility]  Pager [phone]

## 2021-08-23 NOTE — PROGRESS NOTES
Admit Date:  2021  7:32 AM   Name:  Mary Lou Bustamante   Age:  80 y.o. Sex:  male  :  3/5/1933   MRN:  [id number]     Presenting Complaint: Respiratory Distress    Reason(s) for Admission: Acute hypoxemic respiratory failure due to COVID-19 ([facility] [U07.1, J96.01]         History of Presenting Illness:   Mary Lou Bustamante is a 80 y.o. male with history of coronary artery disease, nonischemic cardiomyopathy status post ICD, GERD, hypertension, paroxysmal atrial fibrillation and hyperlipidemia admitted to ICU with acute respiratory distress and acute hypoxic respiratory failure requiring ventilation. Patient was intubated on  and was extubated on . Per report, patient is on 3 L oxygen and now being transferred to floor. In the ICU, patient was treated with steroid, remdesivir with for Covid infection. I have verified with the pharmacy and patient did not receive Actemra during hospitalization. In addition, patient found to be in atrial fibrillation with rapid ventricular rate. His AV manjit blocking agent been adjusted and cardiology is on board. Is currently on anticoagulation with Eliquis. He was also started on broad-spectrum antibiotics for concern for sepsis and pancultures ordered. As per patient is feeling much better. COVID-19 infection: As per patient he is not sure how he got Covid infection because he got Covid shot. His shortness of breath is much better he denies any shortness of breath at present. Atrial fibrillation: Denies any palpitations. Take blood thinner at home. Hypoglycemia: Patient denies any history of diabetes. : Feeling much better. Sitting in the room with 3 L oxygen. Denies any chest pain, shortness of breath or nausea or vomiting or palpitation. As per patient would like to go home once he gets better. : Feeling better.   I discussed rehab versus home health with the patient and as per him he will go with therapy recommendations. Patient used the restroom and came out and his oxygen saturation was persistently above 92 on room air. August 23: Patient sitting without oxygen in no respiratory distress. Wants to go home but agreeable to go to rehab center as he understand that his w needs to help him at home and he needs to get stronger for it. Denies any chest pain, nausea, vomiting or palpitations. Rest review of system negative except mentioned above. Assessment & Plan:   Principal Problem:  #   Acute hypoxemic respiratory failure due to COVID-19 [facility])   Likely secondary to COVID-19 infection.  -On dexamethasone with end date on August 28.  -On remdesivir.  -Proning 8 to 12 hours in 24 hours as tolerated. -Incentive spirometer.  -On Eliquis.  -Wean down oxygen as tolerated. August 21: See above. August 22: See above. Doing much better. August 23: Doing much better. See above    #Sepsis: Likely secondary to COVID-19 infection. Cannot rule out bacterial pneumonia. On broad-spectrum antibiotics including vancomycin and cefepime. Cultures negative for 48 hours so we will discontinue vancomycin. Continue cefepime for now. August 21:  given patient's allergies we will continue cefepime for now. August 22: End date for cefepime place. #Atrial fibrillation with rapid ventricular rate: Paroxysmal.  On anticoagulation and AV manjit blocking agent with diltiazem and Coreg. Appreciate cardiology recs. -TSH ordered. August 22: TSH within normal limit. #   Nonischemic cardiomyopathy ([facility] [address] (2/21/2013)  -On Lasix 40 mg daily. #Liver enzymes: Likely multifactorial.  Less than five times upper normal limits we will continue remdesivir. Hepatitis panel ordered. August 23: Liver enzymes trending down. TSH and hepatitis panel within normal limits. # HTN (hypertension) (2/21/2013)  - Continue current medication. As needed labetalol ordered.     #Hyperglycemia: Not on any diabetic medication. We will stop Lantus and continue sliding scale. HbA1c ordered. August 22: HbA1c 6.5. On Eliquis. Patient is AOx3. Patient wants his wife to be power of  and he wants to be full code. I have called patient's wife. Both verbalized understanding that  response to Covid infection is unpredictable and his condition meditated in spite of treatment. Discussed with  and patient will probably have back tomorrow to go to rehab      Objective:     Patient Vitals for the past 24 hrs:   Temp Pulse Resp BP SpO2   08/23/21 1129 98 °F (36.7 °C) 77 18 127/66 92 %   08/23/21 0753 97.5 °F (36.4 °C) 86 18 118/68 95 %   08/23/21 0449 97.5 °F (36.4 °C) 82 18 129/82 93 %   08/23/21 0024 97.9 °F (36.6 °C) 73 18 108/80 91 %   08/22/21 2038 98 °F (36.7 °C) 80 18 119/69 93 %   08/22/21 1640 97.5 °F (36.4 °C) 93 15 113/74 94 %     Oxygen Therapy  O2 Sat (%): 92 % (08/23/21 1129)  Pulse via Oximetry: 77 beats per minute (08/20/21 1202)  O2 Device: None (Room air) (08/22/21 1928)  Skin Assessment: Clean, dry, & intact (08/21/21 0200)  Skin Protection for O2 Device: Yes (08/18/21 1916)  Orientation: Bilateral (08/18/21 1916)  Location: Cheek (08/18/21 1916)  O2 Flow Rate (L/min): 1 l/min (08/22/21 0717)  FIO2 (%): 40 % (08/19/21 0502)    Estimated body mass index is 36.27 kg/m² as calculated from the following:    Height as of this encounter: 6' 1\" (1.854 m). Weight as of this encounter: 124.7 kg (274 lb 14.4 oz). No intake or output data in the 24 hours ending 08/23/21 1347      Physical Exam:    General:    BMI 46. Speaking in full sentences. Head:  Normocephalic, atraumatic  Eyes:  Sclerae appear normal.  Pupils equally round. ENT:  Nares appear normal, no drainage. Moist oral mucosa  Neck:  Supple, no JVD. CV:   RRR. No m/r/g. No jugular venous distension. Lungs:   Decrease air entry bilateral lower lobe with some crackles otherwise CTAB. No wheezing, rhonchi, or rales. Respirations even, unlabored  Abdomen: Bowel sounds present. Soft, nontender, nondistended. Extremities: No cyanosis or clubbing. No edema  Skin:     No rashes and normal coloration. Warm and dry. Neuro:  AOx3, moving all 4 extremities, speech normal  Psych:  Normal mood and affect. Alert and oriented x3    I have reviewed ordered lab tests and independently visualized imaging below:  Procedures done this admission:  * No surgery found *    All Micro Results     Procedure Component Value Units Date/Time    CULTURE, BLOOD [[id number]] Collected: 08/18/21 0752    Order Status: Completed Specimen: Blood Updated: 08/23/21 0647     Special Requests: --        LEFT  Antecubital       Culture result: NO GROWTH 5 DAYS       CULTURE, RESPIRATORY/SPUTUM/BRONCH [place] Snooks STAIN [[id number]] Collected: 08/18/21 1115    Order Status: Completed Specimen: Sputum,ET Suction Updated: 08/20/21 0838     Special Requests: NO SPECIAL REQUESTS        GRAM STAIN 0 TO 5 WBCS SEEN PER OIF      NO EPITHELIAL CELLS SEEN         FEW GRAM POSITIVE COCCI         FEW GRAM POSITIVE RODS        Culture result:       LIGHT NORMAL RESPIRATORY MARCOS          COVID-19 RAPID TEST [[id number]]  (Abnormal) Collected: 08/18/21 0752    Order Status: Completed Specimen: Nasopharyngeal Updated: 08/18/21 0833     Specimen source NASAL        COVID-19 rapid test Detected        Comment:      The specimen is POSITIVE for SARS-CoV-2, the novel coronavirus associated with COVID-19. This test has been authorized by the FDA under an Emergency Use Authorization (EUA) for use by authorized laboratories.         Fact sheet for Healthcare Providers: iTendency.uy  Fact sheet for Patients: iTendency.uy       Methodology: Isothermal Nucleic Acid Amplification         CULTURE, BLOOD [[id number]]     Order Status: Canceled Specimen: Blood           SARS-CoV-2 Lab Results  \"Novel Coronavirus\" Test: No results found for: COV2NT   \"Emergent Disease\" Test: No results found for: EDPR  \"SARS-COV-2\" Test: No results found for: XGCOVT  \"Precision Labs\" Test: No results found for: RSLT  Rapid Test:   Lab Results   Component Value Date/Time    COVR Detected (AA) 08/18/2021 07:52 AM            Labs: Results:       BMP, Mg, Phos Recent Labs     08/23/21 0727 08/22/21  0620 08/21/21  0647    140 142   K 4.0 4.2 4.1   * 108* 108*   CO2 29 28 25   AGAP 3* 4* 9   BUN 33* 32* 31*   CREA 0.89 0.90 0.96   CA 8.8 8.4 8.6   * 180* 161*   MG 2.3 2.3 2.4   PHOS 3.5  --  3.1      CBC Recent Labs     08/23/21 0727 08/22/21  0620 08/21/21  0647   WBC 11.9* 8.8 11.1   RBC 4.19* 4.07* 4.24   HGB 13.1* 12.6* 13.4*   HCT 40.5* 39.8* 42.6    157 152   GRANS 85* 86* 86*   LYMPH 6* 8* 8*   EOS 0* 0* 0*   MONOS 6 4 4   BASOS 0 0 0   IG 3 2 1   ANEU 10.1* 7.6 9.6*   ABL 0.7 0.7 0.9   [place] 0.0 0.0 0.0   ABM 0.7 0.3 0.4   ABB 0.0 0.0 0.0   AIG 0.4 0.2 0.1      LFT Recent Labs     08/23/21 0727 08/22/21  0620 08/21/21  0647   ALT 91* 112* 54   AP 49* 47* 49*   TP 6.1* 6.1* 6.6   ALB 3.0* 2.9* 2.9*   GLOB 3.1 3.2 3.7*   AGRAT 1.0* 0.9* 0.8*      Cardiac Testing Lab Results   Component Value Date/Time     09/06/2013 01:11 AM      Coagulation Tests Lab Results   Component Value Date/Time    Prothrombin time 17.2 (H) 08/18/2021 07:52 AM    Prothrombin time 11.4 (H) 09/06/2013 02:00 AM    Prothrombin time 10.6 02/20/2013 10:22 AM    INR 1.4 08/18/2021 07:52 AM    INR 1.1 09/06/2013 02:00 AM    INR 1.0 02/20/2013 10:22 AM    aPTT 33.4 08/18/2021 07:52 AM    aPTT 28.2 09/06/2013 02:00 AM    aPTT 24.5 02/20/2013 10:22 AM      A1c Lab Results   Component Value Date/Time    Hemoglobin A1c 6.5 (H) 08/21/2021 06:47 AM      Lipid Panel No results found for: CHOL, CHOLPOCT, CHOLX, CHLST, CHOLV, 103852, HDL, HDLP, LDL, LDLC, DLDLP, 376464, VLDLC, VLDL, TGLX, TRIGL, TRIGP, TGLPOCT, CHHD, CHHDX   Thyroid Panel Lab Results   Component Value Date/Time    TSH 0.989 08/21/2021 06:47 AM    TSH 2.337 08/25/2010 10:00 AM    T4, Total 8.3 08/25/2010 10:00 AM    T4, Free 1.3 08/21/2021 06:47 AM    T3 Uptake 37 08/25/2010 10:00 AM        Most Recent UA Lab Results   Component Value Date/Time    WBC 5-10 09/06/2013 05:00 AM    RBC 5-10 09/06/2013 05:00 AM    Epithelial cells 10-20 09/06/2013 05:00 AM    Bacteria 0 09/06/2013 05:00 AM    Casts 0 09/06/2013 05:00 AM    Crystals, urine 0 09/06/2013 05:00 AM    Mucus 4+ (H) 09/06/2013 05:00 AM            All Micro Results     Procedure Component Value Units Date/Time    CULTURE, BLOOD [[id number]] Collected: 08/18/21 0752    Order Status: Completed Specimen: Blood Updated: 08/23/21 0647     Special Requests: --        LEFT  Antecubital       Culture result: NO GROWTH 5 DAYS       CULTURE, RESPIRATORY/SPUTUM/BRONCH Padmini Clos STAIN [[id number]] Collected: 08/18/21 1115    Order Status: Completed Specimen: Sputum,ET Suction Updated: 08/20/21 0838     Special Requests: NO SPECIAL REQUESTS        GRAM STAIN 0 TO 5 WBCS SEEN PER OIF      NO EPITHELIAL CELLS SEEN         FEW GRAM POSITIVE COCCI         FEW GRAM POSITIVE RODS        Culture result:       LIGHT NORMAL RESPIRATORY MARCOS          COVID-19 RAPID TEST [[id number]]  (Abnormal) Collected: 08/18/21 0752    Order Status: Completed Specimen: Nasopharyngeal Updated: 08/18/21 0833     Specimen source NASAL        COVID-19 rapid test Detected        Comment:      The specimen is POSITIVE for SARS-CoV-2, the novel coronavirus associated with COVID-19. This test has been authorized by the FDA under an Emergency Use Authorization (EUA) for use by authorized laboratories.         Fact sheet for Healthcare Providers: ConventionUpdate.co.nz  Fact sheet for Patients: ConventionUpdate.co.nz       Methodology: Isothermal Nucleic Acid Amplification         CULTURE, BLOOD [[id number]]     Order Status: Canceled Specimen: Blood           Other Studies:  XR CHEST SNGL V    Result Date: 8/23/2021  Clinical history: Respiratory failure TECHNIQUE: AP portable chest COMPARISON: August 21 FINDINGS: There are bilateral airspace densities, similar to prior exam. No pneumothorax. Heart is enlarged. Mediastinal contour is within normal limits. Left-sided cardiac pacer is stable. 1. Persistent bilateral airspace densities. Differential diagnosis includes pneumonia and pulmonary edema. Current Meds:  Current Facility-Administered Medications   Medication Dose Route Frequency    cefepime (MAXIPIME) 2 g in 0.9% sodium chloride (MBP/ADV) 100 mL MBP  2 g IntraVENous Q12H    benzonatate (TESSALON) capsule 100 mg  100 mg Oral TID PRN    tamsulosin (FLOMAX) capsule 0.4 mg  0.4 mg Oral DAILY    famotidine (PEPCID) tablet 20 mg  20 mg Oral BID    apixaban (ELIQUIS) tablet 5 mg  5 mg Oral BID    dilTIAZem IR (CARDIZEM) tablet 60 mg  60 mg Oral AC&HS    labetaloL (NORMODYNE;TRANDATE) injection 10 mg  10 mg IntraVENous Q4H PRN    insulin lispro (HUMALOG) injection   SubCUTAneous TIDAC    furosemide (LASIX) tablet 40 mg  40 mg Oral DAILY    carvediloL (COREG) tablet 25 mg  25 mg Oral BID WITH MEALS    [Held by provider] lisinopriL (PRINIVIL, ZESTRIL) tablet 5 mg  5 mg Oral DAILY    sodium chloride (NS) flush 5-40 mL  5-40 mL IntraVENous Q8H    sodium chloride (NS) flush 5-40 mL  5-40 mL IntraVENous PRN    dexamethasone (DECADRON) 10 mg/mL injection 6 mg  6 mg IntraVENous Q24H    acetaminophen (TYLENOL) solution 650 mg  650 mg Per NG tube Q4H PRN       Signed:  Deng Carter MD    Part of this note may have been written by using a voice dictation software. The note has been proof read but may still contain some grammatical/other typographical errors.

## 2021-08-23 NOTE — PROGRESS NOTES
Pt is OOB to chair, he has not kept in place his O2 tubing, and cardiac monitor after I've placed several times. Pt is in no respiratory distress, O2 levels are staying between 92-95% in between O2 checks. Pt is intermittently confused.

## 2021-08-24 PROBLEM — E11.9 TYPE II DIABETES MELLITUS (HCC): Status: ACTIVE | Noted: 2021-01-01

## 2021-08-24 PROBLEM — U07.1 SEPSIS DUE TO COVID-19 (HCC): Status: RESOLVED | Noted: 2021-01-01 | Resolved: 2021-01-01

## 2021-08-24 PROBLEM — U07.1 ACUTE HYPOXEMIC RESPIRATORY FAILURE DUE TO COVID-19 (HCC): Status: RESOLVED | Noted: 2021-01-01 | Resolved: 2021-01-01

## 2021-08-24 PROBLEM — R73.9 HYPERGLYCEMIA: Status: RESOLVED | Noted: 2021-01-01 | Resolved: 2021-01-01

## 2021-08-24 PROBLEM — A41.89 SEPSIS DUE TO COVID-19 (HCC): Status: ACTIVE | Noted: 2021-01-01

## 2021-08-24 PROBLEM — A41.89 SEPSIS DUE TO COVID-19 (HCC): Status: RESOLVED | Noted: 2021-01-01 | Resolved: 2021-01-01

## 2021-08-24 PROBLEM — J96.01 ACUTE HYPOXEMIC RESPIRATORY FAILURE DUE TO COVID-19 (HCC): Status: RESOLVED | Noted: 2021-01-01 | Resolved: 2021-01-01

## 2021-08-24 PROBLEM — N17.9 AKI (ACUTE KIDNEY INJURY) (HCC): Status: RESOLVED | Noted: 2021-01-01 | Resolved: 2021-01-01

## 2021-08-24 PROBLEM — U07.1 SEPSIS DUE TO COVID-19 (HCC): Status: ACTIVE | Noted: 2021-01-01

## 2021-08-24 NOTE — DISCHARGE INSTRUCTIONS
Call 911 or go to the emergency room if you develop any red flag signs we discussed today. Please notify your primary care if any test/investigation is not covered by your insurance before getting it done.

## 2021-08-24 NOTE — PROGRESS NOTES
[facility] CARDIOLOGY PROGRESS NOTE    8/24/2021 9:29 AM    Admit Date: 8/18/2021        Subjective:   Stable overnight without angina, CHF, or palpitations. Vitals stable and controlled. No other complaints overnight. Tolerating meds well. Objective:      Vitals:    08/23/21 2113 08/23/21 2354 08/24/21 0500 08/24/21 0659   BP:  129/73 120/76 137/76   Pulse:  74 77 79   Resp:  22 23 18   Temp:   97.4 °F (36.3 °C) 97.3 °F (36.3 °C)   SpO2: 94% 92% 96% 93%   Weight:   271 lb 2.7 oz (123 kg)    Height:           Physical Exam:  Neck- supple, no JVD  CV- irregular rate and rhythm no MRG  Lung- clear bilaterally anteriorly, decreased bibasilar with poor air mvmt  Abd- soft, nontender, nondistended  Ext- no edema  Skin- warm and dry    Data Review:   Recent Labs     08/24/21  0650 08/23/21  0727 08/22/21  0620 08/22/21  0620   NA  --  140  --  140   K  --  4.0  --  4.2   MG  --  2.3  --  2.3   BUN  --  33*  --  32*   CREA  --  0.89  --  0.90   GLU  --  169*  --  180*   WBC 13.0* 11.9*   < > 8.8   HGB 14.1 13.1*   < > 12.6*   HCT 43.6 40.5*   < > 39.8*    203   < > 157    < > = values in this interval not displayed. Assessment and Plan:     Principal Problem:    Acute hypoxemic respiratory failure due to COVID-19 ([facility] [address] (8/18/2021) improving, comfortable on nasal cannula.  Per primary physicians.     Active Problems:    Nonischemic cardiomyopathy ([address] - fairly euvolemic today.  Continue medications and titrate as needed         HTN (hypertension) (2/21/2013) stable, continue meds       Severe obesity (BMI 35.0-39. 9) with comorbidity ([address] (5/7/2018) outpatient nutrition counseling       Paroxysmal atrial fibrillation ([address] (11/17/2020) driven by underlying acute pulmonary illness.  Heart rate fairly well controlled, on oral rate slowing meds and Eliquis with good tolerance thus far. Taken off monitor yesterday, rate controlled consistently over weekend on current meds. .. 1% AF burden on ICD interrogation prior to admit a couple of weeks ago.  Continue meds and repeat check rhythm in the morning.       Sepsis due to COVID-19 ([facility] [address] (8/18/2021) improving, continue meds       AYLIN (acute kidney injury) ([facility] [address] (8/18/2021) normalized, continue meds and follow daily    We will be on standby and follow from a distance. Please call if any further assistance is needed or if any new questions arise. Thanks for the consult.      Dimple Quan MD  [facility] Cardiology  Pager [phone]

## 2021-08-24 NOTE — DISCHARGE SUMMARY
Hospitalist Discharge Summary     Admit Date:  2021  7:32 AM   DC note date: 2021  Name:  Felecia Barr   Age:  80 y.o.  :  3/5/1933   MRN:  [id number]   PCP:  Kristen Wong MD  Treatment Team: Attending Provider: Curry Ormond, MD; Primary Nurse: Jarad Key RN; Utilization Review: Laura Mederos RN; Care Manager: Nubia Red; Occupational Therapy Assistant: Edin Goss    Problem List for this Hospitalization:  Hospital Problems as of 2021 Date Reviewed: 2021        Codes Class Noted - Resolved POA    Type II diabetes mellitus ([facility] ICD-10-CM: E11.9  ICD-9-CM: 250.00  2021 - Present Unknown        COVID-19 ICD-10-CM: U07.1  ICD-9-CM: 079.89  2021 - Present Unknown        Paroxysmal atrial fibrillation ([facility] ICD-10-CM: I48.0  ICD-9-CM: 427.31  2020 - Present Yes        Severe obesity (BMI 35.0-39. 9) with comorbidity ([facility] (Chronic) ICD-10-CM: E66.01  ICD-9-CM: 278.01  2018 - Present Yes        Nonischemic cardiomyopathy ([facility] (Chronic) ICD-10-CM: I42.8  ICD-9-CM: 425.4  2013 - Present Yes    Overview Signed 2018 10:18 AM by Sonia Macias MD     EF 45% 2017   \"severe AI\"              HTN (hypertension) (Chronic) ICD-10-CM: I10  ICD-9-CM: 401.9  2013 - Present Yes        * (Principal) RESOLVED: Acute hypoxemic respiratory failure due to COVID-19 [facility]) ICD-10-CM: U07.1, J96.01  ICD-9-CM: 518.81, 079.89, 799.02  2021 - 2021 Yes        RESOLVED: AYLIN (acute kidney injury) ([facility] ICD-10-CM: N17.9  ICD-9-CM: 584.9  2021 - 2021 Yes        RESOLVED: Hyperglycemia ICD-10-CM: R73.9  ICD-9-CM: 790.29  2021 - 2021 Yes                Hospital Course:  Please see HPI and daily progress note. I have assumed care on the patient during hospitalization.   Briefly, Felecia Barr is a 80 y.o. male with history of coronary artery disease, nonischemic cardiomyopathy status post ICD, GERD, hypertension, paroxysmal atrial fibrillation and hyperlipidemia admitted to ICU with acute respiratory distress and acute hypoxic respiratory failure requiring ventilation. Patient was intubated on August 18 and was extubated on August 19. Per report, patient is on 3 L oxygen and now being transferred to floor on August 28.     In the ICU, patient was treated with steroid, remdesivir with for Covid infection. I have verified with the pharmacy and patient did not receive Actemra during hospitalization. In addition, patient found to be in atrial fibrillation with rapid ventricular rate. His AV manjit blocking agent been adjusted and cardiology is on board. Is currently on anticoagulation with Eliquis. He was also started on broad-spectrum antibiotics for concern for sepsis and pancultures ordered. Patient stayed stable. He finished his cefepime and vancomycin for suspected sepsis. He completed course of remdesivir. Still on dexamethasone and needs 3 more days of dexamethasone to complete total 10 days course. Patient is on room air from last 36 hours. His HbA1c is 6.5 which is consistent with new onset diabetes mellitus. Started patient on Metformin which needs to be titrated up at rehab. In interim, will continue sliding scale as patient is on dexamethasone. Patient is extremely hard of hearing and I did not find his hearing aid in the room. Talk to patient's family and they I worried about patient comprehension given his hearing issues. Family will talk to the nurse as they informed me that they have brought patient's hearing aid yesterday. Patient is walking in room without any issues. Initially patient wanted to go home but then agreed to go to rehab. He already has bed at rehab today. Case discussed with cardiologist who recommended discharging patient on current cardiology regimen. Changes Cardizem to 240 extended release to decrease the frequency of Cardizem.   I have discussed the medication list with patient's family and they are not aware of patient current medication list but as per them patient is on same medication b as prescribed by previous provider. I have informed them that I have access to patient cardiology list who is the last provider patient have seen and I have reconciled medication based on that. At last, patient has mild elevation of his liver enzyme which is expected with  Covid infection and remdesivir therapy. He needs close follow-up of his liver enzymes at rehab facility or with primary care. Lab work within 1 week    [employer] signs discussed. Patient is being discharged to rehab. Disposition: Skilled Nursing Facility  Activity: PT/OT Eval and Treat  Diet: ADULT DIET Regular; 3 carb choices (45 gm/meal); Low Sodium (2 gm)  Code Status: Full Code    Follow Up Orders: Follow-up Appointments   Procedures    FOLLOW UP VISIT Appointment in: Other (Specify) PCP in one week. Cardiologist in 2 week     PCP in one week. Cardiologist in 2 week     Standing Status:   Standing     Number of Occurrences:   1     Order Specific Question:   Appointment in     Answer: Other (Specify)       Follow-up Information     Follow up With Specialties Details Why Contact Info    [facility]   [facility]    Justino Jensen MD Internal Medicine Schedule an appointment as soon as possible for a visit in 1 week  [facility]  [address]      Sabrina Buckley MD Cardiology Schedule an appointment as soon as possible for a visit in 2 weeks  [address] Thiago Eugene [address] [place] [address]  [phone]            Discharge meds at bottom of this note. Plan was discussed with patient and his family. All questions answered. Patient was stable at time of discharge. Given instructions to call a physician or return if any concerns.   Discharge summary and encounter summary was sent to PCP electronically via \"Comm Mgt\" link in [facility], if possible. Diagnostic Imaging/Tests:   XR CHEST SNGL V    Result Date: 8/23/2021  Clinical history: Respiratory failure TECHNIQUE: AP portable chest COMPARISON: August 21 FINDINGS: There are bilateral airspace densities, similar to prior exam. No pneumothorax. Heart is enlarged. Mediastinal contour is within normal limits. Left-sided cardiac pacer is stable. 1. Persistent bilateral airspace densities. Differential diagnosis includes pneumonia and pulmonary edema. XR CHEST SNGL V    Result Date: 8/21/2021   Portable view of the chest COMPARISON: August 19 CLINICAL HISTORY: Respiratory failure. FINDINGS: Stable left-sided cardiac pacer. Heart is enlarged. There are bilateral airspace densities. No pneumothorax. Endotracheal and enteric tubes are no longer seen. 1. Bilateral airspace disease, similar to prior exam.    XR CHEST SNGL V    Result Date: 8/19/2021  EXAM: Chest x-ray. INDICATION: Dyspnea. Covid 19. COMPARISON: Yesterday's chest x-ray. TECHNIQUE: Frontal view chest x-ray. FINDINGS: Bilateral lung infiltrates are unchanged on the right and mildly improved on the left. Again noted is cardiomegaly and a left chest wall biventricular pacemaker. No pneumothorax or pleural effusion is seen. Support catheters appear to be in good position. Unchanged right and mildly improved left lung infiltrates. XR ABD (KUB)    Result Date: 8/18/2021  CHEST X-RAY, one view. HISTORY:  Respiratory failure and history of cardiomyopathy. TECHNIQUE:  AP upright portable view. COMPARISON: November 2020 FINDINGS: Lungs: Diffuse bilateral infiltrates with relative sparing of the upper lung zones. Costophrenic angles: Blunted on the left. Heart size: Borderline. Pulmonary vasculature: Obscured. Aorta: Unremarkable. Included portion of the upper abdomen: is unremarkable. Bones: No gross bony lesions.  Other: Left-sided cardiac pacemaker is present. ET tube tip 3 to 4 cm above the deena, below the clavicular heads. Diffuse bilateral infiltrates suspicious for pulmonary edema. Pneumonia could have a similar appearance ET tube in expected location, 3 to 4 cm above deena. Abdominal film for feeding GI tube placement. History: GI tube placement. Technique: Single AP view of the abdomen. IMPRESSION: NG tube tip is projected over the proximal stomach. Advancing 5 to 10 cm to be considered. . . XR CHEST PORT    Result Date: 8/18/2021  CHEST X-RAY, one view. HISTORY:  COVID pneumonia. Line placement. TECHNIQUE:  AP portable supine view. COMPARISON: Exam earlier today, preprocedure. FINDINGS: Lungs: No pneumothorax. Infiltrates in both lungs. Costophrenic angles: Blunted on the. Heart size: is normal. Pulmonary vasculature: is unremarkable. Aorta: Unremarkable. Included portion of the upper abdomen: is unremarkable. Bones: No gross bony lesions. Other: Left-sided IJ catheter is present with tip over the SVC at the level of the deena. Cardiac pacemaker, ET tube and feeding tubes are present. Negative for pneumothorax status post left IJ central venous catheter placement, with tip projected over SVC at the level of the deena. XR CHEST PORT    Result Date: 8/18/2021  CHEST X-RAY, one view. HISTORY:  Respiratory failure and history of cardiomyopathy. TECHNIQUE:  AP upright portable view. COMPARISON: November 2020 FINDINGS: Lungs: Diffuse bilateral infiltrates with relative sparing of the upper lung zones. Costophrenic angles: Blunted on the left. Heart size: Borderline. Pulmonary vasculature: Obscured. Aorta: Unremarkable. Included portion of the upper abdomen: is unremarkable. Bones: No gross bony lesions. Other: Left-sided cardiac pacemaker is present. ET tube tip 3 to 4 cm above the deena, below the clavicular heads. Diffuse bilateral infiltrates suspicious for pulmonary edema.  Pneumonia could have a similar appearance ET tube in expected location, 3 to 4 cm above deena. Abdominal film for feeding GI tube placement. History: GI tube placement. Technique: Single AP view of the abdomen. IMPRESSION: NG tube tip is projected over the proximal stomach. Advancing 5 to 10 cm to be considered. . . Echocardiogram results:  No results found for this visit on 08/18/21. Procedures done this admission:  * No surgery found *    All Micro Results     Procedure Component Value Units Date/Time    CULTURE, BLOOD [[id number]] Collected: 08/18/21 0752    Order Status: Completed Specimen: Blood Updated: 08/23/21 0647     Special Requests: --        LEFT  Antecubital       Culture result: NO GROWTH 5 DAYS       CULTURE, RESPIRATORY/SPUTUM/BRONCH Josepha Shearing STAIN [[id number]] Collected: 08/18/21 1115    Order Status: Completed Specimen: Sputum,ET Suction Updated: 08/20/21 0838     Special Requests: NO SPECIAL REQUESTS        GRAM STAIN 0 TO 5 WBCS SEEN PER OIF      NO EPITHELIAL CELLS SEEN         FEW GRAM POSITIVE COCCI         FEW GRAM POSITIVE RODS        Culture result:       LIGHT NORMAL RESPIRATORY MARCOS          COVID-19 RAPID TEST [[id number]]  (Abnormal) Collected: 08/18/21 0752    Order Status: Completed Specimen: Nasopharyngeal Updated: 08/18/21 0833     Specimen source NASAL        COVID-19 rapid test Detected        Comment:      The specimen is POSITIVE for SARS-CoV-2, the novel coronavirus associated with COVID-19. This test has been authorized by the FDA under an Emergency Use Authorization (EUA) for use by authorized laboratories.         Fact sheet for Healthcare Providers: ConventionUpdate.co.nz  Fact sheet for Patients: ConventionUpdate.co.nz       Methodology: Isothermal Nucleic Acid Amplification         CULTURE, BLOOD [[id number]]     Order Status: Canceled Specimen: Blood           SARS-CoV-2 Lab Results  \"Novel Coronavirus\" Test: No results found for: COV2NT   \"Emergent Disease\" Test: No results found for: EDPR  \"SARS-COV-2\" Test: No results found for: XGCOVT  \"Precision Labs\" Test: No results found for: RSLT  Rapid Test:   Lab Results   Component Value Date/Time    COVR Detected (AA) 08/18/2021 07:52 AM            Labs: Results:       BMP, Mg, Phos Recent Labs     08/23/21  0727 08/22/21  0620    140   K 4.0 4.2   * 108*   CO2 29 28   AGAP 3* 4*   BUN 33* 32*   CREA 0.89 0.90   CA 8.8 8.4   * 180*   MG 2.3 2.3   PHOS 3.5  --       CBC Recent Labs     08/24/21  0650 08/23/21  0727 08/22/21  0620   WBC 13.0* 11.9* 8.8   RBC 4.67 4.19* 4.07*   HGB 14.1 13.1* 12.6*   HCT 43.6 40.5* 39.8*    203 157   GRANS 82* 85* 86*   LYMPH 7* 6* 8*   EOS 0* 0* 0*   MONOS 6 6 4   BASOS 1 0 0   IG 5 3 2   ANEU 10.6* 10.1* 7.6   ABL 0.9 0.7 0.7   [place] 0.0 0.0 0.0   ABM 0.8 0.7 0.3   ABB 0.1 0.0 0.0   AIG 0.6* 0.4 0.2      LFT Recent Labs     08/23/21 0727 08/22/21  0620   ALT 91* 112*   AP 49* 47*   TP 6.1* 6.1*   ALB 3.0* 2.9*   GLOB 3.1 3.2   AGRAT 1.0* 0.9*      Cardiac Testing Lab Results   Component Value Date/Time     09/06/2013 01:11 AM      Coagulation Tests Lab Results   Component Value Date/Time    Prothrombin time 17.2 (H) 08/18/2021 07:52 AM    Prothrombin time 11.4 (H) 09/06/2013 02:00 AM    Prothrombin time 10.6 02/20/2013 10:22 AM    INR 1.4 08/18/2021 07:52 AM    INR 1.1 09/06/2013 02:00 AM    INR 1.0 02/20/2013 10:22 AM    aPTT 33.4 08/18/2021 07:52 AM    aPTT 28.2 09/06/2013 02:00 AM    aPTT 24.5 02/20/2013 10:22 AM      A1c Lab Results   Component Value Date/Time    Hemoglobin A1c 6.5 (H) 08/21/2021 06:47 AM      Lipid Panel No results found for: CHOL, CHOLPOCT, CHOLX, CHLST, CHOLV, 362823, HDL, HDLP, LDL, LDLC, DLDLP, 443952, VLDLC, VLDL, TGLX, TRIGL, TRIGP, TGLPOCT, CHHD, CHHDX   Thyroid Panel Lab Results   Component Value Date/Time    TSH 0.989 08/21/2021 06:47 AM    TSH 2.337 08/25/2010 10:00 AM    T4, Total 8.3 08/25/2010 10:00 AM    T4, Free 1.3 08/21/2021 06:47 AM T3 Uptake 37 08/25/2010 10:00 AM        Most Recent UA Lab Results   Component Value Date/Time    WBC 5-10 09/06/2013 05:00 AM    RBC 5-10 09/06/2013 05:00 AM    Epithelial cells 10-20 09/06/2013 05:00 AM    Bacteria 0 09/06/2013 05:00 AM    Casts 0 09/06/2013 05:00 AM    Crystals, urine 0 09/06/2013 05:00 AM    Mucus 4+ (H) 09/06/2013 05:00 AM        Allergies   Allergen Reactions    Pcn [Penicillins] Itching and Swelling     DIFFICULTY SWALLOWING       Immunization History   Administered Date(s) Administered    COVID-19, PFIZER, MRNA, LNP-S, PF, 30MCG/0.3ML DOSE 02/09/2021    TB Skin Test (PPD) Intradermal 08/20/2021       All Labs from Last 24 Hrs:  Recent Results (from the past 24 hour(s))   GLUCOSE, POC    Collection Time: 08/23/21 11:33 AM   Result Value Ref Range    Glucose (POC) 141 (H) 65 - 100 mg/dL    Performed by [employer]    GLUCOSE, POC    Collection Time: 08/23/21  4:53 PM   Result Value Ref Range    Glucose (POC) 162 (H) 65 - 100 mg/dL    [facility]formed by [facility]    GLUCOSE, POC    Collection Time: 08/23/21  8:56 PM   Result Value Ref Range    Glucose (POC) 183 (H) 65 - 100 mg/dL    Performed by [facility]    CBC WITH AUTOMATED DIFF    Collection Time: 08/24/21  6:50 AM   Result Value Ref Range    WBC 13.0 (H) 4.3 - 11.1 K/uL    RBC 4.67 4.23 - 5.6 M/uL    HGB 14.1 13.6 - 17.2 g/dL    HCT 43.6 41.1 - 50.3 %    MCV 93.4 79.6 - 97.8 FL    MCH 30.2 26.1 - 32.9 PG    MCHC 32.3 31.4 - 35.0 g/dL    RDW 13.2 11.9 - 14.6 %    PLATELET 206 832 - 200 K/uL    MPV 11.2 9.4 - 12.3 FL    ABSOLUTE NRBC 0.00 0.0 - 0.2 K/uL    DF AUTOMATED      NEUTROPHILS 82 (H) 43 - 78 %    LYMPHOCYTES 7 (L) 13 - 44 %    MONOCYTES 6 4.0 - 12.0 %    EOSINOPHILS 0 (L) 0.5 - 7.8 %    BASOPHILS 1 0.0 - 2.0 %    IMMATURE GRANULOCYTES 5 0.0 - 5.0 %    ABS. NEUTROPHILS 10.6 (H) 1.7 - 8.2 K/UL    ABS. LYMPHOCYTES 0.9 0.5 - 4.6 K/UL    ABS. MONOCYTES 0.8 0.1 - 1.3 K/UL    ABS. EOSINOPHILS 0.0 0.0 - 0.8 K/UL    ABS. BASOPHILS 0.1 0.0 - 0.2 K/UL    ABS. IMM. GRANS. 0.6 (H) 0.0 - 0.5 K/UL   GLUCOSE, POC    Collection Time: 08/24/21  7:02 AM   Result Value Ref Range    Glucose (POC) 198 (H) 65 - 100 mg/dL    Performed by Rachael        Discharge Exam:  Patient Vitals for the past 24 hrs:   Temp Pulse Resp BP SpO2   08/24/21 0659 97.3 °F (36.3 °C) 79 18 137/76 93 %   08/24/21 0500 97.4 °F (36.3 °C) 77 23 120/76 96 %   08/23/21 2354  74 22 129/73 92 %   08/23/21 2113     94 %   08/23/21 2015 97.3 °F (36.3 °C) 73 20 126/67 95 %   08/23/21 1708 97.8 °F (36.6 °C) 86 18 132/74 93 %   08/23/21 1129 98 °F (36.7 °C) 77 18 127/66 92 %     Oxygen Therapy  O2 Sat (%): 93 % (08/24/21 0659)  Pulse via Oximetry: 77 beats per minute (08/20/21 1202)  O2 Device: None (Room air) (08/23/21 2113)  Skin Assessment: Clean, dry, & intact (08/21/21 0200)  Skin Protection for O2 Device: Yes (08/18/21 1916)  Orientation: Bilateral (08/18/21 1916)  Location: Cheek (08/18/21 1916)  O2 Flow Rate (L/min): 1 l/min (08/22/21 0717)  FIO2 (%): 40 % (08/19/21 0502)    Estimated body mass index is 35.78 kg/m² as calculated from the following:    Height as of this encounter: 6' 1\" (1.854 m). Weight as of this encounter: 123 kg (271 lb 2.7 oz). Intake/Output Summary (Last 24 hours) at 8/24/2021 0957  Last data filed at 8/24/2021 0930  Gross per 24 hour   Intake 720 ml   Output    Net 720 ml       *Note that automatically entered I/Os may not be accurate; dependent on patient compliance with collection and accurate  by assistants. General:          BMI 46. Speaking in full sentences. Head:               Normocephalic, atraumatic  Eyes:               Sclerae appear normal.  Pupils equally round. ENT:                Nares appear normal, no drainage. Moist oral mucosa  Neck:               Supple, no JVD. CV:                  RRR. No m/r/g. No jugular venous distension.   Lungs:             Decrease air entry bilateral lower lobe with some crackles otherwise CTAB. No wheezing, rhonchi, or rales. Respirations even, unlabored  Abdomen: Bowel sounds present. Soft, nontender, nondistended. Extremities:     No cyanosis or clubbing. No edema  Skin:                No rashes and normal coloration. Warm and dry. Neuro:             AOx3, moving all 4 extremities, speech normal.  Extremely hard of hearing so difficult to communicate. Psych:             Normal mood and affect. Alert and oriented x3  Current Med List in Hospital:   Current Facility-Administered Medications   Medication Dose Route Frequency    dilTIAZem ER (CARDIZEM CD) capsule 240 mg  240 mg Oral DAILY    benzonatate (TESSALON) capsule 100 mg  100 mg Oral TID PRN    tamsulosin (FLOMAX) capsule 0.4 mg  0.4 mg Oral DAILY    famotidine (PEPCID) tablet 20 mg  20 mg Oral BID    apixaban (ELIQUIS) tablet 5 mg  5 mg Oral BID    labetaloL (NORMODYNE;TRANDATE) injection 10 mg  10 mg IntraVENous Q4H PRN    insulin lispro (HUMALOG) injection   SubCUTAneous TIDAC    furosemide (LASIX) tablet 40 mg  40 mg Oral DAILY    carvediloL (COREG) tablet 25 mg  25 mg Oral BID WITH MEALS    [Held by provider] lisinopriL (PRINIVIL, ZESTRIL) tablet 5 mg  5 mg Oral DAILY    sodium chloride (NS) flush 5-40 mL  5-40 mL IntraVENous Q8H    sodium chloride (NS) flush 5-40 mL  5-40 mL IntraVENous PRN    dexamethasone (DECADRON) 10 mg/mL injection 6 mg  6 mg IntraVENous Q24H    acetaminophen (TYLENOL) solution 650 mg  650 mg Per NG tube Q4H PRN       Discharge Info:   Current Discharge Medication List      START taking these medications    Details   dexAMETHasone (DECADRON) 6 mg tablet Take 1 Tablet by mouth daily. Qty: 3 Tablet, Refills: 0  Start date: 8/24/2021      dilTIAZem ER (CARDIZEM CD) 240 mg capsule Take 1 Capsule by mouth daily.   Qty: 30 Capsule, Refills: 0  Start date: 8/24/2021      insulin lispro (HUMALOG) 100 unit/mL injection INITIATE INSULIN CORRECTIVE PROTOCOL:  Insulin High Sensitivity (thin, ESRD)  For Blood Sugar (mg/dL) of:              Less than 150 =   0 units  150 -199 =  1 units  200 -249 =   2 units  250 -299 =   3 units  300 -349 =   4 units  350 or greater = 5 units and Call Physician  Initiate Hypoglycemic protocol if blood glucose is <70 mg/dL. Fast Acting - Administer Immediately - or within 15 minutes of start of meal, if mealtime coverage. Qty: 1 Vial, Refills: 0  Start date: 8/24/2021      metFORMIN (GLUCOPHAGE) 500 mg tablet Take 1 Tablet by mouth daily (with breakfast). Qty: 30 Tablet, Refills: 0  Start date: 8/24/2021         CONTINUE these medications which have NOT CHANGED    Details   furosemide (LASIX) 40 mg tablet TAKE 1 TABLET BY MOUTH DAILY  Qty: 90 Tablet, Refills: 3    Associated Diagnoses: Localized edema; Essential hypertension      Eliquis 5 mg tablet TAKE 1 TABLET BY MOUTH TWICE DAILY  Qty: 60 Tablet, Refills: 5    Associated Diagnoses: Paroxysmal atrial fibrillation (HCC)      amitriptyline (ELAVIL) 25 mg tablet Take 25 mg by mouth nightly. cholecalciferol (Vitamin D3) (1000 Units /25 mcg) tablet Take  by mouth daily. carvediloL (COREG) 25 mg tablet Take 1 Tab by mouth two (2) times daily (with meals). Qty: 180 Tab, Refills: 3    Associated Diagnoses: Nonischemic cardiomyopathy ([address] Essential hypertension      tamsulosin (FLOMAX) 0.4 mg capsule Take 1 Cap by mouth daily. 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 pantoprazole (PROTONIX) 40 mg tablet Take 40 mg by mouth daily. Refills: 2      PROCTOZONE-HC 2.5 % rectal cream as directed. Indications: Pt takes as needed  Refills: 8      B.infantis-B.ani-B.long-B.bifi (PROBIOTIC 4X) 10-15 mg TbEC Take 1 Tab by mouth daily.       cyanocobalamin (VITAMIN B-12) 500 mcg tablet Take 500 mcg by mouth daily. azelastine (ASTELIN) 137 mcg nasal spray 1 Spray by Nasal route as needed. Use in each nostril as directed       colestipol (COLESTID) 1 gram tablet Take 2 g by mouth daily. STOP taking these medications       ramipriL (ALTACE) 5 mg capsule Comments:   Reason for Stopping:             Medication changes discussed with patient's family. Time spent in patient discharge planning and coordination 36 minutes.     Signed:  Deng Carter MD

## 2021-08-24 NOTE — PROGRESS NOTES
The patient is medically stable for discharge. Patient to discharge to [facility] this day. Patient's room number is 322 and report line is [phone]. [facility] has been scheduled for 1400, as SNF requested. CM will provide this information to primary nurse. Patient's spouse Trevor Bhagat notified of discharge. No needs or concerns voiced at this time. Please consult or notify CM if any needs shall arise. CM remains available. Care Management Interventions  PCP Verified by CM: Yes  Mode of Transport at Discharge: BLS  Transition of Care Consult (CM Consult): Discharge Planning, SNF  Discharge Durable Medical Equipment: No (none currently)  Physical Therapy Consult: Yes  Occupational Therapy Consult: Yes  Speech Therapy Consult: No  Current Support Network: Lives with Spouse (lives with spouse, Trevor Bhagat)  Confirm Follow Up Transport: Self  The Plan for Transition of Care is Related to the Following Treatment Goals : STR to improve physical mobility.    The Patient and/or Patient Representative was Provided with a Choice of Provider and Agrees with the Discharge Plan?: Yes  Freedom of Choice List was Provided with Basic Dialogue that Supports the Patient's Individualized Plan of Care/Goals, Treatment Preferences and Shares the Quality Data Associated with the Providers?: Yes  [employer] Information Provided?: No ([facility]/supplemental)  Discharge Location  Discharge Placement: Skilled nursing facility ([facility] )

## 2021-10-08 PROBLEM — J45.20 MILD INTERMITTENT ASTHMA: Status: ACTIVE | Noted: 2021-01-01

## 2021-10-08 PROBLEM — K72.00 ACUTE LIVER FAILURE WITHOUT HEPATIC COMA: Status: ACTIVE | Noted: 2021-01-01

## 2021-10-08 PROBLEM — Z86.16 PERSONAL HISTORY OF COVID-19: Chronic | Status: ACTIVE | Noted: 2021-01-01

## 2021-10-08 PROBLEM — G92.8 TOXIC METABOLIC ENCEPHALOPATHY: Status: ACTIVE | Noted: 2021-01-01

## 2021-10-08 PROBLEM — R65.20 SEPSIS WITH ENCEPHALOPATHY WITHOUT SEPTIC SHOCK (HCC): Status: ACTIVE | Noted: 2021-01-01

## 2021-10-08 PROBLEM — B37.0 THRUSH, ORAL: Status: ACTIVE | Noted: 2021-01-01

## 2021-10-08 PROBLEM — N17.0 ACUTE KIDNEY INJURY (AKI) WITH ACUTE TUBULAR NECROSIS (ATN) (HCC): Status: ACTIVE | Noted: 2021-01-01

## 2021-10-08 PROBLEM — I48.0 PAROXYSMAL ATRIAL FIBRILLATION WITH RVR (HCC): Status: ACTIVE | Noted: 2021-01-01

## 2021-10-08 PROBLEM — G93.40 SEPSIS WITH ENCEPHALOPATHY WITHOUT SEPTIC SHOCK (HCC): Status: ACTIVE | Noted: 2021-01-01

## 2021-10-08 PROBLEM — J96.12 CHRONIC RESPIRATORY FAILURE WITH HYPOXIA AND HYPERCAPNIA (HCC): Status: ACTIVE | Noted: 2021-01-01

## 2021-10-08 PROBLEM — I50.20 HFREF (HEART FAILURE WITH REDUCED EJECTION FRACTION) (HCC): Chronic | Status: ACTIVE | Noted: 2021-01-01

## 2021-10-08 PROBLEM — J96.11 CHRONIC RESPIRATORY FAILURE WITH HYPOXIA AND HYPERCAPNIA (HCC): Status: ACTIVE | Noted: 2021-01-01

## 2021-10-08 PROBLEM — A41.9 SEPSIS WITH ENCEPHALOPATHY WITHOUT SEPTIC SHOCK (HCC): Status: ACTIVE | Noted: 2021-01-01

## 2021-10-08 PROBLEM — I48.91 ATRIAL FIBRILLATION WITH RVR (HCC): Status: ACTIVE | Noted: 2021-01-01

## 2021-10-08 NOTE — CONSULTS
[facility] CARDIOLOGY   Initial Cardiac Evaluation                 Primary Cardiologist: Dr. Ada Haines    Primary Care Physician: Dr. Geri Corona ([address] [place] [address])    Admitting Physician: Dr. Suze Mcmillan    Evaluating Physician: Dr. Roselia Goel:     Patient is a 80 y.o.  male with PMHx of NICM with minimal CAD, ICD (SJM BIV ICD '14), LBBB, HTN, HLD, GERD, BPH, PAF (Eliquis/Amio), HFrEF, DM II, Obesity and chronic O2 4L who presented to the ED from [place] [id number] today. Pt has AMS and is unable to provide much history. Facility called and this NP spoke with Nurse Osiel Rebolledo who was taking care of Pt today and sent him to the ED. Osiel Rebolledo states that she had taken care of Pt last week and when she saw him today he looked \"terrible. \" She states that he was confused and lethargic which is abnormal as he is usually alert, oriented and talkative. She states he was \"breathing funny. \" She checked his VS and noted HR to be in the 30s. She checked an apical pulse and reports this was 38bpm. He was thus sent to [facility] ED for further evaluation. Asked Osiel Rebolledo to review meds and VS on facility records. She reports Pt has had no abnormal VS and no fevers. She states he has been receiving his Amio and Eliquis without any documented missed or held doses. In the ED: Pt found to have elevated LA 3.1, K+ 5.2 and Cr 1/38 c/w AYLIN. UA+ and CXR with possible PNA. He was given IV abx and limited IVF resuscitation due to hx HFrEF. He was noted to be in AFib RVR and was given 10mg IVP Diltiazem and started on a gtt. He had significantly elevated LFTs as well. Cr and LFTs were normal on labs from 10/6/21. He was admitted by the Hospitalist and Cardiology was asked to evaluate for management of his AFib RVR. Further history -- Pt was admitted here at [facility] from 8/18/21-8/24/21 with acute respiratory failure due to +COVID and required intubation. He had AFib RVR during that admit as well.  He was initially managed on IV CCB, BB held due to mild hypotension. He was converted to PO CCB. He was ultimately discharged on Diltiazem ER 240mg daily and Coreg 25mg daily, ACEi stopped due to AYLIN as IP. Per review of current meds from facility -- Pt is not on either the Diltiazem or the Coreg. He is receiving Amiodarone 200mg daily. He was also started on Azithromycin and Prednisone yesterday (10/7/21) for \"lower lobe infiltrate. \"     ECHO  -- EF 40-45%  LHC  -- minimal CAD    Past Medical History:   Diagnosis Date    Abnormal EKG     Arrhythmia     pacemaker/defib placed 13    CAD (coronary artery disease)     MINIMAL DISEASE     Cardiomyopathy ([address]     Edema     Ankle    GERD (gastroesophageal reflux disease)     Heart failure (HCC)     HLD (hyperlipidemia) 2016    Hypertension     ICD (implantable cardiac defibrillator) in place 2013    LBBB (left bundle branch block) 2016    Prostatitis       Past Surgical History:   Procedure Laterality Date    HX CHOLECYSTECTOMY      HX COLONOSCOPY      HX HEENT      t and     HX OTHER SURGICAL      ing hernia rpr    HX PACEMAKER  2013    St. flora ICD    HX UROLOGICAL  2012    PROSTATE TUMT    OK ABDOMEN SURGERY PROC UNLISTED      cholecystectomy      Allergies   Allergen Reactions    Pcn [Penicillins] Itching and Swelling     DIFFICULTY SWALLOWING       Social History     Tobacco Use    Smoking status: Former Smoker     Packs/day: 0.25     Years: 10.00     Pack years: 2.50     Quit date: 1965     Years since quittin.1    Smokeless tobacco: Never Used   Substance Use Topics    Alcohol use: No      FH:   Family History   Problem Relation Age of Onset    Stroke Mother     Heart Surgery Brother 76        CABG        Review of Systems: unable to fully obtain due to AMS        Objective:       Visit Vitals  BP (!) 98/58   Pulse 94   Temp (!) 100.8 °F (38.2 °C)   Resp 24   Wt 123 kg (271 lb 2.7 oz)   SpO2 99%   BMI 35.78 kg/m²       No intake/output data recorded. No intake/output data recorded. Physical Exam:  General: well developed, nourished, NAD, resting comfortably, appears confused  HEENT: PERRLA but pinpoint, no acute abnormalities noted, sclera clear  Neck: supple, no JVD, trachea midline  Heart: S1S2, IRIR without murmurs, rubs or gallops, AFib on monitor (100-115)  Lungs: clear anterior bilaterally, unable to position Pt for posterior exam, normal effort on O2, no wheezing or rales presently  Abd: soft, nontender, nondistended, +bowel sounds  Ext: warm, no LE edema, pulses 2+ bilaterally  Skin: warm and dry, intact to view  Psychiatric: flat affect, cooperative  Neurologic: AMS, lethargic but arousable, oriented to self, moves all 4 equally, mentation is slowed but Pt able to answer a few questions      ECG: appears AFib, BBB,     ECHO: as above    CXR: Impression: Chronic bilateral airspace opacities. This could be pulmonary edema. Scarring or recurrent pneumonia are additional considerations. Data Review:      Recent Results (from the past 24 hour(s))   EKG, 12 LEAD, INITIAL    Collection Time: 10/08/21  1:01 PM   Result Value Ref Range    Ventricular Rate 142 BPM    Atrial Rate 394 BPM    QRS Duration 136 ms    Q-T Interval 342 ms    QTC Calculation (Bezet) 526 ms    Calculated R Axis -177 degrees    Calculated T Axis 26 degrees    Diagnosis       !! AGE AND GENDER SPECIFIC ECG ANALYSIS !!   Atrial fibrillation with rapid ventricular response  Right superior axis deviation  Non-specific intra-ventricular conduction block  Cannot rule out Septal infarct , age undetermined  Abnormal ECG  When compared with ECG of 18-AUG-2021 07:39,  Questionable change in QRS axis  T wave inversion no longer evident in Lateral leads     GLUCOSE, POC    Collection Time: 10/08/21  1:15 PM   Result Value Ref Range    Glucose (POC) 170 (H) 65 - 100 mg/dL    Performed by Pedro Luis    LACTIC ACID    Collection Time: 10/08/21  1:22 PM Result Value Ref Range    Lactic acid 3.1 (H) 0.4 - 2.0 MMOL/L   CBC WITH AUTOMATED DIFF    Collection Time: 10/08/21  1:22 PM   Result Value Ref Range    WBC 6.7 4.3 - 11.1 K/uL    RBC 3.68 (L) 4.23 - 5.6 M/uL    HGB 12.0 (L) 13.6 - 17.2 g/dL    HCT 38.5 (L) 41.1 - 50.3 %    .6 (H) 79.6 - 97.8 FL    MCH 32.6 26.1 - 32.9 PG    MCHC 31.2 (L) 31.4 - 35.0 g/dL    RDW 16.1 (H) 11.9 - 14.6 %    PLATELET 621 775 - 443 K/uL    MPV 11.9 9.4 - 12.3 FL    ABSOLUTE NRBC 0.00 0.0 - 0.2 K/uL    DF AUTOMATED      NEUTROPHILS 79 (H) 43 - 78 %    LYMPHOCYTES 14 13 - 44 %    MONOCYTES 5 4.0 - 12.0 %    EOSINOPHILS 0 (L) 0.5 - 7.8 %    BASOPHILS 0 0.0 - 2.0 %    IMMATURE GRANULOCYTES 2 0.0 - 5.0 %    ABS. NEUTROPHILS 5.3 1.7 - 8.2 K/UL    ABS. LYMPHOCYTES 0.9 0.5 - 4.6 K/UL    ABS. MONOCYTES 0.3 0.1 - 1.3 K/UL    ABS. EOSINOPHILS 0.0 0.0 - 0.8 K/UL    ABS. BASOPHILS 0.0 0.0 - 0.2 K/UL    ABS. IMM.  GRANS. 0.2 0.0 - 0.5 K/UL   PROCALCITONIN    Collection Time: 10/08/21  1:22 PM   Result Value Ref Range    Procalcitonin <0.05 ng/mL   BLOOD GAS, ARTERIAL POC    Collection Time: 10/08/21  1:24 PM   Result Value Ref Range    Device: NASAL CANNULA      pH (POC) 7.37 7.35 - 7.45      pCO2 (POC) 46.3 (H) 35 - 45 MMHG    pO2 (POC) 67 (L) 75 - 100 MMHG    HCO3 (POC) 26.6 (H) 22 - 26 MMOL/L    sO2 (POC) 92.0 (L) 95 - 98 %    Base excess (POC) 0.8 mmol/L    Allens test (POC) Positive      Site LEFT RADIAL      Specimen type (POC) ARTERIAL      Performed by Seamus     CO2, POC 27 (H) 13 - 23 MMOL/L    FIO2, L/min 4     URINALYSIS W/ RFLX MICROSCOPIC    Collection Time: 10/08/21  1:56 PM   Result Value Ref Range    Color ORANGE      Appearance CLEAR      pH (UA) 6.0 5.0 - 9.0      Protein 30 (A) NEG mg/dL    Glucose Negative mg/dL    Ketone TRACE (A) NEG mg/dL    Bilirubin Negative NEG      Blood MODERATE (A) NEG      Urobilinogen 1.0 0.2 - 1.0 EU/dL    Nitrites Negative NEG      Leukocyte Esterase TRACE (A) NEG      WBC 10-20 0 /hpf    RBC 20-50 0 /hpf    Epithelial cells 0-3 0 /hpf    Bacteria 2+ (H) 0 /hpf    Casts HYALINE 0 /lpf    Crystals, urine CA OXALATE 0 /LPF    Mucus 4+ (H) 0 /lpf    Other observations RESULTS VERIFIED MANUALLY     LACTIC ACID    Collection Time: 10/08/21  3:35 PM   Result Value Ref Range    Lactic acid 2.2 (H) 0.4 - 2.0 MMOL/L   METABOLIC PANEL, COMPREHENSIVE    Collection Time: 10/08/21  3:35 PM   Result Value Ref Range    Sodium 138 138 - 145 mmol/L    Potassium 5.2 (H) 3.5 - 5.1 mmol/L    Chloride 104 98 - 107 mmol/L    CO2 28 21 - 32 mmol/L    Anion gap 6 (L) 7 - 16 mmol/L    Glucose 165 (H) 65 - 100 mg/dL    BUN 32 (H) 8 - 23 MG/DL    Creatinine 1.38 0.8 - 1.5 MG/DL    GFR est AA >60 >60 ml/min/1.73m2    GFR est non-AA 52 (L) >60 ml/min/1.73m2    Calcium 9.2 8.3 - 10.4 MG/DL    Bilirubin, total 1.4 (H) 0.2 - 1.1 MG/DL    ALT (SGPT) 396 (H) 12 - 65 U/L    AST (SGOT) 374 (H) 15 - 37 U/L    Alk.  phosphatase 80 50 - 136 U/L    Protein, total 6.0 (L) 6.3 - 8.2 g/dL    Albumin 2.6 (L) 3.2 - 4.6 g/dL    Globulin 3.4 2.3 - 3.5 g/dL    A-G Ratio 0.8 (L) 1.2 - 3.5     GLUCOSE, POC    Collection Time: 10/08/21  5:37 PM   Result Value Ref Range    Glucose (POC) 164 (H) 65 - 100 mg/dL    Performed by Ameena          Assessment/Plan:   Principal Problem:    Sepsis with encephalopathy without septic shock -- per Hospitalist    Active Problems:    Nonischemic cardiomyopathy -- consider repeat ECHO       Implantable cardioverter-defibrillator (ICD) in situ -- SJM BIV ICD -- was interrogated today but report not available at this time, will attempt to locate      HTN (hypertension) -- BP marginal on IV Dilt gtt, monitor for now      LBBB (left bundle branch block) -- chronic      HLD (hyperlipidemia) -- per primary team      Benign non-nodular prostatic hyperplasia with lower urinary tract symptoms --per primary team      Class 2 severe obesity due to excess calories with serious comorbidity and body mass index (BMI) of 35.0 to 35.9 in adult -- needs weight loss but difficult with current level of debility and illness      Personal history of COVID-19 -- 8/2021      Type 2 diabetes mellitus, without long-term current use of insulin -- per primary team      Toxic metabolic encephalopathy -- per primary team       Acute kidney injury (AYLIN) with acute tubular necrosis (ATN) -- per primary team, Cr 1.38 on arrival and was 1.03 2 days ago      Thrush, oral -- per primary team      Acute liver failure without hepatic coma -- per primary team, noted ,  TBili 1.4 -- these were 48, 24 and 0.6 respectively 2 days ago -- would avoid Amio with current liver dysfunction      Chronic respiratory failure with hypoxia and hypercapnia -- on chronic O2 PTA      Mild intermittent asthma -- per primary team      HFrEF (heart failure with reduced ejection fraction) -- was receiving Lasix at facility, not on BB or ACEi/ARB/ARNi, unclear why BB stopped, ACEi stopped last admit due to AYLIN, Lasix on hold now with current AYLIN        Paroxysmal atrial fibrillation with RVR -- Pt with reported artur event PTA but will need to review device report as Pt has SJM device and thus should not have \"artur\" events, however, when EMS arrived with with RVR. This is likely exacerbated by current sepsis. Ok to continue IV Dilt for now. Pts -120 on 2.5mg IV Dilt and ok with this range for now. His has borderline BPs and would like to avoid needing pressors. Will need to re-eval PO med regimen as well. Pt was discharged on PO CCB and BB. However, these have both been discontinued (unclear by who or when this was done). Cont Eliquis. Note K+ 5.2, will check Mag and TSH is pending.  Will have EP evaluate in AM.          MENG Dye  10/8/2021  7:47 PM

## 2021-10-08 NOTE — PROGRESS NOTES
Physical Exam  Skin:     General: Skin is warm and dry. Comments: Sacral wound noted to have sluggish blanchable area around site.

## 2021-10-08 NOTE — ED PROVIDER NOTES
Presents with complaint of altered mental status and arrhythmia. The nursing home called and stated patient had a heart rate in the 30s. EMS reported that patient had a heart rate in the 140s. Patient was recently admitted and discharged for Covid. He had encephalopathy and has had intermittent confusion per his wife that I spoke to. He wears 4 L of oxygen at all times. He will state his name and follows some commands and answer some questions but overall is confused. Patient arrives in A. fib with RVR and is tachypneic. The history is provided by the patient, the spouse, the nursing home and the EMS personnel. Altered mental status   This is a new problem. Episode onset: Unknown. Associated symptoms include confusion.         Past Medical History:   Diagnosis Date    Abnormal EKG     Arrhythmia     pacemaker/defib placed 2/20/13    CAD (coronary artery disease)     MINIMAL DISEASE 2010    Cardiomy[address] [address]     Edema     Ankle    GERD (gastroesophageal reflux disease)     Heart failure (HCC)     HLD (hyperlipidemia) 1/7/2016    Hypertension     ICD (implantable cardiac defibrillator) in place 2/2013    LBBB (left bundle branch block) 1/7/2016    Prostatitis        Past Surgical History:   Procedure Laterality Date    HX CHOLECYSTECTOMY      HX COLONOSCOPY      HX HEENT      t and     HX OTHER SURGICAL      ing hernia rpr    HX PACEMAKER  2/20/2013    St. flora ICD    HX UROLOGICAL  7/2012    PROSTATE TUMT    AR ABDOMEN SURGERY PROC UNLISTED      cholecystectomy         Family History:   Problem Relation Age of Onset    Stroke Mother     Heart Surgery Brother 76        CABG       Social History     Socioeconomic History    Marital status:      Spouse name: Not on file    Number of children: Not on file    Years of education: Not on file    Highest education level: Not on file   Occupational History    Not on file   Tobacco Use    Smoking status: Former Smoker Packs/day: 0.25     Years: 10.00     Pack years: 2.50     Quit date: 1965     Years since quittin.1    Smokeless tobacco: Never Used   Substance and Sexual Activity    Alcohol use: No    Drug use: No    Sexual activity: Not on file   Other Topics Concern    Not on file   Social History Narrative    Not on file     Social Determinants of Health     Financial Resource Strain:     Difficulty of Paying Living Expenses:    Food Insecurity:     Worried About Running Out of Food in the Last Year:     [address] in the Last Year:    Transportation Needs:     Lack of Transportation (Medical):  Lack of Transportation (Non-Medical):    Physical Activity:     Days of Exercise per Week:     Minutes of Exercise per Session:    Stress:     Feeling of Stress :    Social Connections:     Frequency of Communication with Friends and Family:     Frequency of Social Gatherings with Friends and Family:     Attends Taoist Services:     Active Member of Clubs or Organizations:     Attends Club or Organization Meetings:     Marital Status:    Intimate Partner Violence:     Fear of Current or Ex-Partner:     Emotionally Abused:     Physically Abused:     Sexually Abused: ALLERGIES: Pcn [penicillins]    Review of Systems   Unable to perform ROS: Mental status change   Psychiatric/Behavioral: Positive for confusion. Vitals:    10/08/21 1501 10/08/21 1536 10/08/21 1550 10/08/21 1552   BP: 102/70 107/69 (!) 144/96    Pulse: (!) 133 (!) 106 (!) 33    Resp: (!) 33 29  21   Temp:       SpO2:  94% 99%    Weight:                Physical Exam  Vitals and nursing note reviewed. Constitutional:       General: He is not in acute distress. Appearance: He is well-developed. He is obese. He is ill-appearing. He is not diaphoretic. HENT:      Head: Normocephalic and atraumatic. Eyes:      General:         Right eye: No discharge. Left eye: No discharge.       Conjunctiva/sclera: Conjunctivae normal.   Cardiovascular:      Rate and Rhythm: Tachycardia present. Rhythm irregular. Pulmonary:      Effort: Tachypnea and respiratory distress (Mild) present. Breath sounds: Normal breath sounds. Abdominal:      General: There is no distension. Palpations: Abdomen is soft. Tenderness: There is no abdominal tenderness. Musculoskeletal:         General: Normal range of motion. Cervical back: Normal range of motion and neck supple. Right lower leg: No edema. Left lower leg: No edema. Skin:     General: Skin is warm and dry. Capillary Refill: Capillary refill takes less than 2 seconds. Neurological:      Mental Status: He is alert and oriented to person, place, and time. Cranial Nerves: No cranial nerve deficit. Psychiatric:         Attention and Perception: He is inattentive. Behavior: Behavior normal.         Cognition and Memory: Cognition is impaired. Memory is impaired. MDM  Number of Diagnoses or Management Options  Atrial fibrillation with RVR (HCC)  Sepsis, due to unspecified organism, unspecified whether acute organ dysfunction present [facility])  Diagnosis management comments: Patient was placed on a Cardizem drip. He was given Tylenol for his fever. He was given antibiotics to cover for pneumonia and UTI. Despite having (hypotension / lactate =4 / documented septic shock), a standard fluid resuscitation of 30 mL/kg would harm the patient because the patient has HF NYHA class III/IV or symptoms with minimal activity. Therefore,  ordering a  500 mL bolus of fluids instead to replace 30 mL/kg. Patient's heart rate improved with Cardizem.   He was admitted to the hospitalist.  I discussed with his wife         Amount and/or Complexity of Data Reviewed  Clinical lab tests: ordered and reviewed  Tests in the radiology section of CPT®: ordered and reviewed  Review and summarize past medical records: yes (Discharge August 24 after being intubated for Covid)  Discuss the patient with other providers: yes  Independent visualization of images, tracings, or specimens: yes (===============================================  ED EKG Interpretation  EKG was interpreted in the absence of a cardiologist.    EKG rhythm: atrial fibrillation  Nl QRS  ST Segments: Nonspecific ST segments - NO STEMI      Jonny Dillard MD; 10/8/2021 @4:24 PM================    )    Risk of Complications, Morbidity, and/or Mortality  Presenting problems: high  Diagnostic procedures: low  Management options: high    Patient Progress  Patient progress: stable         Critical Care  Performed by: Andrei Dorman MD  Authorized by: Andrei Dorman MD     Critical care provider statement:     Critical care time (minutes):  45    Critical care time was exclusive of:  Separately billable procedures and treating other patients    Critical care was necessary to treat or prevent imminent or life-threatening deterioration of the following conditions:  CNS failure or compromise, sepsis and circulatory failure    Critical care was time spent personally by me on the following activities:  Obtaining history from patient or surrogate, examination of patient, evaluation of patient's response to treatment, discussions with primary provider, discussions with consultants, re-evaluation of patient's condition, review of old charts, ordering and review of radiographic studies, ordering and review of laboratory studies and ordering and performing treatments and interventions    I assumed direction of critical care for this patient from another provider in my specialty: no

## 2021-10-08 NOTE — ED TRIAGE NOTES
Patient arrives to ED via EMS from [address] . Facility called out for \"symptomatic bradycardia\". When EMS arrived patient was a paced rhythm at 140 bpm. Patient was lethargic and altered. Facility does not know his LKW. Patient is alert and oriented to himself. Patient able to move all 4 extremities.

## 2021-10-08 NOTE — H&P
Hospitalist History and Physical   Admit Date:  10/8/2021 12:57 PM   Name:  Petrona Bullock   Age:  80 y.o. Sex:  male  :  3/5/1933   MRN:  [id number]   Room:  [facility]/    Presenting Complaint: No chief complaint on file. Reason(s) for Admission: Sepsis with encephalopathy without septic shock ([facility] [address] [A41.9, R65.20, G93.40]     History of Present Illness:   Petrona Bullock is a 80 y.o. male with medical history of NICM, ICD, LBBB, HTN, HLD, GERD, BPH, pAF (eliquis/amio), obesity, T2DM, chronic hypoxemic respiratory failure (4L following covid pna) who presented via ems from post-acute facility with AMS, bradycardia (hr in 30s) and increased WOB. The day PTA, he was started on azithromycin and prednisone for LLL infiltrate. In the ED, found to have LA 3.1, K+5.2, Scr 1.38, LFTS elevated. CXR bilateral infiltrates. He was given abx and limited IVF due to his h/o CHF. He was found to be in AFIB RVR. rec'd dilt bolus 10 mg x1 f/b gitt. Review of Systems:  Unable to perform 2/2 AMS   Assessment & Plan:        Sepsis with encephalopathy without septic shock     Source unclear. Suspected urinary source. Thrush in mouth. Start vanc/cefepime. Ucx and blood cx in process. Procal nil, PNA less likely. At risk for adrenal insuffiency, check random cortisol now and in AM     Trend LA. pAFIB rvr - s/p dilt bolus 10 mg f/b gtt. Check troponin, tsh, bnp, cortisol, d dimer,  electrolytes. Consult cardiology given concern for tachy-artur. Cont. eliquis     Toxic metabolic encephalopathy     Speech is dysarthric. Oriented to self. On blood thinners. Check CTH. Check Ammonia level given acute liver failure from sepsis     Start thiamine to prevent or reduce risk of WE      Acute kidney injury (AYLIN) with acute tubular necrosis (ATN) ([facility] [address] (10/8/2021)    Scr 1.38 BUN 32. UA +++ hyaline casts.      2/2 infection, hypotension, hypovolemia     Check renal US to rule out hydronephrosis or perinephric abcess Maintain map >65 mm hg     Daily BMP/CMP, check mag and phos. Renal dosing. Insert villalta. Strict I&O's. Thrush, oral    Given AMS and reduced cognition to follow intstructions for nystatin swish and swallow or mimi, admin diflucan IV x 1 and start clotrimiazole mimi in AM.     Chronic respiratory failure with hypoxia - 2/2 COVID PNA. H/o intermittent asthma. Start brovana/budesonide nebs. ABG reviewed. Acute liver failure without hepatic coma     Suspect ischemic in nature. Prior hepatitis serologies normal. Check RUQ US. Ammonia. Trend LFTs. Nonischemic cardiomyopathy // Implantable cardioverter-defibrillator (ICD) in situ // LBBB     Adds to complexity. Benign non-nodular prostatic hyperplasia with lower urinary tract symptoms   At risk of urinary retention and infection. UA with LE and ++hyaline casts. Clinically dry appearing. Insert villalta. Start fluids, monitor for volume overload and continue broad spectrum abx. Bladder scan to assess urinary retention. Check renal US. Class 2 severe obesity due to excess calories with serious comorbidity and body mass index (BMI) of 35.0 to 35.9 in adult     Increased risk of all cause mortality. Adds to complexity. Personal history of COVID-19 (8/18/2021)  Noted. CXR findings 2/2 COVID versus pulmonary edema. Check PA/LA cxr tomorrow. Type 2 diabetes mellitus, without long-term current use of insulin ([facility] [address] (8/24/2021)    Controlled on metformin at home. Start SSI and FSBG q6h. Goal FSBG 140-180. There is a high probability of acute organ impairment or life-threatening deterioration in the patient's condition from: severe sepsis, cardiopulmonary decompensation, renal failure     Total critical care time spent: 41 minutes. Time is indicative of direct patient attendance at bedside and on the patient's floor nearby.   Includes time spent at bedside performing history and exam, performing chart review, discussing findings and treatment plan with patient and/or family, discussing patient with consultants and colleagues, ordering and reviewing pertinent laboratory and radiographic evaluations, and discussing patient with nursing staff. Time excludes procedures.         Dispo/Discharge Planning:     Admit to telemetry   Diet: DIET NPO  VTE ppx: DOAC   Code status: Full Code    Hospital Problems as of 10/8/2021 Date Reviewed: 5/20/2021        Codes Class Noted - Resolved POA    * (Principal) Sepsis with encephalopathy without septic shock ([facility] ICD-10-CM: A41.9, R65.20, G93.40  ICD-9-CM: 038.9, 995.91, 348.30  10/8/2021 - Present Unknown        Paroxysmal atrial fibrillation with RVR ([facility] ICD-10-CM: I48.0  ICD-9-CM: 427.31  10/8/2021 - Present Yes        Toxic metabolic encephalopathy [id number]: G92.8  ICD-9-CM: 349.82  10/8/2021 - Present Yes        Acute kidney injury (AYLIN) with acute tubular necrosis (ATN) ([facility] ICD-10-CM: N17.0  ICD-9-CM: 584.5  10/8/2021 - Present Yes        Thrush, oral ICD-10-CM: B37.0  ICD-9-CM: 112.0  10/8/2021 - Present Yes        Acute liver failure without hepatic coma ICD-10-CM: K72.00  ICD-9-CM: 570  10/8/2021 - Present Yes        Type 2 diabetes mellitus, without long-term current use of insulin ([facility] ICD-10-CM: E11.9  ICD-9-CM: 250.00  8/24/2021 - Present Yes        Personal history of COVID-19 (Chronic) ICD-10-CM: Z86.16  ICD-9-CM: V12.09  8/18/2021 - Present Yes        Class 2 severe obesity due to excess calories with serious comorbidity and body mass index (BMI) of 35.0 to 35.9 in [facility]) (Chronic) ICD-10-CM: E66.01, Z68.35  ICD-9-CM: 278.01, V85.35  5/7/2018 - Present Yes        Benign non-nodular prostatic hyperplasia with lower urinary tract symptoms (Chronic) ICD-10-CM: N40.1  ICD-9-CM: 600.91  7/27/2017 - Present Yes        LBBB (left bundle branch block) (Chronic) ICD-10-CM: I44.7  ICD-9-CM: 426.3  1/7/2016 - Present Yes        HLD (hyperlipidemia) (Chronic) ICD-10-CM: E78.5  ICD-9-CM: 272.4  2016 - Present Yes        Nonischemic cardiomyopathy ([facility] (Chronic) ICD-10-CM: I42.8  ICD-9-CM: 425.4  2013 - Present Yes    Overview Signed 2018 10:18 AM by Ayush Cutler MD     EF 45% 2017   \"severe AI\"              Implantable cardioverter-defibrillator (ICD) in situ (Chronic) ICD-10-CM: Z95.810  ICD-9-CM: V45.02  2013 - Present Yes    Overview Signed 2013  7:22 AM by Rosa Alegre [place] biventricular ICD implanted 13             HTN (hypertension) (Chronic) ICD-10-CM: I10  ICD-9-CM: 401.9  2013 - Present Yes              Past Medical History:   Diagnosis Date    Abnormal EKG     Arrhythmia     pacemaker/defib placed 13    CAD (coronary artery disease)     MINIMAL DISEASE     Cardiomyopathy ([facility]     Edema     Ankle    GERD (gastroesophageal reflux disease)     Heart failure ([facility]     HLD (hyperlipidemia) 2016    Hypertension     ICD (implantable cardiac defibrillator) in place 2013    LBBB (left bundle branch block) 2016    Prostatitis      Past Surgical History:   Procedure Laterality Date    HX CHOLECYSTECTOMY      HX COLONOSCOPY      HX HEENT      t and     HX OTHER SURGICAL      ing hernia rpr    HX PACEMAKER  2013    [place] ICD    HX UROLOGICAL  2012    PROSTATE TUMT    HI ABDOMEN SURGERY PROC UNLISTED      cholecystectomy      Allergies   Allergen Reactions    Pcn [Penicillins] Itching and Swelling     DIFFICULTY SWALLOWING        Social History     Tobacco Use    Smoking status: Former Smoker     Packs/day: 0.25     Years: 10.00     Pack years: 2.50     Quit date: 1965     Years since quittin.1    Smokeless tobacco: Never Used   Substance Use Topics    Alcohol use: No      Family History   Problem Relation Age of Onset    Stroke Mother     Heart Surgery Brother 76        CABG      Family history reviewed and negative except as noted above.     Immunization History Administered Date(s) Administered    COVID-19, PFIZER, MRNA, LNP-S, PF, 30MCG/0.3ML DOSE 02/09/2021    TB Skin Test (PPD) Intradermal 08/20/2021     PTA Medications:  Current Outpatient Medications   Medication Instructions    amitriptyline (ELAVIL) 25 mg, Oral, EVERY BEDTIME    azelastine (ASTELIN) 137 mcg nasal spray 1 Spray, Nasal, AS NEEDED, Use in each nostril as directed    B.infantis-B.ani-B.long-B.bifi (PROBIOTIC 4X) 10-15 mg TbEC 1 Tablet, DAILY    carvediloL (COREG) 25 mg, Oral, 2 TIMES DAILY WITH MEALS    cholecalciferol (Vitamin D3) (1000 Units /25 mcg) tablet Oral, DAILY    colestipoL (COLESTID) 2 g, Oral, DAILY    cyanocobalamin (VITAMIN B-12) 500 mcg, DAILY    dexAMETHasone (DECADRON) 6 mg, Oral, DAILY    dextromethorphan-guaiFENesin (MUCINEX DM) 60-1,200 mg Tb12 Oral, AS NEEDED    dilTIAZem ER (CARDIZEM CD) 240 mg, Oral, DAILY    Eliquis 5 mg tablet TAKE 1 TABLET BY MOUTH TWICE DAILY    furosemide (LASIX) 40 mg tablet TAKE 1 TABLET BY MOUTH DAILY    insulin lispro (HUMALOG) 100 unit/mL injection INITIATE INSULIN CORRECTIVE PROTOCOL:<BR>Insulin High Sensitivity (thin, ESRD)<BR>For Blood Sugar (mg/dL) of:            <BR>Less than 150 =   0 units<BR>150 -199 =  1 units<BR>200 -249 =   2 units<BR>250 -299 =   3 units<BR>300 -349 =   4 units<BR>350 or greater = 5 units and Call Physician<BR>Initiate Hypoglycemic protocol if blood glucose is <70 mg/dL. Fast Acting - Administer Immediately - or within 15 minutes of start of meal, if mealtime coverage.     metFORMIN (GLUCOPHAGE) 500 mg, Oral, DAILY WITH BREAKFAST    pantoprazole (PROTONIX) 40 mg, Oral, DAILY    PROCTOZONE-HC 2.5 % rectal cream AS DIRECTED    tamsulosin (FLOMAX) 0.4 mg, Oral, DAILY       Objective:     Patient Vitals for the past 24 hrs:   Temp Pulse Resp BP SpO2   10/08/21 1627  (!) 119 29 (!) 100/58 99 %   10/08/21 1552   21     10/08/21 1550  100  (!) 144/96 99 %   10/08/21 1536  (!) 106 29 107/69 94 % 10/08/21 1501  (!) 133 (!) 33 102/70    10/08/21 1427     99 %   10/08/21 1422  (!) 121 (!) 33 (!) 127/95    10/08/21 1402  (!) 114 (!) 31 121/70 94 %   10/08/21 1346 (!) 100.8 °F (38.2 °C) 100  92/69 98 %   10/08/21 1337  95 18 (!) 101/54 96 %   10/08/21 1333 97.8 °F (36.6 °C)    95 %   10/08/21 1330     95 %   10/08/21 1327  (!) 110 (!) 31 (!) 98/57 94 %   10/08/21 1326  85 (!) 37  93 %   10/08/21 1322  (!) 115 (!) 31 101/61    10/08/21 1319  99  (!) 112/55    10/08/21 1307  (!) 141 24 (!) 107/56 96 %     Oxygen Therapy  O2 Sat (%): 99 % (10/08/21 1627)  Pulse via Oximetry: 106 beats per minute (10/08/21 1627)    Estimated body mass index is 35.78 kg/m² as calculated from the following:    Height as of 8/18/21: 6' 1\" (1.854 m). Weight as of this encounter: 123 kg (271 lb 2.7 oz). No intake or output data in the 24 hours ending 10/08/21 1637      Physical Exam:  Physical Exam  Vitals and nursing note reviewed. Constitutional:       General: He is in acute distress. Appearance: He is ill-appearing. HENT:      Head: Normocephalic and atraumatic. Mouth/Throat:      Mouth: Mucous membranes are dry. Comments: Thrush   Eyes:      General: No scleral icterus. Cardiovascular:      Rate and Rhythm: Tachycardia present. Rhythm irregular. Pulmonary:      Effort: Respiratory distress present. Breath sounds: No wheezing or rales. Comments: Increased WOB with accessory muscle usage and tachypnea   Abdominal:      General: There is no distension. Palpations: Abdomen is soft. Tenderness: There is no abdominal tenderness. Musculoskeletal:      Right lower leg: No edema. Left lower leg: No edema. Skin:     Coloration: Skin is pale. 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 He is disoriented.       Comments: Speech intermittently incomprehensible          I have reviewed ordered lab tests and independently visualized imaging below:    Last 24hr Labs:  Recent Results (from the past 24 hour(s))   GLUCOSE, POC    Collection Time: 10/08/21  1:15 PM   Result Value Ref Range    Glucose (POC) 170 (H) 65 - 100 mg/dL    Performed by Pedro Luis    LACTIC ACID    Collection Time: 10/08/21  1:22 PM   Result Value Ref Range    Lactic acid 3.1 (H) 0.4 - 2.0 MMOL/L   CBC WITH AUTOMATED DIFF    Collection Time: 10/08/21  1:22 PM   Result Value Ref Range    WBC 6.7 4.3 - 11.1 K/uL    RBC 3.68 (L) 4.23 - 5.6 M/uL    HGB 12.0 (L) 13.6 - 17.2 g/dL    HCT 38.5 (L) 41.1 - 50.3 %    .6 (H) 79.6 - 97.8 FL    MCH 32.6 26.1 - 32.9 PG    MCHC 31.2 (L) 31.4 - 35.0 g/dL    RDW 16.1 (H) 11.9 - 14.6 %    PLATELET 153 480 - 999 K/uL    MPV 11.9 9.4 - 12.3 FL    ABSOLUTE NRBC 0.00 0.0 - 0.2 K/uL    DF AUTOMATED      NEUTROPHILS 79 (H) 43 - 78 %    LYMPHOCYTES 14 13 - 44 %    MONOCYTES 5 4.0 - 12.0 %    EOSINOPHILS 0 (L) 0.5 - 7.8 %    BASOPHILS 0 0.0 - 2.0 %    IMMATURE GRANULOCYTES 2 0.0 - 5.0 %    ABS. NEUTROPHILS 5.3 1.7 - 8.2 K/UL    ABS. LYMPHOCYTES 0.9 0.5 - 4.6 K/UL    ABS. MONOCYTES 0.3 0.1 - 1.3 K/UL    ABS. EOSINOPHILS 0.0 0.0 - 0.8 K/UL    ABS. BASOPHILS 0.0 0.0 - 0.2 K/UL    ABS. IMM.  GRANS. 0.2 0.0 - 0.5 K/UL   PROCALCITONIN    Collection Time: 10/08/21  1:22 PM   Result Value Ref Range    Procalcitonin <0.05 ng/mL   BLOOD GAS, ARTERIAL POC    Collection Time: 10/08/21  1:24 PM   Result Value Ref Range    Device: NASAL CANNULA      pH (POC) 7.37 7.35 - 7.45      pCO2 (POC) 46.3 (H) 35 - 45 MMHG    pO2 (POC) 67 (L) 75 - 100 MMHG    HCO3 (POC) 26.6 (H) 22 - 26 MMOL/L    sO2 (POC) 92.0 (L) 95 - 98 %    Base excess (POC) 0.8 mmol/L    Allens test (POC) Positive      Site LEFT RADIAL      Specimen type (POC) ARTERIAL      Performed by Seamus     CO2, POC 27 (H) 13 - 23 MMOL/L    FIO2, L/min 4     URINALYSIS W/ RFLX MICROSCOPIC    Collection Time: 10/08/21  1:56 PM   Result Value Ref Range Color ORANGE      Appearance CLEAR      pH (UA) 6.0 5.0 - 9.0      Protein 30 (A) NEG mg/dL    Glucose Negative mg/dL    Ketone TRACE (A) NEG mg/dL    Bilirubin Negative NEG      Blood MODERATE (A) NEG      Urobilinogen 1.0 0.2 - 1.0 EU/dL    Nitrites Negative NEG      Leukocyte Esterase TRACE (A) NEG      WBC 10-20 0 /hpf    RBC 20-50 0 /hpf    Epithelial cells 0-3 0 /hpf    Bacteria 2+ (H) 0 /hpf    Casts HYALINE 0 /lpf    Crystals, urine CA OXALATE 0 /LPF    Mucus 4+ (H) 0 /lpf    Other observations RESULTS VERIFIED MANUALLY     LACTIC ACID    Collection Time: 10/08/21  3:35 PM   Result Value Ref Range    Lactic acid 2.2 (H) 0.4 - 2.0 MMOL/L   METABOLIC PANEL, COMPREHENSIVE    Collection Time: 10/08/21  3:35 PM   Result Value Ref Range    Sodium 138 138 - 145 mmol/L    Potassium 5.2 (H) 3.5 - 5.1 mmol/L    Chloride 104 98 - 107 mmol/L    CO2 28 21 - 32 mmol/L    Anion gap 6 (L) 7 - 16 mmol/L    Glucose 165 (H) 65 - 100 mg/dL    BUN 32 (H) 8 - 23 MG/DL    Creatinine 1.38 0.8 - 1.5 MG/DL    GFR est AA >60 >60 ml/min/1.73m2    GFR est non-AA 52 (L) >60 ml/min/1.73m2    Calcium 9.2 8.3 - 10.4 MG/DL    Bilirubin, total 1.4 (H) 0.2 - 1.1 MG/DL    ALT (SGPT) 396 (H) 12 - 65 U/L    AST (SGOT) 374 (H) 15 - 37 U/L    Alk.  phosphatase 80 50 - 136 U/L    Protein, total 6.0 (L) 6.3 - 8.2 g/dL    Albumin 2.6 (L) 3.2 - 4.6 g/dL    Globulin 3.4 2.3 - 3.5 g/dL    A-G Ratio 0.8 (L) 1.2 - 3.5         All Micro Results     Procedure Component Value Units Date/Time    CULTURE, URINE [[id number]]     Order Status: Sent Specimen: Cath Urine     BLOOD CULTURE [[id number]] Collected: 10/08/21 0692    Order Status: Completed Specimen: Blood Updated: 10/08/21 1548    BLOOD CULTURE [[id number]] Collected: 10/08/21 1343    Order Status: Completed Specimen: Blood Updated: 10/08/21 1513          Other Studies:  XR CHEST PORT    Result Date: 10/8/2021  EXAMINATION: XR CHEST PORT 10/8/2021 1:34 PM ACCESSION NUMBER: [id number] COMPARISON: 08/23/2021 INDICATION: meets SIRS criteria TECHNIQUE: A single view of the chest was obtained. FINDINGS: Support Devices: There is a left chest wall defibrillator. The wires are unchanged from the comparison exam. Lungs: Chronic bilateral airspace opacities are seen. These are similar to the comparison exam. Cardiac Silhouette: Heart size is enlarged. Mediastinum: The aorta is normal. Upper Abdomen: Normal Miscellaneous: There are no lytic and blastic lesions. 1.  Chronic bilateral airspace opacities. This could be pulmonary edema. Scarring or recurrent pneumonia are additional considerations. Medications Administered     acetaminophen (TYLENOL) tablet 1,000 mg     Admin Date  10/08/2021 Action  Given Dose  1,000 mg Route  Oral Administered By  Lang Gitelman, RN          cefepime (MAXIPIME) 1 g in 0.9% sodium chloride (MBP/ADV) 50 mL MBP     Admin Date  10/08/2021 Action  New Bag Dose  1 g Rate  100 mL/hr Route  IntraVENous Administered By  Lang Gitelman, RN          dilTIAZem (CARDIZEM) 100 mg in 0.9% sodium chloride (MBP/ADV) 100 mL infusion     Admin Date  10/08/2021 Action  New Bag Dose  2.5 mg/hr Rate  2.5 mL/hr Route  IntraVENous Administered By  Lang Gitelman, RN          dilTIAZem (CARDIZEM) injection 10 mg     Admin Date  10/08/2021 Action  Given Dose  10 mg Route  IntraVENous Administered By  Lang Gitelman, RN          sodium chloride 0.9 % bolus infusion 500 mL     Admin Date  10/08/2021 Action  New Bag Dose  500 mL Rate  500 mL/hr Route  IntraVENous Administered By  Lang Gitelman, RN          vancomycin (VANCOCIN) 2500 mg in  mL infusion     Admin Date  10/08/2021 Action  New Bag Dose  2,500 mg Rate  200 mL/hr Route  IntraVENous Administered By  Lang Gitelman, RN                  Signed:  Guerita Nash DO    Part of this note may have been written by using a voice dictation software. The note has been proof read but may still contain some grammatical/other typographical errors.

## 2021-10-08 NOTE — PROGRESS NOTES
[address] [facility] Pharmacokinetic Monitoring Service - Vancomycin     Miguel Barbosa is a 80 y.o. male starting on vancomycin therapy for Sepsis. Pharmacy consulted by Dr. Mindi Dias for monitoring and adjustment. Target Concentration: Goal AUC/ANIKET 400-600 mg*hr/L    Additional Antimicrobials: Cefepime    Pertinent Laboratory Values: Wt Readings from Last 1 Encounters:   10/08/21 123 kg (271 lb 2.7 oz)     Temp Readings from Last 1 Encounters:   10/08/21 (!) 100.8 °F (38.2 °C)     No components found for: PROCAL  Recent Labs     10/08/21  1535 10/08/21  1322 10/06/21  1406   BUN 32*  --  19   CREA 1.38  --  1.03   WBC  --  6.7 7.0   PCT  --  <0.05  --    LAC 2.2* 3.1*  --      CrCl cannot be calculated (Unknown ideal weight.). Lab Results   Component Value Date/Time    Vancomycin, random 12.3 08/19/2021 04:02 AM       MRSA Nasal Swab: N/A. Non-respiratory infection. .    Plan:  Dosing recommendations based on Bayesian software  Start vancomycin 2500 mg x 1 followed by 1250 mg Q24H.   Anticipated AUC of 459 and trough concentration of 14.9 at steady state  Renal labs as indicated   Vancomycin concentration planned for 10/10 @ 0400   Pharmacy will continue to monitor patient and adjust therapy as indicated    Thank you for the consult,  JOSE Caputo  10/8/2021 4:44 PM

## 2021-10-08 NOTE — ROUTINE PROCESS
TRANSFER - IN REPORT:    Verbal report received from [address] RN(name) on Madeline Kothari  being received from ED(unit) for routine progression of care      Report consisted of patients Situation, Background, Assessment and   Recommendations(SBAR). Information from the following report(s) SBAR, MAR and Cardiac Rhythm A fib was reviewed with the receiving nurse. Opportunity for questions and clarification was provided. Assessment completed upon patients arrival to unit and care assumed.

## 2021-10-08 NOTE — ED NOTES
TRANSFER - OUT REPORT:    Verbal report given to [facility] (name) on Lavelle Lee  being transferred to [facility](unit) for routine progression of care       Report consisted of patients Situation, Background, Assessment and   Recommendations(SBAR). Information from the following report(s) SBAR was reviewed with the receiving nurse. Lines:   Peripheral IV 10/08/21 Posterior;Right Hand (Active)       Peripheral IV 10/08/21 Left Antecubital (Active)        Opportunity for questions and clarification was provided.       Patient transported with:   Monitor  Registered Nurse

## 2021-10-09 PROBLEM — I50.23 ACUTE ON CHRONIC SYSTOLIC HEART FAILURE (HCC): Status: ACTIVE | Noted: 2021-01-01

## 2021-10-09 NOTE — PROGRESS NOTES
Lung sounds increasing crackles and course. Notified MD on call, [place]. New order received to stop continuous LR.

## 2021-10-09 NOTE — PROGRESS NOTES
Problem: Dysphagia (Adult)  Goal:   Outcome: Progressing Towards Goal  LTG: Patient will tolerate least restrictive diet without overt signs or symptoms of airway compromise by discharge. STG: Patient will tolerate regular texture diet and thin liquids without overt signs or symptoms of aspiration 100% of the time. STG: Patient will participate in modified barium swallow study as clinically indicated. SPEECH LANGUAGE PATHOLOGY: DYSPHAGIA- Initial Assessment    NAME/AGE/GENDER: Madeline Kothari is a 80 y.o. male  DATE: 10/9/2021  PRIMARY DIAGNOSIS: Sepsis with encephalopathy without septic shock ([facility] [A41.9, R65.20, G93.40]      ICD-10: Treatment Diagnosis: R13.12 Dysphagia, Oropharyngeal Phase    RECOMMENDATIONS   DIET:    Regular Consistency   Thin Liquids    MEDICATIONS: With liquid     PRECAUTIONS, MODIFICATIONS, AND STRATEGIES  · Slow rate of intake  · Small bites/sips  · Upright at 90 degrees during meal     RECOMMENDATIONS FOR CONTINUED SPEECH THERAPY:   YES: Anticipate need for ongoing speech therapy during this hospitalization. ASSESSMENT   Patient presents with oral and pharyngeal phase of swallow grossly within functional limits with no overt signs or symptoms of aspiration observed with any consistency assessed. Swallows of all textures timely, clear to cervical auscultation and with adequate laryngeal excursion. Voice clear after swallow. No oral residue observed. Recommend initiate regular texture diet and thin liquids. Give meds with water. If silent aspiration is a concern due to left lower lobe findings on chest x-ray, consider further objective assessment via Modified Barium Swallow study. Discussed with Dr. Hannah Mallory via [employer], who states hold off for now. Will follow x1 for diet tolerance if no MBS desired.     EDUCATION:  · Recommendations discussed with Patient and MD    REHABILITATION POTENTIAL FOR STATED GOALS: Good    PLAN    FREQUENCY/DURATION:   Continue to follow patient 1 time for diet tolerance. Recommendations for next treatment session: Next treatment session will address diet tolerance    CONTINUATION OF SKILLED SERVICES/MEDICAL NECESSITY:   Patient is expected to demonstrate progress in  swallow function in order to  improve swallow safety. SUBJECTIVE   Patient with delayed verbal responses, but cooperative. Oxygen Device: NC  Pain: Pain Scale 1: Numeric (0 - 10)  Pain Intensity 1: 0    History of Present Injury/Illness: Mr. Bartolome Zepeda  has a past medical history of Abnormal EKG, Arrhythmia, CAD (coronary artery disease), Cardiomyopathy ([facility] Edema, GERD (gastroesophageal reflux disease), Heart failure ([facility] HLD (hyperlipidemia) (1/7/2016), Hypertension, ICD (implantable cardiac defibrillator) in place (2/2013), LBBB (left bundle branch block) (1/7/2016), and Prostatitis. Colton Brownlee He also  has a past surgical history that includes pr abdomen surgery proc unlisted; hx other surgical; hx heent; hx colonoscopy; hx pacemaker (2/20/2013); hx urological (7/2012); and hx cholecystectomy. PRECAUTIONS/ALLERGIES: Pcn [penicillins]     Problem List:  (Impairments causing functional limitations):  1. Sepsis, thrush    Previous Dysphagia: NONE REPORTED  Diet Prior to Evaluation: NPO    Orientation:  Person    Cognitive-Linguistic Screening:   Alertness  o Alert   Speech Production:   o Minimal communication attempts  Recommendations: Given results of screening, patient may benefit from further cognitive assessment pending clinical course    OBJECTIVE   Oral Motor:   · Labial: No impairment  · Dentition: Intact, partials  · Oral Hygiene: Adequate  · Lingual: No impairment    Dysphagia evaluation:   Patient consumed trials of thin liquids, applesauce, mixed consistency and cracker. No overt signs or symptoms of aspiration identified. Voice clear after all trials.     Tool Used: Dysphagia Outcome and Severity Scale (NICKIE)    Score Comments   Normal Diet  [] 7 With no strategies or extra time needed   Functional Swallow  [] 6 May have mild oral or pharyngeal delay   Mild Dysphagia  [] 5 Which may require one diet consistency restricted    Mild-Moderate Dysphagia  [] 4 With 1-2 diet consistencies restricted   Moderate Dysphagia  [] 3 With 2 or more diet consistencies restricted   Moderate-Severe Dysphagia  [] 2 With partial PO strategies (trials with ST only)   Severe Dysphagia  [] 1 With inability to tolerate any PO safely      Score:  Initial: 6 Most Recent: 6 (Date 10/09/21 )   Interpretation of Tool: The Dysphagia Outcome and Severity Scale (NICKIE) is a simple, easy-to-use, 7-point scale developed to systematically rate the functional severity of dysphagia based on objective assessment and make recommendations for diet level, independence level, and type of nutrition. Current Medications:   No current facility-administered medications on file prior to encounter. Current Outpatient Medications on File Prior to Encounter   Medication Sig Dispense Refill    amiodarone (CORDARONE) 200 mg tablet Take 200 mg by mouth daily.  ascorbic acid, vitamin C, (VITAMIN C) 500 mg tablet Take 1,000 mg by mouth two (2) times a day.  azithromycin (Zithromax) 250 mg tablet Take 250 mg by mouth daily.  doxycycline (MONODOX) 100 mg capsule Take 100 mg by mouth two (2) times a day.  insulin glargine (Lantus U-100 Insulin) 100 unit/mL injection 4 Units by SubCUTAneous route nightly.  omeprazole (PRILOSEC) 20 mg capsule Take 20 mg by mouth daily.  predniSONE (DELTASONE) 20 mg tablet Take 20 mg by mouth daily (with breakfast).  albuterol (Ventolin HFA) 90 mcg/actuation inhaler Take 2 Puffs by inhalation every four (4) hours as needed for Wheezing or Shortness of Breath. Use with spacer.  ZINC SULFATE PO Take 50 mg by mouth daily.  acetaminophen (TylenoL) 325 mg tablet Take 650 mg by mouth every six (6) hours as needed for Pain.       furosemide (LASIX) 40 mg tablet TAKE 1 TABLET BY MOUTH DAILY (Patient taking differently: Take 20 mg by mouth daily.) 90 Tablet 3    Eliquis 5 mg tablet TAKE 1 TABLET BY MOUTH TWICE DAILY 60 Tablet 5    cholecalciferol (Vitamin D3) (1000 Units /25 mcg) tablet Take 2,000 Units by mouth daily.  tamsulosin (FLOMAX) 0.4 mg capsule Take 1 Cap by mouth daily. Indications: enlarged prostate with urination problem 90 Cap 3    PROCTOZONE-HC 2.5 % rectal cream Insert 1 Applicator into rectum two (2) times daily as needed for Hemorrhoids. Indications: Pt takes as needed  8    cyanocobalamin (VITAMIN B-12) 500 mcg tablet Take 500 mcg by mouth daily.  colestipol (COLESTID) 1 gram tablet Take 2 g by mouth nightly.           INTERDISCIPLINARY COLLABORATION: MD    After treatment position/precautions:  · Upright in bed  · MD notified    Total Treatment Duration:   Time In: 1038  Time Out: [address] [place], [place], [facility]

## 2021-10-09 NOTE — ASSESSMENT & PLAN NOTE
Admission BNP 8K now 16k. Was volume overloaded but hypotensive.     10/11: Volume status intact but hypotensive secondary to heart failure, renal function improved, repeat albumin to help improve cardiac function

## 2021-10-09 NOTE — PROGRESS NOTES
VANCO DAILY FOLLOW UP RENAL INSUFFICIENCY PATIENT   [address] [facility] [place] Pharmacokinetic Monitoring Service - Vancomycin    Consulting Provider: Christina Brasher   Indication: sepsis of unknown etiology  Target Concentration: Random level ? 15 mg/L  Day of Therapy: 2  Additional Antimicrobials: cefepime    Pertinent Laboratory Values: Wt Readings from Last 1 Encounters:   10/09/21 115.2 kg (254 lb)     Temp Readings from Last 1 Encounters:   10/09/21 98.1 °F (36.7 °C)     Recent Labs     10/09/21  0358 10/08/21  2143 10/08/21  1535 10/08/21  1322 10/08/21  1322 10/06/21  1406   BUN 34*  --  32*  --   --  19   CREA 1.24  --  1.38  --   --  1.03   WBC 7.1  --   --   --  6.7 7.0   PCT  --   --   --   --  <0.05  --    LAC 1.2 1.5 2.2*   < > 3.1*  --     < > = values in this interval not displayed. MRSA Nasal Swab: N/A. Non-respiratory infection. .    Assessment:  Date:  Dose/Freq Admin Times Level/Time:   10/8 2500 mg x 1 1550    10/9 1250 mg x 1 (1500) Rd @ 0581 = 15.7   10/10   Rd @ 0400                 Plan:  Concentration-guided dosing due to renal impairment  Random level is therapeutic   Give vancomycin 1250 mg x 1  Vancomycin concentration ordered for 10/10 @ 0400  Pharmacy will continue to monitor patient and adjust therapy as indicated    Thank you for the consult,  JOSE Milton  10/9/2021

## 2021-10-09 NOTE — PROGRESS NOTES
ACUTE OT GOALS:  (Developed with and agreed upon by patient and/or caregiver.)  1. Patient will complete upper body bathing and dressing with SET UP and adaptive equipment as needed. 2. Patient will complete toileting with CGA X1.   3. Patient will complete grooming ADL with SET UP.  4. Patient will tolerate 25 minutes of OT treatment with 1-2 rest breaks to increase activity tolerance for ADLs. 5. Patient will complete functional transfers with CGA X1 and adaptive equipment as needed. 6. Patient will tolerate 10 minutes BUE exercises to increase strength for safe, functional transfers. Timeframe: 7 visits     OCCUPATIONAL THERAPY ASSESSMENT: Initial Assessment and Daily Note OT Treatment Day # 1    Padmini Davis is a 80 y.o. male   PRIMARY DIAGNOSIS: Sepsis with encephalopathy without septic shock ([facility]  Sepsis with encephalopathy without septic shock ([facility] [A41.9, R65.20, G93.40]       Reason for Referral:    ICD-10: Treatment Diagnosis: Generalized Muscle Weakness (M62.81)  Difficulty in walking, Not elsewhere classified (R26.2)  [place] / [place] / Product Type: Medicare /   ASSESSMENT:     REHAB RECOMMENDATIONS:   Recommendation to date pending progress:  Setting:   Short-term Rehab  Equipment:    To Be Determined     PRIOR LEVEL OF FUNCTION:  (Prior to Hospitalization)  INITIAL/CURRENT LEVEL OF FUNCTION:  (Based on today's evaluation)   Bathing:   Unknown  Dressing:   Unknown  Feeding/Grooming:   Unknown  Toileting:   Unknown  Functional Mobility:   Unknown Bathing:   Moderate Assistance  Dressing:   Moderate Assistance  Feeding/Grooming:   Minimal Assistance  Toileting:   Maximal Assistance  Functional Mobility:   Moderate Assistance x 2     ASSESSMENT:  Mr. Mona Murillo is an 79 y/o male presents from [address] [place] [address] with sepsis, afib and AMS. Pt wears chronic 4L O2 NC and is currently on 3L O2 NC with sats >95% throughout.  Today pt presents with decreased activity tolerance, strength, balance and increased confusion impacting ADLs. Pt overall mod A x2 for mobility and transfers with RW. Pt with fleeting command following and requires max verbal cues for grooming ADLs and to follow simple commands. Pt with decreased safety awareness and insight into deficits due to confusion. Pt is currently functioning below baseline and would benefit from skilled OT services to address OT goals and plan of care. Rec STR at d/c.      SUBJECTIVE:   Mr. Sanchez Must states, \"We went outside to get my car. \"    SOCIAL HISTORY/LIVING ENVIRONMENT: pt confused, per chart pt lives with wife, I with ADLs, uses SPC and wears 4L O2 NC chronically       OBJECTIVE:     PAIN: VITAL SIGNS: LINES/DRAINS:   Pre Treatment: Pain Screen  Pain Scale 1: Numeric (0 - 10)  Pain Intensity 1: 0  Post Treatment: no c/o pain Vital Signs  Pulse (Heart Rate): (!) 116  BP: 117/74  MAP (Calculated): 88  BP 1 Location: Right lower arm  BP 1 Method: Automatic  BP Patient Position: Supine (after activity)  O2 Device: Nasal cannula  O2 Flow Rate (L/min): 3 l/min Continuous Pulse Oximetry, Freeman Catheter and IV  O2 Device: Nasal cannula     GROSS EVALUATION:  BUE Within Functional Limits Abnormal/ Functional Abnormal/ Non-Functional (see comments) Not Tested Comments:   AROM [] [x] [] []    PROM [x] [] [] []    Strength [] [x] [] [] deconditioned   Balance [] [x] [] [] Mod A x2 RW   Posture [] [x] [] [] Posterior lean in standing   Sensation [] [] [] [x] Unable to test due to confusion   Coordination [] [] [] [x]    Tone [] [] [] [x]    Edema [] [] [] [x]    Activity Tolerance [] [x] [] [] 3L O2 NC, fatigues    [] [] [] []      COGNITION/  PERCEPTION: Intact Impaired   (see comments) Comments:   Orientation [] [x] AAO x1 to self but not time or situation, unable to be re-oriented   Vision [] [x] Wears glasses   Hearing [] [x] [place]   Judgment/ Insight [] [x] Decreased insight into deficits and safety awareness due to confusion   Attention [] [x] fleeting   Memory [] [x] confusedd   Command Following [] [x] Multimodal cues   Emotional Regulation [x] []     [] []      ACTIVITIES OF DAILY LIVING: I Mod I S SBA CGA Min Mod Max Total NT Comments   BASIC ADLs:              Bathing/ Showering [] [] [] [] [] [] [] [] [] [x]    Toileting [] [] [] [] [] [] [] [] [] [x]    Dressing [] [] [] [] [] [] [] [x] [] [] Donning socks EOB   Feeding [] [] [] [] [] [] [] [] [] [x]    Grooming [] [] [x] [] [] [x] [] [] [] [] Set up washing face EOB, min verbal cues for task   Personal Device Care [] [] [] [] [] [x] [] [] [] [] Phone use   Functional Mobility [] [] [] [] [] [] [x] [] [] [] x2 RW   I=Independent, Mod I=Modified Independent, S=Supervision, SBA=Standby Assistance, CGA=Contact Guard Assistance,   Min=Minimal Assistance, Mod=Moderate Assistance, Max=Maximal Assistance, Total=Total Assistance, NT=Not Tested    MOBILITY: I Mod I S SBA CGA Min Mod Max Total  NT x2 Comments:   Supine to sit [] [] [] [] [] [] [x] [] [] [] [x]    Sit to supine [] [] [] [] [] [] [x] [] [] [] [x]    Sit to stand [] [] [] [] [] [] [x] [] [] [] [x] RW   Bed to chair [] [] [] [] [] [] [x] [] [] [] [x] RW   I=Independent, Mod I=Modified Independent, S=Supervision, SBA=Standby Assistance, CGA=Contact Guard Assistance,   Min=Minimal Assistance, Mod=Moderate Assistance, Max=Maximal Assistance, Total=Total Assistance, NT=Not [facility] 6 Clicks   Daily Activity Inpatient Short Form        How much help from another person does the patient currently need. .. Total A Lot A Little None   1. Putting on and taking off regular lower body clothing? [] 1   [x] 2   [] 3   [] 4   2. Bathing (including washing, rinsing, drying)? [] 1   [x] 2   [] 3   [] 4   3. Toileting, which includes using toilet, bedpan or urinal?   [] 1   [x] 2   [] 3   [] 4   4. Putting on and taking off regular upper body clothing?    [] 1   [] 2   [x] 3   [] 4 5. Taking care of personal grooming such as brushing teeth? [] 1   [] 2   [x] 3   [] 4   6. Eating meals? [] 1   [] 2   [x] 3   [] 4   © 2007, Trustees of [address] [place] [address] [address] [phone], under license to [employer]. All rights reserved     Score:  Initial: 15 Most Recent: X (Date: -- )   Interpretation of Tool:  Represents activities that are increasingly more difficult (i.e. Bed mobility, Transfers, Gait). PLAN:   FREQUENCY/DURATION: OT Plan of Care: 3 times/week for duration of hospital stay or until stated goals are met, whichever comes first.    PROBLEM LIST:   (Skilled intervention is medically necessary to address:)  1. Decreased ADL/Functional Activities  2. Decreased Activity Tolerance  3. Decreased AROM/PROM  4. Decreased Balance  5. Decreased Cognition  6. Decreased Coordination  7. Decreased Gait Ability  8. Decreased Strength  9. Decreased Transfer Abilities   INTERVENTIONS PLANNED:   (Benefits and precautions of occupational therapy have been discussed with the patient.)  1. Self Care Training  2. Therapeutic Activity  3. Neuromuscular Re-education  4. Education     TREATMENT:     EVALUATION: Moderate Complexity : (Untimed Charge)    TREATMENT:   ($$ Self Care/Home Management: 8-22 mins$$ Neuromuscular Re-Education: 8-22 mins   )  Co-Treatment PT/OT necessary due to patient's decreased overall endurance/tolerance levels, as well as need for high level skilled assistance to complete functional transfers/mobility and functional tasks  Self Care (15 Minutes): Self care including Lower Body Dressing, Personal Device Care, Grooming, ADL Adaptive Equipment Training and functional transfers in prep for ADLs to increase independence and decrease level of assistance required.   Neuromuscular Re-education (20 Minutes): Neuromuscular Re-education included Balance Training, Coordination training, Functional mobility with facilitation, Postural training, Sitting balance training and Standing balance training to improve Balance, Coordination, Functional Mobility and Postural Control.     TREATMENT GRID:  N/A    AFTER TREATMENT POSITION/PRECAUTIONS:  Alarm Activated, Bed, Needs within reach and RN notified    INTERDISCIPLINARY COLLABORATION:  RN/PCT, PT/PTA and OT/MCFARLANE    TOTAL TREATMENT DURATION:  OT Patient Time In/Time Out  Time In: 1820  Time Out: [facility], OT

## 2021-10-09 NOTE — ASSESSMENT & PLAN NOTE
Source unclear. Suspected urinary source. Thrush in mouth. Procal nil, PNA less likely. Lactic acidosis resolved 10/9. Random cortisol 38, a.m. 54. TSH 1.8.  -vancomycin (10/8-. .), cefepime (10/8-. .)   -Ucx and blood cx NGTD    10/11: Urine culture negative, blood cultures no growth to date with 3 days, will plan for 5-day duration of therapy with antibiotics. Mentation improved, no longer somnolent.   Continue therapy as noted above

## 2021-10-09 NOTE — ASSESSMENT & PLAN NOTE
Suspect ischemic in nature. Prior hepatitis serologies normal. RUQ US unremarkable. Ammonia.   -Trend transaminases    10/11: Continued improvement

## 2021-10-09 NOTE — PROGRESS NOTES
Verbal bedside report given to oncoming RNAbbey. Patient's situation, background, assessment and recommendations provided. Opportunity for questions provided. Oncoming RN assumed care of patient.

## 2021-10-09 NOTE — PROGRESS NOTES
ACUTE PHYSICAL THERAPY GOALS:  (Developed with and agreed upon by patient and/or caregiver.)    (1.) Lillie Gaston  will move from supine to sit and sit to supine  with CONTACT GUARD ASSIST within 7 treatment day(s). (2.) Lillie Gaston will transfer from bed to chair and chair to bed with MINIMAL ASSIST using the least restrictive device within 7 treatment day(s). (3.) Lillie Gaston will ambulate with MINIMAL ASSIST for 30+ feet with the least restrictive device within 7 treatment day(s). (4.) Lillie Gaston will perform standing static and dynamic balance activities x 10 minutes with MINIMAL ASSIST to improve safety within 7 treatment day(s). (5.) Lillie Gaston will perform bilateral lower extremity exercises x 10 min for HEP with SUPERVISION to improve strength, endurance, and functional mobility within 7 treatment day(s).      PHYSICAL THERAPY ASSESSMENT: Initial Assessment, Daily Note and AM PT Treatment Day # 1      Lillie Gaston is a 80 y.o. male   PRIMARY DIAGNOSIS: Sepsis with encephalopathy without septic shock ([facility] [address]  Sepsis with encephalopathy without septic shock ([facility] [address] [A41.9, R65.20, G93.40]       Reason for Referral:    ICD-10: Treatment Diagnosis: Generalized Muscle Weakness (M62.81)  Difficulty in walking, Not elsewhere classified (R26.2)  [place]PAT[place]: [place] / [place] / Product Type: Medicare /     ASSESSMENT:     REHAB RECOMMENDATIONS:   Recommendation to date pending progress:  Setting:   Short-term Rehab  Equipment:    To Be Determined     PRIOR LEVEL OF FUNCTION:  (Prior to Hospitalization) INITIAL/CURRENT LEVEL OF FUNCTION:  (Most Recently Demonstrated)   Bed Mobility:   Unknown  Sit to Stand:   Unknown  Transfers:   Unknown  Gait/Mobility:   Unknown Bed Mobility:   Moderate Assistance x 2  Sit to Stand:   Moderate Assistance x 2  Transfers:   Not tested  Gait/Mobility:   Not tested     ASSESSMENT:  Mr. Hortensia Basurto  is a 80year old M who presents from STR with symptomatic bradycardia, AMS. Pt is oriented to self and location but not situation, cannot recall coming from STR vs home. Unable to determine PLOF due to confusion. This date pt performs mobility including bed mobility with modA of 2. STS practiced x 2, pt required modA of 2 on first attempt and Mary of 2 on second attempt. Pt with significant weakness and tremors in standing. Pt was able to take some side steps to [facility] with modA of 2 and RW. HR and SpO2 stable throughout mobility. Pt presents as functioning below his baseline, with deficits in mobility including transfers, gait, balance, and activity tolerance. Pt will benefit from skilled therapy services to address stated deficits to promote return to highest level of function, independence, and safety. Will continue to follow. .     SUBJECTIVE:   Mr. Annemarie Rosales states, Deisy Kind you going to leave me? \"    SOCIAL HISTORY/LIVING ENVIRONMENT: unable to determine- confusion.  Came from STR     OBJECTIVE:     PAIN: VITAL SIGNS: LINES/DRAINS:   Pre Treatment: Pain Screen  Pain Scale 1: Numeric (0 - 10)  Pain Intensity 1: 0  Post Treatment: 0   Freeman Catheter and IV  O2 Device: Nasal cannula     GROSS EVALUATION:  B LE Within Functional Limits Abnormal/ Functional Abnormal/ Non-Functional (see comments) Not Tested Comments:   AROM [x] [] [] []    PROM [] [] [] [x]    Strength [] [] [x] [] ModA for standing   Balance [] [] [x] [] ModA for standing   Posture [] [] [x] [] Cues for upright posture   Sensation [] [] [] [x]    Coordination [] [] [] [x]    Tone [] [] [] [x]    Edema [] [] [] [x]    Activity Tolerance [] [] [x] [] General deconditioning     [] [] [] []      COGNITION/  PERCEPTION: Intact Impaired   (see comments) Comments:   Orientation [] [x] Oriented to self but not situation   Vision [x] []    Hearing [] [x] [place]   Command Following [] [x] confused   Safety Awareness [] [x] confused    [] []      MOBILITY: I Mod I S SBA CGA Min Mod Max Total NT x2 Comments:   Bed Mobility    Rolling [] [] [] [] [] [] [x] [] [] [] [x]    Supine to Sit [] [] [] [] [] [] [x] [] [] [] [x]    Scooting [] [] [] [] [] [] [x] [] [] [] [x]    Sit to Supine [] [] [] [] [] [] [x] [] [] [] [x]    Transfers    Sit to Stand [] [] [] [] [] [] [x] [] [] [] [x]    Bed to Chair [] [] [] [] [] [] [] [] [] [x] []    Stand to Sit [] [] [] [] [] [] [x] [] [] [] [x]    I=Independent, Mod I=Modified Independent, S=Supervision, SBA=Standby Assistance, CGA=Contact Guard Assistance,   Min=Minimal Assistance, Mod=Moderate Assistance, Max=Maximal Assistance, Total=Total Assistance, NT=Not Tested  GAIT: I Mod I S SBA CGA Min Mod Max Total  NT x2 Comments:   Level of Assistance [] [] [] [] [] [] [x] [] [] [] [x]    Distance Side steps to [facility][1]    DME Rolling Walker and Gait Belt    Gait Quality shuffled steps, dec balance, tremors    Weightbearing Status N/A     I=Independent, Mod I=Modified Independent, S=Supervision, SBA=Standby Assistance, CGA=Contact Guard Assistance,   Min=Minimal Assistance, Mod=Moderate Assistance, Max=Maximal Assistance, Total=Total Assistance, NT=Not Tested    [address] [place] [address] [address] [facility] [id number] [facility] Short [facility]       How much difficulty does the patient currently have. .. Unable A Lot A Little None   1. Turning over in bed (including adjusting bedclothes, sheets and blankets)? [] 1   [] 2   [x] 3   [] 4   2. Sitting down on and standing up from a chair with arms ( e.g., wheelchair, bedside commode, etc.)   [] 1   [x] 2   [] 3   [] 4   3. Moving from lying on back to sitting on the side of the bed? [] 1   [x] 2   [] 3   [] 4   How much help from another person does the patient currently need. .. Total A Lot A Little None   4. Moving to and from a bed to a chair (including a wheelchair)? [] 1   [x] 2   [] 3   [] 4   5. Need to walk in hospital room? [] 1   [x] 2   [] 3   [] 4   6. Climbing 3-5 steps with a railing?    [x] 1   [] 2   [] 3   [] 4 © 2007, Trustees of [facility] [employer], under license to [employer]. All rights reserved     Score:  Initial: 12 Most Recent: X (Date: -- )    Interpretation of Tool:  Represents activities that are increasingly more difficult (i.e. Bed mobility, Transfers, Gait). PLAN:   FREQUENCY/DURATION: PT Plan of Care: 3 times/week for duration of hospital stay or until stated goals are met, whichever comes first.    PROBLEM LIST:   (Skilled intervention is medically necessary to address:)  1. Decreased ADL/Functional Activities  2. Decreased Activity Tolerance  3. Decreased Balance  4. Decreased Cognition  5. Decreased Coordination  6. Decreased Gait Ability  7. Decreased Strength  8. Decreased Transfer Abilities   INTERVENTIONS PLANNED:   (Benefits and precautions of physical therapy have been discussed with the patient.)  1. Therapeutic Activity  2. Therapeutic Exercise/HEP  3. Neuromuscular Re-education  4. Gait Training  5. Education     TREATMENT:     EVALUATION: Moderate Complexity : (Untimed Charge)    TREATMENT:   ($$ Therapeutic Activity: 23-37 mins    )  Co-Treatment PT/OT necessary due to patient's decreased overall endurance/tolerance levels, as well as need for high level skilled assistance to complete functional transfers/mobility and functional tasks  Therapeutic Activity (25 Minutes): Therapeutic activity included Rolling, Supine to Sit, Scooting, Lateral Scooting, Transfer Training, Ambulation on level ground, Sitting balance  and Standing balance to improve functional Mobility, Strength and Activity tolerance.     TREATMENT GRID:  N/A    AFTER TREATMENT POSITION/PRECAUTIONS:  Bed, Needs within reach and RN notified    INTERDISCIPLINARY COLLABORATION:  RN/PCT and OT/MCFARLANE    TOTAL TREATMENT DURATION:  PT Patient Time In/Time Out  Time In: 0696  Time Out: [address], PT

## 2021-10-09 NOTE — ASSESSMENT & PLAN NOTE
2/2 COVID PNA. H/o intermittent asthma. ABG reviewed. -brovana/budesonide nebs.     10/11: remains on 3L NC, wean as tolerated, continue as noted

## 2021-10-09 NOTE — ASSESSMENT & PLAN NOTE
s/p dilt bolus 10 mg f/b gtt. troponin, tsh, cortisol, d dimer, electrolytes.   -Consulted cardiology with concern for tachy-artur.   -apixaban    10/11: remains mostly rate controlled, unable to tolerate diltiazem given hypotension, continue current managment

## 2021-10-09 NOTE — PROGRESS NOTES
Bedside and Verbal shift change report given to self (oncoming nurse) by Hiren Schmitt (offgoing nurse). Report included the following information SBAR, Kardex and MAR.

## 2021-10-09 NOTE — PROGRESS NOTES
Hospitalist Progress Note   Admit Date:  10/8/2021 12:57 PM   Name:  Hamilton Brennan   Age:  80 y.o. Sex:  male  :  3/5/1933   MRN:  [id number]   Room:  308/01    Presenting Complaint: No chief complaint on file. Reason(s) for Admission: Sepsis with encephalopathy without septic shock ([facility] [address] [A41.9, R65.20, G93.40]     Hospital Course & Interval History:   80 y.o. male with medical history of NICM, ICD, LBBB, HTN, HLD, GERD, BPH, pAF (eliquis/amio), obesity, T2DM, chronic hypoxemic respiratory failure (4L following covid pna) who presented via ems from post-acute facility with AMS, bradycardia (hr in 30s) and increased WOB. The day PTA, he was started on azithromycin and prednisone for LLL infiltrate. In the ED, found to have LA 3.1, K+5.2, Scr 1.38, LFTS elevated. CXR bilateral infiltrates. He was given abx and limited IVF due to his h/o CHF. He was found to be in AFIB RVR. rec'd dilt bolus 10 mg x1 f/b gitt. Subjective (10/09/21):  Less confused today by evaluation. He is aware of where he is who he is. Context is rather foggy. Seems upset can identify cause due to confusion. Current state remarkable for potential volume overload. No acute complaints    Assessment & Plan:   * Sepsis with encephalopathy without septic shock [facility])  Source unclear. Suspected urinary source. Thrush in mouth. Procal nil, PNA less likely.   -vancomycin (10/8-. .), cefepime (10/8-. .)   -Ucx and blood cx in process.  -At risk for adrenal insuffiency, random cortisol  -Trend LA    10/9: Encephalopathy improved, but not resolved. Continue management as noted    Paroxysmal atrial fibrillation with RVR (HCC)  s/p dilt bolus 10 mg f/b gtt. troponin, tsh, cortisol, d dimer, electrolytes. -Consult cardiology given concern for tachy-artur.   -apixaban    10/9: now rate controlled, continue current managment    Acute on chronic systolic heart failure ([facility] [address]  Admission BNP 8K now 16k.  Possibly volume overloaded but hypotensive. 10/9: alubumin and bumex    Acute kidney injury (AYLIN) with acute tubular necrosis (ATN) (HCC)  Admission Scr 1.38 BUN 32. UA +++ hyaline casts. 2/2 infection, hypotension, poor perfusion; renal US ruled out hydronephrosis. -Maintain map >65 mm hg   -Daily BMP/CMP, check mag and phos. -Renal dosing  -Freeman. Strict I&O's.     10/9: improvement of sCr to 1.2, continue as noted      Toxic metabolic encephalopathy  Speech is dysarthric. Oriented to self. On blood thinners. Admission [address] negative; Ammonia negative;   -thiamine to prevent or reduce risk of WE    10/9: some improvement in mental status, remains confused, continue as above    Chronic respiratory failure with hypoxia and hypercapnia (HCC)  2/2 COVID PNA. H/o intermittent asthma. ABG reviewed. -brovana/budesonide nebs. 10/9: remains on 3L NC, continue as noted    Acute liver failure without hepatic coma  Suspect ischemic in nature. Prior hepatitis serologies normal. RUQ US unremarkable. Ammonia. -Trend transaminases    Thrush, oral  -nystatin    Benign non-nodular prostatic hyperplasia with lower urinary tract symptoms  At risk of urinary retention and infection. UA with LE and ++hyaline casts. Class 2 severe obesity due to excess calories with serious comorbidity and body mass index (BMI) of 35.0 to 35.9 in adult [facility])  Increased risk of all cause mortality. Adds to complexity. HFrEF (heart failure with reduced ejection fraction) ([address]  Complicating care    Type 2 diabetes mellitus, without long-term current use of insulin ([address]  Controlled on metformin at home. Start SSI and FSBG q6h. Goal FSBG 140-180. Personal history of COVID-19  Noted. CXR findings 2/2 COVID versus pulmonary edema. Check PA/LA cxr tomorrow.     LBBB (left bundle branch block)  Complicating care    Implantable cardioverter-defibrillator (ICD) in situ  Complicating care    Nonischemic cardiomyopathy [facility])  Complicating care        Dispo/Discharge Planning:    Pending clinical improvement    Diet:  ADULT DIET Regular  DVT PPx: On apixaban  Code status: Full Code    Hospital Problems as of 10/9/2021 Date Reviewed: 10/9/2021        Codes Class Noted - Resolved POA    * (Principal) Sepsis with encephalopathy without septic shock ([facility] ICD-10-CM: A41.9, R65.20, G93.40  ICD-9-CM: 038.9, 995.91, 348.30  10/8/2021 - Present Yes        Paroxysmal atrial fibrillation with RVR ([facility] ICD-10-CM: I48.0  ICD-9-CM: 427.31  10/8/2021 - Present Yes        Acute on chronic systolic heart failure ([facility] ICD-10-CM: I50.23  ICD-9-CM: 428.23  10/9/2021 - Present Yes        Toxic metabolic encephalopathy [facility]: G92.8  ICD-9-CM: 349.82  10/8/2021 - Present Yes        Acute kidney injury (AYLIN) with acute tubular necrosis (ATN) ([facility] ICD-10-CM: N17.0  ICD-9-CM: 584.5  10/8/2021 - Present Yes        Thrush, oral ICD-10-CM: B37.0  ICD-9-CM: 112.0  10/8/2021 - Present Yes        Acute liver failure without hepatic coma ICD-10-CM: K72.00  ICD-9-CM: 318  10/8/2021 - Present Yes        Chronic respiratory failure with hypoxia and hypercapnia (HCC) ICD-10-CM: J96.11, J96.12  ICD-9-CM: 518.83, 799.02, 786.09  10/8/2021 - Present Yes        Benign non-nodular prostatic hyperplasia with lower urinary tract symptoms (Chronic) ICD-10-CM: N40.1  ICD-9-CM: 600.91  7/27/2017 - Present Yes        Class 2 severe obesity due to excess calories with serious comorbidity and body mass index (BMI) of 35.0 to 35.9 in adult [facility]) (Chronic) ICD-10-CM: E66.01, Z68.35  ICD-9-CM: 278.01, V85.35  5/7/2018 - Present Yes        Mild intermittent asthma ICD-10-CM: J45.20  ICD-9-CM: 493.90  10/8/2021 - Present Yes        HFrEF (heart failure with reduced ejection fraction) ([facility] (Chronic) ICD-10-CM: I50.20  ICD-9-CM: 428.20  10/8/2021 - Present Yes        Type 2 diabetes mellitus, without long-term current use of insulin ([facility] ICD-10-CM: E11.9  ICD-9-CM: 250.00  8/24/2021 - Present Yes        Personal history of COVID-19 (Chronic) ICD-10-CM: Z86.16  ICD-9-CM: V12.09  8/18/2021 - Present Yes        LBBB (left bundle branch block) (Chronic) ICD-10-CM: I44.7  ICD-9-CM: 426.3  1/7/2016 - Present Yes        HLD (hyperlipidemia) (Chronic) ICD-10-CM: E78.5  ICD-9-CM: 272.4  1/7/2016 - Present Yes        Nonischemic cardiomyopathy ([address] (Chronic) ICD-10-CM: I42.8  ICD-9-CM: 425.4  2/21/2013 - Present Yes    Overview Signed 5/7/2018 10:18 AM by Jesus Mata MD     EF 45% 2017   \"severe AI\"              Implantable cardioverter-defibrillator (ICD) in situ (Chronic) ICD-10-CM: Z95.810  ICD-9-CM: V45.02  2/21/2013 - Present Yes    Overview Signed 2/21/2013  7:22 AM by [facility]     [place] biventricular ICD implanted 2/20/13             HTN (hypertension) (Chronic) ICD-10-CM: I10  ICD-9-CM: 401.9  2/21/2013 - Present Yes              Objective:     Patient Vitals for the past 24 hrs:   Temp Pulse Resp BP SpO2   10/09/21 1150 97.8 °F (36.6 °C) (!) 110 16 139/73 94 %   10/09/21 0932  (!) 116  117/74    10/09/21 0748 98.1 °F (36.7 °C) (!) 119 20 (!) 89/59 98 %   10/09/21 0732     95 %   10/09/21 0611  (!) 112  (!) 89/50    10/09/21 0530  (!) 113  (!) 94/58    10/09/21 0500  (!) 114  98/76    10/09/21 0443 98 °F (36.7 °C) (!) 116 24 95/76 92 %   10/09/21 0431  (!) 110  98/62    10/09/21 0401  (!) 106  (!) 125/59    10/09/21 0300  (!) 114  107/76    10/09/21 0230  (!) 110  103/77    10/09/21 0200  (!) 113  109/66    10/09/21 0130  (!) 110  111/61    10/09/21 0111 97.6 °F (36.4 °C) 98 24 108/69 94 %   10/09/21 0030  (!) 114  106/78    10/09/21 0000  (!) 114  (!) 121/94    10/08/21 2331  (!) 109  99/80    10/08/21 2300  (!) 113  98/64    10/08/21 2230  (!) 111  101/74    10/08/21 2131  (!) 107  100/68    10/08/21 2104 97.8 °F (36.6 °C) (!) 113 24 (!) 85/65 97 %   10/08/21 2034    (!) 142/79 96 %   10/08/21 1802  94 24 (!) 98/58    10/08/21 1742  97 23 (!) 98/58    10/08/21 1732  96 24 (!) 92/54    10/08/21 1702   24 98/67    10/08/21 1627  (!) 119 29 (!) 100/58 99 %   10/08/21 1552   21     10/08/21 1550  100  (!) 144/96 99 %   10/08/21 1536  (!) 106 29 107/69 94 %   10/08/21 1501  (!) 133 (!) 33 102/70      Oxygen Therapy  O2 Sat (%): 94 % (10/09/21 1150)  Pulse via Oximetry: 125 beats per minute (10/09/21 0732)  O2 Device: Nasal cannula (10/09/21 1200)  O2 Flow Rate (L/min): 3 l/min (10/09/21 1200)    Estimated body mass index is 33.51 kg/m² as calculated from the following:    Height as of this encounter: 6' 1\" (1.854 m). Weight as of this encounter: 115.2 kg (254 lb). Intake/Output Summary (Last 24 hours) at 10/9/2021 1436  Last data filed at 10/9/2021 1310  Gross per 24 hour   Intake 120 ml   Output 200 ml   Net -80 ml       Physical Exam  Vitals and nursing note reviewed. Constitutional:       General: He is not in acute distress. Appearance: Normal appearance. HENT:      Head: Normocephalic. Eyes:      Extraocular Movements: Extraocular movements intact. Cardiovascular:      Rate and Rhythm: Normal rate. Pulses:           Radial pulses are 2+ on the left side. Comments: BLE trace pitting edema  Pulmonary:      Effort: Pulmonary effort is normal. No respiratory distress. Abdominal:      General: There is no distension. Tenderness: There is no abdominal tenderness. Musculoskeletal:         General: No deformity. Cervical back: No rigidity. Skin:     General: Skin is warm and dry. Neurological:      General: No focal deficit present. Mental Status: He is alert. He is confused.       Comments: Oriented to person, place not context   Psychiatric:         Mood and Affect: Mood normal.         Behavior: Behavior normal.         I have reviewed ordered lab tests and independently visualized imaging below:    Recent Labs:  Recent Results (from the past 48 hour(s))   EKG, 12 LEAD, INITIAL    Collection Time: 10/08/21  1:01 PM   Result Value Ref Range    Ventricular Rate 142 BPM    Atrial Rate 394 BPM    QRS Duration 136 ms    Q-T Interval 342 ms    QTC Calculation (Bezet) 526 ms    Calculated R Axis -177 degrees    Calculated T Axis 26 degrees    Diagnosis       Atrial fibrillation with rapid ventricular response  Right superior axis deviation  Non-specific intra-ventricular conduction block  Cannot rule out Septal infarct , age undetermined  Abnormal ECG  When compared with ECG of 18-AUG-2021 07:39,  Questionable change in QRS axis  T wave inversion no longer evident in Lateral leads  Confirmed by Jenn Abad ([id number]) on 10/9/2021 9:44:00 AM     GLUCOSE, POC    Collection Time: 10/08/21  1:15 PM   Result Value Ref Range    Glucose (POC) 170 (H) 65 - 100 mg/dL    Performed by Pedro Luis    LACTIC ACID    Collection Time: 10/08/21  1:22 PM   Result Value Ref Range    Lactic acid 3.1 (H) 0.4 - 2.0 MMOL/L   CBC WITH AUTOMATED DIFF    Collection Time: 10/08/21  1:22 PM   Result Value Ref Range    WBC 6.7 4.3 - 11.1 K/uL    RBC 3.68 (L) 4.23 - 5.6 M/uL    HGB 12.0 (L) 13.6 - 17.2 g/dL    HCT 38.5 (L) 41.1 - 50.3 %    .6 (H) 79.6 - 97.8 FL    MCH 32.6 26.1 - 32.9 PG    MCHC 31.2 (L) 31.4 - 35.0 g/dL    RDW 16.1 (H) 11.9 - 14.6 %    PLATELET 668 950 - 185 K/uL    MPV 11.9 9.4 - 12.3 FL    ABSOLUTE NRBC 0.00 0.0 - 0.2 K/uL    DF AUTOMATED      NEUTROPHILS 79 (H) 43 - 78 %    LYMPHOCYTES 14 13 - 44 %    MONOCYTES 5 4.0 - 12.0 %    EOSINOPHILS 0 (L) 0.5 - 7.8 %    BASOPHILS 0 0.0 - 2.0 %    IMMATURE GRANULOCYTES 2 0.0 - 5.0 %    ABS. NEUTROPHILS 5.3 1.7 - 8.2 K/UL    ABS. LYMPHOCYTES 0.9 0.5 - 4.6 K/UL    ABS. MONOCYTES 0.3 0.1 - 1.3 K/UL    ABS. EOSINOPHILS 0.0 0.0 - 0.8 K/UL    ABS. BASOPHILS 0.0 0.0 - 0.2 K/UL    ABS. IMM.  GRANS. 0.2 0.0 - 0.5 K/UL   PROCALCITONIN    Collection Time: 10/08/21  1:22 PM   Result Value Ref Range    Procalcitonin <0.05 ng/mL   BLOOD GAS, ARTERIAL POC    Collection Time: 10/08/21  1:24 PM Result Value Ref Range    Device: NASAL CANNULA      pH (POC) 7.37 7.35 - 7.45      pCO2 (POC) 46.3 (H) 35 - 45 MMHG    pO2 (POC) 67 (L) 75 - 100 MMHG    HCO3 (POC) 26.6 (H) 22 - 26 MMOL/L    sO2 (POC) 92.0 (L) 95 - 98 %    Base excess (POC) 0.8 mmol/L    Allens test (POC) Positive      Site LEFT RADIAL      Specimen type (POC) ARTERIAL      Performed by Seamus     CO2, POC 27 (H) 13 - 23 MMOL/L    FIO2, L/min 4     CULTURE, BLOOD    Collection Time: 10/08/21  1:43 PM    Specimen: Blood   Result Value Ref Range    Special Requests: LEFT  FOREARM        Culture result: NO GROWTH AFTER 15 HOURS     URINALYSIS W/ RFLX MICROSCOPIC    Collection Time: 10/08/21  1:56 PM   Result Value Ref Range    Color ORANGE      Appearance CLEAR      pH (UA) 6.0 5.0 - 9.0      Protein 30 (A) NEG mg/dL    Glucose Negative mg/dL    Ketone TRACE (A) NEG mg/dL    Bilirubin Negative NEG      Blood MODERATE (A) NEG      Urobilinogen 1.0 0.2 - 1.0 EU/dL    Nitrites Negative NEG      Leukocyte Esterase TRACE (A) NEG      WBC 10-20 0 /hpf    RBC 20-50 0 /hpf    Epithelial cells 0-3 0 /hpf    Bacteria 2+ (H) 0 /hpf    Casts HYALINE 0 /lpf    Crystals, urine CA OXALATE 0 /LPF    Mucus 4+ (H) 0 /lpf    Other observations RESULTS VERIFIED MANUALLY     CULTURE, URINE    Collection Time: 10/08/21  1:56 PM    Specimen: Cath Urine   Result Value Ref Range    Special Requests: NO SPECIAL REQUESTS      Culture result:        NO GROWTH AFTER SHORT PERIOD OF INCUBATION. FURTHER RESULTS TO FOLLOW AFTER OVERNIGHT INCUBATION.    CULTURE, BLOOD    Collection Time: 10/08/21  3:35 PM    Specimen: Blood   Result Value Ref Range    Special Requests: LEFT  HAND        Culture result: NO GROWTH AFTER 15 HOURS     LACTIC ACID    Collection Time: 10/08/21  3:35 PM   Result Value Ref Range    Lactic acid 2.2 (H) 0.4 - 2.0 MMOL/L   METABOLIC PANEL, COMPREHENSIVE    Collection Time: 10/08/21  3:35 PM   Result Value Ref Range    Sodium 138 138 - 145 mmol/L Potassium 5.2 (H) 3.5 - 5.1 mmol/L    Chloride 104 98 - 107 mmol/L    CO2 28 21 - 32 mmol/L    Anion gap 6 (L) 7 - 16 mmol/L    Glucose 165 (H) 65 - 100 mg/dL    BUN 32 (H) 8 - 23 MG/DL    Creatinine 1.38 0.8 - 1.5 MG/DL    GFR est AA >60 >60 ml/min/1.73m2    GFR est non-AA 52 (L) >60 ml/min/1.73m2    Calcium 9.2 8.3 - 10.4 MG/DL    Bilirubin, total 1.4 (H) 0.2 - 1.1 MG/DL    ALT (SGPT) 396 (H) 12 - 65 U/L    AST (SGOT) 374 (H) 15 - 37 U/L    Alk.  phosphatase 80 50 - 136 U/L    Protein, total 6.0 (L) 6.3 - 8.2 g/dL    Albumin 2.6 (L) 3.2 - 4.6 g/dL    Globulin 3.4 2.3 - 3.5 g/dL    A-G Ratio 0.8 (L) 1.2 - 3.5     GLUCOSE, POC    Collection Time: 10/08/21  5:37 PM   Result Value Ref Range    Glucose (POC) 164 (H) 65 - 100 mg/dL    Performed by Ameena    CORTISOL, BASELINE    Collection Time: 10/08/21  7:59 PM   Result Value Ref Range    Cortisol, baseline 38.4 ug/dL   AMMONIA    Collection Time: 10/08/21  7:59 PM   Result Value Ref Range    Ammonia 55 (H) 11 - 32 UMOL/L   D DIMER    Collection Time: 10/08/21  7:59 PM   Result Value Ref Range    D DIMER 0.83 (H) <0.56 ug/ml(FEU)   TROPONIN-HIGH SENSITIVITY    Collection Time: 10/08/21  7:59 PM   Result Value Ref Range    Troponin-High Sensitivity 41.0 (H) 0 - 14 pg/mL   TSH 3RD GENERATION    Collection Time: 10/08/21  7:59 PM   Result Value Ref Range    TSH 1.840 0.358 - 3.740 uIU/mL   NT-PRO BNP    Collection Time: 10/08/21  7:59 PM   Result Value Ref Range    NT pro-BNP 16,258 (H) <450 PG/ML   MAGNESIUM    Collection Time: 10/08/21  7:59 PM   Result Value Ref Range    Magnesium 2.5 (H) 1.8 - 2.4 mg/dL   PHOSPHORUS    Collection Time: 10/08/21  7:59 PM   Result Value Ref Range    Phosphorus 5.0 (H) 2.3 - 3.7 MG/DL   LACTIC ACID    Collection Time: 10/08/21  9:43 PM   Result Value Ref Range    Lactic acid 1.5 0.4 - 2.0 MMOL/L   GLUCOSE, POC    Collection Time: 10/08/21 11:27 PM   Result Value Ref Range    Glucose (POC) 150 (H) 65 - 100 mg/dL    Performed by Max    LACTIC ACID    Collection Time: 10/09/21  3:58 AM   Result Value Ref Range    Lactic acid 1.2 0.4 - 2.0 MMOL/L   METABOLIC PANEL, COMPREHENSIVE    Collection Time: 10/09/21  3:58 AM   Result Value Ref Range    Sodium 139 136 - 145 mmol/L    Potassium 5.3 (H) 3.5 - 5.1 mmol/L    Chloride 107 98 - 107 mmol/L    CO2 29 21 - 32 mmol/L    Anion gap 3 (L) 7 - 16 mmol/L    Glucose 143 (H) 65 - 100 mg/dL    BUN 34 (H) 8 - 23 MG/DL    Creatinine 1.24 0.8 - 1.5 MG/DL    GFR est AA >60 >60 ml/min/1.73m2    GFR est non-AA 58 (L) >60 ml/min/1.73m2    Calcium 9.3 8.3 - 10.4 MG/DL    Bilirubin, total 1.0 0.2 - 1.1 MG/DL    ALT (SGPT) 354 (H) 12 - 65 U/L    AST (SGOT) 297 (H) 15 - 37 U/L    Alk.  phosphatase 65 50 - 136 U/L    Protein, total 5.9 (L) 6.3 - 8.2 g/dL    Albumin 2.7 (L) 3.2 - 4.6 g/dL    Globulin 3.2 2.3 - 3.5 g/dL    A-G Ratio 0.8 (L) 1.2 - 3.5     CBC W/O DIFF    Collection Time: 10/09/21  3:58 AM   Result Value Ref Range    WBC 7.1 4.3 - 11.1 K/uL    RBC 3.43 (L) 4.23 - 5.6 M/uL    HGB 10.7 (L) 13.6 - 17.2 g/dL    HCT 35.6 (L) 41.1 - 50.3 %    .8 (H) 79.6 - 97.8 FL    MCH 31.2 26.1 - 32.9 PG    MCHC 30.1 (L) 31.4 - 35.0 g/dL    RDW 16.0 (H) 11.9 - 14.6 %    PLATELET 662 086 - 692 K/uL    MPV 10.0 9.4 - 12.3 FL    ABSOLUTE NRBC 0.00 0.0 - 0.2 K/uL   CORTISOL, AM    Collection Time: 10/09/21  3:58 AM   Result Value Ref Range    Cortisol, a.m. 54.0 (H) 7 - 25 ug/dL   VANCOMYCIN, RANDOM    Collection Time: 10/09/21  3:58 AM   Result Value Ref Range    Vancomycin, random 15.7 UG/ML   GLUCOSE, POC    Collection Time: 10/09/21  6:23 AM   Result Value Ref Range    Glucose (POC) 132 (H) 65 - 100 mg/dL    Performed by Janet Hedrick    GLUCOSE, POC    Collection Time: 10/09/21 12:24 PM   Result Value Ref Range    Glucose (POC) 136 (H) 65 - 100 mg/dL    Performed by Ken        All Micro Results     Procedure Component Value Units Date/Time    CULTURE, URINE [[id number]] Collected: 10/08/21 1356    Order Status: Completed Specimen: Cath Urine Updated: 10/09/21 0812     Special Requests: NO SPECIAL REQUESTS        Culture result:       NO GROWTH AFTER SHORT PERIOD OF INCUBATION. FURTHER RESULTS TO FOLLOW AFTER OVERNIGHT INCUBATION. BLOOD CULTURE [[id number]] Collected: 10/08/21 1343    Order Status: Completed Specimen: Blood Updated: 10/09/21 0655     Special Requests: --        LEFT  FOREARM       Culture result: NO GROWTH AFTER 15 HOURS       BLOOD CULTURE [[id number]] Collected: 10/08/21 1535    Order Status: Completed Specimen: Blood Updated: 10/09/21 0655     Special Requests: --        LEFT  HAND       Culture result: NO GROWTH AFTER 15 HOURS             Other Studies:  XR CHEST PA LAT    Result Date: 10/9/2021  Chest 2 view CLINICAL INDICATION: Persisting severe subacute shortness of breath, follow-up of bilateral lung infiltrates, meets SIRS criteria, septic COMPARISON: Radiograph yesterday TECHNIQUE: Upright AP and lateral views chest at 8:32 AM upright. FINDINGS: There are stable mediastinal and hilar contours, left side pacemaker/ICD. Stable lung volumes. Persisting diffuse bilateral lung infiltrates, and small posterior layering pleural effusions in both bases. No pneumothorax. Partial consolidations and/or atelectasis in the retrocardiac left lower lung have not changed     1. Unchanged cardiac enlargement and bilateral airspace opacities. 2. Small bilateral pleural effusions. CT HEAD WO CONT    Result Date: 10/8/2021  Head CT INDICATION: Altered mental status Multiple axial images obtained through the brain without intravenous contrast. Radiation dose reduction techniques were used for this study: All CT scans performed at this facility use one or all of the following: Automated exposure control, adjustment of the mA and/or kVp according to patient's size, iterative reconstruction. FINDINGS: No areas of abnormal attenuation are seen in the brain.  There is no CT evidence of acute hemorrhage or infarction. The ventricles are normal in size. There are no extra-axial fluid collections. No masses are seen. There is partial opacification of the right maxillary sinus and near complete opacification of the left sphenoid sinus. There are no bony lesions. 1.  No CT evidence of acute intracranial abnormality. 2.  Paranasal sinus disease. US ABD COMP    Result Date: 10/9/2021  EXAM: US ABD COMP HISTORY: AYLIN, elevated LFTs. TECHNIQUE: Grayscale and limited color Doppler ultrasound of the upper abdomen. COMPARISON: None. FINDINGS: This examination is somewhat limited as the patient was unable to cooperate with breathing and positioning instructions. Aorta/IVC: There is an aortic aneurysm distally measuring 3.3 x 3.7 x 3.5 cm./Visualized portions are within normal limits. Pancreas: Obscured by overlying bowel gas. Liver: Liver is normal in size and echogenicity. No discrete hepatic lesions are demonstrated on the provided images. The left lobe was not well visualized. Gallbladder: The gallbladder has been removed. Portal vein: The portal vein shows hepatopedal flow. CBD: Normal in caliber and measures 4.7 mm. Spleen: The spleen measures 11.7  cm. There are no discrete masses. Right kidney: 11.4 cm in length and without evidence of obstruction. It contains 2 anechoic avascular structures that appear to represent simple cysts. The larger measures 1.8 x 2.6 x 2.2 cm. The second measures 1.0 x 1.2 x 1.3 cm. Left kidney: 12.9 cm in length and without evidence of obstruction. The kidney appears somewhat echogenic. It contains 2 hypoechoic avascular structures. The larger measures 7.9 x 7.5 x 7.8 cm. The second measures 1.4 x 1.3 x 1.3 cm. These have the appearance of simple cysts. Note was made of a right pleural effusion. Bilateral renal cysts. The left kidney is echogenic. This can be seen with medical renal disease. Status post cholecystectomy. The pancreas was not visualized.  The left lobe of the liver was not very well seen. Note was made of a right pleural effusion. Abdominal aortic aneurysm measuring up to 3.7 cm.       Current Meds:  Current Facility-Administered Medications   Medication Dose Route Frequency    albumin human 25% (BUMINATE) solution 25 g  25 g IntraVENous Q6H    cefepime (MAXIPIME) 2 g in 0.9% sodium chloride (MBP/ADV) 100 mL MBP  2 g IntraVENous Q12H    Vancomycin Intermittent Dosing Placeholder   Other Rx Dosing/Monitoring    vancomycin (VANCOCIN) 1250 mg in  ml infusion  1,250 mg IntraVENous ONCE    bumetanide (BUMEX) injection 0.5 mg  0.5 mg IntraVENous BID    nystatin (MYCOSTATIN) 100,000 unit/mL oral suspension 500,000 Units  500,000 Units Swish and Spit QID    sodium chloride (NS) flush 5-10 mL  5-10 mL IntraVENous Q8H    sodium chloride (NS) flush 5-10 mL  5-10 mL IntraVENous PRN    dilTIAZem (CARDIZEM) 100 mg in 0.9% sodium chloride (MBP/ADV) 100 mL infusion  0-15 mg/hr IntraVENous TITRATE    lactated Ringers infusion  75 mL/hr IntraVENous CONTINUOUS    apixaban (ELIQUIS) tablet 5 mg  5 mg Oral Q12H    sodium chloride (NS) flush 5-40 mL  5-40 mL IntraVENous Q8H    sodium chloride (NS) flush 5-40 mL  5-40 mL IntraVENous PRN    acetaminophen (TYLENOL) tablet 650 mg  650 mg Oral Q6H PRN    Or    acetaminophen (TYLENOL) suppository 650 mg  650 mg Rectal Q6H PRN    polyethylene glycol (MIRALAX) packet 17 g  17 g Oral DAILY PRN    ondansetron (ZOFRAN ODT) tablet 4 mg  4 mg Oral Q8H PRN    Or    ondansetron (ZOFRAN) injection 4 mg  4 mg IntraVENous Q6H PRN    famotidine (PEPCID) tablet 20 mg  20 mg Oral BID    dextrose 40% (GLUTOSE) oral gel 1 Tube  15 g Oral PRN    glucagon (GLUCAGEN) injection 1 mg  1 mg IntraMUSCular PRN    dextrose (D50W) injection syrg 12.5-25 g  25-50 mL IntraVENous PRN    insulin regular (NOVOLIN R, HUMULIN R) injection   SubCUTAneous Q6H    thiamine (B-1) 100 mg in 0.9% sodium chloride 50 mL IVPB  100 mg IntraVENous DAILY  arformoteroL (BROVANA) neb solution 15 mcg  15 mcg Nebulization BID RT    budesonide (PULMICORT) 500 mcg/2 ml nebulizer suspension  500 mcg Nebulization BID RT       Signed:  Divya Serrano MD

## 2021-10-09 NOTE — MED STUDENT NOTES
[facility]  [facility]     Progress Note     Name:  Miguel Barbosa  Age:88 y.o. Sex:male   :  3/5/1933    MRN:  [id number]     Admit Date:  10/8/2021  Reason for Admission: Sepsis with encephalopathy without septic shock ([facility] [A41.9, R65.20, G93.40]    Assessment & Plan     Principal Problem:    Sepsis with encephalopathy without septic shock ([facility] (10/8/2021)    Unclear source, suspected urinary. Thrush in mouth    - Vancomycin (10/8 - ), Cefepime (10/8 - )    - blood culture shows no growth after 15 hours. Urine culture results pending after overnight incubation      At risk for adrenal insufficiency, monitoring cortisol     Active Problems:    Nonischemic cardiomyopathy, Implantable cardioverter-defibrillator (ICD) in situ,   LBBB (left bundle branch block)     Adds to complexity         Benign non-nodular prostatic hyperplasia with lower urinary tract symptoms (2017)    At risk for urinary retention and infection. UA with LE and ++ hyaline casts. Freeman catheter inserted. Started on fluids and monitored for volume overload. Continue broad spectrum antibiotics      Class 2 severe obesity due to excess calories with serious comorbidity and body mass index (BMI) of 35.0 to 35.9 in adult [facility]) (2018)    Adds to complexity      Personal history of COVID-19 (2021)    CXR findings 2/2 COVID vs pulmonary edema. PA/LA CXR shows unchanged cardiac enlargement and bilateral airspace opacities along with small bilateral pleural effusions       Type 2 diabetes mellitus, without long-term current use of insulin ([facility] (2021)    Controlled at home with metformin. Start SSI and FSBG [id number]. Goal FSBG 140-180      Paroxysmal atrial fibrillation with RVR ([facility] (10/8/2021)    - continue eliquis       Toxic metabolic encephalopathy ()    Dysarthric speech. Oriented to self.  Ammonia elevated at 55 on 10/8      Acute kidney injury (AYLIN) with acute tubular necrosis (ATN) ([facility] (10/8/2021)      Lab Results   Component Value Date/Time    Creatinine 1.24 10/09/2021 03:58 AM     Lab Results   Component Value Date/Time    BUN 34 (H) 10/09/2021 03:58 AM     BUN elevated. UA shows +++ hyaline casts. 2/2 infection, hypotension, hypovolemia  Maintain MAP >65 mm Hg  Monitor BMP/CMP, magnesium, phosphate  Strict I/O from villalta      Thrush, oral (10/8/2021)    Diflucan IV x 1 and clotrimazole mimi started this morning      Acute liver failure without hepatic coma (10/8/2021)    Most likely ischemic. Hepatitis serology is normal.    - monitor LFTs      Chronic respiratory failure with hypoxia and hypercapnia ([facility] (10/8/2021)    History of intermittent asthma. Started brovana/budesonide nebulizer. ABG reviewed        Diet:  DIET ADULT  DVT PPx: direct oral anticoagulants  GI Ppx: N/A , diet NPO  Code: Full Code    Dispo/Discharge Planning: telemetry    Hospital Course/Subjective:     81 yo male with history of non-ischemic cardiomyopathy, ICD, LBBB, hypertension, hyperlipidemia, GERD, GPH, paroxysmal A fib, obesity, T2DM, chronic hypoxemic respiratory failure admitted to hospital for AMS, bradycardia (into the 30s) and increased WOB. CXR showed bilateral infiltrates, and was started on azithromycin and prednisone for LLL infiltrate. In ED, LA 3.1, K+ 5.2, Scr 1.38, and elevated LFTs. IV fluids limited due to history of CHF. Was found to be in A fib RV and a dilt bolus 10 mg x 1 f/b gitt was recommended    Subjective/24 hr Events (10/09/21):    No changes in status. Was unsure if he wanted to continue treatment, wants to go home but is not in any condition to return home yet. Review of Systems: Review of systems is otherwise negative with the exception of the elements mentioned above.     Objective:     Patient Vitals for the past 24 hrs:   Temp Pulse Resp BP SpO2   10/09/21 1150 97.8 °F (36.6 °C) (!) 110 16 139/73 94 %   10/09/21 0932  (!) 116  117/74    10/09/21 0748 98.1 °F (36.7 °C) (!) 119 20 (!) 89/59 98 %   10/09/21 0732     95 %   10/09/21 0611  (!) 112  (!) 89/50    10/09/21 0530  (!) 113  (!) 94/58    10/09/21 0500  (!) 114  98/76    10/09/21 0443 98 °F (36.7 °C) (!) 116 24 95/76 92 %   10/09/21 0431  (!) 110  98/62    10/09/21 0401  (!) 106  (!) 125/59    10/09/21 0300  (!) 114  107/76    10/09/21 0230  (!) 110  103/77    10/09/21 0200  (!) 113  109/66    10/09/21 0130  (!) 110  111/61    10/09/21 0111 97.6 °F (36.4 °C) 98 24 108/69 94 %   10/09/21 0030  (!) 114  106/78    10/09/21 0000  (!) 114  (!) 121/94    10/08/21 2331  (!) 109  99/80    10/08/21 2300  (!) 113  98/64    10/08/21 2230  (!) 111  101/74    10/08/21 2131  (!) 107  100/68    10/08/21 2104 97.8 °F (36.6 °C) (!) 113 24 (!) 85/65 97 %   10/08/21 2034    (!) 142/79 96 %   10/08/21 1802  94 24 (!) 98/58    10/08/21 1742  97 23 (!) 98/58    10/08/21 1732  96 24 (!) 92/54    10/08/21 1702   24 98/67    10/08/21 1627  (!) 119 29 (!) 100/58 99 %   10/08/21 1552   21     10/08/21 1550  100  (!) 144/96 99 %   10/08/21 1536  (!) 106 29 107/69 94 %   10/08/21 1501  (!) 133 (!) 33 102/70    10/08/21 1427     99 %   10/08/21 1422  (!) 121 (!) 33 (!) 127/95      Oxygen Therapy  O2 Sat (%): 94 % (10/09/21 1150)  Pulse via Oximetry: 125 beats per minute (10/09/21 0732)  O2 Device: Nasal cannula (10/09/21 1200)  O2 Flow Rate (L/min): 3 l/min (10/09/21 1200)    Body mass index is 33.51 kg/m².     Physical Exam    Data Review:  I have reviewed all labs, meds, and studies from the last 24 hours:    Labs:  Recent Results (from the past 24 hour(s))   CULTURE, BLOOD    Collection Time: 10/08/21  3:35 PM    Specimen: Blood   Result Value Ref Range    Special Requests: LEFT  HAND        Culture result: NO GROWTH AFTER 15 HOURS     LACTIC ACID    Collection Time: 10/08/21  3:35 PM   Result Value Ref Range    Lactic acid 2.2 (H) 0.4 - 2.0 MMOL/L   METABOLIC PANEL, COMPREHENSIVE    Collection Time: 10/08/21  3:35 PM   Result Value Ref Range    Sodium 138 138 - 145 mmol/L    Potassium 5.2 (H) 3.5 - 5.1 mmol/L    Chloride 104 98 - 107 mmol/L    CO2 28 21 - 32 mmol/L    Anion gap 6 (L) 7 - 16 mmol/L    Glucose 165 (H) 65 - 100 mg/dL    BUN 32 (H) 8 - 23 MG/DL    Creatinine 1.38 0.8 - 1.5 MG/DL    GFR est AA >60 >60 ml/min/1.73m2    GFR est non-AA 52 (L) >60 ml/min/1.73m2    Calcium 9.2 8.3 - 10.4 MG/DL    Bilirubin, total 1.4 (H) 0.2 - 1.1 MG/DL    ALT (SGPT) 396 (H) 12 - 65 U/L    AST (SGOT) 374 (H) 15 - 37 U/L    Alk.  phosphatase 80 50 - 136 U/L    Protein, total 6.0 (L) 6.3 - 8.2 g/dL    Albumin 2.6 (L) 3.2 - 4.6 g/dL    Globulin 3.4 2.3 - 3.5 g/dL    A-G Ratio 0.8 (L) 1.2 - 3.5     GLUCOSE, POC    Collection Time: 10/08/21  5:37 PM   Result Value Ref Range    Glucose (POC) 164 (H) 65 - 100 mg/dL    Performed by Ameena    CORTISOL, BASELINE    Collection Time: 10/08/21  7:59 PM   Result Value Ref Range    Cortisol, baseline 38.4 ug/dL   AMMONIA    Collection Time: 10/08/21  7:59 PM   Result Value Ref Range    Ammonia 55 (H) 11 - 32 UMOL/L   D DIMER    Collection Time: 10/08/21  7:59 PM   Result Value Ref Range    D DIMER 0.83 (H) <0.56 ug/ml(FEU)   TROPONIN-HIGH SENSITIVITY    Collection Time: 10/08/21  7:59 PM   Result Value Ref Range    Troponin-High Sensitivity 41.0 (H) 0 - 14 pg/mL   TSH 3RD GENERATION    Collection Time: 10/08/21  7:59 PM   Result Value Ref Range    TSH 1.840 0.358 - 3.740 uIU/mL   NT-PRO BNP    Collection Time: 10/08/21  7:59 PM   Result Value Ref Range    NT pro-BNP 16,258 (H) <450 PG/ML   MAGNESIUM    Collection Time: 10/08/21  7:59 PM   Result Value Ref Range    Magnesium 2.5 (H) 1.8 - 2.4 mg/dL   PHOSPHORUS    Collection Time: 10/08/21  7:59 PM   Result Value Ref Range    Phosphorus 5.0 (H) 2.3 - 3.7 MG/DL   LACTIC ACID    Collection Time: 10/08/21  9:43 PM   Result Value Ref Range    Lactic acid 1.5 0.4 - 2.0 MMOL/L   GLUCOSE, POC Collection Time: 10/08/21 11:27 PM   Result Value Ref Range    Glucose (POC) 150 (H) 65 - 100 mg/dL    Performed by Max    LACTIC ACID    Collection Time: 10/09/21  3:58 AM   Result Value Ref Range    Lactic acid 1.2 0.4 - 2.0 MMOL/L   METABOLIC PANEL, COMPREHENSIVE    Collection Time: 10/09/21  3:58 AM   Result Value Ref Range    Sodium 139 136 - 145 mmol/L    Potassium 5.3 (H) 3.5 - 5.1 mmol/L    Chloride 107 98 - 107 mmol/L    CO2 29 21 - 32 mmol/L    Anion gap 3 (L) 7 - 16 mmol/L    Glucose 143 (H) 65 - 100 mg/dL    BUN 34 (H) 8 - 23 MG/DL    Creatinine 1.24 0.8 - 1.5 MG/DL    GFR est AA >60 >60 ml/min/1.73m2    GFR est non-AA 58 (L) >60 ml/min/1.73m2    Calcium 9.3 8.3 - 10.4 MG/DL    Bilirubin, total 1.0 0.2 - 1.1 MG/DL    ALT (SGPT) 354 (H) 12 - 65 U/L    AST (SGOT) 297 (H) 15 - 37 U/L    Alk.  phosphatase 65 50 - 136 U/L    Protein, total 5.9 (L) 6.3 - 8.2 g/dL    Albumin 2.7 (L) 3.2 - 4.6 g/dL    Globulin 3.2 2.3 - 3.5 g/dL    A-G Ratio 0.8 (L) 1.2 - 3.5     CBC W/O DIFF    Collection Time: 10/09/21  3:58 AM   Result Value Ref Range    WBC 7.1 4.3 - 11.1 K/uL    RBC 3.43 (L) 4.23 - 5.6 M/uL    HGB 10.7 (L) 13.6 - 17.2 g/dL    HCT 35.6 (L) 41.1 - 50.3 %    .8 (H) 79.6 - 97.8 FL    MCH 31.2 26.1 - 32.9 PG    MCHC 30.1 (L) 31.4 - 35.0 g/dL    RDW 16.0 (H) 11.9 - 14.6 %    PLATELET 740 036 - 714 K/uL    MPV 10.0 9.4 - 12.3 FL    ABSOLUTE NRBC 0.00 0.0 - 0.2 K/uL   CORTISOL, AM    Collection Time: 10/09/21  3:58 AM   Result Value Ref Range    Cortisol, a.m. 54.0 (H) 7 - 25 ug/dL   VANCOMYCIN, RANDOM    Collection Time: 10/09/21  3:58 AM   Result Value Ref Range    Vancomycin, random 15.7 UG/ML   GLUCOSE, POC    Collection Time: 10/09/21  6:23 AM   Result Value Ref Range    Glucose (POC) 132 (H) 65 - 100 mg/dL    Performed by Barb Cordoba    GLUCOSE, POC    Collection Time: 10/09/21 12:24 PM   Result Value Ref Range    Glucose (POC) 136 (H) 65 - 100 mg/dL    Performed by Ken [employer] Results     Procedure Component Value Units Date/Time    CULTURE, URINE [[id number]] Collected: 10/08/21 1356    Order Status: Completed Specimen: Cath Urine Updated: 10/09/21 5088     Special Requests: NO SPECIAL REQUESTS        Culture result:       NO GROWTH AFTER SHORT PERIOD OF INCUBATION. FURTHER RESULTS TO FOLLOW AFTER OVERNIGHT INCUBATION. BLOOD CULTURE [[id number]] Collected: 10/08/21 1343    Order Status: Completed Specimen: Blood Updated: 10/09/21 0655     Special Requests: --        LEFT  FOREARM       Culture result: NO GROWTH AFTER 15 HOURS       BLOOD CULTURE [[id number]] Collected: 10/08/21 1535    Order Status: Completed Specimen: Blood Updated: 10/09/21 0655     Special Requests: --        LEFT  HAND       Culture result: NO GROWTH AFTER 15 HOURS             EKG Results     Procedure 720 Value Units Date/Time    EKG, 12 LEAD, INITIAL [[id number]] Collected: 10/08/21 1301    Order Status: Completed Updated: 10/09/21 0944     Ventricular Rate 142 BPM      Atrial Rate 394 BPM      QRS Duration 136 ms      Q-T Interval 342 ms      QTC Calculation (Bezet) 526 ms      Calculated R Axis -177 degrees      Calculated T Axis 26 degrees      Diagnosis --     Atrial fibrillation with rapid ventricular response  Right superior axis deviation  Non-specific intra-ventricular conduction block  Cannot rule out Septal infarct , age undetermined  Abnormal ECG  When compared with ECG of 18-AUG-2021 07:39,  Questionable change in QRS axis  T wave inversion no longer evident in Lateral leads  Confirmed by Early Gallop ([id number]) on 10/9/2021 9:44:00 AM            Other Studies:  XR CHEST PA LAT    Result Date: 10/9/2021  Chest 2 view CLINICAL INDICATION: Persisting severe subacute shortness of breath, follow-up of bilateral lung infiltrates, meets SIRS criteria, septic COMPARISON: Radiograph yesterday TECHNIQUE: Upright AP and lateral views chest at 8:32 AM upright.  FINDINGS: There are stable mediastinal and hilar contours, left side pacemaker/ICD. Stable lung volumes. Persisting diffuse bilateral lung infiltrates, and small posterior layering pleural effusions in both bases. No pneumothorax. Partial consolidations and/or atelectasis in the retrocardiac left lower lung have not changed     1. Unchanged cardiac enlargement and bilateral airspace opacities. 2. Small bilateral pleural effusions. CT HEAD WO CONT    Result Date: 10/8/2021  Head CT INDICATION: Altered mental status Multiple axial images obtained through the brain without intravenous contrast. Radiation dose reduction techniques were used for this study: All CT scans performed at this facility use one or all of the following: Automated exposure control, adjustment of the mA and/or kVp according to patient's size, iterative reconstruction. FINDINGS: No areas of abnormal attenuation are seen in the brain. There is no CT evidence of acute hemorrhage or infarction. The ventricles are normal in size. There are no extra-axial fluid collections. No masses are seen. There is partial opacification of the right maxillary sinus and near complete opacification of the left sphenoid sinus. There are no bony lesions. 1.  No CT evidence of acute intracranial abnormality. 2.  Paranasal sinus disease. US ABD COMP    Result Date: 10/9/2021  EXAM: US ABD COMP HISTORY: AYLIN, elevated LFTs. TECHNIQUE: Grayscale and limited color Doppler ultrasound of the upper abdomen. COMPARISON: None. FINDINGS: This examination is somewhat limited as the patient was unable to cooperate with breathing and positioning instructions. Aorta/IVC: There is an aortic aneurysm distally measuring 3.3 x 3.7 x 3.5 cm./Visualized portions are within normal limits. Pancreas: Obscured by overlying bowel gas. Liver: Liver is normal in size and echogenicity. No discrete hepatic lesions are demonstrated on the provided images. The left lobe was not well visualized. Gallbladder:  The gallbladder has been removed. Portal vein: The portal vein shows hepatopedal flow. CBD: Normal in caliber and measures 4.7 mm. Spleen: The spleen measures 11.7  cm. There are no discrete masses. Right kidney: 11.4 cm in length and without evidence of obstruction. It contains 2 anechoic avascular structures that appear to represent simple cysts. The larger measures 1.8 x 2.6 x 2.2 cm. The second measures 1.0 x 1.2 x 1.3 cm. Left kidney: 12.9 cm in length and without evidence of obstruction. The kidney appears somewhat echogenic. It contains 2 hypoechoic avascular structures. The larger measures 7.9 x 7.5 x 7.8 cm. The second measures 1.4 x 1.3 x 1.3 cm. These have the appearance of simple cysts. Note was made of a right pleural effusion. Bilateral renal cysts. The left kidney is echogenic. This can be seen with medical renal disease. Status post cholecystectomy. The pancreas was not visualized. The left lobe of the liver was not very well seen. Note was made of a right pleural effusion. Abdominal aortic aneurysm measuring up to 3.7 cm.       Current Meds:   Current Facility-Administered Medications   Medication Dose Route Frequency    albumin human 25% (BUMINATE) solution 25 g  25 g IntraVENous Q6H    cefepime (MAXIPIME) 2 g in 0.9% sodium chloride (MBP/ADV) 100 mL MBP  2 g IntraVENous Q12H    Vancomycin Intermittent Dosing Placeholder   Other Rx Dosing/Monitoring    vancomycin (VANCOCIN) 1250 mg in  ml infusion  1,250 mg IntraVENous ONCE    sodium chloride (NS) flush 5-10 mL  5-10 mL IntraVENous Q8H    sodium chloride (NS) flush 5-10 mL  5-10 mL IntraVENous PRN    dilTIAZem (CARDIZEM) 100 mg in 0.9% sodium chloride (MBP/ADV) 100 mL infusion  0-15 mg/hr IntraVENous TITRATE    lactated Ringers infusion  100 mL/hr IntraVENous CONTINUOUS    apixaban (ELIQUIS) tablet 5 mg  5 mg Oral Q12H    sodium chloride (NS) flush 5-40 mL  5-40 mL IntraVENous Q8H    sodium chloride (NS) flush 5-40 mL  5-40 mL IntraVENous PRN    acetaminophen (TYLENOL) tablet 650 mg  650 mg Oral Q6H PRN    Or    acetaminophen (TYLENOL) suppository 650 mg  650 mg Rectal Q6H PRN    polyethylene glycol (MIRALAX) packet 17 g  17 g Oral DAILY PRN    ondansetron (ZOFRAN ODT) tablet 4 mg  4 mg Oral Q8H PRN    Or    ondansetron (ZOFRAN) injection 4 mg  4 mg IntraVENous Q6H PRN    famotidine (PEPCID) tablet 20 mg  20 mg Oral BID    clotrimazole (MYCELEX) mimi 10 mg  10 mg Oral 5XD    dextrose 40% (GLUTOSE) oral gel 1 Tube  15 g Oral PRN    glucagon (GLUCAGEN) injection 1 mg  1 mg IntraMUSCular PRN    dextrose (D50W) injection syrg 12.5-25 g  25-50 mL IntraVENous PRN    insulin regular (NOVOLIN R, HUMULIN R) injection   SubCUTAneous Q6H    thiamine (B-1) 100 mg in 0.9% sodium chloride 50 mL IVPB  100 mg IntraVENous DAILY    arformoteroL (BROVANA) neb solution 15 mcg  15 mcg Nebulization BID RT    budesonide (PULMICORT) 500 mcg/2 ml nebulizer suspension  500 mcg Nebulization BID RT       Problem List:  Hospital Problems as of 10/9/2021 Date Reviewed: 10/9/2021        Codes Class Noted - Resolved POA    * (Principal) Sepsis with encephalopathy without septic shock ([facility] ICD-10-CM: A41.9, R65.20, G93.40  ICD-9-CM: 038.9, 995.91, 348.30  10/8/2021 - Present Yes        Paroxysmal atrial fibrillation with RVR ([facility] ICD-10-CM: I48.0  ICD-9-CM: 427.31  10/8/2021 - Present Yes        Toxic metabolic encephalopathy [id number]: G92.8  ICD-9-CM: 349.82  10/8/2021 - Present Yes        Acute kidney injury (AYLIN) with acute tubular necrosis (ATN) ([facility] ICD-10-CM: N17.0  ICD-9-CM: 584.5  10/8/2021 - Present Yes        Thrush, oral ICD-10-CM: B37.0  ICD-9-CM: 112.0  10/8/2021 - Present Yes        Acute liver failure without hepatic coma ICD-10-CM: K72.00  ICD-9-CM: 307  10/8/2021 - Present Yes        Chronic respiratory failure with hypoxia and hypercapnia (HCC) ICD-10-CM: J96.11, J96.12  ICD-9-CM: 518.83, 799.02, 786.09  10/8/2021 - Present Yes Mild intermittent asthma ICD-10-CM: J45.20  ICD-9-CM: 493.90  10/8/2021 - Present Yes        HFrEF (heart failure with reduced ejection fraction) (HCC) (Chronic) ICD-10-CM: I50.20  ICD-9-CM: 428.20  10/8/2021 - Present Yes        Type 2 diabetes mellitus, without long-term current use of insulin (HCC) ICD-10-CM: E11.9  ICD-9-CM: 250.00  8/24/2021 - Present Yes        Personal history of COVID-19 (Chronic) ICD-10-CM: Z86.16  ICD-9-CM: V12.09  8/18/2021 - Present Yes        Class 2 severe obesity due to excess calories with serious comorbidity and body mass index (BMI) of 35.0 to 35.9 in [facility]) (Chronic) ICD-10-CM: E66.01, Z68.35  ICD-9-CM: 278.01, V85.35  5/7/2018 - Present Yes        Benign non-nodular prostatic hyperplasia with lower urinary tract symptoms (Chronic) ICD-10-CM: N40.1  ICD-9-CM: 600.91  7/27/2017 - Present Yes        LBBB (left bundle branch block) (Chronic) ICD-10-CM: I44.7  ICD-9-CM: 426.3  1/7/2016 - Present Yes        HLD (hyperlipidemia) (Chronic) ICD-10-CM: E78.5  ICD-9-CM: 272.4  1/7/2016 - Present Yes        Nonischemic cardiomyopathy ([facility] (Chronic) ICD-10-CM: I42.8  ICD-9-CM: 425.4  2/21/2013 - Present Yes    Overview Signed 5/7/2018 10:18 AM by Shakira Swain MD     EF 45% 2017   \"severe AI\"              Implantable cardioverter-defibrillator (ICD) in situ (Chronic) ICD-10-CM: Z95.810  ICD-9-CM: V45.02  2/21/2013 - Present Yes    Overview Signed 2/21/2013  7:22 AM by Richard Richardson III     [place] biventricular ICD implanted 2/20/13             HTN (hypertension) (Chronic) ICD-10-CM: I10  ICD-9-CM: 401.9  2/21/2013 - Present Yes             The patient's chart is reviewed and the patient is discussed with the staff. MDM    Signed By: [facility]    October 9, 2021  2:04 PM        *ATTENTION:  This note has been created by a medical student for educational purposes only.   Please do not refer to the content of this note for clinical decision-making, billing, or other purposes. Please see attending physicians note to obtain clinical information on this patient. *

## 2021-10-10 PROBLEM — I43 CARDIOMYOPATHY DUE TO COVID-19 VIRUS (HCC): Status: ACTIVE | Noted: 2021-01-01

## 2021-10-10 PROBLEM — U07.1 CARDIOMYOPATHY DUE TO COVID-19 VIRUS (HCC): Status: ACTIVE | Noted: 2021-01-01

## 2021-10-10 NOTE — PROGRESS NOTES
10/10/2021 8:54 AM    Admit Date: 10/8/2021    Admit Diagnosis: Sepsis with encephalopathy without septic shock ([facility] [A41.9, R65.20, G93.40]      Subjective:   Denies chest pain or shortness of breath. Still mildly confused but better than yesterday      Objective:      Visit Vitals  /68 (BP 1 Location: Right upper arm, BP Patient Position: At rest)   Pulse 94   Temp 97.7 °F (36.5 °C)   Resp 18   Ht 6' 1\" (1.854 m)   Wt 257 lb 12.3 oz (116.9 kg)   SpO2 97%   BMI 34.01 kg/m²       Physical Exam:  Gene Mccreedy, Well Nourished, No Acute Distress, Alert & Oriented x 3, appropriate mood. Neck- supple, no JVD  CV- irregular rate and rhythm no MRG  Lung- clear bilaterally  Abd- soft, nontender, nondistended  Ext- no edema bilaterally. Skin- warm and dry        Data Review:   Recent Labs     10/10/21  0410      K 4.4   BUN 39*   CREA 1.08   WBC 6.6   HGB 10.2*   HCT 33.5*          Assessment/Plan:     Principal Problem:    Sepsis with encephalopathy without septic shock ([facility] (10/8/2021)-- per primary    Active Problems:    Acute on chronic systolic heart failure ([facility] (10/9/2021)      Nonischemic cardiomyopathy ([facility] (2/21/2013)      Overview: EF 45% 2017   \"severe AI\"       Implantable cardioverter-defibrillator (ICD) in situ (2/21/2013)      Overview: [place] biventricular ICD implanted 2/20/13      HTN (hypertension) (2/21/2013)      LBBB (left bundle branch block) (1/7/2016)      HLD (hyperlipidemia) (1/7/2016)      Benign non-nodular prostatic hyperplasia with lower urinary tract symptoms (7/27/2017)      Class 2 severe obesity due to excess calories with serious comorbidity and body mass index (BMI) of 35.0 to 35.9 in adult [facility]) (5/7/2018)      Personal history of COVID-19 (8/18/2021)      Type 2 diabetes mellitus, without long-term current use of insulin ([address] (8/24/2021)      Paroxysmal atrial fibrillation with RVR ([address] (10/8/2021) rate is better controlled today. .  Patient is on Eliquis. We will continue Cardizem drip.   He is still mildly hypotensive and will not accelerate oral rate control meds at this time      Toxic metabolic encephalopathy (75/3/1177)      Acute kidney injury (AYLIN) with acute tubular necrosis (ATN) ([facility] (10/8/2021)      Irven Grumbles, oral (10/8/2021)      Acute liver failure without hepatic coma (10/8/2021)      Chronic respiratory failure with hypoxia and hypercapnia ([address] (10/8/2021)      Mild intermittent asthma (10/8/2021)      HFrEF (heart failure with reduced ejection fraction) ([facility] (10/8/2021)

## 2021-10-10 NOTE — PROGRESS NOTES
Physical Exam  Skin:            Comments: Cleansed with wound cleanser, sluggish blanchable tissue noted around wound. Skin prep used to protect intact tissue around site. Hydrogel then allevyn applied.

## 2021-10-10 NOTE — PROGRESS NOTES
Shmuel Johnbenson patient wife updated to pt condition and plan for palliative care to contact her tomorrow as well as case management. She has expressed concern for pt NOT returned to [place] post acute rehab. She also has only seen him once in 8 weeks and wishes to see him.

## 2021-10-10 NOTE — PROGRESS NOTES
Hospitalist Progress Note   Admit Date:  10/8/2021 12:57 PM   Name:  Jose Armando Payan   Age:  80 y.o. Sex:  male  :  3/5/1933   MRN:  [id number]   Room:  308/01    Presenting Complaint: No chief complaint on file. Reason(s) for Admission: Sepsis with encephalopathy without septic shock ([facility] [A41.9, R65.20, G93.40]     Hospital Course & Interval History:   80 y.o. male with medical history of NICM, ICD, LBBB, HTN, HLD, GERD, BPH, pAF (eliquis/amio), obesity, T2DM, chronic hypoxemic respiratory failure (4L following covid pna) who presented via ems from post-acute facility with AMS, bradycardia (hr in 30s) and increased WOB. The day PTA, he was started on azithromycin and prednisone for LLL infiltrate. In the ED, found to have LA 3.1, K+5.2, Scr 1.38, LFTS elevated. CXR bilateral infiltrates. He was given abx and limited IVF due to his h/o CHF. He was found to be in AFIB RVR. rec'd dilt bolus 10 mg x1 f/b gitt. Subjective (10/10/21):  Extreme fatigue with intermittent lucidity. Able to provide orientation x3 but falls asleep in the middle of sentence while talking. Has no acute complaints. Evaluation of wound by nursing indicates good perfusion. Assessment & Plan:   * Sepsis with encephalopathy without septic shock [facility])  Source unclear. Suspected urinary source. Thrush in mouth. Procal nil, PNA less likely.   -vancomycin (10/8-. .), cefepime (10/8-. .)   -Ucx and blood cx in process.  -At risk for adrenal insuffiency, random cortisol  -Trend LA    10/10: Encephalopathy intermittent, pronounced fatigue. Continue management as noted    Paroxysmal atrial fibrillation with RVR (HCC)  s/p dilt bolus 10 mg f/b gtt. troponin, tsh, cortisol, d dimer, electrolytes. -Consult cardiology given concern for tachy-artur.   -apixaban    10/10: remains rate controlled, continue current managment    Acute on chronic systolic heart failure ([facility] [address]  Admission BNP 8K now 16k.  Possibly volume overloaded but hypotensive. 10/10: completing alubumin and bumex, reassess volume status after completion    Acute kidney injury (AYLIN) with acute tubular necrosis (ATN) (HCC)  Admission Scr 1.38 BUN 32. UA +++ hyaline casts. 2/2 infection, hypotension, poor perfusion; renal US ruled out hydronephrosis. -Maintain map >65 mm hg   -Daily BMP/CMP, check mag and phos.   -Freeman. Strict I&O's.     10/10: improvement of sCr to 1.1, continue as noted      Toxic metabolic encephalopathy  Speech is dysarthric. Oriented to self. On blood thinners. Admission CTH negative; Ammonia negative;   -thiamine to prevent or reduce risk of WE    10/10: intermittent lucidity with pronounced fatigue, continue as above    Chronic respiratory failure with hypoxia and hypercapnia (HCC)  2/2 COVID PNA. H/o intermittent asthma. ABG reviewed. -brovana/budesonide nebs. 10/10: remains on 3L NC, wean as tolerated, continue as noted    Acute liver failure without hepatic coma  Suspect ischemic in nature. Prior hepatitis serologies normal. RUQ US unremarkable. Ammonia. -Trend transaminases    Thrush, oral  -nystatin    Benign non-nodular prostatic hyperplasia with lower urinary tract symptoms  At risk of urinary retention and infection. UA with LE and ++hyaline casts. Class 2 severe obesity due to excess calories with serious comorbidity and body mass index (BMI) of 35.0 to 35.9 in adult [facility])  Increased risk of all cause mortality. Adds to complexity. 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 Start SSI and FSBG q6h. Goal FSBG 140-180. Personal history of COVID-19  Noted. CXR findings 2/2 COVID versus pulmonary edema. Check PA/LA cxr tomorrow.     LBBB (left bundle branch block)  Complicating care    Implantable cardioverter-defibrillator (ICD) in situ  Complicating care    Nonischemic cardiomyopathy [facility])  Complicating care        Dispo/Discharge Planning:    Pending clinical improvement    Diet:  ADULT DIET Easy to Chew  DVT PPx: On apixaban  Code status: Full Code    Hospital Problems as of 10/10/2021 Date Reviewed: 10/9/2021        Codes Class Noted - Resolved POA    * (Principal) Sepsis with encephalopathy without septic shock ([facility] ICD-10-CM: A41.9, R65.20, G93.40  ICD-9-CM: 038.9, 995.91, 348.30  10/8/2021 - Present Yes        Paroxysmal atrial fibrillation with RVR ([facility] ICD-10-CM: I48.0  ICD-9-CM: 427.31  10/8/2021 - Present Yes        Acute on chronic systolic heart failure ([facility] ICD-10-CM: I50.23  ICD-9-CM: 428.23  10/9/2021 - Present Yes        Toxic metabolic encephalopathy SLD-91-LQ: G92.8  ICD-9-CM: 349.82  10/8/2021 - Present Yes        Acute kidney injury (AYLIN) with acute tubular necrosis (ATN) ([facility] ICD-10-CM: N17.0  ICD-9-CM: 584.5  10/8/2021 - Present Yes        Thrush, oral ICD-10-CM: B37.0  ICD-9-CM: 112.0  10/8/2021 - Present Yes        Acute liver failure without hepatic coma ICD-10-CM: K72.00  ICD-9-CM: 836  10/8/2021 - Present Yes        Chronic respiratory failure with hypoxia and hypercapnia (HCC) ICD-10-CM: J96.11, J96.12  ICD-9-CM: 518.83, 799.02, 786.09  10/8/2021 - Present Yes        Benign non-nodular prostatic hyperplasia with lower urinary tract symptoms (Chronic) ICD-10-CM: N40.1  ICD-9-CM: 600.91  7/27/2017 - Present Yes        Class 2 severe obesity due to excess calories with serious comorbidity and body mass index (BMI) of 35.0 to 35.9 in adult [facility]) (Chronic) ICD-10-CM: E66.01, Z68.35  ICD-9-CM: 278.01, V85.35  5/7/2018 - Present Yes        Mild intermittent asthma ICD-10-CM: J45.20  ICD-9-CM: 493.90  10/8/2021 - Present Yes        HFrEF (heart failure with reduced ejection fraction) ([facility]) (Chronic) ICD-10-CM: I50.20  ICD-9-CM: 428.20 10/8/2021 - Present Yes        Type 2 diabetes mellitus, without long-term current use of insulin ([facility] ICD-10-CM: E11.9  ICD-9-CM: 250.00  8/24/2021 - Present Yes        Personal history of COVID-19 (Chronic) ICD-10-CM: Z86.16  ICD-9-CM: V12.09  8/18/2021 - Present Yes        LBBB (left bundle branch block) (Chronic) ICD-10-CM: I44.7  ICD-9-CM: 426.3  1/7/2016 - Present Yes        HLD (hyperlipidemia) (Chronic) ICD-10-CM: E78.5  ICD-9-CM: 272.4  1/7/2016 - Present Yes        Nonischemic cardiomyopathy ([facility] (Chronic) ICD-10-CM: I42.8  ICD-9-CM: 425.4  2/21/2013 - Present Yes    Overview Signed 5/7/2018 10:18 AM by Katie Roth MD     EF 45% 2017   \"severe AI\"              Implantable cardioverter-defibrillator (ICD) in situ (Chronic) ICD-10-CM: Z95.810  ICD-9-CM: V45.02  2/21/2013 - Present Yes    Overview Signed 2/21/2013  7:22 AM by Nicky Bold III     [place] biventricular ICD implanted 2/20/13             HTN (hypertension) (Chronic) ICD-10-CM: I10  ICD-9-CM: 401.9  2/21/2013 - Present Yes              Objective:     Patient Vitals for the past 24 hrs:   Temp Pulse Resp BP SpO2   10/10/21 1241 97.4 °F (36.3 °C) 98 18 (!) 117/58 96 %   10/10/21 1117  (!) 105  109/75    10/10/21 0800     97 %   10/10/21 0757 97.7 °F (36.5 °C) 94 18 104/68 96 %   10/10/21 0600  85  105/65    10/10/21 0500  (!) 102  (!) 98/53    10/10/21 0300 97.8 °F (36.6 °C) 94 18 102/79 92 %   10/10/21 0002 98.6 °F (37 °C) 88 14 132/78 95 %   10/09/21 2300  92  (!) 140/76    10/09/21 2200  92  138/77    10/09/21 2100  (!) 106  (!) 149/67    10/09/21 1952 97.8 °F (36.6 °C) (!) 114 18 133/76 94 %   10/09/21 1920     95 %   10/09/21 1819    (!) 147/86    10/09/21 1626 97.9 °F (36.6 °C) 96 16 106/77 96 %   10/09/21 1517    139/73      Oxygen Therapy  O2 Sat (%): 96 % (10/10/21 1241)  Pulse via Oximetry: 77 beats per minute (10/10/21 0800)  O2 Device: Nasal cannula (10/10/21 0800)  O2 Flow Rate (L/min): 3 l/min (10/10/21 0800)  FIO2 (%): 32 % (10/10/21 0800)    Estimated body mass index is 34.01 kg/m² as calculated from the following:    Height as of this encounter: 6' 1\" (1.854 m). Weight as of this encounter: 116.9 kg (257 lb 12.3 oz). Intake/Output Summary (Last 24 hours) at 10/10/2021 1408  Last data filed at 10/10/2021 1159  Gross per 24 hour   Intake 1370 ml   Output 725 ml   Net 645 ml       Physical Exam  Vitals and nursing note reviewed. Constitutional:       General: He is sleeping. He is not in acute distress. Appearance: Normal appearance. HENT:      Head: Normocephalic. Eyes:      Extraocular Movements: Extraocular movements intact. Cardiovascular:      Rate and Rhythm: Normal rate. Pulses:           Radial pulses are 2+ on the left side. Comments: BLE trace pitting edema  Pulmonary:      Effort: Pulmonary effort is normal. No respiratory distress. Abdominal:      General: There is no distension. Tenderness: There is no abdominal tenderness. Musculoskeletal:         General: No deformity. Cervical back: No rigidity. Skin:     General: Skin is warm and dry. Neurological:      General: No focal deficit present. Mental Status: He is oriented to person, place, and time and easily aroused. He is confused.    Psychiatric:         Mood and Affect: Mood normal.         Behavior: Behavior normal.         I have reviewed ordered lab tests and independently visualized imaging below:    Recent Labs:  Recent Results (from the past 48 hour(s))   CULTURE, BLOOD    Collection Time: 10/08/21  3:35 PM    Specimen: Blood   Result Value Ref Range    Special Requests: LEFT  HAND        Culture result: NO GROWTH 2 DAYS     LACTIC ACID    Collection Time: 10/08/21  3:35 PM   Result Value Ref Range    Lactic acid 2.2 (H) 0.4 - 2.0 MMOL/L   METABOLIC PANEL, COMPREHENSIVE    Collection Time: 10/08/21  3:35 PM   Result Value Ref Range    Sodium 138 138 - 145 mmol/L    Potassium 5.2 (H) 3.5 - 5.1 mmol/L    Chloride 104 98 - 107 mmol/L    CO2 28 21 - 32 mmol/L    Anion gap 6 (L) 7 - 16 mmol/L    Glucose 165 (H) 65 - 100 mg/dL    BUN 32 (H) 8 - 23 MG/DL    Creatinine 1.38 0.8 - 1.5 MG/DL    GFR est AA >60 >60 ml/min/1.73m2    GFR est non-AA 52 (L) >60 ml/min/1.73m2    Calcium 9.2 8.3 - 10.4 MG/DL    Bilirubin, total 1.4 (H) 0.2 - 1.1 MG/DL    ALT (SGPT) 396 (H) 12 - 65 U/L    AST (SGOT) 374 (H) 15 - 37 U/L    Alk.  phosphatase 80 50 - 136 U/L    Protein, total 6.0 (L) 6.3 - 8.2 g/dL    Albumin 2.6 (L) 3.2 - 4.6 g/dL    Globulin 3.4 2.3 - 3.5 g/dL    A-G Ratio 0.8 (L) 1.2 - 3.5     GLUCOSE, POC    Collection Time: 10/08/21  5:37 PM   Result Value Ref Range    Glucose (POC) 164 (H) 65 - 100 mg/dL    Performed by Ameena    CORTISOL, BASELINE    Collection Time: 10/08/21  7:59 PM   Result Value Ref Range    Cortisol, baseline 38.4 ug/dL   AMMONIA    Collection Time: 10/08/21  7:59 PM   Result Value Ref Range    Ammonia 55 (H) 11 - 32 UMOL/L   D DIMER    Collection Time: 10/08/21  7:59 PM   Result Value Ref Range    D DIMER 0.83 (H) <0.56 ug/ml(FEU)   TROPONIN-HIGH SENSITIVITY    Collection Time: 10/08/21  7:59 PM   Result Value Ref Range    Troponin-High Sensitivity 41.0 (H) 0 - 14 pg/mL   TSH 3RD GENERATION    Collection Time: 10/08/21  7:59 PM   Result Value Ref Range    TSH 1.840 0.358 - 3.740 uIU/mL   NT-PRO BNP    Collection Time: 10/08/21  7:59 PM   Result Value Ref Range    NT pro-BNP 16,258 (H) <450 PG/ML   MAGNESIUM    Collection Time: 10/08/21  7:59 PM   Result Value Ref Range    Magnesium 2.5 (H) 1.8 - 2.4 mg/dL   PHOSPHORUS    Collection Time: 10/08/21  7:59 PM   Result Value Ref Range    Phosphorus 5.0 (H) 2.3 - 3.7 MG/DL   LACTIC ACID    Collection Time: 10/08/21  9:43 PM   Result Value Ref Range    Lactic acid 1.5 0.4 - 2.0 MMOL/L   GLUCOSE, POC    Collection Time: 10/08/21 11:27 PM   Result Value Ref Range    Glucose (POC) 150 (H) 65 - 100 mg/dL    Performed by Max    LACTIC ACID    Collection Time: 10/09/21  3:58 AM   Result Value Ref Range    Lactic acid 1.2 0.4 - 2.0 MMOL/L   METABOLIC PANEL, COMPREHENSIVE    Collection Time: 10/09/21  3:58 AM   Result Value Ref Range    Sodium 139 136 - 145 mmol/L    Potassium 5.3 (H) 3.5 - 5.1 mmol/L    Chloride 107 98 - 107 mmol/L    CO2 29 21 - 32 mmol/L    Anion gap 3 (L) 7 - 16 mmol/L    Glucose 143 (H) 65 - 100 mg/dL    BUN 34 (H) 8 - 23 MG/DL    Creatinine 1.24 0.8 - 1.5 MG/DL    GFR est AA >60 >60 ml/min/1.73m2    GFR est non-AA 58 (L) >60 ml/min/1.73m2    Calcium 9.3 8.3 - 10.4 MG/DL    Bilirubin, total 1.0 0.2 - 1.1 MG/DL    ALT (SGPT) 354 (H) 12 - 65 U/L    AST (SGOT) 297 (H) 15 - 37 U/L    Alk.  phosphatase 65 50 - 136 U/L    Protein, total 5.9 (L) 6.3 - 8.2 g/dL    Albumin 2.7 (L) 3.2 - 4.6 g/dL    Globulin 3.2 2.3 - 3.5 g/dL    A-G Ratio 0.8 (L) 1.2 - 3.5     CBC W/O DIFF    Collection Time: 10/09/21  3:58 AM   Result Value Ref Range    WBC 7.1 4.3 - 11.1 K/uL    RBC 3.43 (L) 4.23 - 5.6 M/uL    HGB 10.7 (L) 13.6 - 17.2 g/dL    HCT 35.6 (L) 41.1 - 50.3 %    .8 (H) 79.6 - 97.8 FL    MCH 31.2 26.1 - 32.9 PG    MCHC 30.1 (L) 31.4 - 35.0 g/dL    RDW 16.0 (H) 11.9 - 14.6 %    PLATELET 815 619 - 732 K/uL    MPV 10.0 9.4 - 12.3 FL    ABSOLUTE NRBC 0.00 0.0 - 0.2 K/uL   CORTISOL, AM    Collection Time: 10/09/21  3:58 AM   Result Value Ref Range    Cortisol, a.m. 54.0 (H) 7 - 25 ug/dL   VANCOMYCIN, RANDOM    Collection Time: 10/09/21  3:58 AM   Result Value Ref Range    Vancomycin, random 15.7 UG/ML   GLUCOSE, POC    Collection Time: 10/09/21  6:23 AM   Result Value Ref Range    Glucose (POC) 132 (H) 65 - 100 mg/dL    Performed by [address] [place] [address], POC    Collection Time: 10/09/21 12:24 PM   Result Value Ref Range    Glucose (POC) 136 (H) 65 - 100 mg/dL    Performed by Ken JEAN ADULT COMPLETE    Collection Time: 10/09/21  2:30 PM   Result Value Ref Range    IVSd 1.15 (A) 0.60 - 1.00 cm LVIDd 6.81 (A) 4.20 - 5.90 cm    LVIDs 5.10 cm    LVOT d 2.21 cm    LVPWd 1.13 (A) 0.60 - 1.00 cm    LVOT Peak Gradient 4.09 mmHg    LVOT Peak Velocity 99.14 cm/s    RVIDd 4.46 cm    Left Atrium Major Axis 5.66 cm    LA Volume 105.75 18.0 - 58.0 mL    LA Area 4C 29.09 cm2    LA Vol 2C 103.25 (A) 18.00 - 58.00 mL    LA Vol 4C 95.49 (A) 18.00 - 58.00 mL    Aortic Valve Area by Continuity of Peak Velocity 3.58 cm2    Aortic Valve Area by Continuity of Peak Velocity 1.97 cm2    Aortic Valve Area by Continuity of Peak Velocity 3.31 cm2    Aortic Valve Area by Continuity of Peak Velocity 6.04 cm2    Aortic Valve Area by Continuity of Peak Velocity 3.33 cm2    AoV PG 5.18 mmHg    Aortic Valve Systolic Peak Velocity 552.20 cm/s    PISA MR Rad 0.51 cm    Mitral Effective Regurgitant Orifice Area 0.16 cm2    MV regurgitant volume 15.01 mL    TR Max Velocity 386.64 cm/s    Mitral Regurgitant Velocity Time Integral 96.13 cm    Tapse 1.70 1.50 - 2.00 cm    Triscuspid Valve Regurgitation Peak Gradient 24.30 mmHg    TR Max Velocity 245.66 cm/s    AO ASC D 4.33 cm    Ao Root D 4.50 cm    LV Mass .7 88.0 - 224.0 g    LV Mass AL Index 152.4 49.0 - 115.0 g/m2    RVSP 35.0 mmHg    Est. RA Pressure 5.0 mmHg    Left Atrium Minor Axis 2.38 cm    LA Vol Index 44.43 16.00 - 28.00 ml/m2    LA Vol Index 43.38 16.00 - 28.00 ml/m2    LA Vol Index 40.12 16.00 - 28.00 ml/m2   GLUCOSE, POC    Collection Time: 10/09/21  6:13 PM   Result Value Ref Range    Glucose (POC) 187 (H) 65 - 100 mg/dL    Performed by Ken    GLUCOSE, POC    Collection Time: 10/09/21 11:52 PM   Result Value Ref Range    Glucose (POC) 161 (H) 65 - 100 mg/dL    Performed by Migel    METABOLIC PANEL, COMPREHENSIVE    Collection Time: 10/10/21  4:10 AM   Result Value Ref Range    Sodium 138 138 - 145 mmol/L    Potassium 4.4 3.5 - 5.1 mmol/L    Chloride 106 98 - 107 mmol/L    CO2 30 21 - 32 mmol/L    Anion gap 2 (L) 7 - 16 mmol/L    Glucose 147 (H) 65 - 100 mg/dL    BUN 39 (H) 8 - 23 MG/DL    Creatinine 1.08 0.8 - 1.5 MG/DL    GFR est AA >60 >60 ml/min/1.73m2    GFR est non-AA >60 >60 ml/min/1.73m2    Calcium 9.2 8.3 - 10.4 MG/DL    Bilirubin, total 0.9 0.2 - 1.1 MG/DL    ALT (SGPT) 274 (H) 12 - 65 U/L    AST (SGOT) 91 (H) 15 - 37 U/L    Alk.  phosphatase 62 50 - 136 U/L    Protein, total 6.0 (L) 6.3 - 8.2 g/dL    Albumin 3.1 (L) 3.2 - 4.6 g/dL    Globulin 2.9 2.3 - 3.5 g/dL    A-G Ratio 1.1 (L) 1.2 - 3.5     CBC W/O DIFF    Collection Time: 10/10/21  4:10 AM   Result Value Ref Range    WBC 6.6 4.3 - 11.1 K/uL    RBC 3.24 (L) 4.23 - 5.6 M/uL    HGB 10.2 (L) 13.6 - 17.2 g/dL    HCT 33.5 (L) 41.1 - 50.3 %    .4 (H) 79.6 - 97.8 FL    MCH 31.5 26.1 - 32.9 PG    MCHC 30.4 (L) 31.4 - 35.0 g/dL    RDW 16.1 (H) 11.9 - 14.6 %    PLATELET 409 632 - 547 K/uL    MPV 10.3 9.4 - 12.3 FL    ABSOLUTE NRBC 0.00 0.0 - 0.2 K/uL   VANCOMYCIN, RANDOM    Collection Time: 10/10/21  4:10 AM   Result Value Ref Range    Vancomycin, random 13.5 UG/ML   GLUCOSE, POC    Collection Time: 10/10/21  6:02 AM   Result Value Ref Range    Glucose (POC) 140 (H) 65 - 100 mg/dL    Performed by Bernice Najjar    GLUCOSE, POC    Collection Time: 10/10/21 11:28 AM   Result Value Ref Range    Glucose (POC) 196 (H) 65 - 100 mg/dL    Performed by Randy        All Micro Results     Procedure Component Value Units Date/Time    BLOOD CULTURE [[id number]] Collected: 10/08/21 1535    Order Status: Completed Specimen: Blood Updated: 10/10/21 1019     Special Requests: --        LEFT  HAND       Culture result: NO GROWTH 2 DAYS       BLOOD CULTURE [[id number]] Collected: 10/08/21 1343    Order Status: Completed Specimen: Blood Updated: 10/10/21 1019     Special Requests: --        LEFT  FOREARM       Culture result: NO GROWTH 2 DAYS       CULTURE, URINE [[id number]] Collected: 10/08/21 1356    Order Status: Completed Specimen: Cath Urine Updated: 10/10/21 0744     Special Requests: NO SPECIAL REQUESTS Culture result:       <10,000 COLONIES/mL MIXED SKIN MARCOS ISOLATED                Other Studies:  ECHO ADULT COMPLETE    Result Date: 10/9/2021  · Image quality for this study was technically difficult. · LV: Estimated LVEF is 15 - 20%. Normal wall thickness. Dilated left ventricle. Severely and globally reduced systolic function. Left ventricular diastolic dysfunction. · RV: Mildly dilated right ventricle. Mildly reduced systolic function. Pacer/ICD present. 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 Ascending aorta diameter = 4.3 cm. · TV: Mild tricuspid valve regurgitation is present. · AV: Mild aortic valve regurgitation is present. · MV: Mitral valve non-specific thickening. Mild to moderate mitral valve regurgitation is present.         Current Meds:  Current Facility-Administered Medications   Medication Dose Route Frequency    thiamine HCL (B-1) tablet 100 mg  100 mg Oral DAILY    insulin regular (NOVOLIN R, HUMULIN R) injection   SubCUTAneous AC&HS    vancomycin (VANCOCIN) 1250 mg in  ml infusion  1,250 mg IntraVENous Q18H    cefepime (MAXIPIME) 2 g in 0.9% sodium chloride (MBP/ADV) 100 mL MBP  2 g IntraVENous Q12H    bumetanide (BUMEX) injection 0.5 mg  0.5 mg IntraVENous BID    nystatin (MYCOSTATIN) 100,000 unit/mL oral suspension 500,000 Units  500,000 Units Swish and Spit QID    sodium chloride (NS) flush 5-10 mL  5-10 mL IntraVENous Q8H    sodium chloride (NS) flush 5-10 mL  5-10 mL IntraVENous PRN    dilTIAZem (CARDIZEM) 100 mg in 0.9% sodium chloride (MBP/ADV) 100 mL infusion  0-15 mg/hr IntraVENous TITRATE    lactated Ringers infusion  75 mL/hr IntraVENous CONTINUOUS    apixaban (ELIQUIS) tablet 5 mg  5 mg Oral Q12H    sodium chloride (NS) flush 5-40 mL  5-40 mL IntraVENous Q8H    sodium chloride (NS) flush 5-40 mL  5-40 mL IntraVENous PRN    acetaminophen (TYLENOL) tablet 650 mg  650 mg Oral Q6H PRN    Or    acetaminophen (TYLENOL) suppository 650 mg  650 mg Rectal Q6H PRN    polyethylene glycol (MIRALAX) packet 17 g  17 g Oral DAILY PRN    ondansetron (ZOFRAN ODT) tablet 4 mg  4 mg Oral Q8H PRN    Or    ondansetron (ZOFRAN) injection 4 mg  4 mg IntraVENous Q6H PRN    famotidine (PEPCID) tablet 20 mg  20 mg Oral BID    dextrose 40% (GLUTOSE) oral gel 1 Tube  15 g Oral PRN    glucagon (GLUCAGEN) injection 1 mg  1 mg IntraMUSCular PRN    dextrose (D50W) injection syrg 12.5-25 g  25-50 mL IntraVENous PRN    arformoteroL (BROVANA) neb solution 15 mcg  15 mcg Nebulization BID RT    budesonide (PULMICORT) 500 mcg/2 ml nebulizer suspension  500 mcg Nebulization BID RT       Signed:  Catalina Longoria MD

## 2021-10-10 NOTE — PROGRESS NOTES
VANCO DAILY FOLLOW UP NOTE  [address] [facility] Pharmacokinetic Monitoring Service - Vancomycin    Consulting Provider: Adina Mcardle   Indication: sepsis of unknown etiology  Target Concentration: Goal AUC/ANIKET 400-600 mg*hr/L  Day of Therapy: 3  Additional Antimicrobials: cefepime    Pertinent Laboratory Values: Wt Readings from Last 1 Encounters:   10/10/21 116.9 kg (257 lb 12.3 oz)     Temp Readings from Last 1 Encounters:   10/10/21 97.7 °F (36.5 °C)     No components found for: PROCAL  Recent Labs     10/10/21  0410 10/09/21  0358 10/08/21  2143 10/08/21  1535 10/08/21  1322 10/08/21  1322   BUN 39* 34*  --  32*  --   --    CREA 1.08 1.24  --  1.38  --   --    WBC 6.6 7.1  --   --   --  6.7   PCT  --   --   --   --   --  <0.05   LAC  --  1.2 1.5 2.2*   < > 3.1*    < > = values in this interval not displayed. Estimated Creatinine Clearance: 63.3 mL/min (based on SCr of 1.08 mg/dL). Lab Results   Component Value Date/Time    Vancomycin, random 13.5 10/10/2021 04:10 AM     MRSA Nasal Swab: N/A. Non-respiratory infection. .    Plan:  Change to dosing recommendations based on [address] as renal function has improved to baseline  Start vancomycin 1250 mg q18h  Anticipated AUC of 467 and trough concentration of 15.1 at steady state  Renal labs as indicated   Vancomycin concentration ordered for 10/12 @ 1000   Pharmacy will continue to monitor patient and adjust therapy as indicated    Thank you for the consult,  JOSE Sarmiento  10/10/2021

## 2021-10-10 NOTE — PROGRESS NOTES
Physician Progress Note      PATIENT:               Stephanie Marie  CSN #:                  [id number]  :                       3/5/1933  ADMIT DATE:       10/8/2021 12:57 PM  [address] [place] DATE:  RESPONDING  PROVIDER #:        Genoveva Tena MD          QUERY TEXT:    Patient admitted with sepsis/AMS noted to have paroxysmal atrial fibrillation and is maintained on Eliquis. If possible, please document in progress notes and?discharge?summary if you are evaluating and/or treating any of the following: The medical record reflects the following:?  ? Risk Factors: paf  ? Clinical Indicators: 87yo with history SHF, HTN, DM,  paroxysmal atrial fibrillation. ? 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     Query created by: Henri Haines on 10/10/2021 10:55 AM      Electronically signed by:  Genoveva Tena MD 10/10/2021 1:51 PM

## 2021-10-10 NOTE — PROGRESS NOTES
Placed 20g 8cm Endurance EDC in left forearm with ultrasound assistance.   [facility]#[id number]

## 2021-10-10 NOTE — PROGRESS NOTES
Pt HR has ranged from 's occasionally in the 120's. Afib. Cardizem gtt has NOT utilized today due to soft BP to help perfuse kidneys. Pt alertness has improved he has been able to feed himself if tray set up.

## 2021-10-11 PROBLEM — Z92.29 COVID-19 VACCINE SERIES COMPLETED: Status: ACTIVE | Noted: 2021-01-01

## 2021-10-11 NOTE — PROGRESS NOTES
ACUTE OT GOALS:  (Developed with and agreed upon by patient and/or caregiver.)  1. Patient will complete upper body bathing and dressing with SET UP and adaptive equipment as needed. 2. Patient will complete toileting with CGA X1.   3. Patient will complete grooming ADL with SET UP.  4. Patient will tolerate 25 minutes of OT treatment with 1-2 rest breaks to increase activity tolerance for ADLs. 5. Patient will complete functional transfers with CGA X1 and adaptive equipment as needed. 6. Patient will tolerate 10 minutes BUE exercises to increase strength for safe, functional transfers.      Timeframe: 7 visits     OCCUPATIONAL THERAPY: Daily Note OT Treatment Day # 2    Petrona Bullock is a 80 y.o. male   PRIMARY DIAGNOSIS: Sepsis with encephalopathy without septic shock ([facility] [address]  Sepsis with encephalopathy without septic shock ([facility] [address] [A41.9, R65.20, G93.40]       [place] / [place] / Product Type: Medicare /   ASSESSMENT:     REHAB RECOMMENDATIONS: CURRENT LEVEL OF FUNCTION:  (Most Recently Demonstrated)   Recommendation to date pending progress:  Setting:   Short-term Rehab  Equipment:    To Be Determined Bathing:   Maximal Assistance UB bathing  Dressing:   Maximal Assistance donning new gown  Feeding/Grooming:   Not tested  Toileting:   Not tested  Functional Mobility:   Maximal Assistance x 2 for sit to stand     ASSESSMENT:  Mr. Bartolome Zepeda continues to present with deficits in overall strength, activity tolerance, ADL performance, and functional mobility. Remains confused and lethargic. Sitting on bed pan upon arrival however did not void. Supine <> sit transfer to EOB with max A. Prop sitting while holding onto bed rails for static EOB sitting balance. Pt able to sit EOB for ~10 minutes and maintain good sitting balance while UB bathing tasks were performed with max A. Sit <> standing with max A x 2; poor standing balance despite constant support and L sided lean. Returned to supine in bed with semi-reclined chair position. No progress towards goals today. Will continue OT efforts as indicated. SUBJECTIVE:   Mr. Juan Wynne states, \"I feel like crap. \"    SOCIAL HISTORY/LIVING ENVIRONMENT:   Support Systems: Spouse/Significant Other    OBJECTIVE:     PAIN: VITAL SIGNS: LINES/DRAINS:   Pre Treatment: Pain Screen  Pain Scale 1: Numeric (0 - 10)  Pain Intensity 1: 0  Post Treatment: 0   Freeman Catheter and IV  O2 Device: Nasal cannula     ACTIVITIES OF DAILY LIVING: I Mod I S SBA CGA Min Mod Max Total NT Comments   BASIC ADLs:              Bathing/ Showering [] [] [] [] [] [] [] [x] [] [] UB bathing   Toileting [] [] [] [] [] [] [] [] [] [x]    Dressing [] [] [] [] [] [] [] [x] [] [] Donning gown   Feeding [] [] [] [] [] [] [] [] [] [x]    Grooming [] [] [] [] [] [] [] [] [] [x]    Personal Device Care [] [] [] [] [] [] [] [] [] [x]    Functional Mobility [] [] [] [] [] [] [] [x] [] [] X 2 for sit to stand   I=Independent, Mod I=Modified Independent, S=Supervision, SBA=Standby Assistance, CGA=Contact Guard Assistance,   Min=Minimal Assistance, Mod=Moderate Assistance, Max=Maximal Assistance, Total=Total Assistance, NT=Not Tested    MOBILITY: I Mod I S SBA CGA Min Mod Max Total  NT x2 Comments:   Supine to sit [] [] [] [] [] [] [] [x] [] [] []    Sit to supine [] [] [] [] [] [] [] [x] [] [] [x]    Sit to stand [] [] [] [] [] [] [] [x] [] [] [x]    Bed to chair [] [] [] [] [] [] [] [] [] [x] []    I=Independent, Mod I=Modified Independent, S=Supervision, SBA=Standby Assistance, CGA=Contact Guard Assistance,   Min=Minimal Assistance, Mod=Moderate Assistance, Max=Maximal Assistance, Total=Total Assistance, NT=Not Tested    BALANCE: Good Fair+ Fair Fair- Poor NT Comments   Sitting Static [] [x] [] [] [] [] Holding onto bed rails    Sitting Dynamic [] [] [] [x] [] []              Standing Static [] [] [] [] [x] []    Standing Dynamic [] [] [] [] [] []      PLAN:   FREQUENCY/DURATION: OT Plan of Care: 3 times/week for duration of hospital stay or until stated goals are met, whichever comes first.    TREATMENT:   TREATMENT:   ($$ Self Care/Home Management: 8-22 mins$$ Neuromuscular Re-Education: 8-22 mins   )  Self Care (10 Minutes): Self care including Upper Body Bathing, Upper Body Dressing and Energy Conservation Training to increase independence and decrease level of assistance required. Neuromuscular Re-education (15 Minutes): Neuromuscular Re-education included Balance Training, Coordination training, Postural training, Sitting balance training and Standing balance training to improve Balance, Coordination and Postural Control.     TREATMENT GRID:  N/A    AFTER TREATMENT POSITION/PRECAUTIONS:  Alarm Activated, Bed, Needs within reach and RN notified    INTERDISCIPLINARY COLLABORATION:  RN/PCT, PT/PTA and OT/MCFARLANE    TOTAL TREATMENT DURATION:  OT Patient Time In/Time Out  Time In: 3447  Time Out: [facility], OT

## 2021-10-11 NOTE — PROGRESS NOTES
Attempted to meet with patient for d/c planning - patient with intermittent confusion - unable to answer questions appropriately at time of visit. Attempted to call patient's spouse, Pelon Shell [phone] to complate assessment/d/c planning. Monique Hollins was currently at a doctor's appointment, but patient's daughter, Kiersten Bain answered her phone and I spoke with them together. Cecille stated that patient was dc'd from [facility] back in August to [facility] but was only there for 1 day. They sent patient to [place] where he was in ICU for quite some time. At d/c from [place] - patient went to [facility] and was there for only a few days before coming back to [facility]. PT/OT have evaluated patient during this admission and are recommending that patient return to rehab @ d/c. Patient's spouse and daughter are on board with patient going to rehab @ d/c but do not want him to return to [facility] if at all possible. They wish for him to go to the [facility] if they can accept him. Will send referral to the [facility]. Also, emailed a rehab choice list to patient's spouse for review in case the [facility] is unable to accept patient @ d/c.     PPD/Covid PCR ordered. CM following. Care Management Interventions  PCP Verified by CM:  Yes  Mode of Transport at Discharge: BLS  Transition of Care Consult (CM Consult): SNF  Partner SNF: Yes  Physical Therapy Consult: Yes  Occupational Therapy Consult: Yes  Speech Therapy Consult: Yes  Support Systems: Spouse/Significant Other  Confirm Follow Up Transport: Family  The Patient and/or Patient Representative was Provided with a Choice of Provider and Agrees with the Discharge Plan?: Yes  Freedom of Choice List was Provided with Basic Dialogue that Supports the Patient's Individualized Plan of Care/Goals, Treatment Preferences and Shares the Quality Data Associated with the Providers?: Yes   Resource Information Provided?: No  Discharge Location  Discharge Placement: Skilled nursing facility

## 2021-10-11 NOTE — PROGRESS NOTES
[facility] CARDIOLOGY PROGRESS NOTE           10/11/2021 5:02 PM    Admit Date: 10/8/2021      Subjective:   confused      Objective:      Vitals:    10/11/21 1416 10/11/21 1455 10/11/21 1500 10/11/21 1625   BP:  (!) 89/69 109/77 (!) 105/59   Pulse: (!) 136 (!) 132 (!) 135 (!) 117   Resp:    19   Temp:    97 °F (36.1 °C)   SpO2:    96%   Weight:       Height:           Data Review:   Recent Labs     10/11/21  0432 10/10/21  0410 10/09/21  0358 10/08/21  1959    138   < >  --    K 4.4 4.4   < >  --    MG  --   --   --  2.5*   BUN 37* 39*   < >  --    CREA 0.88 1.08   < >  --    * 147*   < >  --    WBC 6.4 6.6   < >  --    HGB 9.9* 10.2*   < >  --    HCT 33.2* 33.5*   < >  --    * 172   < >  --     < > = values in this interval not displayed. Assessment/Plan:     Is an 80-year-old gentleman who had a prior history of paroxysmal atrial fibrillation and nonischemic cardiomyopathy. A biventricular ICD was implanted 2 years ago. 1 year ago his left ventricular ejection fraction was up to 40 to 45%. In May he had follow-up with Dr. Morillo Scales he was stable. In August he developed Covid with Covid pneumonia respiratory failure and eventual discharge to a nursing home. His atrial fibrillation was recurrent and persistent since then with rapid rates documented via telemetry. He is admitted on this occasion with persistent respiratory failure rapid atrial fibrillation and congestive heart failure with an echo ejection fraction of 15 to 20%. Today on rounds he does not look well. He seems confused. Has labored breathing. On exam neck veins seem. There are lots of rales. His heart is rate is fast and irregular. There is some edema. It is my impression that he has deteriorated due to persistence of his Covid pneumonia which has resulted in a tachycardia induced recurrence of severe cardiomyopathy.   I believe his IV fluids should be stopped and he is heart rate should be controlled with IV amiodarone and possible AV node ablation along with institution of heart failure therapy    . Issac Hoover MD  10/11/2021 5:02 PM

## 2021-10-11 NOTE — CONSULTS
Palliative Care    Patient: Kam Martínez MRN: [id number]  SSN: [id number]    YOB: 1933  Age: 80 y.o. Sex: male       Date of Request: 10/11/2021  Date of Consult:  10/11/2021  Reason for Consult:  family support and education and goals of care  Requesting Physician: Dr. Candie Shannon     Assessment/Plan:     Principal Diagnosis:    Altered Mental Status R41.82    Additional Diagnoses:   · Debility, Unspecified  R53.81  · Dyspnea  R06.00  · Frailty  R54  · Counseling, Encounter for Medical Advice  Z71.9  · Encounter for Palliative Care  Z51.5    Palliative Performance Scale (PPS):       Medical Decision Making:   Reviewed and summarized labs and imaging from admission. Pt alert with some confusion. Falls asleep during conversation. I reached out to pt wife via phone. Updated on current condition and ongoing treatment. Wife is happy for some improvement. She does not wish for pt to return to prior rehab facility as she feels pt was not safe there. Will notify CM. We discussed code status. She wishes to keep pt FULL code but would not want pt to remain on ventilator if not improving. Will follow loosely     Will discuss findings with members of the interdisciplinary team.      Thank you for this referral.         Subjective:     History obtained from:  Family and Chart    Chief Complaint: altered mental status  History of Present Illness:  80 y. o. male with medical history of NICM, ICD, LBBB, HTN, HLD, GERD, BPH, pAF (eliquis/amio), obesity, T2DM, chronic hypoxemic respiratory failure (4L following covid pna) who presented via ems from post-acute facility with AMS, bradycardia (hr in 30s) and increased WOB. The day PTA, he was started on azithromycin and prednisone for LLL infiltrate.  In the ED, found to have LA 3.1, K+5.2, Scr 1.38, LFTS elevated.  CXR bilateral infiltrates. He was given abx and limited IVF due to his h/o CHF. He was found to be in AFIB RVR.  rec'd dilt bolus 10 mg x1 f/b robert.     Advance Directive: No       Code Status:  Full [address] [place] of : pt spouse would be decision maker    Past Medical History:   Diagnosis Date    Abnormal EKG     Arrhythmia     pacemaker/defib placed 13    CAD (coronary artery disease)     MINIMAL DISEASE     Cardiomyopathy ([address]     Edema     Ankle    GERD (gastroesophageal reflux disease)     Heart failure (HCC)     HLD (hyperlipidemia) 2016    Hypertension     ICD (implantable cardiac defibrillator) in place 2013    LBBB (left bundle branch block) 2016    Prostatitis       Past Surgical History:   Procedure Laterality Date    HX CHOLECYSTECTOMY      HX COLONOSCOPY      HX HEENT      t and     HX OTHER SURGICAL      ing hernia rpr    HX PACEMAKER  2013    St. flora ICD    HX UROLOGICAL  2012    PROSTATE TUMT    AR ABDOMEN SURGERY PROC UNLISTED      cholecystectomy     Family History   Problem Relation Age of Onset    Stroke Mother     Heart Surgery Brother 76        CABG      Social History     Tobacco Use    Smoking status: Former Smoker     Packs/day: 0.25     Years: 10.00     Pack years: 2.50     Quit date: 1965     Years since quittin.1    Smokeless tobacco: Never Used   Substance Use Topics    Alcohol use: No     Prior to Admission medications    Medication Sig Start Date End Date Taking? Authorizing Provider   amiodarone (CORDARONE) 200 mg tablet Take 200 mg by mouth daily. Yes Provider, Historical   ascorbic acid, vitamin C, (VITAMIN C) 500 mg tablet Take 1,000 mg by mouth two (2) times a day. Yes Provider, Historical   azithromycin (Zithromax) 250 mg tablet Take 250 mg by mouth daily. 10/8/21 10/12/21 Yes Provider, Historical   doxycycline (MONODOX) 100 mg capsule Take 100 mg by mouth two (2) times a day. 10/8/21 10/13/21 Yes Provider, Historical   insulin glargine (Lantus U-100 Insulin) 100 unit/mL injection 4 Units by SubCUTAneous route nightly.    Yes Provider, Historical   omeprazole (PRILOSEC) 20 mg capsule Take 20 mg by mouth daily. Yes Provider, Historical   predniSONE (DELTASONE) 20 mg tablet Take 20 mg by mouth daily (with breakfast). 10/7/21 10/12/21 Yes Provider, Historical   albuterol (Ventolin HFA) 90 mcg/actuation inhaler Take 2 Puffs by inhalation every four (4) hours as needed for Wheezing or Shortness of Breath. Use with spacer. Yes Provider, Historical   ZINC SULFATE PO Take 50 mg by mouth daily. Yes Provider, Historical   acetaminophen (TylenoL) 325 mg tablet Take 650 mg by mouth every six (6) hours as needed for Pain. Yes Provider, Historical   furosemide (LASIX) 40 mg tablet TAKE 1 TABLET BY MOUTH DAILY  Patient taking differently: Take 20 mg by mouth daily. 8/12/21   Yaquelin Zambrano MD   Eliquis 5 mg tablet TAKE 1 TABLET BY MOUTH TWICE DAILY 5/31/21   Laine Beck MD   cholecalciferol (Vitamin D3) (1000 Units /25 mcg) tablet Take 2,000 Units by mouth daily. Provider, Historical   tamsulosin (FLOMAX) 0.4 mg capsule Take 1 Cap by mouth daily. Indications: enlarged prostate with urination problem 10/4/19   Alma Govea MD   PROCTOZONE-HC 2.5 % rectal cream Insert 1 Applicator into rectum two (2) times daily as needed for Hemorrhoids. Indications: Pt takes as needed 3/14/16   Provider, Historical   cyanocobalamin (VITAMIN B-12) 500 mcg tablet Take 500 mcg by mouth daily. Provider, Historical   colestipol (COLESTID) 1 gram tablet Take 2 g by mouth nightly. Provider, Historical       Allergies   Allergen Reactions    Pcn [Penicillins] Itching and Swelling     DIFFICULTY SWALLOWING          Review of systems negative with exception of noted above     Objective:     Visit Vitals  BP (!) 89/66   Pulse (!) 121   Temp 97.1 °F (36.2 °C)   Resp 18   Ht 6' 1\" (1.854 m)   Wt 264 lb 14.4 oz (120.2 kg)   SpO2 96%   BMI 34.95 kg/m²        Physical Exam:    General:  Alert, calm.    Eyes:  Conjunctivae/corneas clear    Nose: Nares normal. Septum midline. Neck: Supple, symmetrical, trachea midline, no JVD   Lungs:   Coarse bilateral bs   Heart:  Regular rate and rhythm, no murmur    Abdomen:   Soft, non-tender, non-distended. Positive bowel sounds   Extremities: Normal, atraumatic, no cyanosis or edema   Skin: Skin color, texture, turgor normal. No rash or lesions.    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) ICD-10-CM: U07.1, I43  ICD-9-CM: 425.9, 079.89  10/10/2021 Yes        * (Principal) Sepsis with encephalopathy without septic shock ([facility] [address] ICD-10-CM: A41.9, R65.20, G93.40  ICD-9-CM: 038.9, 995.91, 348.30  10/8/2021 Yes        Paroxysmal atrial fibrillation with RVR (HCC) ICD-10-CM: I48.0  ICD-9-CM: 427.31  10/8/2021 Yes        Toxic metabolic encephalopathy TJZ-55-AP: G92.8  ICD-9-CM: 349.82  10/8/2021 Yes        Acute kidney injury (AYLIN) with acute tubular necrosis (ATN) (HCC) ICD-10-CM: N17.0  ICD-9-CM: 584.5  10/8/2021 Yes        [place], oral ICD-10-CM: B37.0  ICD-9-CM: 112.0  10/8/2021 Yes        Acute liver failure without hepatic coma ICD-10-CM: K72.00  ICD-9-CM: 425  10/8/2021 Yes        Chronic respiratory failure with hypoxia and hypercapnia (HCC) ICD-10-CM: J96.11, J96.12  ICD-9-CM: 518.83, 799.02, 786.09  10/8/2021 Yes        Mild intermittent asthma ICD-10-CM: J45.20  ICD-9-CM: 493.90  10/8/2021 Yes        HFrEF (heart failure with reduced ejection fraction) ([facility]) (Chronic) ICD-10-CM: I50.20  ICD-9-CM: 428.20  10/8/2021 Yes        Type 2 diabetes mellitus, without long-term current use of insulin ([facility] ICD-10-CM: E11.9  ICD-9-CM: 250.00  8/24/2021 Yes        Personal history of COVID-19 (Chronic) ICD-10-CM: Z86.16  ICD-9-CM: V12.09  8/18/2021 Yes        Class 2 severe obesity due to excess calories with serious comorbidity and body mass index (BMI) of 35.0 to 35.9 in adult [facility]) (Chronic) ICD-10-CM: E66.01, Z68.35  ICD-9-CM: 278.01, V85.35  5/7/2018 Yes        Benign non-nodular prostatic hyperplasia with lower urinary tract symptoms (Chronic) ICD-10-CM: N40.1  ICD-9-CM: 600.91  7/27/2017 Yes        LBBB (left bundle branch block) (Chronic) ICD-10-CM: I44.7  ICD-9-CM: 426.3  1/7/2016 Yes        HLD (hyperlipidemia) (Chronic) ICD-10-CM: E78.5  ICD-9-CM: 272.4  1/7/2016 Yes        Nonischemic cardiomyopathy ([address] (Chronic) ICD-10-CM: I42.8  ICD-9-CM: 425.4  2/21/2013 Yes    Overview Signed 5/7/2018 10:18 AM by Ayush Cutler MD     EF 45% 2017   \"severe AI\"              Implantable cardioverter-defibrillator (ICD) in situ (Chronic) ICD-10-CM: Z95.810  ICD-9-CM: V45.02  2/21/2013 Yes    Overview Signed 2/21/2013  7:22 AM by Rosa Alegre León biventricular ICD implanted 2/20/13             HTN (hypertension) (Chronic) ICD-10-CM: I10  ICD-9-CM: 401.9  2/21/2013 Yes              Signed By: Regina Dill MD     October 11, 2021

## 2021-10-11 NOTE — PROGRESS NOTES
VANCO DAILY FOLLOW UP NOTE  [address] [facility] Pharmacokinetic Monitoring Service - Vancomycin    Consulting Provider: Marjan Landeros   Indication: Sepsis of Unknown Etiology  Target Concentration: Goal AUC/ANIKET 400-600 mg*hr/L  Day of Therapy: 4  Additional Antimicrobials: Cefepime    Pertinent Laboratory Values: Wt Readings from Last 1 Encounters:   10/11/21 120.2 kg (264 lb 14.4 oz)     Temp Readings from Last 1 Encounters:   10/11/21 98.2 °F (36.8 °C)     No components found for: PROCAL  Recent Labs     10/11/21  0432 10/10/21  0410 10/09/21  0358 10/08/21  2143 10/08/21  1535 10/08/21  1535 10/08/21  1322 10/08/21  1322   BUN 37* 39* 34*  --    < > 32*  --   --    CREA 0.88 1.08 1.24  --    < > 1.38  --   --    WBC 6.4 6.6 7.1  --    < >  --   --  6.7   PCT  --   --   --   --   --   --   --  <0.05   LAC  --   --  1.2 1.5  --  2.2*   < > 3.1*    < > = values in this interval not displayed. Estimated Creatinine Clearance: 78.8 mL/min (based on SCr of 0.88 mg/dL). Lab Results   Component Value Date/Time    Vancomycin, random 13.5 10/10/2021 04:10 AM     MRSA Nasal Swab: N/A. Non-respiratory infection. .    Plan:  Current dosing regimen is therapeutic, however with improving renal function, potential to become subtherapeutic  Increase dose to 1000 mg q12h  Repeat vancomycin concentration ordered for 1012 @ 0400   Pharmacy will continue to monitor patient and adjust therapy as indicated    Thank you for the consult,  JOSE Sargent  10/11/2021 8:16 AM

## 2021-10-11 NOTE — PROGRESS NOTES
Hospitalist Progress Note   Admit Date:  10/8/2021 12:57 PM   Name:  Madeline Kothari   Age:  80 y.o. Sex:  male  :  3/5/1933   MRN:  [id number]   Room:  [facility]/    Presenting Complaint: No chief complaint on file. Reason(s) for Admission: Sepsis with encephalopathy without septic shock ([facility] [address] [A41.9, R65.20, G93.40]     Hospital Course & Interval History:   80 y.o. male with medical history of NICM, ICD, LBBB, HTN, HLD, GERD, BPH, pAF (eliquis/amio), obesity, T2DM, chronic hypoxemic respiratory failure (4L following covid pna) who presented via ems from post-acute facility with AMS, bradycardia (hr in 30s) and increased WOB. The day PTA, he was started on azithromycin and prednisone for LLL infiltrate. In the ED, found to have LA 3.1, K+5.2, Scr 1.38, LFTS elevated. CXR bilateral infiltrates. He was given abx and limited IVF due to his h/o CHF. He was found to be in AFIB RVR. rec'd dilt bolus 10 mg x1 f/b gitt. Subjective (10/11/21): His mental status is continued to improve. He is participating examined not somnolent. He is somewhat withdrawn and appears depressed. Case management is working on discharge planning. He should be eligible for rehab maybe as early as tomorrow. Assessment & Plan:   * Sepsis with encephalopathy without septic shock [facility])  Source unclear. Suspected urinary source. Thrush in mouth. Procal nil, PNA less likely. Lactic acidosis resolved 10/9. Random cortisol 38, a.m. 54. TSH 1.8.  -vancomycin (10/8-. .), cefepime (10/8-. .)   -Ucx and blood cx NGTD    10/11: Urine culture negative, blood cultures no growth to date with 3 days, will plan for 5-day duration of therapy with antibiotics. Mentation improved, no longer somnolent. Continue therapy as noted above    Paroxysmal atrial fibrillation with RVR (HCC)  s/p dilt bolus 10 mg f/b gtt. troponin, tsh, cortisol, d dimer, electrolytes.   -Consulted cardiology with concern for tachy-artur.   -apixaban    10/11: remains mostly rate controlled, unable to tolerate diltiazem given hypotension, continue current managment    Acute on chronic systolic heart failure ([facility] [address]  Admission BNP 8K now 16k. Was volume overloaded but hypotensive. 10/11: Volume status intact but hypotensive secondary to heart failure, renal function improved, repeat albumin to help improve cardiac function    Acute kidney injury (AYLIN) with acute tubular necrosis (ATN) (HCC)  Resolved    Chronic respiratory failure with hypoxia and hypercapnia (HCC)  2/2 COVID PNA. H/o intermittent asthma. ABG reviewed. -brovana/budesonide nebs. 10/11: remains on 3L NC, wean as tolerated, continue as noted    Acute liver failure without hepatic coma  Suspect ischemic in nature. Prior hepatitis serologies normal. RUQ US unremarkable. Ammonia. -Trend transaminases    10/11: Continued improvement    Thrush, oral  -nystatin    Benign non-nodular prostatic hyperplasia with lower urinary tract symptoms  At risk of urinary retention and infection. UA with LE and ++hyaline casts. Class 2 severe obesity due to excess calories with serious comorbidity and body mass index (BMI) of 35.0 to 35.9 in adult [facility])  Increased risk of all cause mortality. Adds to complexity. 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 Start SSI and FSBG q6h. Goal FSBG 140-180. Personal history of COVID-19  Noted. CXR findings 2/2 COVID versus pulmonary edema. Check PA/LA cxr tomorrow.     LBBB (left bundle branch block)  Complicating care    Implantable cardioverter-defibrillator (ICD) in situ  Complicating care    Nonischemic cardiomyopathy [facility])  Complicating care        Dispo/Discharge Planning:    Short-term rehab tomorrow    Diet:  ADULT DIET Easy to Chew  DVT PPx: On apixaban  Code status: Full Code    Hospital Problems as of 10/11/2021 Date Reviewed: 10/11/2021        Codes Class Noted - Resolved POA    * (Principal) Sepsis with encephalopathy without septic shock ([facility] ICD-10-CM: A41.9, R65.20, G93.40  ICD-9-CM: 038.9, 995.91, 348.30  10/8/2021 - Present Yes        Paroxysmal atrial fibrillation with RVR ([facility] ICD-10-CM: I48.0  ICD-9-CM: 427.31  10/8/2021 - Present Yes        Acute on chronic systolic heart failure ([facility] ICD-10-CM: O57.86  ICD-9-CM: 428.23  10/9/2021 - Present Yes        Acute kidney injury (AYLIN) with acute tubular necrosis (ATN) ([facility] ICD-10-CM: N17.0  ICD-9-CM: 584.5  10/8/2021 - Present Yes        Thrush, oral ICD-10-CM: B37.0  ICD-9-CM: 112.0  10/8/2021 - Present Yes        Acute liver failure without hepatic coma ICD-10-CM: K72.00  ICD-9-CM: 570  10/8/2021 - Present Yes        Chronic respiratory failure with hypoxia and hypercapnia (HCC) ICD-10-CM: J96.11, J96.12  ICD-9-CM: 518.83, 799.02, 786.09  10/8/2021 - Present Yes        Benign non-nodular prostatic hyperplasia with lower urinary tract symptoms (Chronic) ICD-10-CM: N40.1  ICD-9-CM: 600.91  7/27/2017 - Present Yes        Class 2 severe obesity due to excess calories with serious comorbidity and body mass index (BMI) of 35.0 to 35.9 in adult [facility]) (Chronic) ICD-10-CM: E66.01, Z68.35  ICD-9-CM: 278.01, V85.35  5/7/2018 - Present Yes        COVID-19 vaccine series completed ICD-10-CM: Z92.29  ICD-9-CM: V87.49  10/11/2021 - Present Yes        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 [facility]) ICD-10-CM: U07.1, I43  ICD-9-CM: 425.9, 079.89  10/10/2021 - Present Yes        Toxic metabolic encephalopathy WBC-59-EJ: G92.8  ICD-9-CM: 349.82  10/8/2021 - Present Yes        Mild intermittent asthma ICD-10-CM: J45.20  ICD-9-CM: 493.90  10/8/2021 - Present Yes        HFrEF (heart failure with reduced ejection fraction) (HCC) (Chronic) ICD-10-CM: I50.20  ICD-9-CM: 428.20  10/8/2021 - Present Yes        Type 2 diabetes mellitus, without long-term current use of insulin ([facility] ICD-10-CM: E11.9  ICD-9-CM: 250.00  8/24/2021 - Present Yes        Personal history of COVID-19 (Chronic) ICD-10-CM: N16.84  ICD-9-CM: V12.09  8/18/2021 - Present Yes        LBBB (left bundle branch block) (Chronic) ICD-10-CM: I44.7  ICD-9-CM: 426.3  1/7/2016 - Present Yes        HLD (hyperlipidemia) (Chronic) ICD-10-CM: E78.5  ICD-9-CM: 272.4  1/7/2016 - Present Yes        Nonischemic cardiomyopathy ([facility] (Chronic) ICD-10-CM: I42.8  ICD-9-CM: 425.4  2/21/2013 - Present Yes    Overview Signed 5/7/2018 10:18 AM by Yaquelin Zambrano MD     EF 45% 2017   \"severe AI\"              Implantable cardioverter-defibrillator (ICD) in situ (Chronic) ICD-10-CM: Z95.810  ICD-9-CM: V45.02  2/21/2013 - Present Yes    Overview Signed 2/21/2013  7:22 AM by Genet Carbone III     [place] biventricular ICD implanted 2/20/13             HTN (hypertension) (Chronic) ICD-10-CM: I10  ICD-9-CM: 401.9  2/21/2013 - Present Yes              Objective:     Patient Vitals for the past 24 hrs:   Temp Pulse Resp BP SpO2   10/11/21 0834     95 %   10/11/21 0812 98.2 °F (36.8 °C) (!) 107 18 104/62 98 %   10/11/21 0421 97.7 °F (36.5 °C) 98 17 (!) 99/53 99 %   10/11/21 0031 97.6 °F (36.4 °C) 98 20 (!) 93/51 96 %   10/10/21 2119    (!) 93/55    10/10/21 2031     94 %   10/10/21 2028 97.8 °F (36.6 °C) 74 18 97/65 97 %   10/10/21 2027     (!) 85 %   10/10/21 1708 97.8 °F (36.6 °C) 100 18 103/78 99 %   10/10/21 1241 97.4 °F (36.3 °C) 98 18 (!) 117/58 96 %     Oxygen Therapy  O2 Sat (%): 95 % (10/11/21 0834)  Pulse via Oximetry: 90 beats per minute (10/11/21 0834)  O2 Device: Nasal cannula (10/11/21 0834)  O2 Flow Rate (L/min): 3 l/min (10/11/21 0834)  FIO2 (%): 32 % (10/11/21 0834)    Estimated body mass index is 34.95 kg/m² as calculated from the following:    Height as of this encounter: 6' 1\" (1.854 m).    Weight as of this encounter: 120.2 kg (264 lb 14.4 oz). Intake/Output Summary (Last 24 hours) at 10/11/2021 1132  Last data filed at 10/11/2021 0620  Gross per 24 hour   Intake 1955 ml   Output 1000 ml   Net 955 ml       Physical Exam  Vitals and nursing note reviewed. Constitutional:       General: He is sleeping. He is not in acute distress. Appearance: Normal appearance. HENT:      Head: Normocephalic. Eyes:      Extraocular Movements: Extraocular movements intact. Cardiovascular:      Rate and Rhythm: Normal rate. Pulses:           Radial pulses are 2+ on the left side. Comments: BLE trace pitting edema  Pulmonary:      Effort: Pulmonary effort is normal. No respiratory distress. Abdominal:      General: There is no distension. Tenderness: There is no abdominal tenderness. Musculoskeletal:         General: No deformity. Cervical back: No rigidity. Skin:     General: Skin is warm and dry. Neurological:      General: No focal deficit present. Mental Status: He is oriented to person, place, and time and easily aroused. He is confused.    Psychiatric:         Mood and Affect: Mood normal.         Behavior: Behavior normal.         I have reviewed ordered lab tests and independently visualized imaging below:    Recent Labs:  Recent Results (from the past 48 hour(s))   GLUCOSE, POC    Collection Time: 10/09/21 12:24 PM   Result Value Ref Range    Glucose (POC) 136 (H) 65 - 100 mg/dL    Performed by Ken    ECHO ADULT COMPLETE    Collection Time: 10/09/21  2:30 PM   Result Value Ref Range    IVSd 1.15 (A) 0.60 - 1.00 cm    LVIDd 6.81 (A) 4.20 - 5.90 cm    LVIDs 5.10 cm    LVOT d 2.21 cm    LVPWd 1.13 (A) 0.60 - 1.00 cm    LVOT Peak Gradient 4.09 mmHg    LVOT Peak Velocity 99.14 cm/s    RVIDd 4.46 cm    Left Atrium Major Axis 5.66 cm    LA Volume 105.75 18.0 - 58.0 mL    LA Area 4C 29.09 cm2    LA Vol 2C 103.25 (A) 18.00 - 58.00 mL    LA Vol 4C 95.49 (A) 18.00 - 58.00 mL    Aortic Valve Area by Continuity of Peak Velocity 3.58 cm2    Aortic Valve Area by Continuity of Peak Velocity 1.97 cm2    Aortic Valve Area by Continuity of Peak Velocity 3.31 cm2    Aortic Valve Area by Continuity of Peak Velocity 6.04 cm2    Aortic Valve Area by Continuity of Peak Velocity 3.33 cm2    AoV PG 5.18 mmHg    Aortic Valve Systolic Peak Velocity 014.48 cm/s    PISA MR Rad 0.51 cm    Mitral Effective Regurgitant Orifice Area 0.16 cm2    MV regurgitant volume 15.01 mL    TR Max Velocity 386.64 cm/s    Mitral Regurgitant Velocity Time Integral 96.13 cm    Tapse 1.70 1.50 - 2.00 cm    Triscuspid Valve Regurgitation Peak Gradient 24.30 mmHg    TR Max Velocity 245.66 cm/s    AO ASC D 4.33 cm    Ao Root D 4.50 cm    LV Mass .7 88.0 - 224.0 g    LV Mass AL Index 152.4 49.0 - 115.0 g/m2    RVSP 35.0 mmHg    Est. RA Pressure 5.0 mmHg    Left Atrium Minor Axis 2.38 cm    LA Vol Index 44.43 16.00 - 28.00 ml/m2    LA Vol Index 43.38 16.00 - 28.00 ml/m2    LA Vol Index 40.12 16.00 - 28.00 ml/m2   GLUCOSE, POC    Collection Time: 10/09/21  6:13 PM   Result Value Ref Range    Glucose (POC) 187 (H) 65 - 100 mg/dL    Performed by Ken    GLUCOSE, POC    Collection Time: 10/09/21 11:52 PM   Result Value Ref Range    Glucose (POC) 161 (H) 65 - 100 mg/dL    Performed by Migel    METABOLIC PANEL, COMPREHENSIVE    Collection Time: 10/10/21  4:10 AM   Result Value Ref Range    Sodium 138 138 - 145 mmol/L    Potassium 4.4 3.5 - 5.1 mmol/L    Chloride 106 98 - 107 mmol/L    CO2 30 21 - 32 mmol/L    Anion gap 2 (L) 7 - 16 mmol/L    Glucose 147 (H) 65 - 100 mg/dL    BUN 39 (H) 8 - 23 MG/DL    Creatinine 1.08 0.8 - 1.5 MG/DL    GFR est AA >60 >60 ml/min/1.73m2    GFR est non-AA >60 >60 ml/min/1.73m2    Calcium 9.2 8.3 - 10.4 MG/DL    Bilirubin, total 0.9 0.2 - 1.1 MG/DL    ALT (SGPT) 274 (H) 12 - 65 U/L    AST (SGOT) 91 (H) 15 - 37 U/L    Alk.  phosphatase 62 50 - 136 U/L    Protein, total 6.0 (L) 6.3 - 8.2 g/dL    Albumin 3.1 (L) 3.2 - 4.6 g/dL    Globulin 2.9 2.3 - 3.5 g/dL    A-G Ratio 1.1 (L) 1.2 - 3.5     CBC W/O DIFF    Collection Time: 10/10/21  4:10 AM   Result Value Ref Range    WBC 6.6 4.3 - 11.1 K/uL    RBC 3.24 (L) 4.23 - 5.6 M/uL    HGB 10.2 (L) 13.6 - 17.2 g/dL    HCT 33.5 (L) 41.1 - 50.3 %    .4 (H) 79.6 - 97.8 FL    MCH 31.5 26.1 - 32.9 PG    MCHC 30.4 (L) 31.4 - 35.0 g/dL    RDW 16.1 (H) 11.9 - 14.6 %    PLATELET 032 030 - 731 K/uL    MPV 10.3 9.4 - 12.3 FL    ABSOLUTE NRBC 0.00 0.0 - 0.2 K/uL   VANCOMYCIN, RANDOM    Collection Time: 10/10/21  4:10 AM   Result Value Ref Range    Vancomycin, random 13.5 UG/ML   GLUCOSE, POC    Collection Time: 10/10/21  6:02 AM   Result Value Ref Range    Glucose (POC) 140 (H) 65 - 100 mg/dL    Performed by Krishna Oneal    GLUCOSE, POC    Collection Time: 10/10/21 11:28 AM   Result Value Ref Range    Glucose (POC) 196 (H) 65 - 100 mg/dL    Performed by [employer]    GLUCOSE, POC    Collection Time: 10/10/21  4:14 PM   Result Value Ref Range    Glucose (POC) 120 (H) 65 - 100 mg/dL    Performed by [employer]    GLUCOSE, POC    Collection Time: 10/10/21  8:53 PM   Result Value Ref Range    Glucose (POC) 166 (H) 65 - 100 mg/dL    Performed by ShaanJACQUELYN    METABOLIC PANEL, COMPREHENSIVE    Collection Time: 10/11/21  4:32 AM   Result Value Ref Range    Sodium 140 138 - 145 mmol/L    Potassium 4.4 3.5 - 5.1 mmol/L    Chloride 107 98 - 107 mmol/L    CO2 29 21 - 32 mmol/L    Anion gap 4 (L) 7 - 16 mmol/L    Glucose 106 (H) 65 - 100 mg/dL    BUN 37 (H) 8 - 23 MG/DL    Creatinine 0.88 0.8 - 1.5 MG/DL    GFR est AA >60 >60 ml/min/1.73m2    GFR est non-AA >60 >60 ml/min/1.73m2    Calcium 9.0 8.3 - 10.4 MG/DL    Bilirubin, total 0.9 0.2 - 1.1 MG/DL    ALT (SGPT) 221 (H) 12 - 65 U/L    AST (SGOT) 63 (H) 15 - 37 U/L    Alk.  phosphatase 57 50 - 136 U/L    Protein, total 5.4 (L) 6.3 - 8.2 g/dL    Albumin 2.8 (L) 3.2 - 4.6 g/dL    Globulin 2.6 2.3 - 3.5 g/dL    A-G Ratio 1.1 (L) 1.2 - 3.5     CBC W/O DIFF    Collection Time: 10/11/21  4:32 AM   Result Value Ref Range    WBC 6.4 4.3 - 11.1 K/uL    RBC 3.13 (L) 4.23 - 5.6 M/uL    HGB 9.9 (L) 13.6 - 17.2 g/dL    HCT 33.2 (L) 41.1 - 50.3 %    .1 (H) 79.6 - 97.8 FL    MCH 31.6 26.1 - 32.9 PG    MCHC 29.8 (L) 31.4 - 35.0 g/dL    RDW 15.9 (H) 11.9 - 14.6 %    PLATELET 982 (L) 585 - 450 K/uL    MPV 11.1 9.4 - 12.3 FL    ABSOLUTE NRBC 0.03 0.0 - 0.2 K/uL   GLUCOSE, POC    Collection Time: 10/11/21  7:40 AM   Result Value Ref Range    Glucose (POC) 115 (H) 65 - 100 mg/dL    Performed by [email or web address]        All Micro Results     Procedure Component Value Units Date/Time    BLOOD CULTURE [[id number]] Collected: 10/08/21 1343    Order Status: Completed Specimen: Blood Updated: 10/11/21 0750     Special Requests: --        LEFT  FOREARM       Culture result: NO GROWTH 3 DAYS       BLOOD CULTURE [[id number]] Collected: 10/08/21 1535    Order Status: Completed Specimen: Blood Updated: 10/11/21 0750     Special Requests: --        LEFT  HAND       Culture result: NO GROWTH 3 DAYS       CULTURE, URINE [[id number]] Collected: 10/08/21 1356    Order Status: Completed Specimen: Cath Urine Updated: 10/11/21 0776     Special Requests: NO SPECIAL REQUESTS        Culture result:       <10,000 COLONIES/mL MIXED SKIN MARCOS ISOLATED                Other Studies:  No results found.     Current Meds:  Current Facility-Administered Medications   Medication Dose Route Frequency    vancomycin (VANCOCIN) 1,000 mg in 0.9% sodium chloride 250 mL (VIAL-MATE)  1,000 mg IntraVENous Q12H    albumin human 25% (BUMINATE) solution 25 g  25 g IntraVENous Q6H    thiamine HCL (B-1) tablet 100 mg  100 mg Oral DAILY    insulin regular (NOVOLIN R, HUMULIN R) injection   SubCUTAneous AC&HS    cefepime (MAXIPIME) 2 g in 0.9% sodium chloride (MBP/ADV) 100 mL MBP  2 g IntraVENous Q12H    nystatin (MYCOSTATIN) 100,000 unit/mL oral suspension 500,000 Units  500,000 Units Swish and Spit QID    sodium chloride (NS) flush 5-10 mL  5-10 mL IntraVENous Q8H    sodium chloride (NS) flush 5-10 mL  5-10 mL IntraVENous PRN    dilTIAZem (CARDIZEM) 100 mg in 0.9% sodium chloride (MBP/ADV) 100 mL infusion  0-15 mg/hr IntraVENous TITRATE    lactated Ringers infusion  75 mL/hr IntraVENous CONTINUOUS    apixaban (ELIQUIS) tablet 5 mg  5 mg Oral Q12H    sodium chloride (NS) flush 5-40 mL  5-40 mL IntraVENous Q8H    sodium chloride (NS) flush 5-40 mL  5-40 mL IntraVENous PRN    acetaminophen (TYLENOL) tablet 650 mg  650 mg Oral Q6H PRN    Or    acetaminophen (TYLENOL) suppository 650 mg  650 mg Rectal Q6H PRN    polyethylene glycol (MIRALAX) packet 17 g  17 g Oral DAILY PRN    ondansetron (ZOFRAN ODT) tablet 4 mg  4 mg Oral Q8H PRN    Or    ondansetron (ZOFRAN) injection 4 mg  4 mg IntraVENous Q6H PRN    famotidine (PEPCID) tablet 20 mg  20 mg Oral BID    dextrose 40% (GLUTOSE) oral gel 1 Tube  15 g Oral PRN    glucagon (GLUCAGEN) injection 1 mg  1 mg IntraMUSCular PRN    dextrose (D50W) injection syrg 12.5-25 g  25-50 mL IntraVENous PRN    arformoteroL (BROVANA) neb solution 15 mcg  15 mcg Nebulization BID RT    budesonide (PULMICORT) 500 mcg/2 ml nebulizer suspension  500 mcg Nebulization BID RT       Signed:  Suzy Ribeiro MD

## 2021-10-11 NOTE — WOUND CARE
Mid sacrum and each buttock with shallow irregular open areas. Sacral is 6x5x0.2cm with pink base full thickness and each buttock is 2x2cm and partial thickness/ peeling skin. Continue foam dressing every other day and prn. Continue to turn/ offload. Drainage is serous and no odor. Float heels off  Pillows or add heel boots. Will monitor.

## 2021-10-11 NOTE — PROGRESS NOTES
ACUTE PHYSICAL THERAPY GOALS:  (Developed with and agreed upon by patient and/or caregiver.)  (1.) Stephen Blanca  will move from supine to sit and sit to supine  with CONTACT GUARD ASSIST within 7 treatment day(s). (2.) Stephen Blanca will transfer from bed to chair and chair to bed with MINIMAL ASSIST using the least restrictive device within 7 treatment day(s). (3.) Stephen Blanca will ambulate with MINIMAL ASSIST for 30+ feet with the least restrictive device within 7 treatment day(s). (4.) Stephen Blanca will perform standing static and dynamic balance activities x 10 minutes with MINIMAL ASSIST to improve safety within 7 treatment day(s). (5.) Stephen Blanca will perform bilateral lower extremity exercises x 10 min for HEP with SUPERVISION to improve strength, endurance, and functional mobility within 7 treatment day(s). PHYSICAL THERAPY: Daily Note and PM Treatment Day # 2    Stephen Blanca is a 80 y.o. male   PRIMARY DIAGNOSIS: Sepsis with encephalopathy without septic shock ([facility] [address]  Sepsis with encephalopathy without septic shock ([facility] [address] [A41.9, R65.20, G93.40]         ASSESSMENT:     REHAB RECOMMENDATIONS: CURRENT LEVEL OF FUNCTION:  (Most Recently Demonstrated)   Recommendation to date pending progress:  Setting:   Short-term Rehab  Equipment:    To Be Determined Bed Mobility:   Maximal Assistance x 2  Sit to Stand:   Maximal Assistance x 2  Transfers:   Not tested  Gait/Mobility:   Not tested     ASSESSMENT:  Mr. Brigido Goldberg was received sitting at EOB with OT present. Per OT, pt was maxA to sit up. Pt demonstrates confusion and fatigues quickly. He occasionally was able to maintain sitting balance with SBA but consistently took breaks and laid on side due to fatigue. STS attempted, pt required maxA of 2 and demonstrate significant lean towards L side. He was able to tolerate ~ 10 seconds standing. Assisted back to supine with maxA of 2.  Little progress made towards goals today due to increased fatigue. Will continue to follow. SUBJECTIVE:   Mr. Heaven Martini states, \"I am tired. \"    SOCIAL HISTORY/ LIVING ENVIRONMENT: see eval  Support Systems: Spouse/Significant Other  OBJECTIVE:     PAIN: VITAL SIGNS: LINES/DRAINS:   Pre Treatment: Pain Screen  Pain Scale 1: Numeric (0 - 10)  Pain Intensity 1: 0  Post Treatment: 0   Freeman Catheter and IV  O2 Device: Nasal cannula     MOBILITY: I Mod I S SBA CGA Min Mod Max Total  NT x2 Comments:   Bed Mobility    Rolling [] [] [] [] [] [] [] [x] [] [] []    Supine to Sit [] [] [] [] [] [] [] [x] [] [] []    Scooting [] [] [] [] [] [] [] [x] [] [] []    Sit to Supine [] [] [] [] [] [] [] [x] [] [] [x]    Transfers    Sit to Stand [] [] [] [] [] [] [] [x] [] [] [x]    Bed to Chair [] [] [] [] [] [] [] [] [] [x] []    Stand to Sit [] [] [] [] [] [] [] [x] [] [] [x]    I=Independent, Mod I=Modified Independent, S=Supervision, SBA=Standby Assistance, CGA=Contact Guard Assistance,   Min=Minimal Assistance, Mod=Moderate Assistance, Max=Maximal Assistance, Total=Total Assistance, NT=Not Tested    BALANCE: Good Fair+ Fair Fair- Poor NT Comments   Sitting Static [] [] [x] [] [] []    Sitting Dynamic [] [] [] [x] [] []              Standing Static [] [] [] [] [x] []    Standing Dynamic [] [] [] [] [] [x]      GAIT: I Mod I S SBA CGA Min Mod Max Total  NT x2 Comments:   Level of Assistance [] [] [] [] [] [] [] [] [] [x] []    Distance n/a    DME Rolling Walker and Gait Belt    Gait Quality n/a    Weightbearing  Status N/A     I=Independent, Mod I=Modified Independent, S=Supervision, SBA=Standby Assistance, CGA=Contact Guard Assistance,   Min=Minimal Assistance, Mod=Moderate Assistance, Max=Maximal Assistance, Total=Total Assistance, NT=Not Tested    PLAN:   FREQUENCY/DURATION: PT Plan of Care: 3 times/week for duration of hospital stay or until stated goals are met, whichever comes first.  TREATMENT:     TREATMENT:   ($$ Therapeutic Activity: 8-22 mins    )  Co-Treatment PT/OT necessary due to patient's decreased overall endurance/tolerance levels, as well as need for high level skilled assistance to complete functional transfers/mobility and functional tasks  Therapeutic Activity (16 Minutes): Therapeutic activity included Rolling, Sit to Supine, Scooting, Sitting balance  and Standing balance to improve functional Mobility, Strength and Activity tolerance.     TREATMENT GRID:  N/A    AFTER TREATMENT POSITION/PRECAUTIONS:  Bed, Needs within reach and RN notified    INTERDISCIPLINARY COLLABORATION:  RN/PCT and OT/MCFARLANE    TOTAL TREATMENT DURATION:  PT Patient Time In/Time Out  Time In: 1349  Time Out: 1901 Dipesh Jordan PT

## 2021-10-11 NOTE — PROGRESS NOTES
Heart rate increasing to 130's and 140's this afternoon. Discussed with BLADIMIR Cruz. Order received for PO cardizem.   See [facility]

## 2021-10-11 NOTE — PROGRESS NOTES
Problem: Dysphagia (Adult)  Goal:   Outcome: Progressing Towards Goal  LTG: Patient will tolerate least restrictive diet without overt signs or symptoms of airway compromise by discharge. MET 10/11/21  STG: Patient will tolerate regular texture diet and thin liquids without overt signs or symptoms of aspiration 100% of the time. MET 10/11/21  STG: Patient will participate in modified barium swallow study as clinically indicated. NOT INDICATED    SPEECH LANGUAGE PATHOLOGY: DYSPHAGIA- Daily Note and Discharge    NAME/AGE/GENDER: Madeline Kothari is a 80 y.o. male  DATE: 10/11/2021  PRIMARY DIAGNOSIS: Sepsis with encephalopathy without septic shock ([facility] [A41.9, R65.20, G93.40]      ICD-10: Treatment Diagnosis: R13.12 Dysphagia, Oropharyngeal Phase    RECOMMENDATIONS   DIET:    Easy to Chew   Thin Liquids    MEDICATIONS: With liquid     PRECAUTIONS, MODIFICATIONS, AND STRATEGIES  · Slow rate of intake  · Small bites/sips  · Upright at 90 degrees during meal     RECOMMENDATIONS FOR CONTINUED SPEECH THERAPY:   No further speech therapy indicated at this time. ASSESSMENT   Patient tolerating chewable textures, mixed consistencies, and thin liquids without s/sx of airway compromise. He does require increased oral prep time due to missing dentition and was downgraded to easy to chew diet by nursing over the weekend. Given increased oral prep time and patient's lack of complaints with current diet order, will continue easy to chew diet with thin liquids. No additional speech therapy indicated at this time. Please consider re-consult if new concerns arise. EDUCATION:  · Recommendations discussed with Patient and MD    REHABILITATION POTENTIAL FOR STATED GOALS: Good    PLAN    FREQUENCY/DURATION:   Dysphagia goals met. No additional speech therapy indicated at this time. CONTINUATION OF SKILLED SERVICES/MEDICAL NECESSITY:   No additional speech services warranted.      SUBJECTIVE   \"I would like to get a nap, but people keep coming in here and waking me up\". He denies any s/sx of dysphagia with current diet. Oxygen Device: NC  Pain: Pain Scale 1: Numeric (0 - 10)  Pain Intensity 1: 2  Pain Location 1: Back  Pain Intervention(s) 1: Repositioned    Problem List:  (Impairments causing functional limitations):  1. Sepsis, thrush    Previous Dysphagia: NONE REPORTED  Diet Prior to Evaluation: NPO    Orientation:  Person  Place  Time  Situation    OBJECTIVE   Dysphagia treatment: Patient independently consumed 3 ounces of mixed consistencies and 4 ounces of thin liquids via serial straw sips. Mildly prolonged mastication with more fibrous textures due to missing dentition; however, oral prep was functional Appropriate oral clearing after the swallow of solid textures. No cough, throat clear, or change in vocal quality observed. RN reports no difficulty with po meds with liquid was this morning. Per chart review, chest x-ray finding and clinical presentation more consistent with fluid overload rather than aspiration. Aspiration cannot be fully ruled out given impaired alertness earlier this admission, but no concern for dysphagia during session today.      Tool Used: Dysphagia Outcome and Severity Scale (NICKIE)    Score Comments   Normal Diet  [] 7 With no strategies or extra time needed   Functional Swallow  [] 6 May have mild oral or pharyngeal delay   Mild Dysphagia  [] 5 Which may require one diet consistency restricted    Mild-Moderate Dysphagia  [] 4 With 1-2 diet consistencies restricted   Moderate Dysphagia  [] 3 With 2 or more diet consistencies restricted   Moderate-Severe Dysphagia  [] 2 With partial PO strategies (trials with ST only)   Severe Dysphagia  [] 1 With inability to tolerate any PO safely      Score:  Initial: 6 Most Recent: 6 (Date 10/11/21 )   Interpretation of Tool: The Dysphagia Outcome and Severity Scale (NICKIE) is a simple, easy-to-use, 7-point scale developed to systematically rate the functional severity of dysphagia based on objective assessment and make recommendations for diet level, independence level, and type of nutrition.        INTERDISCIPLINARY COLLABORATION: MD    After treatment position/precautions:  · Upright in bed  · MD notified    Total Treatment Duration:   Time In: 6398  Time Out: 5463    BECCA [facility] [employer] [facility], [facility] JAI SCARLETT BULLOCK, CCC-SLP  Speech Language Pathologist  Acute Rehabilitation Services  Contact: Christian

## 2021-10-12 PROBLEM — R65.21 SEPSIS WITH ENCEPHALOPATHY AND SEPTIC SHOCK (HCC): Status: ACTIVE | Noted: 2021-01-01

## 2021-10-12 PROBLEM — J96.92 HYPERCAPNIC RESPIRATORY FAILURE (HCC): Status: ACTIVE | Noted: 2021-01-01

## 2021-10-12 NOTE — PROGRESS NOTES
Hospitalist Progress Note   Admit Date:  10/8/2021 12:57 PM   Name:  Matthew Shrestha   Age:  80 y.o. Sex:  male  :  3/5/1933   MRN:  [id number]   Room:  [facility]/    Presenting Complaint: No chief complaint on file. Reason(s) for Admission: Sepsis with encephalopathy without septic shock ([facility] [address] [A41.9, R65.20, G93.40]     Hospital Course & Interval History:   80 y.o. male with medical history of NICM, ICD, LBBB, HTN, HLD, GERD, BPH, pAF (eliquis/amio), obesity, T2DM, chronic hypoxemic respiratory failure (4L following covid pna) who presented via ems from post-acute facility with AMS, bradycardia (hr in 30s) and increased WOB. The day PTA, he was started on azithromycin and prednisone for LLL infiltrate. In the ED, found to have LA 3.1, K+5.2, Scr 1.38, LFTS elevated. CXR bilateral infiltrates. He was given abx and limited IVF due to his h/o CHF. He was found to be in AFIB RVR. rec'd dilt bolus 10 mg x1 f/b gitt. Subjective (10/12/21): Patient transferred to ICU overnight due to increased lethargy and worsening respiratory status. He had ABG which showed hypercapnia and he was transferred to ICU for BiPAP therapy. He also suffered from worsening hypotension with blood pressures 60s over 40s. He was started on Levophed for hypotension. This morning, patient reportedly more awake and alert and able to open his eyes/respond to commands. ABG improved. He is still on Levophed and was restarted on amiodarone for recurrent Afib with RVR. Assessment & Plan:   * Sepsis with encephalopathy and septic shock [facility])  Source unclear. Suspected urinary source. Thrush in mouth. Procal nil, PNA less likely. Lactic acidosis resolved 10/9. Random cortisol 38, a.m. 54. TSH 1.8.  -vancomycin (10/8-. .), cefepime (10/8-. .)   -Ucx and blood cx NGTD  - Levophed for hypotension     10/12: Urine culture negative, blood cultures no growth to date with 4 days, will plan for 5-day duration of therapy with antibiotics. Mentation worsened due to hypercapnia, improved after BIPAP. Continue therapy as noted above     Paroxysmal atrial fibrillation with RVR (HCC)  s/p dilt bolus 10 mg f/b gtt. troponin, tsh, cortisol, d dimer, electrolytes. -Consulted cardiology with concern for tachy-artur.   -apixaban     10/12:  unable to tolerate diltiazem given hypotension, started on amiodarone gtt by Cards. continue current managment     Acute on chronic systolic heart failure ([facility] [address]  Admission BNP 8K now 16k. Was volume overloaded but hypotensive.     10/12: Volume status slightly hypervolemic with CXR showing persistent bilateral lung infiltrates, stable left pleural effusion. renal function slightly worse, hold on Lasix today with hypotension.      Acute kidney injury (AYLIN) with acute tubular necrosis (ATN) ([facility] [address]  Worsened 10/12 with creatinine 1.55. Likely AYLNI with component of prerenal congestion in setting of heart failure after albumin yesterday. Hold off on Lasix with concurrent hypotension     Chronic respiratory failure with hypoxia and hypercapnia ([facility])  2/2 COVID PNA. H/o intermittent asthma. ABG reviewed. -brovana/budesonide nebs.     10/12: required BIPAP overnight with CO2 88, pH 7.12. Weaned to NC this afternoon with resolving hypercapnia and acidosis. Cont with O2 via NC as able, wean as tolerated, continue as noted     Acute liver failure without hepatic coma  Suspect ischemic vs congestion in nature. Prior hepatitis serologies normal. RUQ US unremarkable. Ammonia. -Trend transaminases     10/12: worsened overnight with albumin and/or worsening hypotensions overnight.     Thrush, oral  -nystatin     Benign non-nodular prostatic hyperplasia with lower urinary tract symptoms  At risk of urinary retention and infection. UA with LE and ++hyaline casts.     Class 2 severe obesity due to excess calories with serious comorbidity and body mass index (BMI) of 35.0 to 35.9 in adult [facility])  Increased risk of all cause mortality. Adds to complexity.      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 Start SSI and FSBG q6h. Goal FSBG 140-180.      Personal history of COVID-19  Noted. CXR findings 2/2 COVID versus pulmonary edema. Check PA/LA cxr tomorrow.     LBBB (left bundle branch block)  Complicating care     Implantable cardioverter-defibrillator (ICD) in situ  Complicating care     Nonischemic cardiomyopathy [facility])  Complicating care           Dispo/Discharge Planning:    Short-term rehab when medically stable. Diet:  ADULT DIET Easy to Chew  DVT PPx: On apixaban  Code status: Full Code     There is a high probability of acute organ impairment or life-threatening deterioration in the patient's condition from: septic shock, hypercapnic respiratory failure    Critical care interventions: Levophed administration, BiPAP therapy    Total critical care time spent: 38 minutes. Time is indicative of direct patient attendance at bedside and on the patient's floor nearby. Includes time spent at bedside performing history and exam, performing chart review, discussing findings and treatment plan with patient and/or family, discussing patient with consultants and colleagues, ordering and reviewing pertinent laboratory and radiographic evaluations, and discussing patient with nursing staff. Time excludes procedures.     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 [facility]) ICD-10-CM: U07.1, I43  ICD-9-CM: 425.9, 079.89  10/10/2021 - Present Yes        * (Principal) Sepsis with encephalopathy without septic shock ([facility] ICD-10-CM: A41.9, R65.20, G93.40  ICD-9-CM: 038.9, 995.91, 348.30  10/8/2021 - Present Yes        Paroxysmal atrial fibrillation with RVR (HCC) ICD-10-CM: I48.0  ICD-9-CM: 427.31  10/8/2021 - Present Yes        Toxic metabolic encephalopathy SDL-32-KD: G92.8  ICD-9-CM: 349.82  10/8/2021 - Present Yes        Acute kidney injury (AYLIN) with acute tubular necrosis (ATN) ([facility] ICD-10-CM: N17.0  ICD-9-CM: 584.5  10/8/2021 - Present Yes        Henreadilson Aliceager, oral ICD-10-CM: B37.0  ICD-9-CM: 112.0  10/8/2021 - Present Yes        Acute liver failure without hepatic coma ICD-10-CM: K72.00  ICD-9-CM: 570  10/8/2021 - Present Yes        Chronic respiratory failure with hypoxia and hypercapnia (HCC) ICD-10-CM: J96.11, J96.12  ICD-9-CM: 518.83, 799.02, 786.09  10/8/2021 - Present Yes        Mild intermittent asthma ICD-10-CM: J45.20  ICD-9-CM: 493.90  10/8/2021 - Present Yes        HFrEF (heart failure with reduced ejection fraction) (HCC) (Chronic) ICD-10-CM: I50.20  ICD-9-CM: 428.20  10/8/2021 - Present Yes        Type 2 diabetes mellitus, without long-term current use of insulin (HCC) ICD-10-CM: E11.9  ICD-9-CM: 250.00  8/24/2021 - Present Yes        Personal history of COVID-19 (Chronic) ICD-10-CM: Z86.16  ICD-9-CM: V12.09  8/18/2021 - Present Yes        Class 2 severe obesity due to excess calories with serious comorbidity and body mass index (BMI) of 35.0 to 35.9 in adult [facility]) (Chronic) ICD-10-CM: E66.01, Z68.35  ICD-9-CM: 278.01, V85.35  5/7/2018 - Present Yes        Benign non-nodular prostatic hyperplasia with lower urinary tract symptoms (Chronic) ICD-10-CM: N40.1  ICD-9-CM: 600.91  7/27/2017 - Present Yes        LBBB (left bundle branch block) (Chronic) ICD-10-CM: I44.7  ICD-9-CM: 426.3  1/7/2016 - Present Yes        HLD (hyperlipidemia) (Chronic) ICD-10-CM: E78.5  ICD-9-CM: 272.4  1/7/2016 - Present Yes        Nonischemic cardiomyopathy ([address] (Chronic) ICD-10-CM: I42.8  ICD-9-CM: 425.4  2/21/2013 - Present Yes    Overview Signed 5/7/2018 10:18 AM by Ursula Azul MD     EF 45% 2017   \"severe AI\"              Implantable cardioverter-defibrillator (ICD) in situ (Chronic) ICD-10-CM: Z95.810  ICD-9-CM: V45.02  2/21/2013 - Present Yes    Overview Signed 2/21/2013  7:22 AM by Taylor Rosenthal III     [place] biventricular ICD implanted 2/20/13             HTN (hypertension) (Chronic) ICD-10-CM: I10  ICD-9-CM: 401.9  2/21/2013 - Present Yes              Objective:     Patient Vitals for the past 24 hrs:   Temp Pulse Resp BP SpO2   10/12/21 1317  100 29 (!) 85/62 99 %   10/12/21 1302  (!) 120 (!) 31 91/62 99 %   10/12/21 1248  (!) 110 24 (!) 83/61 98 %   10/12/21 1233  (!) 101 28 (!) 84/63 97 %   10/12/21 1218  100 27 103/74 99 %   10/12/21 1202  (!) 107 28 92/72 100 %   10/12/21 1148  (!) 116 (!) 32 116/64 100 %   10/12/21 1133 97.5 °F (36.4 °C) 95 17 (!) 95/58 99 %   10/12/21 1117  (!) 108 (!) 36 90/67 100 %   10/12/21 1105  (!) 101 24 (!) 74/63 98 %   10/12/21 1039     97 %   10/12/21 1033  (!) 120 24 (!) 121/55 99 %   10/12/21 1018  (!) 103 27 106/66 100 %   10/12/21 1002  93 16 122/60 99 %   10/12/21 0948  (!) 109 24 112/60 98 %   10/12/21 0933  (!) 123 30 (!) 107/58 100 %   10/12/21 0906  (!) 109 19 (!) 105/56 99 %   10/12/21 0849  (!) 101 13 (!) 75/56 99 %   10/12/21 0832  73 29 (!) 87/63 100 %   10/12/21 0817  (!) 108 25 (!) 86/60 99 %   10/12/21 0802  99 20 (!) 89/62 99 %   10/12/21 0748  94 (!) 41 97/65 100 %   10/12/21 0743  (!) 112 22 100/68 99 %   10/12/21 0733  97 23 (!) 87/50 100 %   10/12/21 0730     100 %   10/12/21 0717  97 24 109/70 99 %   10/12/21 0703 96.8 °F (36 °C) 96 24 90/68 100 %   10/12/21 0648  80 (!) 38 93/70 100 %   10/12/21 0632  (!) 112 (!) 40 (!) 102/57 100 %   10/12/21 0618  93 24 (!) 83/46 96 %   10/12/21 0543  83 25 (!) 73/60 100 %   10/12/21 0533  89 13 (!) 87/60 100 %   10/12/21 0456 97.5 °F (36.4 °C) (!) 101 28 (!) 70/58 100 %   10/12/21 0413    (!) 88/60    10/12/21 0301    (!) 94/58    10/12/21 0218  82  (!) 98/54    10/12/21 0123     95 %   10/12/21 0050  78  (!) 103/58    10/12/21 0031 (!) 96.7 °F (35.9 °C) 79 22 (!) 82/52 97 %   10/12/21 0017    (!) 82/49    10/11/21 2317     98 %   10/11/21 2314  83  (!) 89/49    10/11/21 2307    (!) 88/65    10/11/21 2250  93  (!) 85/56    10/11/21 2149     (!) 77 %   10/11/21 2132  97  (!) 106/55    10/11/21 2006 98.5 °F (36.9 °C) 95 22 95/61 96 %   10/11/21 1902  (!) 133  110/68    10/11/21 1625 97 °F (36.1 °C) (!) 117 19 (!) 105/59 96 %   10/11/21 1500  (!) 135  109/77    10/11/21 1455  (!) 132  (!) 89/69    10/11/21 1416  (!) 136        Oxygen Therapy  O2 Sat (%): 99 % (10/12/21 1317)  Pulse via Oximetry: 117 beats per minute (10/12/21 1317)  O2 Device: Nasal cannula (10/12/21 1133)  Skin Assessment: Clean, dry, & intact (10/12/21 0730)  Skin Protection for O2 Device: No (10/12/21 1039)  O2 Flow Rate (L/min): 10 l/min (10/12/21 0730)  FIO2 (%): 30 % (10/12/21 0730)    Estimated body mass index is 35.62 kg/m² as calculated from the following:    Height as of this encounter: 6' 1\" (1.854 m). Weight as of this encounter: 122.5 kg (270 lb). Intake/Output Summary (Last 24 hours) at 10/12/2021 1334  Last data filed at 10/12/2021 0941  Gross per 24 hour   Intake 1723.18 ml   Output 625 ml   Net 1098.18 ml       Physical Exam  Vitals and nursing note reviewed. Constitutional:       General: He is not in acute distress. Appearance: Normal appearance. He is ill-appearing. HENT:      Head: Normocephalic. Eyes:      Extraocular Movements: Extraocular movements intact. Cardiovascular:      Rate and Rhythm: Tachycardia present. Rhythm irregular.       Pulses:           Radial pulses are 2+ on the left side. Comments: BLE trace pitting edema  Pulmonary:      Effort: Pulmonary effort is normal. No respiratory distress. Breath sounds: No decreased air movement. Examination of the right-middle field reveals decreased breath sounds. Examination of the right-lower field reveals decreased breath sounds. Decreased breath sounds present. Abdominal:      General: There is no distension. Tenderness: There is no abdominal tenderness. Musculoskeletal:         General: No deformity. Cervical back: No rigidity. Skin:     General: Skin is warm and dry. Neurological:      General: No focal deficit present. Mental Status: He is oriented to person, place, and time and easily aroused. He is confused. Psychiatric:         Mood and Affect: Mood normal.         Behavior: Behavior normal.         I have reviewed ordered lab tests and independently visualized imaging below:    Recent Labs:  Recent Results (from the past 48 hour(s))   GLUCOSE, POC    Collection Time: 10/10/21  4:14 PM   Result Value Ref Range    Glucose (POC) 120 (H) 65 - 100 mg/dL    [facility]    GLUCOSE, POC    Collection Time: 10/10/21  8:53 PM   Result Value Ref Range    Glucose (POC) 166 (H) 65 - 100 mg/dL    Performed by [facility]    METABOLIC PANEL, COMPREHENSIVE    Collection Time: 10/11/21  4:32 AM   Result Value Ref Range    Sodium 140 138 - 145 mmol/L    Potassium 4.4 3.5 - 5.1 mmol/L    Chloride 107 98 - 107 mmol/L    CO2 29 21 - 32 mmol/L    Anion gap 4 (L) 7 - 16 mmol/L    Glucose 106 (H) 65 - 100 mg/dL    BUN 37 (H) 8 - 23 MG/DL    Creatinine 0.88 0.8 - 1.5 MG/DL    GFR est AA >60 >60 ml/min/1.73m2    GFR est non-AA >60 >60 ml/min/1.73m2    Calcium 9.0 8.3 - 10.4 MG/DL    Bilirubin, total 0.9 0.2 - 1.1 MG/DL    ALT (SGPT) 221 (H) 12 - 65 U/L    AST (SGOT) 63 (H) 15 - 37 U/L    Alk.  phosphatase 57 50 - 136 U/L    Protein, total 5.4 (L) 6.3 - 8.2 g/dL    Albumin 2.8 (L) 3.2 - 4.6 g/dL Globulin 2.6 2.3 - 3.5 g/dL    A-G Ratio 1.1 (L) 1.2 - 3.5     CBC W/O DIFF    Collection Time: 10/11/21  4:32 AM   Result Value Ref Range    WBC 6.4 4.3 - 11.1 K/uL    RBC 3.13 (L) 4.23 - 5.6 M/uL    HGB 9.9 (L) 13.6 - 17.2 g/dL    HCT 33.2 (L) 41.1 - 50.3 %    .1 (H) 79.6 - 97.8 FL    MCH 31.6 26.1 - 32.9 PG    MCHC 29.8 (L) 31.4 - 35.0 g/dL    RDW 15.9 (H) 11.9 - 14.6 %    PLATELET 122 (L) 229 - 450 K/uL    MPV 11.1 9.4 - 12.3 FL    ABSOLUTE NRBC 0.03 0.0 - 0.2 K/uL   GLUCOSE, POC    Collection Time: 10/11/21  7:40 AM   Result Value Ref Range    Glucose (POC) 115 (H) 65 - 100 mg/dL    Performed by Anil    GLUCOSE, POC    Collection Time: 10/11/21 11:37 AM   Result Value Ref Range    Glucose (POC) 123 (H) 65 - 100 mg/dL    Performed by Arsen Rodrigues    GLUCOSE, POC    Collection Time: 10/11/21  4:13 PM   Result Value Ref Range    Glucose (POC) 151 (H) 65 - 100 mg/dL    Performed by Anil    COVID-19 RAPID TEST    Collection Time: 10/11/21  4:29 PM   Result Value Ref Range    Specimen source Nasopharyngeal      COVID-19 rapid test Not detected NOTD     GLUCOSE, POC    Collection Time: 10/11/21  9:37 PM   Result Value Ref Range    Glucose (POC) 272 (H) 65 - 100 mg/dL    Performed by Drew    BLOOD GAS, ARTERIAL POC    Collection Time: 10/11/21 10:18 PM   Result Value Ref Range    Device: NASAL CANNULA      FIO2 (POC) 60 %    pH (POC) 7.12 (LL) 7.35 - 7.45      pCO2 (POC) 88.0 (HH) 35 - 45 MMHG    pO2 (POC) 346 (H) 75 - 100 MMHG    HCO3 (POC) 28.7 (H) 22 - 26 MMOL/L    sO2 (POC) 99.9 (H) 95 - 98 %    Base deficit (POC) 2.6 mmol/L    Allens test (POC) Positive      Site LEFT RADIAL      Specimen type (POC) ARTERIAL      Performed by Marceilna     CO2, POC 30 (H) 13 - 23 MMOL/L    Critical value read back [email or web address]     Respiratory comment: 10L_Nasal_Cannula    BLOOD GAS, ARTERIAL POC    Collection Time: 10/12/21  1:19 AM   Result Value Ref Range    Device: BIPAP MASK      FIO2 (POC) 40 %    pH (POC) 7.37 7.35 - 7.45      pCO2 (POC) 50.7 (H) 35 - 45 MMHG    pO2 (POC) 79 75 - 100 MMHG    HCO3 (POC) 29.2 (H) 22 - 26 MMOL/L    sO2 (POC) 95.0 95 - 98 %    Base excess (POC) 3.1 mmol/L    Tidal volume 500 ml    PEEP/CPAP (POC) 8 cmH2O    PIP (POC) 16      Allens test (POC) Positive      Inspiratory Time 1 sec    Site LEFT RADIAL      Specimen type (POC) ARTERIAL      Performed by Marcelina     CO2, POC 30 (H) 13 - 23 MMOL/L    Respiratory comment: [id number]    METABOLIC PANEL, COMPREHENSIVE    Collection Time: 10/12/21  2:13 AM   Result Value Ref Range    Sodium 140 136 - 145 mmol/L    Potassium 5.3 (H) 3.5 - 5.1 mmol/L    Chloride 106 98 - 107 mmol/L    CO2 32 21 - 32 mmol/L    Anion gap 2 (L) 7 - 16 mmol/L    Glucose 84 65 - 100 mg/dL    BUN 44 (H) 8 - 23 MG/DL    Creatinine 1.55 (H) 0.8 - 1.5 MG/DL    GFR est AA 55 (L) >60 ml/min/1.73m2    GFR est non-AA 45 (L) >60 ml/min/1.73m2    Calcium 9.1 8.3 - 10.4 MG/DL    Bilirubin, total 1.5 (H) 0.2 - 1.1 MG/DL    ALT (SGPT) 355 (H) 12 - 65 U/L    AST (SGOT) 241 (H) 15 - 37 U/L    Alk.  phosphatase 68 50 - 136 U/L    Protein, total 5.6 (L) 6.3 - 8.2 g/dL    Albumin 3.3 3.2 - 4.6 g/dL    Globulin 2.3 2.3 - 3.5 g/dL    A-G Ratio 1.4 1.2 - 3.5     VANCOMYCIN, RANDOM    Collection Time: 10/12/21  2:13 AM   Result Value Ref Range    Vancomycin, random 23.0 UG/ML   LACTIC ACID    Collection Time: 10/12/21  2:13 AM   Result Value Ref Range    Lactic acid 2.1 (H) 0.4 - 2.0 MMOL/L   CBC W/O DIFF    Collection Time: 10/12/21  2:13 AM   Result Value Ref Range    WBC 7.2 4.3 - 11.1 K/uL    RBC 3.13 (L) 4.23 - 5.6 M/uL    HGB 10.1 (L) 13.6 - 17.2 g/dL    HCT 33.5 (L) 41.1 - 50.3 %    .0 (H) 79.6 - 97.8 FL    MCH 32.3 26.1 - 32.9 PG    MCHC 30.1 (L) 31.4 - 35.0 g/dL    RDW 15.9 (H) 11.9 - 14.6 %    PLATELET 253 (L) 712 - 450 K/uL    MPV 10.9 9.4 - 12.3 FL    ABSOLUTE NRBC 0.03 0.0 - 0.2 K/uL   AMMONIA    Collection Time: 10/12/21  2:13 AM   Result Value Ref Range    Ammonia 26 11 - 32 UMOL/L   GLUCOSE, POC    Collection Time: 10/12/21  8:54 AM   Result Value Ref Range    Glucose (POC) 105 (H) 65 - 100 mg/dL    Performed by Kay    LACTIC ACID    Collection Time: 10/12/21  9:06 AM   Result Value Ref Range    Lactic acid 1.0 0.4 - 2.0 MMOL/L   EKG, 12 LEAD, SUBSEQUENT    Collection Time: 10/12/21 11:19 AM   Result Value Ref Range    Ventricular Rate 109 BPM    Atrial Rate 102 BPM    QRS Duration 152 ms    Q-T Interval 398 ms    QTC Calculation (Bezet) 535 ms    Calculated R Axis -60 degrees    Calculated T Axis 115 degrees    Diagnosis       Underlying rhyhtm atrial fibrillation with RVR with demand pacing  Left axis deviation  Non-specific intra-ventricular conduction block  Possible Lateral infarct , age undetermined  Abnormal ECG  When compared with ECG of 12-OCT-2021 11:18,  Demand pacing noted  Confirmed by Eda Fonseca MD (), NAOMI ([id number]) on 10/12/2021 1:00:47 PM     GLUCOSE, POC    Collection Time: 10/12/21  1:23 PM   Result Value Ref Range    Glucose (POC) 117 (H) 65 - 100 mg/dL    Performed by Angela Tamayo        All Micro Results     Procedure Component Value Units Date/Time    BLOOD CULTURE [[id number]] Collected: 10/08/21 1343    Order Status: Completed Specimen: Blood Updated: 10/12/21 0931     Special Requests: --        LEFT  FOREARM       Culture result: NO GROWTH 4 DAYS       BLOOD CULTURE [[id number]] Collected: 10/08/21 1535    Order Status: Completed Specimen: Blood Updated: 10/12/21 0931     Special Requests: --        LEFT  HAND       Culture result: NO GROWTH 4 DAYS       COVID-19 RAPID TEST [[id number]] Collected: 10/11/21 1629    Order Status: Completed Specimen: Nasopharyngeal Updated: 10/11/21 1717     Specimen source Nasopharyngeal        COVID-19 rapid test Not detected        Comment:      The specimen is NEGATIVE for SARS-CoV-2, the novel coronavirus associated with COVID-19.   A negative result does not rule out COVID-19. This test has been authorized by the FDA under an Emergency Use Authorization (EUA) for use by authorized laboratories. Fact sheet for Healthcare Providers: ConventionUpdate.co.nz  Fact sheet for Patients: ConventionUpdate.co.nz       Methodology: Isothermal Nucleic Acid Amplification         CULTURE, URINE [[id number]] Collected: 10/08/21 1356    Order Status: Completed Specimen: Cath Urine Updated: 10/11/21 0744     Special Requests: NO SPECIAL REQUESTS        Culture result:       <10,000 COLONIES/mL MIXED SKIN MARCOS ISOLATED                Other Studies:  XR CHEST SNGL V    Result Date: 10/12/2021  EXAMINATION: One view chest HISTORY: new cvc TECHNIQUE: Frontal chest. COMPARISON: 10/11/2021 FINDINGS:  The endotracheal tube has been removed. A right-sided central venous catheter is in place. The tip lies over the superior vena cava at about the level of the hilum. Persistent bilateral lung infiltrates. There is no significant pneumothorax. There is a stable left pleural effusion. The heart is unchanged. No other significant interval changes. 1. Findings as described above. XR CHEST SNGL V    Result Date: 10/12/2021  EXAMINATION: One view chest HISTORY: hypoxia TECHNIQUE: Frontal chest. COMPARISON: No Comparison FINDINGS:  An endotracheal tube has been placed. The tip lies 5.67 m proximal to the deena. Stable left-sided cardiac device. Persistent bilateral lung infiltrates, increased on the right. There is no significant pneumothorax. There is persistent silhouetting of the left hemidiaphragm suggesting small pleural effusion. The heart is unchanged. No other significant interval changes. 1. Findings as described above.        Current Meds:  Current Facility-Administered Medications   Medication Dose Route Frequency    NOREPINephrine (LEVOPHED) 4 mg in 5% dextrose 250 mL infusion  0.5-30 mcg/min IntraVENous TITRATE    Vancomycin Intermittent Dosing per Rx   Other Rx Dosing/Monitoring    [START ON 10/13/2021] Vancomycin Random Level Reminder   Other ONCE    [START ON 10/13/2021] cefepime (MAXIPIME) 2 g in 0.9% sodium chloride (MBP/ADV) 100 mL MBP  2 g IntraVENous Q24H    tuberculin injection 5 Units  5 Units IntraDERMal ONCE    amiodarone (CORDARONE) 450 mg in dextrose 5% 250 mL infusion  0.5 mg/min IntraVENous CONTINUOUS    [Held by provider] furosemide (LASIX) injection 40 mg  40 mg IntraVENous Q12H    spironolactone (ALDACTONE) tablet 25 mg  25 mg Oral DAILY    carvediloL (COREG) tablet 3.125 mg  3.125 mg Oral BID WITH MEALS    albuterol-ipratropium (DUO-NEB) 2.5 MG-0.5 MG/3 ML  3 mL Nebulization Q4H PRN    thiamine HCL (B-1) tablet 100 mg  100 mg Oral DAILY    insulin regular (NOVOLIN R, HUMULIN R) injection   SubCUTAneous AC&HS    nystatin (MYCOSTATIN) 100,000 unit/mL oral suspension 500,000 Units  500,000 Units Swish and Spit QID    sodium chloride (NS) flush 5-10 mL  5-10 mL IntraVENous Q8H    sodium chloride (NS) flush 5-10 mL  5-10 mL IntraVENous PRN    apixaban (ELIQUIS) tablet 5 mg  5 mg Oral Q12H    sodium chloride (NS) flush 5-40 mL  5-40 mL IntraVENous Q8H    sodium chloride (NS) flush 5-40 mL  5-40 mL IntraVENous PRN    acetaminophen (TYLENOL) tablet 650 mg  650 mg Oral Q6H PRN    Or    acetaminophen (TYLENOL) suppository 650 mg  650 mg Rectal Q6H PRN    polyethylene glycol (MIRALAX) packet 17 g  17 g Oral DAILY PRN    ondansetron (ZOFRAN ODT) tablet 4 mg  4 mg Oral Q8H PRN    Or    ondansetron (ZOFRAN) injection 4 mg  4 mg IntraVENous Q6H PRN    famotidine (PEPCID) tablet 20 mg  20 mg Oral BID    dextrose 40% (GLUTOSE) oral gel 1 Tube  15 g Oral PRN    glucagon (GLUCAGEN) injection 1 mg  1 mg IntraMUSCular PRN    dextrose (D50W) injection syrg 12.5-25 g  25-50 mL IntraVENous PRN    arformoteroL (BROVANA) neb solution 15 mcg  15 mcg Nebulization BID RT    budesonide (PULMICORT) 500 mcg/2 ml nebulizer suspension  500 mcg Nebulization BID RT       Signed:  Lorena Bowie MD

## 2021-10-12 NOTE — PROGRESS NOTES
Repeat ABG results reported to Dr. Mary Jefferson. Dr. Ghazala Campbell at bedside recommends changing patient to CPAP and repeating ABG at 0500.

## 2021-10-12 NOTE — PROGRESS NOTES
Patient transferred from 3rd floor telemetry to BMT/ ICU related to decline in patients medical status. Patient will need to be re-evaluated with PT and OT when medically stable. Will continue to follow for discharge planning needs  Please consult  if any new issues arise   Discharge plan is uncertain at this time.

## 2021-10-12 NOTE — PROGRESS NOTES
[facility] CARDIOLOGY PROGRESS NOTE           10/12/2021 5:31 PM    Admit Date: 10/8/2021      Subjective:   Patient developed worsening hypoxic hypercapnic respiratory failure overnight. Patient was transferred to the ICU. He was noted to be hypotensive requiring central line placement and initiation of pressor support. We are following patient for atrial fibrillation with rapid ventricular response. ROS:  Cardiovascular:  As noted above    Objective:      Vitals:    10/12/21 1633 10/12/21 1648 10/12/21 1702 10/12/21 1718   BP: 90/63 99/62 96/63 105/64   Pulse: (!) 112 (!) 104 (!) 120 (!) 109   Resp: (!) 31 (!) 31 (!) 32 (!) 39   Temp:       SpO2: 99% 99% 97% 99%   Weight:       Height:           Physical Exam:  General-acutely ill in appearance  Neck- supple, no JVD  CV-irregular and tachycardic  Lung-diminished bilaterally  Abd- soft, nontender, nondistended  Ext- no edema bilaterally. Skin- warm and dry    Data Review:   Recent Labs     10/12/21  0213 10/11/21  0432    140   K 5.3* 4.4   BUN 44* 37*   CREA 1.55* 0.88   GLU 84 106*   WBC 7.2 6.4   HGB 10.1* 9.9*   HCT 33.5* 33.2*   * 143*     Interpretation Summary    · Image quality for this study was technically difficult. · LV: Estimated LVEF is 15 - 20%. Normal wall thickness. Dilated left ventricle. Severely and globally reduced systolic function. Left ventricular diastolic dysfunction. · RV: Mildly dilated right ventricle. Mildly reduced systolic function. Pacer/ICD present. 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 Ascending aorta diameter = 4.3 cm. · TV: Mild tricuspid valve regurgitation is present. · AV: Mild aortic valve regurgitation is present. · MV: Mitral valve non-specific thickening. Mild to moderate mitral valve regurgitation is present.        Assessment/Plan:     Principal Problem:    Sepsis with encephalopathy and septic shock ([address] (10/8/2021)  Patient appears to be declining clinically. Management per primary team    Active Problems:    Acute on chronic systolic heart failure ([address] (10/9/2021)  In the face of acute clinical decline patient heart failure will be difficult to manage. Overall prognosis is poor. Nonischemic cardiomyopathy ([address] (2/21/2013)  See above. Implantable cardioverter-defibrillator (ICD) in situ (2/21/2013)  Normal function      HTN (hypertension) (2/21/2013)  Patient now hypotensive on pressor support      LBBB (left bundle branch block) (1/7/2016)  Chronic      HLD (hyperlipidemia) (1/7/2016)  Chronic      Benign non-nodular prostatic hyperplasia with lower urinary tract symptoms (7/27/2017)  Chronic      Class 2 severe obesity due to excess calories with serious comorbidity and body mass index (BMI) of 35.0 to 35.9 in adult [facility]) (5/7/2018)  Chronic      Personal history of COVID-19 (8/18/2021)  Chronic      Type 2 diabetes mellitus, without long-term current use of insulin ([address] (8/24/2021)  Chronic and managed per primary team      Paroxysmal atrial fibrillation with RVR ([address] (10/8/2021)  Patient now with persistent atrial fibrillation. Restart amiodarone IV with bolus. Heart rate did improve to appropriate numbers with amiodarone drip. Patient's overall clinical prognosis is poor.       Toxic metabolic encephalopathy (18/6/7127)  Managed per primary team      Acute kidney injury (AYLIN) with acute tubular necrosis (ATN) ([address] (10/8/2021)  Managed per primary team      Thrush, oral (10/8/2021)  Managed per primary team      Acute liver failure without hepatic coma (10/8/2021)  Managed per primary team      Chronic respiratory failure with hypoxia and hypercapnia ([address] (10/8/2021)  Patient now on BiPAP      Mild intermittent asthma (10/8/2021)  Managed per primary team      HFrEF (heart failure with reduced ejection fraction) ([address] (10/8/2021)  See above      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

## 2021-10-12 NOTE — PROGRESS NOTES
Patient more lethargic. RT at bedside, increased oxygen from 3 L (O2 sat 78%) to 10 L (O2 sat 97%). ICU outreach RN and Charge RN at bedside. /55, , Temp 99.3, Resp 32. ABG complete. Results to Dr. Gerardo Humphreys. New orders received.

## 2021-10-12 NOTE — PROGRESS NOTES
VANCO DAILY FOLLOW UP NOTE  [address] Pharmacokinetic Monitoring Service - Vancomycin    Consulting Provider: Valerie Kat   Indication: Sepsis of Unknown Etiology  Target Concentration: Goal AUC/ANIKET 400-600 mg*hr/L  Day of Therapy: 4  Additional Antimicrobials: Cefepime    Pertinent Laboratory Values: Wt Readings from Last 1 Encounters:   10/12/21 122.5 kg (270 lb)     Temp Readings from Last 1 Encounters:   10/12/21 96.8 °F (36 °C)     No components found for: PROCAL  Recent Labs     10/12/21  0906 10/12/21  0213 10/11/21  0432 10/10/21  0410   BUN  --  44* 37* 39*   CREA  --  1.55* 0.88 1.08   WBC  --  7.2 6.4 6.6   LAC 1.0 2.1*  --   --      Estimated Creatinine Clearance: 45.2 mL/min (A) (based on SCr of 1.55 mg/dL (H)). Lab Results   Component Value Date/Time    Vancomycin, random 23.0 10/12/2021 02:13 AM     MRSA Nasal Swab: N/A. Non-respiratory infection. .    Plan:  Scr with jump to 1.55 this morning after showing improvement. Change to intermittent dosing currently. Will recheck level with morning labs and continue dosing from there.     Pharmacy will continue to monitor patient and adjust therapy as indicated    Thank you for the consult,  Analy Bartholomew, PharmD, [address]  Pharmacist  [phone]

## 2021-10-12 NOTE — PROGRESS NOTES
Dr. Ramses Martinez notified of pt's amio gtt being held. Order received to give amio bolus and restart amio gtt.

## 2021-10-12 NOTE — PROGRESS NOTES
Pt on BiPAP AVAPS mode:       10/12/21 0123   Oxygen Therapy   O2 Sat (%) 95 %   Pulse via Oximetry 78 beats per minute   O2 Device BIPAP   CPAP/BIPAP   Device Mode AVAP   AVAP Set Resp Rate 24   AVAP Set VT (ml) 500   PIP Observed 18 cm H20   EPAP (cm H2O) 8 cm H2O   Inspiratory Time (sec) 1 seconds   Vt Spont (ml) 528 ml   Ve Observed (l/min) 12.7 l/min   Total RR (Spontaneous) 24 breaths per minute   Insp Rise Time (sec) 3   Leak (Estimated) 28 L/min   Alarm Settings   High Pressure 50   Low Pressure 2   Apnea 20   Low Ve 3   High Rate 50   Low Rate 8

## 2021-10-12 NOTE — PROCEDURES
Procedure:   US GUIDED CENTRAL LINE PLACEMENT    Indication:    hemodynamic shock  Summary:  Informed consent was obtained. A time-out was performed. Using the Sonosite ultrasound with the 5-10 MHz vascular probe transducer and sterile technique, the RIJ vein was identified and under direct real time visualization was cannulated. The CVC kit guide wire was then inserted and its position within the RIJ vein verified in short and long axis views on vascular probe US. Using Seldinger technique, a quad lumen catheter was then placed in the R IJ  vein, after dilation of entry port without difficulty. There was no significant bleeding during the procedure and good flush and drawback was noted. The CVC was then affixed with supplied 2.0 silk sutures via the proximal and distal limiters, with the insertion point affixed at 16 cm. A biostatic disc was applied to the entry port followed by a transparent dressing. A chest x-ray is ordered to confirm proper placement. There were no immediate complications.     Rosa Fabian MD

## 2021-10-12 NOTE — PROGRESS NOTES
Occupational Therapy Note:  Therapist is discharging patient from OT at this time due to decline in medical status and transfer to ICU. Please reconsult OT when MD deems patient appropriate for continued services. Thank you.   Fredrick Ramachandran, OTR/L

## 2021-10-12 NOTE — PROGRESS NOTES
TRANSFER - OUT REPORT:    Verbal report given to Natalie Palacios RN on Edinson Us  being transferred to [facility] for change in patient condition(requiring BiPAP)       Report consisted of patients Situation, Background, Assessment and Recommendations(SBAR). Information from the following report(s) SBAR, Kardex, Intake/Output, MAR, Recent Results and Cardiac Rhythm A Fib was reviewed with the receiving nurse. Opportunity for questions and clarification was provided.

## 2021-10-12 NOTE — PROGRESS NOTES
Physical Therapy Note:    Therapist is discontinuing acute PT services secondary to transfer to the ICU. Please re-consult acute PT services when medically appropriate.  Thank you,    Faith Ruelas, PT, DPT

## 2021-10-12 NOTE — PROGRESS NOTES
Dr. Juanita Farley notified of Pt's BP 60's/40's and HR= 64. Orders to stop amio gtt for now. Orders for Levophed for blood pressure control and stop lasix for now.

## 2021-10-12 NOTE — PROGRESS NOTES
[facility] CRITICAL CARE OUTREACH NURSE PROGRESS REPORT      SUBJECTIVE: Called to assess patient secondary to outreach protocol. MEWS Score: 3 (10/11/21 2006)  Vitals:    10/11/21 1625 10/11/21 1902 10/11/21 2006 10/11/21 2149   BP: (!) 105/59 110/68 95/61    Pulse: (!) 117 (!) 133 95    Resp: 19  22    Temp: 97 °F (36.1 °C)  98.5 °F (36.9 °C)    SpO2: 96%  96% (!) 77%   Weight:       Height:            LAB DATA:    Recent Labs     10/11/21  0432 10/10/21  0410 10/09/21  0358    138 139   K 4.4 4.4 5.3*    106 107   CO2 29 30 29   AGAP 4* 2* 3*   * 147* 143*   BUN 37* 39* 34*   CREA 0.88 1.08 1.24   GFRAA >60 >60 >60   GFRNA >60 >60 58*   CA 9.0 9.2 9.3   ALB 2.8* 3.1* 2.7*   TP 5.4* 6.0* 5.9*   GLOB 2.6 2.9 3.2   AGRAT 1.1* 1.1* 0.8*   * 274* 354*        Recent Labs     10/11/21  0432 10/10/21  0410 10/09/21  0358   WBC 6.4 6.6 7.1   HGB 9.9* 10.2* 10.7*   HCT 33.2* 33.5* 35.6*   * 172 198          OBJECTIVE: On arrival to room, I found patient to be in bed with primary RN and RT at bedside. Pain Assessment  Pain Intensity 1: 0 (10/11/21 1940)  Pain Location 1: Back  Pain Intervention(s) 1: Repositioned  Patient Stated Pain Goal: 0                                 ASSESSMENT:  Pt is lethargic and answers questions to sternal rub. BP stable, HR; 110's Afib and O2 sat=100% on 10L nasal cannula. ABG obtained. Pt retaining CO2. Suggested request MD for Bipap for correction, lower O2 concentration and repeat ABG in a few hours. PLAN:  Will continue to monitor per protocol.

## 2021-10-12 NOTE — PROGRESS NOTES
Bedside and Verbal shift change report given to Wilberto Aceves RN (oncoming nurse) by Zulema Crowder RN (offgoing nurse). Report included the following information SBAR, Kardex, ED Summary, Intake/Output, MAR, Recent Results and Cardiac Rhythm Afib/V paced. Pt resting in bed on bipap 35%. Pt opens eyes to voice and follows commands. PERRLA 3 mm. Lung sounds diminished. Afib/V paced on monitor HR 96 and BP 90/68. Stomach obese and soft upon palpation with hypoactive bowel sounds. Freeman in place. Prevalon boots to BLE. Pt shakes head no when asked if in pain. Levo gtt @ 7 mcg/min.

## 2021-10-12 NOTE — PROGRESS NOTES
Pt placed on BiPAP AVAPS mode post ABG:       10/11/21 5727   Oxygen Therapy   O2 Sat (%) 98 %   Pulse via Oximetry 83 beats per minute   O2 Device BIPAP   FIO2 (%) 60 %   CPAP/BIPAP   Device Mode AVAP   AVAP Set Resp Rate 18   AVAP Set VT (ml) 500   Mask Type and Size Large; Full face   Skin Condition Intact   PIP Observed 15 cm H20   EPAP (cm H2O) 8 cm H2O   Inspiratory Time (sec) 1.1 seconds   Vt Spont (ml) 460 ml   Ve Observed (l/min) 8.4 l/min   Total RR (Spontaneous) 18 breaths per minute   Insp Rise Time (sec) 3   Leak (Estimated) 39 L/min   Alarm Settings   High Pressure 50   Low Pressure 3   Apnea 20   Low Ve 3   High Rate 50   Low Rate 8

## 2021-10-12 NOTE — PROGRESS NOTES
Attempted to call pt's wife, Betsey Feliciano, at [phone] to notify change in pt condition. Wife did not answer and voicemail left to return call at [phone] immediately.

## 2021-10-13 NOTE — PROGRESS NOTES
VANCO DAILY FOLLOW UP RENAL INSUFFICIENCY PATIENT   [address] [facility] Pharmacokinetic Monitoring Service - Vancomycin    Consulting Provider: Dr. Montero Person   Indication: Sepsis of unknown etiology  Target Concentration: Random level ? 15 mg/L  Day of Therapy: 5  Additional Antimicrobials: Cefepime    Pertinent Laboratory Values: Wt Readings from Last 1 Encounters:   10/13/21 126.3 kg (278 lb 8 oz)     Temp Readings from Last 1 Encounters:   10/13/21 98.9 °F (37.2 °C)     Recent Labs     10/13/21  0313 10/12/21  0906 10/12/21  0213 10/11/21  0432   BUN 57*  --  44* 37*   CREA 1.64*  --  1.55* 0.88   WBC 13.4*  --  7.2 6.4   LAC  --  1.0 2.1*  --        MRSA Nasal Swab: N/A. Non-respiratory infection. .    Assessment:  Date:  Dose/Freq Admin Times Level/Time:   10/13 1000 mg X 1 0809 R @ 7517 = 17.9   10/14   R @ (5620) =                        Plan:  Concentration-guided dosing due to renal impairment  Random level is therapeutic   Give vancomycin 1000 mg X 1. Will then continue to dose intermittently as patient exhibiting AYLIN at this time. Vancomycin concentration ordered for 10/14 @ 0400. Plan to re-dose if level less than 20.   Pharmacy will continue to monitor patient and adjust therapy as indicated    Thank you for the consult,  JOSE Ramsay

## 2021-10-13 NOTE — PROGRESS NOTES
LOS 4d  Chart reviewed by [facility] for discharge planning. Patient remains in the  BMT/ICU  Current IV's Levophed  PPD ordered  On 10/11 for discharge planning.   Will continue to follow for discharge planning needs  Please consult  if any new issues arise  PT and OT will need re-eval when medically stable    Discharge plan projected towards [facility] since patient came from [facility] facility

## 2021-10-13 NOTE — PROGRESS NOTES
Bedside and verbal shift change report received from  [address] [place] [address] (offgoing nurse). Report included the following information SBAR, Kardex, ED Summary, Intake/Output, MAR, Accordion, Recent Results, Med Rec Status, Cardiac Rhythm A-fib and Alarm Parameters .      Dual skin assessment completed at bedside: Breakdown on sacrum  (list pertinent skin assessment findings)    Dual verification of gtts completed (name of gtts verified): N/A

## 2021-10-13 NOTE — PROGRESS NOTES
Hospitalist Progress Note   Admit Date:  10/8/2021 12:57 PM   Name:  Savannah Fritz   Age:  80 y.o. Sex:  male  :  3/5/1933   MRN:  [id number]   Room:  [facility]/    Presenting Complaint: No chief complaint on file. Reason(s) for Admission: Sepsis with encephalopathy without septic shock ([facility] [address] [A41.9, R65.20, G93.40]     Hospital Course & Interval History:   80 y.o. male with medical history of NICM, ICD, LBBB, HTN, HLD, GERD, BPH, pAF (eliquis/amio), obesity, T2DM, chronic hypoxemic respiratory failure (4L following covid pna) who presented via ems from post-acute facility with AMS, bradycardia (hr in 30s) and increased WOB. The day PTA, he was started on azithromycin and prednisone for LLL infiltrate. In the ED, found to have LA 3.1, K+5.2, Scr 1.38, LFTS elevated. CXR bilateral infiltrates. He was given abx and limited IVF due to his h/o CHF. He was found to be in AFIB RVR. rec'd dilt bolus 10 mg x1 f/b gitt. Patient transferred to ICU on 10/12 with worsening respiratory status, requiring BiPAP. Also requiring Levophed for hypotension. Also started on amiodarone for A. fib with RVR. Cardiology following. Subjective (10/13/21): Patient is seen at the bedside. Unresponsive, opens his eyes with sternal rub. Not following any commands. Labs reviewed. Spoke to palliative. Patient CODE STATUS now DNR. Remained in A. fib with RVR, on amiodarone gtt. Also requiring Levophed gtt. Assessment & Plan:   * Sepsis with encephalopathy and septic shock [facility])  Source unclear. Suspected urinary source. Thrush in mouth. Procal nil, PNA less likely. Lactic acidosis resolved 10/9. Random cortisol 38, a.m. 54. TSH 1.8.  -vancomycin (10/8-. .), cefepime (10/8-. .)   -Ucx and blood cx NGTD  - Levophed for hypotension     10/13: Urine and blood culture negative. We will plan to complete 5-day duration of therapy with antibiotics. Continue therapy as noted above.   Remained on Levophed, continue to wean     Paroxysmal atrial fibrillation with RVR (HCC)  s/p dilt bolus 10 mg f/b gtt. troponin, tsh, cortisol, d dimer, electrolytes. -Consulted cardiology with concern for tachy-artur.   -apixaban     10/13:  unable to tolerate diltiazem given hypotension, started on amiodarone gtt by Cards. continue current managment     Acute on chronic systolic heart failure ([facility] [address]  Admission BNP 8K now 16k. Was volume overloaded but hypotensive.     10/13: Volume status slightly hypervolemic with CXR showing persistent bilateral lung infiltrates, stable left pleural effusion. Renal function slightly worse, continue holding Lasix today.      Acute kidney injury (AYLIN) with acute tubular necrosis (ATN) ([facility] [address]  Worsened 10/13 with creatinine 1.64. Likely AYLIN with component of prerenal congestion in setting of heart failure. Hold off on Lasix with concurrent hypotension     Chronic respiratory failure with hypoxia and hypercapnia ([facility])  2/2 COVID PNA. H/o intermittent asthma. ABG reviewed. -brovana/budesonide nebs. -required BIPAP overnight with CO2 88, pH 7.12. Weaned to NC on 10/12 with resolving hypercapnia and acidosis     10/13: . Cont with O2 via NC as able, wean as tolerated, continue as noted     Acute liver failure without hepatic coma  Suspect ischemic vs congestion in nature. Prior hepatitis serologies normal. RUQ US unremarkable. Ammonia. -Trend transaminases       Thrush, oral  -nystatin     Benign non-nodular prostatic hyperplasia with lower urinary tract symptoms  At risk of urinary retention and infection. UA with LE and ++hyaline casts.     Class 2 severe obesity due to excess calories with serious comorbidity and body mass index (BMI) of 35.0 to 35.9 in adult [facility])  Increased risk of all cause mortality.  Adds to complexity.      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 Start SSI and FSBG q6h. Goal FSBG 140-180.      Personal history of COVID-19  Noted. CXR findings 2/2 COVID versus pulmonary edema. Check PA/LA cxr tomorrow.     LBBB (left bundle branch block)  Complicating care     Implantable cardioverter-defibrillator (ICD) in situ  Complicating care     Nonischemic cardiomyopathy [facility])  Complicating care           Dispo/Discharge Planning:    Short-term rehab when medically stable. Diet:  ADULT DIET Easy to Chew  DVT PPx: On apixaban  Code status: DNR     There is a high probability of acute organ impairment or life-threatening deterioration in the patient's condition from: septic shock, hypercapnic respiratory failure    Critical care interventions: Levophed administration, BiPAP therapy    Total critical care time spent: 40 minutes. Time is indicative of direct patient attendance at bedside and on the patient's floor nearby. Includes time spent at bedside performing history and exam, performing chart review, discussing findings and treatment plan with patient and/or family, discussing patient with consultants and colleagues, ordering and reviewing pertinent laboratory and radiographic evaluations, and discussing patient with nursing staff. Time excludes procedures.     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 [facility]) ICD-10-CM: U07.1, I43  ICD-9-CM: 425.9, 079.89  10/10/2021 - Present Yes        * (Principal) Sepsis with encephalopathy and septic shock ([facility] ICD-10-CM: A41.9, R65.21, G93.40  ICD-9-CM: 038.9, 995.92, 785.52, 348.30  10/8/2021 - Present Yes        Paroxysmal atrial fibrillation with RVR (HCC) ICD-10-CM: I48.0  ICD-9-CM: 427.31  10/8/2021 - Present Yes        Toxic metabolic encephalopathy AWJ-06-TM: G92.8  ICD-9-CM: 349.82  10/8/2021 - Present Yes        Acute kidney injury (AYLIN) with acute tubular necrosis (ATN) ([facility] ICD-10-CM: N17.0  ICD-9-CM: 584.5  10/8/2021 - Present Yes        Nanine Renate, oral ICD-10-CM: B37.0  ICD-9-CM: 112.0  10/8/2021 - Present Yes        Acute liver failure without hepatic coma ICD-10-CM: K72.00  ICD-9-CM: 570  10/8/2021 - Present Yes        Chronic respiratory failure with hypoxia and hypercapnia (HCC) ICD-10-CM: J96.11, J96.12  ICD-9-CM: 518.83, 799.02, 786.09  10/8/2021 - Present Yes        Mild intermittent asthma ICD-10-CM: J45.20  ICD-9-CM: 493.90  10/8/2021 - Present Yes        HFrEF (heart failure with reduced ejection fraction) (HCC) (Chronic) ICD-10-CM: I50.20  ICD-9-CM: 428.20  10/8/2021 - Present Yes        Type 2 diabetes mellitus, without long-term current use of insulin (HCC) ICD-10-CM: E11.9  ICD-9-CM: 250.00  8/24/2021 - Present Yes        Personal history of COVID-19 (Chronic) ICD-10-CM: Z86.16  ICD-9-CM: V12.09  8/18/2021 - Present Yes        Class 2 severe obesity due to excess calories with serious comorbidity and body mass index (BMI) of 35.0 to 35.9 in [facility]) (Chronic) ICD-10-CM: E66.01, Z68.35  ICD-9-CM: 278.01, V85.35  5/7/2018 - Present Yes        Benign non-nodular prostatic hyperplasia with lower urinary tract symptoms (Chronic) ICD-10-CM: N40.1  ICD-9-CM: 600.91  7/27/2017 - Present Yes        LBBB (left bundle branch block) (Chronic) ICD-10-CM: I44.7  ICD-9-CM: 426.3  1/7/2016 - Present Yes        HLD (hyperlipidemia) (Chronic) ICD-10-CM: E78.5  ICD-9-CM: 272.4  1/7/2016 - Present Yes        Nonischemic cardiomyopathy ([facility] [address] (Chronic) ICD-10-CM: I42.8  ICD-9-CM: 425.4  2/21/2013 - Present Yes    Overview Signed 5/7/2018 10:18 AM by Wilfrido Crystal MD     EF 45% 2017   \"severe AI\"              Implantable cardioverter-defibrillator (ICD) in situ (Chronic) ICD-10-CM: Z95.810  ICD-9-CM: V45.02  2/21/2013 - Present Yes    Overview Signed 2/21/2013  7:22 AM by Kwabena Berman III     [place] biventricular ICD implanted 2/20/13             HTN (hypertension) (Chronic) ICD-10-CM: I10  ICD-9-CM: 401.9  2/21/2013 - Present Yes              Objective:     Patient Vitals for the past 24 hrs:   Temp Pulse Resp BP SpO2   10/13/21 1217  (!) 129 30 92/60 97 %   10/13/21 1204 98.9 °F (37.2 °C) (!) 132 (!) 34 93/61 96 %   10/13/21 1148  (!) 123 (!) 37 107/61 97 %   10/13/21 1133  (!) 118 (!) 32 (!) 87/62 98 %   10/13/21 1118  (!) 130 24 94/63 98 %   10/13/21 1103  (!) 120 29 (!) 97/56 98 %   10/13/21 1048  (!) 120 28 (!) 86/61 97 %   10/13/21 1033  (!) 128 (!) 32 94/60 98 %   10/13/21 1017  (!) 134 (!) 33 104/63 97 %   10/13/21 1003  (!) 120 (!) 34 98/66 98 %   10/13/21 0948  (!) 116 (!) 36 93/67 97 %   10/13/21 0933  (!) 120 (!) 31 (!) 96/57 98 %   10/13/21 0918  (!) 132 (!) 32 106/63 97 %   10/13/21 0903  (!) 135 (!) 37 (!) 101/56 92 %   10/13/21 0848  (!) 126 28 95/63 97 %   10/13/21 0847     94 %   10/13/21 0833  (!) 116 (!) 32 106/60 95 %   10/13/21 0817  (!) 123 (!) 33 97/60 95 %   10/13/21 0803  (!) 128 (!) 31 (!) 101/58 95 %   10/13/21 0748  (!) 135 (!) 32 (!) 103/58 94 %   10/13/21 0733 98.6 °F (37 °C) (!) 139 (!) 35 111/68 96 %   10/13/21 0717  (!) 113 (!) 33 98/60 97 %   10/13/21 0703  (!) 128 (!) 33 99/62 98 %   10/13/21 0648  (!) 120 (!) 31 106/81 97 %   10/13/21 0418  (!) 138 (!) 37 106/65 93 %   10/13/21 0403 98.6 °F (37 °C) (!) 126 (!) 38 (!) 82/67 95 %   10/13/21 0349  (!) 133 (!) 37 (!) 74/58 (!) 89 %   10/13/21 0333  (!) 127 (!) 35 104/65 96 %   10/13/21 0318  (!) 110 (!) 31 94/62 (!) 88 %   10/13/21 0303  (!) 125 (!) 32 103/72 96 %   10/13/21 0249  (!) 118 25 99/65 96 %   10/13/21 0234  (!) 124 (!) 42 97/67 95 %   10/13/21 0219  (!) 120 (!) 32 100/72    10/13/21 0203  (!) 130 (!) 36 108/71    10/13/21 0149  (!) 122 (!) 34 112/60 93 %   10/13/21 0133  (!) 137 28 97/71 92 %   10/13/21 0118  (!) 135 (!) 40 97/72 99 %   10/13/21 0103  (!) 133 (!) 36 97/73 98 %   10/13/21 0051  (!) 119 (!) 36 92/62 96 %   10/13/21 0033  (!) 137 (!) 34 114/75 100 %   10/13/21 0019  (!) 110 (!) 46 96/69 97 %   10/13/21 0003 97.5 °F (36.4 °C) (!) 113 (!) 34 (!) 95/59 98 %   10/13/21 0001 97.5 °F (36.4 °C)       10/12/21 2349  (!) 116 (!) 31 100/70 97 %   10/12/21 2334  (!) 107 (!) 42 (!) 94/59 99 %   10/12/21 2318  (!) 121 (!) 51 108/61 99 %   10/12/21 2303  (!) 137 (!) 31 106/66 99 %   10/12/21 2249  (!) 118 30 95/81 99 %   10/12/21 2233  (!) 110 21 98/62 98 %   10/12/21 2218  (!) 124 (!) 33 100/62 99 %   10/12/21 2203  (!) 116 (!) 36 105/64 98 %   10/12/21 2149  (!) 120 28 96/69 98 %   10/12/21 2134  (!) 119 (!) 37 102/65 98 %   10/12/21 2119  (!) 117 (!) 32 99/71 99 %   10/12/21 2103  (!) 113 (!) 35 (!) 86/55 97 %   10/12/21 2048  (!) 112 (!) 38 102/64 96 %   10/12/21 2034  (!) 121 (!) 32 97/62 98 %   10/12/21 2024  (!) 113 (!) 37  97 %   10/12/21 2018  (!) 118 26 95/62 98 %   10/12/21 2003  (!) 112 30 (!) 82/65 100 %   10/12/21 1957     98 %   10/12/21 1949  (!) 115 (!) 34 103/70 98 %   10/12/21 1933  (!) 112 (!) 32 114/68 98 %   10/12/21 1919  (!) 132 28 96/67 99 %   10/12/21 1903 98.7 °F (37.1 °C) (!) 108 (!) 33 92/67 98 %   10/12/21 1733  (!) 103 (!) 36 (!) 96/49 98 %   10/12/21 1718  (!) 109 (!) 39 105/64 99 %   10/12/21 1702  (!) 120 (!) 32 96/63 97 %   10/12/21 1648  (!) 104 (!) 31 99/62 99 %   10/12/21 1633  (!) 112 (!) 31 90/63 99 %   10/12/21 1602  (!) 120 (!) 34 (!) 84/67 99 %   10/12/21 1548  (!) 116 (!) 32 100/63 98 %   10/12/21 1533  (!) 118 28 (!) 81/67 99 %   10/12/21 1517 98 °F (36.7 °C) (!) 114 25 111/86 99 %   10/12/21 1503  (!) 115 (!) 33 (!) 85/63 97 %     Oxygen Therapy  O2 Sat (%): 97 % (10/13/21 1217)  Pulse via Oximetry: 126 beats per minute (10/13/21 1217)  O2 Device: Nasal cannula (10/13/21 0860)  Skin Assessment: Clean, dry, & intact (10/13/21 8969)  Skin Protection for O2 Device: No (10/12/21 1039)  O2 Flow Rate (L/min): 6 l/min (10/13/21 1204)  FIO2 (%): 30 % (10/12/21 0730)    Estimated body mass index is 36.74 kg/m² as calculated from the following:    Height as of this encounter: 6' 1\" (1.854 m). Weight as of this encounter: 126.3 kg (278 lb 8 oz). Intake/Output Summary (Last 24 hours) at 10/13/2021 1448  Last data filed at 10/13/2021 0604  Gross per 24 hour   Intake 1587.02 ml   Output 395 ml   Net 1192.02 ml       Constitutional:       General:  He is ill-appearing. HENT:      Head: Normocephalic. Eyes:      Extraocular Movements: Extraocular movements intact. Cardiovascular:      Rate and Rhythm: Tachycardia present. Rhythm irregular. Pulses:           Radial pulses are 2+ on the left side. Comments: BLE trace pitting edema  Pulmonary:      Effort: Pulmonary effort is normal. No respiratory distress. Breath sounds: No decreased air movement. Examination of the right-middle field reveals decreased breath sounds. Examination of the right-lower field reveals decreased breath sounds. Decreased breath sounds present. Abdominal:      General: There is no distension. Tenderness: There is no abdominal tenderness. Musculoskeletal:         General: No deformity. Cervical back: No rigidity. Skin:     General: Skin is warm and dry.    Neurological:     Unresponsive    I have reviewed ordered lab tests and independently visualized imaging below:    Recent Labs:  Recent Results (from the past 48 hour(s))   GLUCOSE, POC    Collection Time: 10/11/21  4:13 PM   Result Value Ref Range    Glucose (POC) 151 (H) 65 - 100 mg/dL    Performed by Anil    COVID-19 RAPID TEST    Collection Time: 10/11/21  4:29 PM   Result Value Ref Range    Specimen source Nasopharyngeal      COVID-19 rapid test Not detected NOTD     GLUCOSE, POC    Collection Time: 10/11/21  9:37 PM   Result Value Ref Range    Glucose (POC) 272 (H) 65 - 100 mg/dL    Performed by Drew    BLOOD GAS, ARTERIAL POC    Collection Time: 10/11/21 10:18 PM   Result Value Ref Range    Device: NASAL CANNULA      FIO2 (POC) 60 %    pH (POC) 7.12 (LL) 7.35 - 7.45      pCO2 (POC) 88.0 (HH) 35 - 45 MMHG    pO2 (POC) 346 (H) 75 - 100 MMHG    HCO3 (POC) 28.7 (H) 22 - 26 MMOL/L    sO2 (POC) 99.9 (H) 95 - 98 %    Base deficit (POC) 2.6 mmol/L    Allens test (POC) Positive      Site LEFT RADIAL      Specimen type (POC) ARTERIAL      Performed by SmithMelanieRT     CO2, POC 30 (H) 13 - 23 MMOL/L    Critical value read back [facility]     Respiratory comment: 10L_Nasal_Cannula    BLOOD GAS, ARTERIAL POC    Collection Time: 10/12/21  1:19 AM   Result Value Ref Range    Device: BIPAP MASK      FIO2 (POC) 40 %    pH (POC) 7.37 7.35 - 7.45      pCO2 (POC) 50.7 (H) 35 - 45 MMHG    pO2 (POC) 79 75 - 100 MMHG    HCO3 (POC) 29.2 (H) 22 - 26 MMOL/L    sO2 (POC) 95.0 95 - 98 %    Base excess (POC) 3.1 mmol/L    Tidal volume 500 ml    PEEP/CPAP (POC) 8 cmH2O    PIP (POC) 16      Allens test (POC) Positive      Inspiratory Time 1 sec    Site LEFT RADIAL      Specimen type (POC) ARTERIAL      Performed by SmithMelanieRT     CO2, POC 30 (H) 13 - 23 MMOL/L    Respiratory comment: [id number]    METABOLIC PANEL, COMPREHENSIVE    Collection Time: 10/12/21  2:13 AM   Result Value Ref Range    Sodium 140 136 - 145 mmol/L    Potassium 5.3 (H) 3.5 - 5.1 mmol/L    Chloride 106 98 - 107 mmol/L    CO2 32 21 - 32 mmol/L    Anion gap 2 (L) 7 - 16 mmol/L    Glucose 84 65 - 100 mg/dL    BUN 44 (H) 8 - 23 MG/DL    Creatinine 1.55 (H) 0.8 - 1.5 MG/DL    GFR est AA 55 (L) >60 ml/min/1.73m2    GFR est non-AA 45 (L) >60 ml/min/1.73m2    Calcium 9.1 8.3 - 10.4 MG/DL    Bilirubin, total 1.5 (H) 0.2 - 1.1 MG/DL    ALT (SGPT) 355 (H) 12 - 65 U/L    AST (SGOT) 241 (H) 15 - 37 U/L    Alk.  phosphatase 68 50 - 136 U/L    Protein, total 5.6 (L) 6.3 - 8.2 g/dL    Albumin 3.3 3.2 - 4.6 g/dL    Globulin 2.3 2.3 - 3.5 g/dL    A-G Ratio 1.4 1.2 - 3.5     VANCOMYCIN, RANDOM    Collection Time: 10/12/21  2:13 AM   Result Value Ref Range    Vancomycin, random 23.0 UG/ML   LACTIC ACID    Collection Time: 10/12/21  2:13 AM   Result Value Ref Range    Lactic acid 2.1 (H) 0.4 - 2.0 MMOL/L   CBC W/O DIFF    Collection Time: 10/12/21  2:13 AM   Result Value Ref Range    WBC 7.2 4.3 - 11.1 K/uL    RBC 3.13 (L) 4.23 - 5.6 M/uL    HGB 10.1 (L) 13.6 - 17.2 g/dL    HCT 33.5 (L) 41.1 - 50.3 %    .0 (H) 79.6 - 97.8 FL    MCH 32.3 26.1 - 32.9 PG    MCHC 30.1 (L) 31.4 - 35.0 g/dL    RDW 15.9 (H) 11.9 - 14.6 %    PLATELET 333 (L) 998 - 450 K/uL    MPV 10.9 9.4 - 12.3 FL    ABSOLUTE NRBC 0.03 0.0 - 0.2 K/uL   AMMONIA    Collection Time: 10/12/21  2:13 AM   Result Value Ref Range    Ammonia 26 11 - 32 UMOL/L   GLUCOSE, POC    Collection Time: 10/12/21  8:54 AM   Result Value Ref Range    Glucose (POC) 105 (H) 65 - 100 mg/dL    Performed by Kay    LACTIC ACID    Collection Time: 10/12/21  9:06 AM   Result Value Ref Range    Lactic acid 1.0 0.4 - 2.0 MMOL/L   EKG, 12 LEAD, SUBSEQUENT    Collection Time: 10/12/21 11:19 AM   Result Value Ref Range    Ventricular Rate 109 BPM    Atrial Rate 102 BPM    QRS Duration 152 ms    Q-T Interval 398 ms    QTC Calculation (Bezet) 535 ms    Calculated R Axis -60 degrees    Calculated T Axis 115 degrees    Diagnosis       Underlying rhyhtm atrial fibrillation with RVR with demand pacing  Left axis deviation  Non-specific intra-ventricular conduction block  Possible Lateral infarct , age undetermined  Abnormal ECG  When compared with ECG of 12-OCT-2021 11:18,  Demand pacing noted  Confirmed by Barbie Puckett MD (), NAOMI ([id number]) on 10/12/2021 1:00:47 PM     GLUCOSE, POC    Collection Time: 10/12/21  1:23 PM   Result Value Ref Range    Glucose (POC) 117 (H) 65 - 100 mg/dL    Performed by [address] [place] [address] PPD TEST IN 24 HRS    Collection Time: 10/12/21  3:52 PM   Result Value Ref Range    PPD Negative Negative    mm Induration 0 0 - 5 mm   GLUCOSE, POC    Collection Time: 10/12/21  5:04 PM   Result Value Ref Range    Glucose (POC) 116 (H) 65 - 100 mg/dL    Performed by Kay    EKG, 12 LEAD, SUBSEQUENT    Collection Time: 10/12/21  6:07 PM   Result Value Ref Range    Ventricular Rate 109 BPM    Atrial Rate 125 BPM    QRS Duration 154 ms    Q-T Interval 342 ms    QTC Calculation (Bezet) 460 ms    Calculated R Axis -24 degrees    Calculated T Axis 150 degrees    Diagnosis       Atrial fibrillation with rapid ventricular response with occasional   ventricular-paced complexes  Left bundle branch block  Abnormal ECG  When compared with ECG of 12-OCT-2021 11:19,  Previous ECG has undetermined rhythm, needs review  Confirmed by Suzette Washburn ([id number]) on 10/13/2021 2:03:19 PM     GLUCOSE, POC    Collection Time: 10/12/21  9:21 PM   Result Value Ref Range    Glucose (POC) 146 (H) 65 - 100 mg/dL    Performed by Gita Leon, RANDOM    Collection Time: 10/13/21  3:13 AM   Result Value Ref Range    Vancomycin, random 14.2 UG/ML   METABOLIC PANEL, BASIC    Collection Time: 10/13/21  3:13 AM   Result Value Ref Range    Sodium 134 (L) 138 - 145 mmol/L    Potassium 5.1 3.5 - 5.1 mmol/L    Chloride 102 98 - 107 mmol/L    CO2 26 21 - 32 mmol/L    Anion gap 6 (L) 7 - 16 mmol/L    Glucose 151 (H) 65 - 100 mg/dL    BUN 57 (H) 8 - 23 MG/DL    Creatinine 1.64 (H) 0.8 - 1.5 MG/DL    GFR est AA 51 (L) >60 ml/min/1.73m2    GFR est non-AA 42 (L) >60 ml/min/1.73m2    Calcium 9.4 8.3 - 10.4 MG/DL   CBC WITH AUTOMATED DIFF    Collection Time: 10/13/21  3:13 AM Result Value Ref Range    WBC 13.4 (H) 4.3 - 11.1 K/uL    RBC 3.51 (L) 4.23 - 5.6 M/uL    HGB 11.0 (L) 13.6 - 17.2 g/dL    HCT 36.0 (L) 41.1 - 50.3 %    .6 (H) 79.6 - 97.8 FL    MCH 31.3 26.1 - 32.9 PG    MCHC 30.6 (L) 31.4 - 35.0 g/dL    RDW 16.2 (H) 11.9 - 14.6 %    PLATELET 784 666 - 390 K/uL    MPV 11.2 9.4 - 12.3 FL    ABSOLUTE NRBC 0.07 0.0 - 0.2 K/uL    DF AUTOMATED      NEUTROPHILS 70 43 - 78 %    LYMPHOCYTES 19 13 - 44 %    MONOCYTES 7 4.0 - 12.0 %    EOSINOPHILS 0 (L) 0.5 - 7.8 %    BASOPHILS 0 0.0 - 2.0 %    IMMATURE GRANULOCYTES 3 0.0 - 5.0 %    ABS. NEUTROPHILS 9.3 (H) 1.7 - 8.2 K/UL    ABS. LYMPHOCYTES 2.6 0.5 - 4.6 K/UL    ABS. MONOCYTES 1.0 0.1 - 1.3 K/UL    ABS. EOSINOPHILS 0.0 0.0 - 0.8 K/UL    ABS. BASOPHILS 0.0 0.0 - 0.2 K/UL    ABS. IMM. GRANS. 0.5 0.0 - 0.5 K/UL   GLUCOSE, POC    Collection Time: 10/13/21  8:07 AM   Result Value Ref Range    Glucose (POC) 157 (H) 65 - 100 mg/dL    Performed by [facility]    GLUCOSE, POC    Collection Time: 10/13/21 11:34 AM   Result Value Ref Range    Glucose (POC) 153 (H) 65 - 100 mg/dL    Performed by [facility]        All Micro Results     Procedure Component Value Units Date/Time    BLOOD CULTURE [[id number]] Collected: 10/08/21 1343    Order Status: Completed Specimen: Blood Updated: 10/13/21 1214     Special Requests: --        LEFT  FOREARM       Culture result: NO GROWTH 5 DAYS       BLOOD CULTURE [[id number]] Collected: 10/08/21 1535    Order Status: Completed Specimen: Blood Updated: 10/13/21 1214     Special Requests: --        LEFT  HAND       Culture result: NO GROWTH 5 DAYS       COVID-19 RAPID TEST [[id number]] Collected: 10/11/21 1629    Order Status: Completed Specimen: Nasopharyngeal Updated: 10/11/21 1717     Specimen source Nasopharyngeal        COVID-19 rapid test Not detected        Comment:      The specimen is NEGATIVE for SARS-CoV-2, the novel coronavirus associated with COVID-19.   A negative result does not rule out COVID-19. This test has been authorized by the FDA under an Emergency Use Authorization (EUA) for use by authorized laboratories. Fact sheet for Healthcare Providers: ConventionUpdate.co.nz  Fact sheet for Patients: ConventionUpdate.co.nz       Methodology: Isothermal Nucleic Acid Amplification         CULTURE, URINE [[id number]] Collected: 10/08/21 1356    Order Status: Completed Specimen: Cath Urine Updated: 10/11/21 7743     Special Requests: NO SPECIAL REQUESTS        Culture result:       <10,000 COLONIES/mL MIXED SKIN MARCOS ISOLATED                Other Studies:  No results found.     Current Meds:  Current Facility-Administered Medications   Medication Dose Route Frequency    NOREPINephrine (LEVOPHED) 4 mg in 5% dextrose 250 mL infusion  0.5-30 mcg/min IntraVENous TITRATE    Vancomycin Intermittent Dosing per Rx   Other Rx Dosing/Monitoring    cefepime (MAXIPIME) 2 g in 0.9% sodium chloride (MBP/ADV) 100 mL MBP  2 g IntraVENous Q24H    amiodarone (CORDARONE) 450 mg in dextrose 5% 250 mL infusion  0.5 mg/min IntraVENous CONTINUOUS    [Held by provider] furosemide (LASIX) injection 40 mg  40 mg IntraVENous Q12H    spironolactone (ALDACTONE) tablet 25 mg  25 mg Oral DAILY    carvediloL (COREG) tablet 3.125 mg  3.125 mg Oral BID WITH MEALS    albuterol-ipratropium (DUO-NEB) 2.5 MG-0.5 MG/3 ML  3 mL Nebulization Q4H PRN    thiamine HCL (B-1) tablet 100 mg  100 mg Oral DAILY    insulin regular (NOVOLIN R, HUMULIN R) injection   SubCUTAneous AC&HS    nystatin (MYCOSTATIN) 100,000 unit/mL oral suspension 500,000 Units  500,000 Units Swish and Spit QID    sodium chloride (NS) flush 5-10 mL  5-10 mL IntraVENous Q8H    sodium chloride (NS) flush 5-10 mL  5-10 mL IntraVENous PRN    apixaban (ELIQUIS) tablet 5 mg  5 mg Oral Q12H    sodium chloride (NS) flush 5-40 mL  5-40 mL IntraVENous Q8H    sodium chloride (NS) flush 5-40 mL  5-40 mL IntraVENous PRN    acetaminophen (TYLENOL) tablet 650 mg  650 mg Oral Q6H PRN    Or    acetaminophen (TYLENOL) suppository 650 mg  650 mg Rectal Q6H PRN    polyethylene glycol (MIRALAX) packet 17 g  17 g Oral DAILY PRN    ondansetron (ZOFRAN ODT) tablet 4 mg  4 mg Oral Q8H PRN    Or    ondansetron (ZOFRAN) injection 4 mg  4 mg IntraVENous Q6H PRN    famotidine (PEPCID) tablet 20 mg  20 mg Oral BID    dextrose 40% (GLUTOSE) oral gel 1 Tube  15 g Oral PRN    glucagon (GLUCAGEN) injection 1 mg  1 mg IntraMUSCular PRN    dextrose (D50W) injection syrg 12.5-25 g  25-50 mL IntraVENous PRN    arformoteroL (BROVANA) neb solution 15 mcg  15 mcg Nebulization BID RT    budesonide (PULMICORT) 500 mcg/2 ml nebulizer suspension  500 mcg Nebulization BID RT       Signed:  Chalo Chase MD

## 2021-10-13 NOTE — PROGRESS NOTES
Palliative Care Progress Note    Patient: Sushila Sahni MRN: [id number]  SSN: [id number]    YOB: 1933  Age: 80 y.o. Sex: male       Assessment/Plan:     Chief Complaint/Interval History: Encephalopathy     Principal Diagnosis:    · Encephalopathy, Unspecified  G93.40    Additional Diagnoses:   · Fatigue, Lethargy  R53.83  · Sepsis, Unspecified Organism:  A41.9  · Counseling, Encounter for Medical Advice  Z71.9  · Encounter for Palliative Care  Z51.5    Palliative Performance Scale (PPS)   20%    Medical Decision Making:   Reviewed and summarized notes over previous 48 hours  Discussed case with appropriate providers: primary RN; hospitalist   Reviewed laboratory and x-ray data. Patient resting in bed, lethargic. Opens eyes briefly to voice and sternal rub. Shortly after, he opens his eyes and smiles at command. He continues with hypotension requiring Levophed and a fib with RVR requiring amiodarone. Call placed to patient's wife and long conversation held about patient's current condition, as well as his performance status and quality of life prior to his illness with Covid. Before his Covid hospitalization, patient was very active, cut their grass, etc.  I counseled that not only is his prognosis questionable clinically, but his prognosis for a recovery to independent living is very poor. She verbalized understanding and states she has been working on paperwork to have him evaluated for [place] [facility], both short and long term if he survives this hospitalization. We reviewed patient's code status. Given patient's most recent decline in the setting of 8 weeks of illness, patient's wife agrees that CPR and ventilatory support are not in patient's best interested. She is in agreement with DNR status, understanding that aggressive care will continue, but if it fails, we would allow a natural death in the event of cardiopulmonary arrest.  She is having a hard time accepting patient's condition. But she has peace in patient's mildred. She has good family and Muslim support. Will discuss findings with members of the interdisciplinary team.         More than 50% of this 35 minute visit was spent counseling and coordination of care as outlined above. Subjective:     Review of Systems:  Review of systems not obtained due to patient factors: lethargic     Objective:     Visit Vitals  BP (!) 86/61   Pulse (!) 120   Temp 98.6 °F (37 °C)   Resp 28   Ht 6' 1\" (1.854 m)   Wt 278 lb 8 oz (126.3 kg)   SpO2 97%   BMI 36.74 kg/m²       Physical Exam:    General:  Frail, debilitated appearing. Lethargic. No acute distress. Eyes:  Conjunctivae/corneas clear. Nose: Nares normal. Septum midline. O2 via NC. Neck: Supple, symmetrical, trachea midline. Lungs:   Unlabored. Heart:  A fib with RVR. Hypotensive. Abdomen:   Soft, non-tender, non-distended. Extremities: Normal, atraumatic, no cyanosis or edema. Skin: Skin color, texture, turgor normal. No rash or lesions. Neurologic: Lethargic. Can only open eyes briefly. Psych: Unable to assess.      Signed By: Brian Razo NP     October 13, 2021

## 2021-10-13 NOTE — PROGRESS NOTES
VANCO DAILY FOLLOW UP RENAL INSUFFICIENCY PATIENT   [address] Pharmacokinetic Monitoring Service - Vancomycin    Consulting Provider: Dr. Montero Person   Indication: Sepsis of unknown etiology  Target Concentration: Random level ? 15 mg/L  Day of Therapy: 5  Additional Antimicrobials: Cefepime    Pertinent Laboratory Values: Wt Readings from Last 1 Encounters:   10/13/21 126.3 kg (278 lb 8 oz)     Temp Readings from Last 1 Encounters:   10/13/21 98.9 °F (37.2 °C)     Recent Labs     10/13/21  0313 10/12/21  0906 10/12/21  0213 10/11/21  0432   BUN 57*  --  44* 37*   CREA 1.64*  --  1.55* 0.88   WBC 13.4*  --  7.2 6.4   LAC  --  1.0 2.1*  --        MRSA Nasal Swab: N/A. Non-respiratory infection. .    Assessment:  Date:  Dose/Freq Admin Times Level/Time:   10/13 1000 mg X 1 0809 R @ 0216 = 17.9   10/14   R @ (0400) =                        Plan:  Concentration-guided dosing due to renal impairment  Random level is therapeutic   Give vancomycin 1000 mg X 1. Will then continue to dose intermittently as patient exhibiting AYLIN at this time. Vancomycin concentration ordered for 10/14 @ 0400. Plan to re-dose if level less than 20.   Pharmacy will continue to monitor patient and adjust therapy as indicated    Thank you for the consult,  JOSE Ramsay

## 2021-10-13 NOTE — PROGRESS NOTES
[facility] CARDIOLOGY PROGRESS NOTE           10/13/2021 3:57 PM    Admit Date: 10/8/2021      Subjective:   No acute distress      Objective:      Vitals:    10/13/21 1404 10/13/21 1418 10/13/21 1433 10/13/21 1449   BP: (!) 88/70 (!) 86/61 (!) 93/53 93/64   Pulse: (!) 112 (!) 133 (!) 113 (!) 128   Resp: 30 (!) 32 (!) 31 28   Temp:       SpO2: 98% 97% 97% 97%   Weight:       Height:           Physical Exam:  General-somnolent    Data Review:   Recent Labs     10/13/21  0313 10/12/21  0213   * 140   K 5.1 5.3*   BUN 57* 44*   CREA 1.64* 1.55*   * 84   WBC 13.4* 7.2   HGB 11.0* 10.1*   HCT 36.0* 33.5*    132*       Assessment/Plan:     Principal Problem:    Sepsis with encephalopathy and septic shock ([facility] (10/8/2021)        Active Problems:    Acute on chronic systolic heart failure ([facility] (10/9/2021)          Nonischemic cardiomyopathy ([facility] (2/21/2013)          Implantable cardioverter-defibrillator (ICD) in situ (2/21/2013)          HTN (hypertension) (2/21/2013)          LBBB (left bundle branch block) (1/7/2016)          HLD (hyperlipidemia) (1/7/2016)          Benign non-nodular prostatic hyperplasia with lower urinary tract symptoms (7/27/2017)          Class 2 severe obesity due to excess calories with serious comorbidity and body mass index (BMI) of 35.0 to 35.9 in adult [facility]) (5/7/2018)          Personal history of COVID-19 (8/18/2021)          Type 2 diabetes mellitus, without long-term current use of insulin ([facility] (8/24/2021)          Paroxysmal atrial fibrillation with RVR ([facility] (10/8/2021)          Toxic metabolic encephalopathy (10/0/4813)          Acute kidney injury (AYLIN) with acute tubular necrosis (ATN) ([facility] (10/8/2021)          [place], oral (10/8/2021)          Acute liver failure without hepatic coma (10/8/2021)          Chronic respiratory failure with hypoxia and hypercapnia ([facility] (10/8/2021)          Mild intermittent asthma (10/8/2021)          HFrEF (heart failure with reduced ejection fraction) ([facility] (10/8/2021)          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

## 2021-10-13 NOTE — PROGRESS NOTES
Bedside and verbal shift change report received from  [id number] [place] [address]  (offgoing nurse). Report included the following information SBAR, Intake/Output, Cardiac Rhythm A fib  and Quality Measures.      Dual skin assessment completed at bedside: Sacral Wound    Dual verification of gtts completed (name of gtts verified):N/A   Amio @0.5  Levo @ 8mcg

## 2021-10-13 NOTE — PROGRESS NOTES
Bedside shift report from MONY Dominguez. Pt resting in bed. Alert on 6L NC. Levophed gtt infusing. Patient repositioned for comfort. Dual skin assessment performed.

## 2021-10-14 PROBLEM — J96.22 ACUTE ON CHRONIC RESPIRATORY FAILURE WITH HYPOXIA AND HYPERCAPNIA (HCC): Status: ACTIVE | Noted: 2021-01-01

## 2021-10-14 PROBLEM — J81.0 ACUTE PULMONARY EDEMA (HCC): Status: ACTIVE | Noted: 2021-01-01

## 2021-10-14 PROBLEM — R57.0 CARDIOGENIC SHOCK (HCC): Status: ACTIVE | Noted: 2021-01-01

## 2021-10-14 PROBLEM — J96.21 ACUTE ON CHRONIC RESPIRATORY FAILURE WITH HYPOXIA AND HYPERCAPNIA (HCC): Status: ACTIVE | Noted: 2021-01-01

## 2021-10-14 NOTE — PROGRESS NOTES
[facility] DAILY FOLLOW UP RENAL INSUFFICIENCY PATIENT   [address] [facility] Pharmacokinetic Monitoring Service - Vancomycin    Consulting Provider: Dr. Montero Person   Indication: Sepsis of unknown etiology  Target Concentration: Random level ? 15 mg/L  Day of Therapy: 7  Additional Antimicrobials: cefepime    Pertinent Laboratory Values: Wt Readings from Last 1 Encounters:   10/13/21 126.3 kg (278 lb 8 oz)     Temp Readings from Last 1 Encounters:   10/14/21 98.7 °F (37.1 °C)     Recent Labs     10/14/21  0320 10/14/21  0313 10/13/21  0313 10/12/21  0906 10/12/21  0213   BUN  --  65* 57*  --  44*   CREA  --  1.81* 1.64*  --  1.55*   WBC  --  14.3* 13.4*  --  7.2   LAC 2.0  --   --  1.0 2.1*       MRSA Nasal Swab: N/A. Non-respiratory infection. .    Assessment:  Date:  Dose/Freq Admin Times Level/Time:   10/8 2500 mg x 1 1550    10/9 1250 mg x 1 1508 0358 = 15.7   10/10 1250 mg q18h 1432 0410 = 13.5   10/11 1250 mg q18h  1000 mg q12h 0624  1932    10/12  Day 5 1000 mg q12h 0856 0213 = 23.0   10/13 1000 mg X 1 0809 0313 = 17.9   10/14 750 mg x 1 (11) 0906 = 19.6   10/15   (04)                 Plan:  Concentration-guided dosing due to renal impairment  Random level is therapeutic   Give vancomycin 750 mg x 1  Vancomycin concentration ordered for 10/15 @ 0400  Pharmacy will continue to monitor patient and adjust therapy as indicated    Thank you for the consult,  JOSE Miranda

## 2021-10-14 NOTE — PROGRESS NOTES
Bedside and verbal shift change report received from  [address] [place] [address] (offgoing nurse). Report included the following information SBAR, Kardex, ED Summary, OR Summary, Procedure Summary, Intake/Output, MAR, Accordion, Recent Results and Cardiac Rhythm NSR.      Dual skin assessment completed at bedside: Sacrum wound (list pertinent skin assessment findings)    Dual verification of gtts completed (name of gtts verified): N/A

## 2021-10-14 NOTE — PROGRESS NOTES
[facility] CARDIOLOGY PROGRESS NOTE    10/14/2021 7:44 AM    Admit Date: 10/8/2021        Subjective:   Remains seriously ill with increased work of breathing on BiPAP in the ICU. Hypotensive and tachycardic with sinus rhythm most likely on telemetry. Review of systems unobtainable given lethargy and increased work of breathing. Objective:      Vitals:    10/14/21 0404 10/14/21 0704 10/14/21 0718 10/14/21 0734   BP: 97/69 96/70 98/74 97/65   Pulse: (!) 139 (!) 130 (!) 144 (!) 133   Resp: 29 (!) 32 27 28   Temp: 98.7 °F (37.1 °C)      SpO2: 96% 97% 97% 98%   Weight:       Height:         Physical Exam:  Neck- supple, difficult to assess JVD  CV-tachycardic but fairly regular rate and rhythm no MRG  Lung-coarse anterolaterally bilaterally  Abd- soft, nontender, nondistended  Ext- no edema  Skin- warm and dry    Data Review:   Recent Labs     10/14/21  0313 10/13/21  0313   * 134*   K 5.3* 5.1   BUN 65* 57*   CREA 1.81* 1.64*   * 151*   WBC 14.3* 13.4*   HGB 11.8* 11.0*   HCT 38.9* 36.0*   * 155     Echo October 2021:  · Image quality for this study was technically difficult. · LV: Estimated LVEF is 15 - 20%. Normal wall thickness. Dilated left ventricle. Severely and globally reduced systolic function. Left ventricular diastolic dysfunction. · RV: Mildly dilated right ventricle. Mildly reduced systolic function. Pacer/ICD present. 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 Ascending aorta diameter = 4.3 cm. · TV: Mild tricuspid valve regurgitation is present. · AV: Mild aortic valve regurgitation is present. · MV: Mitral valve non-specific thickening. Mild to moderate mitral valve regurgitation is present. Assessment and Plan:     Principal Problem:    Sepsis with encephalopathy and septic shock ([address] (10/8/2021)  Continue Levophed and antibiotics as tolerated.   Remains seriously ill.     Active Problems:    Acute on chronic systolic heart failure ([address] (10/9/2021)  In the face of acute clinical decline patient heart failure will be difficult to manage. Overall prognosis is poor. Conservative medical therapy. Holding Lasix with hypotension and worsening renal function       Nonischemic cardiomyopathy ([address] (2/21/2013)  See above.       Implantable cardioverter-defibrillator (ICD) in situ (2/21/2013)  Normal function       HTN (hypertension) (2/21/2013)  Patient now hypotensive on pressor support, Levophed as needed       LBBB (left bundle branch block) (1/7/2016)  Chronic       HLD (hyperlipidemia) (1/7/2016)  Chronic        Personal history of COVID-19 (8/18/2021)      Type 2 diabetes mellitus, without long-term current use of insulin ([address] (8/24/2021)  Chronic and managed per primary team       Paroxysmal atrial fibrillation with RVR ([address] (10/8/2021)  Patient now with persistent atrial fibrillation. Restarted amiodarone IV with bolus. Appears to be most likely in sinus tachycardia again in the 120s, but patient's overall clinical prognosis is poor.       Toxic metabolic encephalopathy (33/1/3751)  Managed per primary team       Acute kidney injury (AYLIN) with acute tubular necrosis (ATN) ([address] (10/8/2021)  Managed per primary team       richy Abdalla (10/8/2021)  Managed per primary team       Acute liver failure without hepatic coma (10/8/2021)  Managed per primary team       Chronic respiratory failure with hypoxia and hypercapnia ([address] (10/8/2021)  Patient now on BiPAP       Mild intermittent asthma (10/8/2021)  Managed per primary team       HFrEF (heart failure with reduced ejection fraction) ([address] (10/8/2021)  See above       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

## 2021-10-14 NOTE — WOUND CARE
Noted re-consult, spoke with primary RN, no changes in wound per RN, continue current orders, will follow along weekly while in acute care setting.

## 2021-10-14 NOTE — PROGRESS NOTES
Hospitalist Progress Note   Admit Date:  10/8/2021 12:57 PM   Name:  Kamila Rodriguez   Age:  80 y.o. Sex:  male  :  3/5/1933   MRN:  [id number]   Room:  [facility]/    Presenting Complaint: No chief complaint on file. Reason(s) for Admission: Sepsis with encephalopathy without septic shock ([facility] [address] [A41.9, R65.20, G93.40]     Hospital Course & Interval History:   80 y.o. male with medical history of NICM, ICD, LBBB, HTN, HLD, GERD, BPH, pAF (eliquis/amio), obesity, T2DM, chronic hypoxemic respiratory failure (4L following covid pna) who presented via ems from post-acute facility with AMS, bradycardia (hr in 30s) and increased WOB. The day PTA, he was started on azithromycin and prednisone for LLL infiltrate. In the ED, found to have LA 3.1, K+5.2, Scr 1.38, LFTS elevated. CXR bilateral infiltrates. He was given abx and limited IVF due to his h/o CHF. He was found to be in AFIB RVR. rec'd dilt bolus 10 mg x1 f/b gitt. Patient transferred to ICU on 10/12 with worsening respiratory status, requiring BiPAP. Also requiring Levophed for hypotension. Also started on amiodarone for A. fib with RVR. Cardiology following. Subjective (10/14/21):  Patient is seen at the bedside. Opening his eyes with sternal rub, not following commands. Worsening leukocytosis. Hypotensive, requiring pressor support. Overnight on BiPAP due to  hypoxia. Chest x-ray with left small pneumothorax. Consult pulmonology. Obtain blood cultures. Continue antibiotics. Assessment & Plan:   * Sepsis with encephalopathy and septic shock [facility])  Source unclear. Suspected urinary source. Thrush in mouth. Procal nil, PNA less likely. Lactic acidosis resolved 10/9. Random cortisol 38, a.m. 54. TSH 1.8.  -vancomycin (10/8-. .), cefepime (10/8-. .)   -Ucx and blood cx NGTD  -Levophed for hypotension     10/14: Worsening leukocytosis. Repeat blood cultures. Continue antibiotics.  Remained on Levophed, try to wean.      Paroxysmal atrial fibrillation with RVR (HCC)  s/p dilt bolus 10 mg f/b gtt. troponin, tsh, cortisol, d dimer, electrolytes. [facility] cardiology with concern for tachy-artur.   -apixaban     10/14:  unable to tolerate diltiazem given hypotension, On amiodarone gtt by Cards. Continue current management    Chronic respiratory failure with hypoxia and hypercapnia (HCC)  2/2 COVID PNA. H/o intermittent asthma. ABG reviewed. -brovana/budesonide nebs. -required BIPAP overnight with CO2 88, pH 7.12. Weaned to NC on 10/12 with resolving hypercapnia and acidosis     10/14: Overnight on BiPAP due to  Hypoxia, try to wean to nasal cannula if tolerates. Chest x-ray with left small pneumothorax. Consult pulmonology.        Acute on chronic systolic heart failure ([facility] [address]  Admission BNP 8K now 16k. Was volume overloaded but hypotensive.     10/14: Renal function slightly worse, continue holding Lasix today. Cardiology following     Acute kidney injury (AYLIN) with acute tubular necrosis (ATN) ([facility] [address]  Worsened 10/13 with creatinine 1.64. Likely AYLIN with component of prerenal congestion in setting of heart failure. Hold off on Lasix with concurrent hypotension    10/14: Creatinine slowly trending up. Avoid nephrotoxic agent.     Acute liver failure without hepatic coma  Suspect ischemic vs congestion in nature. Prior hepatitis serologies normal. RUQ US unremarkable. Ammonia. -Trend transaminases    Thrush, oral  -nystatin     Benign non-nodular prostatic hyperplasia with lower urinary tract symptoms  At risk of urinary retention and infection. UA with LE and ++hyaline casts.     Class 2 severe obesity due to excess calories with serious comorbidity and body mass index (BMI) of 35.0 to 35.9 in [facility])  Increased risk of all cause mortality.  Adds to complexity.      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 Start SSI and FSBG q6h. Goal FSBG 140-180.      Personal history of COVID-19  Noted. CXR findings 2/2 COVID versus pulmonary edema. Check PA/LA cxr tomorrow.     LBBB (left bundle branch block)  Complicating care     Implantable cardioverter-defibrillator (ICD) in situ  Complicating care     Nonischemic cardiomyopathy [facility])  Complicating care         Dispo/Discharge Planning:    Short-term rehab when medically stable. Diet:  ADULT DIET Easy to Chew  DVT PPx: On apixaban  Code status: DNR     There is a high probability of acute organ impairment or life-threatening deterioration in the patient's condition from: septic shock, hypercapnic respiratory failure    Critical care interventions: Levophed administration, BiPAP therapy    Total critical care time spent: 40 minutes. Time is indicative of direct patient attendance at bedside and on the patient's floor nearby. Includes time spent at bedside performing history and exam, performing chart review, discussing findings and treatment plan with patient and/or family, discussing patient with consultants and colleagues, ordering and reviewing pertinent laboratory and radiographic evaluations, and discussing patient with nursing staff. Time excludes procedures.     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 ([facility] ICD-10-CM: U07.1, I43  ICD-9-CM: 425.9, 079.89 10/10/2021 - Present Yes        * (Principal) Sepsis with encephalopathy and septic shock ([facility] ICD-10-CM: A41.9, R65.21, G93.40  ICD-9-CM: 038.9, 995.92, 785.52, 348.30  10/8/2021 - Present Yes        Paroxysmal atrial fibrillation with RVR (HCC) ICD-10-CM: I48.0  ICD-9-CM: 427.31  10/8/2021 - Present Yes        Toxic metabolic encephalopathy QOK-62-VG: G92.8  ICD-9-CM: 349.82  10/8/2021 - Present Yes        Acute kidney injury (AYLIN) with acute tubular necrosis (ATN) ([facility] ICD-10-CM: N17.0  ICD-9-CM: 584.5  10/8/2021 - Present Yes        Daniela Ivan, oral ICD-10-CM: B37.0  ICD-9-CM: 112.0  10/8/2021 - Present Yes        Acute liver failure without hepatic coma ICD-10-CM: K72.00  ICD-9-CM: 570  10/8/2021 - Present Yes        Chronic respiratory failure with hypoxia and hypercapnia (HCC) ICD-10-CM: J96.11, J96.12  ICD-9-CM: 518.83, 799.02, 786.09  10/8/2021 - Present Yes        Mild intermittent asthma ICD-10-CM: J45.20  ICD-9-CM: 493.90  10/8/2021 - Present Yes        HFrEF (heart failure with reduced ejection fraction) (HCC) (Chronic) ICD-10-CM: I50.20  ICD-9-CM: 428.20  10/8/2021 - Present Yes        Type 2 diabetes mellitus, without long-term current use of insulin (HCC) ICD-10-CM: E11.9  ICD-9-CM: 250.00  8/24/2021 - Present Yes        Personal history of COVID-19 (Chronic) ICD-10-CM: Z86.16  ICD-9-CM: V12.09  8/18/2021 - Present Yes        Class 2 severe obesity due to excess calories with serious comorbidity and body mass index (BMI) of 35.0 to 35.9 in adult [facility]) (Chronic) ICD-10-CM: E66.01, Z68.35  ICD-9-CM: 278.01, V85.35  5/7/2018 - Present Yes        Benign non-nodular prostatic hyperplasia with lower urinary tract symptoms (Chronic) ICD-10-CM: N40.1  ICD-9-CM: 600.91  7/27/2017 - Present Yes        LBBB (left bundle branch block) (Chronic) ICD-10-CM: I44.7  ICD-9-CM: 426.3  1/7/2016 - Present Yes        HLD (hyperlipidemia) (Chronic) ICD-10-CM: E78.5  ICD-9-CM: 272.4  1/7/2016 - Present Yes        Nonischemic cardiomyopathy ([facility] [address] (Chronic) ICD-10-CM: I42.8  ICD-9-CM: 425.4  2/21/2013 - Present Yes    Overview Signed 5/7/2018 10:18 AM by Charlie Taylor MD     EF 45% 2017   \"severe AI\"              Implantable cardioverter-defibrillator (ICD) in situ (Chronic) ICD-10-CM: Z95.810  ICD-9-CM: V45.02  2/21/2013 - Present Yes    Overview Signed 2/21/2013  7:22 AM by Gayatri Standard III     [place] biventricular ICD implanted 2/20/13             HTN (hypertension) (Chronic) ICD-10-CM: I10  ICD-9-CM: 401.9  2/21/2013 - Present Yes              Objective:     Patient Vitals for the past 24 hrs:   Temp Pulse Resp BP SpO2   10/14/21 1202  (!) 133 17 94/78 97 %   10/14/21 1131 98.7 °F (37.1 °C) (!) 113 (!) 31 (!) 104/58 98 %   10/14/21 1114  (!) 127 (!) 39 91/60 97 %   10/14/21 0949  (!) 127 (!) 33 (!) 86/64 98 %   10/14/21 0934  (!) 140 (!) 32 102/64 97 %   10/14/21 0918  (!) 131 22 (!) 99/56 97 %   10/14/21 0904  (!) 139 8 96/64 97 %   10/14/21 0849  (!) 132 19 93/61 98 %   10/14/21 0833  (!) 123 17 94/63 97 %   10/14/21 0818  (!) 136 30 (!) 84/65 96 %   10/14/21 0814     97 %   10/14/21 0804  (!) 131 (!) 33 (!) 89/59 97 %   10/14/21 0749  (!) 130 29 (!) 86/63 96 %   10/14/21 0734 98.2 °F (36.8 °C) (!) 133 28 97/65 98 %   10/14/21 0718  (!) 144 27 98/74 97 %   10/14/21 0704  (!) 130 (!) 32 96/70 97 %   10/14/21 0404 98.7 °F (37.1 °C) (!) 139 29 97/69 96 %   10/14/21 0349  (!) 141 (!) 34 106/70 97 %   10/14/21 0334  (!) 123 (!) 37 105/69 97 %   10/14/21 0319  (!) 129 (!) 33 103/68 97 %   10/14/21 0304  (!) 129 (!) 32 105/63 97 %   10/14/21 0248  (!) 122 28 93/61 97 %   10/14/21 0234  (!) 133 26 100/69 97 %   10/14/21 0219  (!) 141 (!) 31 101/64 97 %   10/14/21 0204  (!) 141 (!) 36 106/72 96 %   10/14/21 0152     97 %   10/14/21 0149  (!) 141 (!) 31 110/75 96 %   10/14/21 0134  (!) 140 (!) 33 103/73 97 %   10/14/21 0119  (!) 137 28 95/61 97 %   10/14/21 0103  (!) 130 (!) 31 92/75 97 %   10/14/21 0048  (!) 115 (!) 31 97/68 97 %   10/14/21 0034  (!) 132 (!) 32 100/74 96 %   10/14/21 0019  (!) 117 (!) 31 91/66 96 %   10/14/21 0004 98.3 °F (36.8 °C) (!) 136 (!) 33 109/72 96 %   10/13/21 2348  (!) 139 (!) 32 113/69 96 %   10/13/21 2334  (!) 125 (!) 31 103/77 96 %   10/13/21 2319  (!) 122 (!) 31 112/65 97 %   10/13/21 2304  (!) 126 28 92/65 97 %   10/13/21 2249  (!) 121 29 98/72 97 %   10/13/21 2234  (!) 118 29 95/74 97 %   10/13/21 2221     97 %   10/13/21 2219  (!) 129 (!) 31 92/70 97 %   10/13/21 2204  (!) 136 (!) 31 92/68 95 %   10/13/21 2149  (!) 131 (!) 32 (!) 76/46 96 %   10/13/21 2135  (!) 122 30 (!) 80/40 96 %   10/13/21 2119  (!) 119 30 (!) 93/57 96 %   10/13/21 2104  (!) 131 (!) 31 (!) 71/54 96 %   10/13/21 2051  (!) 107 (!) 32 (!) 83/43 96 %   10/13/21 2043     96 %   10/13/21 2034  (!) 118 (!) 32 94/60 96 %   10/13/21 2019  (!) 101 25 97/62 96 %   10/13/21 2007  (!) 110 (!) 31 (!) 86/60 95 %   10/13/21 1949  98 (!) 32 (!) 107/47 95 %   10/13/21 1943  (!) 120 (!) 31 100/64 93 %   10/1934  (!) 112 (!) 31 100/64 97 %   10/13/21 1919  (!) 114 (!) 31 (!) 104/58 92 %   10/13/21 1904 (!) 94.6 °F (34.8 °C) (!) 130 (!) 32 93/68 91 %   10/13/21 1848  (!) 129 (!) 31 96/67 91 %   10/13/21 1833  (!) 116 29 102/69 98 %   10/13/21 1818  (!) 121 29 100/70 97 %   10/13/21 1804  (!) 130 30 99/67 98 %   10/13/21 1749  (!) 105 24 104/66 97 %   10/13/21 1734  (!) 115 (!) 32 (!) 84/57 97 %   10/13/21 1718  (!) 108 (!) 34 (!) 97/54 96 %   10/13/21 1704  (!) 112 24 (!) 100/59 96 %   10/13/21 1648  (!) 120 (!) 33 94/67 96 %   10/13/21 1633  (!) 118 30 97/63 97 %   10/13/21 1618  (!) 112 30 92/60 96 %   10/13/21 1604  (!) 133 (!) 31 (!) 98/55 97 %   10/13/21 1533  (!) 121 29 91/64 97 %   10/13/21 1504 99 °F (37.2 °C) (!) 109 28 (!) 84/60 97 %   10/13/21 1449  (!) 128 28 93/64 97 %   10/13/21 1433  (!) 113 (!) 31 (!) 93/53 97 %   10/13/21 1418  (!) 133 (!) 32 (!) 86/61 97 % 10/13/21 1404  (!) 112 30 (!) 88/70 98 %   10/13/21 1348  (!) 106 27 (!) 82/60 97 %   10/13/21 1333  (!) 109 (!) 32 (!) 81/62 97 %     Oxygen Therapy  O2 Sat (%): 97 % (10/14/21 1202)  Pulse via Oximetry: 135 beats per minute (10/14/21 1202)  O2 Device: BIPAP (10/14/21 0814)  Skin Assessment: Clean, dry, & intact (10/14/21 0734)  Skin Protection for O2 Device: No (10/12/21 1039)  O2 Flow Rate (L/min): 7 l/min (10/14/21 1131)  FIO2 (%): 40 % (10/14/21 0814)    Estimated body mass index is 36.36 kg/m² as calculated from the following:    Height as of this encounter: 6' 1\" (1.854 m). Weight as of this encounter: 125 kg (275 lb 9.2 oz). Intake/Output Summary (Last 24 hours) at 10/14/2021 1306  Last data filed at 10/14/2021 0500  Gross per 24 hour   Intake 1470.47 ml   Output 465 ml   Net 1005.47 ml       Constitutional:       General:  He is ill-appearing. HENT:      Head: Normocephalic. Eyes:      Extraocular Movements: Extraocular movements intact. Cardiovascular:      Rate and Rhythm: Tachycardia present. Rhythm irregular. Pulses:           Radial pulses are 2+ on the left side. Comments: BLE trace pitting edema  Pulmonary:      Effort: Pulmonary effort is normal. No respiratory distress. Breath sounds: No decreased air movement. Examination of the right-middle field reveals decreased breath sounds. Examination of the right-lower field reveals decreased breath sounds. Decreased breath sounds present. Abdominal:      General: There is no distension. Tenderness: There is no abdominal tenderness. Musculoskeletal:         General: No deformity. Cervical back: No rigidity. Skin:     General: Skin is warm and dry.    Neurological:     Unresponsive    I have reviewed ordered lab tests and independently visualized imaging below:    Recent Labs:  Recent Results (from the past 48 hour(s))   GLUCOSE, POC    Collection Time: 10/12/21  1:23 PM   Result Value Ref Range    Glucose (POC) 117 (H) 65 - 100 mg/dL    Performed by [address] [place] [address] PPD TEST IN 24 HRS    Collection Time: 10/12/21  3:52 PM   Result Value Ref Range    PPD Negative Negative    mm Induration 0 0 - 5 mm   GLUCOSE, POC    Collection Time: 10/12/21  5:04 PM   Result Value Ref Range    Glucose (POC) 116 (H) 65 - 100 mg/dL    Performed by Kay    EKG, 12 LEAD, SUBSEQUENT    Collection Time: 10/12/21  6:07 PM   Result Value Ref Range    Ventricular Rate 109 BPM    Atrial Rate 125 BPM    QRS Duration 154 ms    Q-T Interval 342 ms    QTC Calculation (Bezet) 460 ms    Calculated R Axis -24 degrees    Calculated T Axis 150 degrees    Diagnosis       Atrial fibrillation with rapid ventricular response with occasional   ventricular-paced complexes  Left bundle branch block  Abnormal ECG  When compared with ECG of 12-OCT-2021 11:19,  Previous ECG has undetermined rhythm, needs review  Confirmed by Suzette Washburn ([id number]) on 10/13/2021 2:03:19 PM     GLUCOSE, POC    Collection Time: 10/12/21  9:21 PM   Result Value Ref Range    Glucose (POC) 146 (H) 65 - 100 mg/dL    Performed by Gita Leon, RANDOM    Collection Time: 10/13/21  3:13 AM   Result Value Ref Range    Vancomycin, random 87.8 UG/ML   METABOLIC PANEL, BASIC    Collection Time: 10/13/21  3:13 AM   Result Value Ref Range    Sodium 134 (L) 138 - 145 mmol/L    Potassium 5.1 3.5 - 5.1 mmol/L    Chloride 102 98 - 107 mmol/L    CO2 26 21 - 32 mmol/L    Anion gap 6 (L) 7 - 16 mmol/L    Glucose 151 (H) 65 - 100 mg/dL    BUN 57 (H) 8 - 23 MG/DL    Creatinine 1.64 (H) 0.8 - 1.5 MG/DL    GFR est AA 51 (L) >60 ml/min/1.73m2    GFR est non-AA 42 (L) >60 ml/min/1.73m2    Calcium 9.4 8.3 - 10.4 MG/DL   CBC WITH AUTOMATED DIFF    Collection Time: 10/13/21  3:13 AM   Result Value Ref Range    WBC 13.4 (H) 4.3 - 11.1 K/uL    RBC 3.51 (L) 4.23 - 5.6 M/uL    HGB 11.0 (L) 13.6 - 17.2 g/dL    HCT 36.0 (L) 41.1 - 50.3 %    .6 (H) 79.6 - 97.8 FL    MCH 31.3 26.1 - 32.9 PG    MCHC 30.6 (L) 31.4 - 35.0 g/dL    RDW 16.2 (H) 11.9 - 14.6 %    PLATELET 531 460 - 457 K/uL    MPV 11.2 9.4 - 12.3 FL    ABSOLUTE NRBC 0.07 0.0 - 0.2 K/uL    DF AUTOMATED      NEUTROPHILS 70 43 - 78 %    LYMPHOCYTES 19 13 - 44 %    MONOCYTES 7 4.0 - 12.0 %    EOSINOPHILS 0 (L) 0.5 - 7.8 %    BASOPHILS 0 0.0 - 2.0 %    IMMATURE GRANULOCYTES 3 0.0 - 5.0 %    ABS. NEUTROPHILS 9.3 (H) 1.7 - 8.2 K/UL    ABS. LYMPHOCYTES 2.6 0.5 - 4.6 K/UL    ABS. MONOCYTES 1.0 0.1 - 1.3 K/UL    ABS. EOSINOPHILS 0.0 0.0 - 0.8 K/UL    ABS. BASOPHILS 0.0 0.0 - 0.2 K/UL    ABS. IMM. GRANS.  0.5 0.0 - 0.5 K/UL   GLUCOSE, POC    Collection Time: 10/13/21  8:07 AM   Result Value Ref Range    Glucose (POC) 157 (H) 65 - 100 mg/dL    Performed by [employer]hosting    GLUCOSE, POC    Collection Time: 10/13/21 11:34 AM   Result Value Ref Range    Glucose (POC) 153 (H) 65 - 100 mg/dL    Performed by [address] [place] [address] 165 PPD TEST IN 48 HRS    Collection Time: 10/13/21  4:00 PM   Result Value Ref Range    PPD Negative Negative    mm Induration 0 0 - 5 mm   GLUCOSE, POC    Collection Time: 10/13/21  5:06 PM   Result Value Ref Range    Glucose (POC) 154 (H) 65 - 100 mg/dL    Performed by [employer]    GLUCOSE, POC    Collection Time: 10/13/21 10:25 PM   Result Value Ref Range    Glucose (POC) 138 (H) 65 - 100 mg/dL    Performed by GiselaSCARLETT    METABOLIC PANEL, BASIC    Collection Time: 10/14/21  3:13 AM   Result Value Ref Range    Sodium 134 (L) 136 - 145 mmol/L    Potassium 5.3 (H) 3.5 - 5.1 mmol/L    Chloride 103 98 - 107 mmol/L    CO2 25 21 - 32 mmol/L    Anion gap 6 (L) 7 - 16 mmol/L    Glucose 154 (H) 65 - 100 mg/dL    BUN 65 (H) 8 - 23 MG/DL    Creatinine 1.81 (H) 0.8 - 1.5 MG/DL    GFR est AA 46 (L) >60 ml/min/1.73m2    GFR est non-AA 38 (L) >60 ml/min/1.73m2    Calcium 9.6 8.3 - 10.4 MG/DL   CBC W/O DIFF    Collection Time: 10/14/21  3:13 AM   Result Value Ref Range    WBC 14. 3 (H) 4.3 - 11.1 K/uL    RBC 3.79 (L) 4.23 - 5.6 M/uL    HGB 11.8 (L) 13.6 - 17.2 g/dL    HCT 38.9 (L) 41.1 - 50.3 %    .6 (H) 79.6 - 97.8 FL    MCH 31.1 26.1 - 32.9 PG    MCHC 30.3 (L) 31.4 - 35.0 g/dL    RDW 16.1 (H) 11.9 - 14.6 %    PLATELET 850 (L) 493 - 450 K/uL    MPV 11.4 9.4 - 12.3 FL    ABSOLUTE NRBC 0.19 0.0 - 0.2 K/uL   LACTIC ACID    Collection Time: 10/14/21  3:20 AM   Result Value Ref Range    Lactic acid 2.0 0.4 - 2.0 MMOL/L   GLUCOSE, POC    Collection Time: 10/14/21  8:05 AM   Result Value Ref Range    Glucose (POC) 151 (H) 65 - 100 mg/dL    Performed by Celestino Croft RANDOM    Collection Time: 10/14/21  9:06 AM   Result Value Ref Range    Vancomycin, random 19.6 UG/ML   GLUCOSE, POC    Collection Time: 10/14/21 11:08 AM   Result Value Ref Range    Glucose (POC) 148 (H) 65 - 100 mg/dL    Performed by Marco A        All Micro Results     Procedure Component Value Units Date/Time    CULTURE, BLOOD [[id number]] Collected: 10/14/21 1113    Order Status: Completed Specimen: Blood Updated: 10/14/21 1153    CULTURE, BLOOD [[id number]] Collected: 10/14/21 1057    Order Status: Completed Specimen: Blood Updated: 10/14/21 1117    BLOOD CULTURE [[id number]] Collected: 10/08/21 1343    Order Status: Completed Specimen: Blood Updated: 10/13/21 1214     Special Requests: --        LEFT  FOREARM       Culture result: NO GROWTH 5 DAYS       BLOOD CULTURE [[id number]] Collected: 10/08/21 1535    Order Status: Completed Specimen: Blood Updated: 10/13/21 1214     Special Requests: --        LEFT  HAND       Culture result: NO GROWTH 5 DAYS       COVID-19 RAPID TEST [[id number]] Collected: 10/11/21 1629    Order Status: Completed Specimen: Nasopharyngeal Updated: 10/11/21 1717     Specimen source Nasopharyngeal        COVID-19 rapid test Not detected        Comment:      The specimen is NEGATIVE for SARS-CoV-2, the novel coronavirus associated with COVID-19.   A negative result does not rule out COVID-19. This test has been authorized by the FDA under an Emergency Use Authorization (EUA) for use by authorized laboratories. Fact sheet for Healthcare Providers: ConventionUpdate.co.nz  Fact sheet for Patients: ConventionUpdate.co.nz       Methodology: Isothermal Nucleic Acid Amplification         CULTURE, URINE [[id number]] Collected: 10/08/21 1356    Order Status: Completed Specimen: Cath Urine Updated: 10/11/21 6644     Special Requests: NO SPECIAL REQUESTS        Culture result:       <10,000 COLONIES/mL MIXED SKIN MARCOS ISOLATED                Other Studies:  XR CHEST SNGL V    Result Date: 10/14/2021  EXAMINATION: One view chest HISTORY: increased O2 demand TECHNIQUE: Frontal chest. COMPARISON: 10/12/2021 FINDINGS:  Stable right central venous catheter. Persistent bilateral lung infiltrates. No a small left significant pneumothorax. There is a small left pleural effusion, unchanged. The heart is unchanged. No other significant interval changes. 1. Findings as described above.        Current Meds:  Current Facility-Administered Medications   Medication Dose Route Frequency    NOREPINephrine (LEVOPHED) 16,000 mcg in dextrose 5% 250 mL infusion  0.5-30 mcg/min IntraVENous TITRATE    apixaban (ELIQUIS) tablet 2.5 mg  2.5 mg Oral Q12H    famotidine (PEPCID) tablet 20 mg  20 mg Oral DAILY    Vancomycin Intermittent Dosing per Rx   Other Rx Dosing/Monitoring    cefepime (MAXIPIME) 2 g in 0.9% sodium chloride (MBP/ADV) 100 mL MBP  2 g IntraVENous Q24H    amiodarone (CORDARONE) 450 mg in dextrose 5% 250 mL infusion  0.5 mg/min IntraVENous CONTINUOUS    [Held by provider] furosemide (LASIX) injection 40 mg  40 mg IntraVENous Q12H    carvediloL (COREG) tablet 3.125 mg  3.125 mg Oral BID WITH MEALS    albuterol-ipratropium (DUO-NEB) 2.5 MG-0.5 MG/3 ML  3 mL Nebulization Q4H PRN    thiamine HCL (B-1) tablet 100 mg  100 mg Oral DAILY    insulin regular (NOVOLIN R, HUMULIN R) injection   SubCUTAneous AC&HS    nystatin (MYCOSTATIN) 100,000 unit/mL oral suspension 500,000 Units  500,000 Units Swish and Spit QID    sodium chloride (NS) flush 5-10 mL  5-10 mL IntraVENous Q8H    sodium chloride (NS) flush 5-10 mL  5-10 mL IntraVENous PRN    sodium chloride (NS) flush 5-40 mL  5-40 mL IntraVENous Q8H    sodium chloride (NS) flush 5-40 mL  5-40 mL IntraVENous PRN    acetaminophen (TYLENOL) tablet 650 mg  650 mg Oral Q6H PRN    Or    acetaminophen (TYLENOL) suppository 650 mg  650 mg Rectal Q6H PRN    polyethylene glycol (MIRALAX) packet 17 g  17 g Oral DAILY PRN    ondansetron (ZOFRAN ODT) tablet 4 mg  4 mg Oral Q8H PRN    Or    ondansetron (ZOFRAN) injection 4 mg  4 mg IntraVENous Q6H PRN    dextrose 40% (GLUTOSE) oral gel 1 Tube  15 g Oral PRN    glucagon (GLUCAGEN) injection 1 mg  1 mg IntraMUSCular PRN    dextrose (D50W) injection syrg 12.5-25 g  25-50 mL IntraVENous PRN    arformoteroL (BROVANA) neb solution 15 mcg  15 mcg Nebulization BID RT    budesonide (PULMICORT) 500 mcg/2 ml nebulizer suspension  500 mcg Nebulization BID RT       Signed:  Jhoan Chandler MD

## 2021-10-14 NOTE — CONSULTS
History and Physical Initial Visit NOTE           10/14/2021    Gustavo Escobar                        Date of Admission:  10/8/2021    The patient's chart is reviewed and the patient is discussed with the staff. Subjective:     Patient is a 80 y.o.  male seen and evaluated at the request of Dr. Gregory Francois.     He has a history of covid in August 2021. Hospitalized from 8/18-24. H/o NICM previous EF 40-45%, GERD, HTN, a fib, hld. He was admitted to the ICU at that time. Intubated just for 1 day. It looks like he was on room air at the time of his discharge. This time he was admitted 80 with AMS, bradycardia, SOB. The H&P states that he was on 4L prior to admission, not sure if this was started while he was at the post acute care facility as there is no mention of that in his previous dc summary. He was found to have B infiltrates on CXR and started on abx. He has been labelled as sepsis. Infectious workup, other than initial UA with 2+ bacteria has been negative. WBC and pct were initially normal. WBC has since risen but is not left shifted. Cultures are negative. Of note, he has been found to have severe systolic heart failure with EF 15-20% and BNP of 9000 rising to 16,000. CXR this morning read as small left sided PTX for which pulmonary was consulted. His initial ABG was normal other than hypoxia, however, subsequent ones showed CO2 up to 88. He was DNR and was placed on bipap with response down to 57. He has developed renal failure and liver failure since admission as well. A fib with RVR currently on amio. Hypotension requiring levophed and tasha. Review of Systems  Review of systems not obtained due to patient factors. Prior to Admission Medications   Prescriptions Last Dose Informant Patient Reported? Taking?    Eliquis 5 mg tablet   No No   Sig: TAKE 1 TABLET BY MOUTH TWICE DAILY   PROCTOZONE-HC 2.5 % rectal cream   Yes No   Sig: Insert 1 Applicator into rectum two (2) times daily as needed for Hemorrhoids. Indications: Pt takes as needed   ZINC SULFATE PO   Yes Yes   Sig: Take 50 mg by mouth daily. acetaminophen (TylenoL) 325 mg tablet   Yes Yes   Sig: Take 650 mg by mouth every six (6) hours as needed for Pain. albuterol (Ventolin HFA) 90 mcg/actuation inhaler   Yes Yes   Sig: Take 2 Puffs by inhalation every four (4) hours as needed for Wheezing or Shortness of Breath. Use with spacer. amiodarone (CORDARONE) 200 mg tablet   Yes Yes   Sig: Take 200 mg by mouth daily. ascorbic acid, vitamin C, (VITAMIN C) 500 mg tablet   Yes Yes   Sig: Take 1,000 mg by mouth two (2) times a day. azithromycin (Zithromax) 250 mg tablet   Yes Yes   Sig: Take 250 mg by mouth daily. cholecalciferol (Vitamin D3) (1000 Units /25 mcg) tablet   Yes No   Sig: Take 2,000 Units by mouth daily. colestipol (COLESTID) 1 gram tablet   Yes No   Sig: Take 2 g by mouth nightly. cyanocobalamin (VITAMIN B-12) 500 mcg tablet   Yes No   Sig: Take 500 mcg by mouth daily. doxycycline (MONODOX) 100 mg capsule   Yes Yes   Sig: Take 100 mg by mouth two (2) times a day. furosemide (LASIX) 40 mg tablet   No No   Sig: TAKE 1 TABLET BY MOUTH DAILY   Patient taking differently: Take 20 mg by mouth daily. insulin glargine (Lantus U-100 Insulin) 100 unit/mL injection   Yes Yes   Si Units by SubCUTAneous route nightly. omeprazole (PRILOSEC) 20 mg capsule   Yes Yes   Sig: Take 20 mg by mouth daily. predniSONE (DELTASONE) 20 mg tablet   Yes Yes   Sig: Take 20 mg by mouth daily (with breakfast). tamsulosin (FLOMAX) 0.4 mg capsule   No No   Sig: Take 1 Cap by mouth daily.  Indications: enlarged prostate with urination problem      Facility-Administered Medications: None     Past Medical History:   Diagnosis Date    Abnormal EKG     Arrhythmia     pacemaker/defib placed 13    CAD (coronary artery disease)     MINIMAL DISEASE     Cardiomyopathy (HCC)     Edema     Ankle    GERD (gastroesophageal reflux disease)     Heart failure (HCC)     HLD (hyperlipidemia) 2016    Hypertension     ICD (implantable cardiac defibrillator) in place 2013    LBBB (left bundle branch block) 2016    Prostatitis      Past Surgical History:   Procedure Laterality Date    HX CHOLECYSTECTOMY      HX COLONOSCOPY      HX HEENT      t and     HX OTHER SURGICAL      ing hernia rpr    HX PACEMAKER  2013    St. flora ICD    HX UROLOGICAL  2012    PROSTATE TUMT    HI ABDOMEN SURGERY PROC UNLISTED      cholecystectomy     Social History     Socioeconomic History    Marital status:      Spouse name: Not on file    Number of children: Not on file    Years of education: Not on file    Highest education level: Not on file   Occupational History    Not on file   Tobacco Use    Smoking status: Former Smoker     Packs/day: 0.25     Years: 10.00     Pack years: 2.50     Quit date: 1965     Years since quittin.1    Smokeless tobacco: Never Used   Substance and Sexual Activity    Alcohol use: No    Drug use: No    Sexual activity: Not on file   Other Topics Concern    Not on file   Social History Narrative    Not on file     Social Determinants of Health     Financial Resource Strain:     Difficulty of Paying Living Expenses:    Food Insecurity:     Worried About Running Out of Food in the Last Year:     [address] in the Last Year:    Transportation Needs:     Lack of Transportation (Medical):      Lack of Transportation (Non-Medical):    Physical Activity:     Days of Exercise per Week:     Minutes of Exercise per Session:    Stress:     Feeling of Stress :    Social Connections:     Frequency of Communication with Friends and Family:     Frequency of Social Gatherings with Friends and Family:     Attends Mu-ism Services:     Active Member of Clubs or Organizations:     Attends Club or Organization Meetings:     Marital Status:    Intimate Partner Violence:     Fear of Current or Ex-Partner:     Emotionally Abused:     Physically Abused:     Sexually Abused:      Family History   Problem Relation Age of Onset    Stroke Mother     Heart Surgery Brother 76        CABG     Allergies   Allergen Reactions    Pcn [Penicillins] Itching and Swelling     DIFFICULTY SWALLOWING       Current Facility-Administered Medications   Medication Dose Route Frequency    NOREPINephrine (LEVOPHED) 16,000 mcg in dextrose 5% 250 mL infusion  0.5-30 mcg/min IntraVENous TITRATE    PHENYLephrine (HERMINIO-SYNEPHRINE) 90 mg in 0.9% sodium chloride 250 mL infusion   mcg/min IntraVENous TITRATE    apixaban (ELIQUIS) tablet 2.5 mg  2.5 mg Oral Q12H    famotidine (PEPCID) tablet 20 mg  20 mg Oral DAILY    Vancomycin Intermittent Dosing per Rx   Other Rx Dosing/Monitoring    cefepime (MAXIPIME) 2 g in 0.9% sodium chloride (MBP/ADV) 100 mL MBP  2 g IntraVENous Q24H    amiodarone (CORDARONE) 450 mg in dextrose 5% 250 mL infusion  0.5 mg/min IntraVENous CONTINUOUS    [Held by provider] furosemide (LASIX) injection 40 mg  40 mg IntraVENous Q12H    carvediloL (COREG) tablet 3.125 mg  3.125 mg Oral BID WITH MEALS    albuterol-ipratropium (DUO-NEB) 2.5 MG-0.5 MG/3 ML  3 mL Nebulization Q4H PRN    thiamine HCL (B-1) tablet 100 mg  100 mg Oral DAILY    insulin regular (NOVOLIN R, HUMULIN R) injection   SubCUTAneous AC&HS    nystatin (MYCOSTATIN) 100,000 unit/mL oral suspension 500,000 Units  500,000 Units Swish and Spit QID    sodium chloride (NS) flush 5-10 mL  5-10 mL IntraVENous Q8H    sodium chloride (NS) flush 5-10 mL  5-10 mL IntraVENous PRN    sodium chloride (NS) flush 5-40 mL  5-40 mL IntraVENous Q8H    sodium chloride (NS) flush 5-40 mL  5-40 mL IntraVENous PRN    acetaminophen (TYLENOL) tablet 650 mg  650 mg Oral Q6H PRN    Or    acetaminophen (TYLENOL) suppository 650 mg  650 mg Rectal Q6H PRN    polyethylene glycol (MIRALAX) packet 17 g  17 g Oral DAILY PRN  ondansetron (ZOFRAN ODT) tablet 4 mg  4 mg Oral Q8H PRN    Or    ondansetron (ZOFRAN) injection 4 mg  4 mg IntraVENous Q6H PRN    dextrose 40% (GLUTOSE) oral gel 1 Tube  15 g Oral PRN    glucagon (GLUCAGEN) injection 1 mg  1 mg IntraMUSCular PRN    dextrose (D50W) injection syrg 12.5-25 g  25-50 mL IntraVENous PRN    arformoteroL (BROVANA) neb solution 15 mcg  15 mcg Nebulization BID RT    budesonide (PULMICORT) 500 mcg/2 ml nebulizer suspension  500 mcg Nebulization BID RT     Objective:   Blood pressure 96/63, pulse (!) 131, temperature 98.7 °F (37.1 °C), resp. rate 28, height 6' 1\" (1.854 m), weight 275 lb 9.2 oz (125 kg), SpO2 98 %. Intake/Output Summary (Last 24 hours) at 10/14/2021 1429  Last data filed at 10/14/2021 0500  Gross per 24 hour   Intake 1470.47 ml   Output 465 ml   Net 1005.47 ml     PHYSICAL EXAM   Constitutional:  the patient is well developed and in no acute distress  EENMT:  Sclera clear, pupils equal, oral mucosa moist  Respiratory: Bipap in place, minimal crackles  Cardiovascular:  Tachy, irregular,without M,G,R  Gastrointestinal: soft and non-tender; with positive bowel sounds. Musculoskeletal: warm without cyanosis. There is 1+ B lower extremity edema. Skin:  no jaundice or rashes, no visible wounds   Neurologic: somnolent, weakly opens eyes to physical stimulation. Not following commands.     Psychiatric:  As above    CXR:    10/14/21      PREVIOUS CXR 8/23/21      CT Chest:     Recent Labs     10/14/21  0320 10/14/21  0313 10/13/21  0313 10/12/21  0906 10/12/21  0213   WBC  --  14.3* 13.4*  --  7.2   HGB  --  11.8* 11.0*  --  10.1*   HCT  --  38.9* 36.0*  --  33.5*   PLT  --  143* 155  --  132*   LAC 2.0  --   --  1.0 2.1*   NA  --  134* 134*  --  140   K  --  5.3* 5.1  --  5.3*   CL  --  103 102  --  106   CO2  --  25 26  --  32   GLU  --  154* 151*  --  84   BUN  --  65* 57*  --  44*   CREA  --  1.81* 1.64*  --  1.55*   CA  --  9.6 9.4  --  9.1   ALB  --   --   --   -- 3. 3   AST  --   --   --   --  241*   ALT  --   --   --   --  355*   AP  --   --   --   --  68     ECHO: Results from Hospital Encounter encounter on 10/08/21    ECHO ADULT COMPLETE    Interpretation Summary  · Image quality for this study was technically difficult. · LV: Estimated LVEF is 15 - 20%. Normal wall thickness. Dilated left ventricle. Severely and globally reduced systolic function. Left ventricular diastolic dysfunction. · RV: Mildly dilated right ventricle. Mildly reduced systolic function. Pacer/ICD present. 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 Ascending aorta diameter = 4.3 cm. · TV: Mild tricuspid valve regurgitation is present. · AV: Mild aortic valve regurgitation is present. · MV: Mitral valve non-specific thickening. Mild to moderate mitral valve regurgitation is present. Results     Procedure Component Value Units Date/Time    CULTURE, BLOOD [[id number]] Collected: 10/14/21 1113    Order Status: Completed Specimen: Blood Updated: 10/14/21 1153    CULTURE, BLOOD [[id number]] Collected: 10/14/21 1057    Order Status: Completed Specimen: Blood Updated: 10/14/21 1117    COVID-19 RAPID TEST [[id number]] Collected: 10/11/21 1629    Order Status: Completed Specimen: Nasopharyngeal Updated: 10/11/21 1717     Specimen source Nasopharyngeal        COVID-19 rapid test Not detected        Comment:      The specimen is NEGATIVE for SARS-CoV-2, the novel coronavirus associated with COVID-19. A negative result does not rule out COVID-19. This test has been authorized by the FDA under an Emergency Use Authorization (EUA) for use by authorized laboratories.         Fact sheet for Healthcare Providers: iTendency.uy  Fact sheet for Patients: iTendency.uy       Methodology: Isothermal Nucleic Acid Amplification         BLOOD CULTURE [[id number]] Collected: 10/08/21 1535    Order Status: Completed Specimen: Blood Updated: 10/13/21 1214     Special Requests: --        LEFT  HAND       Culture result: NO GROWTH 5 DAYS       CULTURE, URINE [[id number]] Collected: 10/08/21 1356    Order Status: Completed Specimen: Cath Urine Updated: 10/11/21 0744     Special Requests: NO SPECIAL REQUESTS        Culture result:       <10,000 COLONIES/mL MIXED SKIN MARCOS ISOLATED          BLOOD CULTURE [[id number]] Collected: 10/08/21 1343    Order Status: Completed Specimen: Blood Updated: 10/13/21 1214     Special Requests: --        LEFT  FOREARM       Culture result: NO GROWTH 5 DAYS           Inpat Anti-Infectives (From admission, onward)     Start     Ordered Stop    10/13/21 0600  cefepime (MAXIPIME) 2 g in 0.9% sodium chloride (MBP/ADV) 100 mL MBP  2 g,   IntraVENous,   EVERY 24 HOURS      10/12/21 1022 --    10/12/21 1016  Vancomycin Intermittent Dosing per Rx  Other,   RX DOSING/MONITORING      10/12/21 1018 --    10/09/21 1800  nystatin (MYCOSTATIN) 100,000 unit/mL oral suspension 500,000 Units  500,000 Units,   Swish and Spit,   4 TIMES DAILY      10/09/21 1436 --    10/08/21 1800  fluconazole (DIFLUCAN) 200mg/100 mL IVPB (premix)  200 mg,   IntraVENous,   ONCE      10/08/21 1621 10/09/21 0559              Assessment and Plan:  (Medical Decision Making)   Principal Problem:    Patient labelled as having septic shock but all tests point to this being cardiogenic shock. His cultures so far have been Negative. His pct was normal. His WBC was normal initially and is just now elevated but with no left shift. At the same time, his TTE shows a severe drop in his EF from 40-45% previously now to 15% with a BNP that is severely elevated. CXR with bilateral infiltrates. Compared to previous covid CXR these are overall improved. Hard to tell if this is ongoing inflammation from covid or acute pulmonary edema. Questionable PTX seen on CXR today. Cardiogenic shock:   Will start dobutamine in attempt to improved cardiac output and forward flow. If this is successful hopefully will be able to wean off other pressors and see an improvement in his renal and liver failure. Try to wean levophed off first, followed by tasha given his tachycardia. Sepsis with encephalopathy and septic shock ([address] (10/8/2021)  Would stop vanc. Would consider stopping cefepime in the coming days as well. Acute on chronic respiratory failure: Unclear if he was on O2 prior to admission. May have been started at his facility after discharge. Cont bipap and daily abg's. DNR. B pulmonary infiltrates: either improving post covid infiltrates or acute pulmonary edema. Unable to diurese at present with hypotension and acute renal failure. Monitor for now. PTX:  Hard to see a clear PTX on earlier cxr. Repeat just done and do not see a clear pleural line. Will await official radiology read and get daily CXR's for now.        Nonischemic cardiomyopathy ([address] (2/21/2013)          Implantable cardioverter-defibrillator (ICD) in situ (2/21/2013)          HTN (hypertension) (2/21/2013)          LBBB (left bundle branch block) (1/7/2016)          HLD (hyperlipidemia) (1/7/2016)          Benign non-nodular prostatic hyperplasia with lower urinary tract symptoms (7/27/2017)          Class 2 severe obesity due to excess calories with serious comorbidity and body mass index (BMI) of 35.0 to 35.9 in adult [facility]) (5/7/2018)          Personal history of COVID-19 (8/18/2021)          Type 2 diabetes mellitus, without long-term current use of insulin ([address] (8/24/2021)          Paroxysmal atrial fibrillation with RVR ([address] (10/8/2021)          Toxic metabolic encephalopathy (21/7/6755)          Acute kidney injury (AYLIN) with acute tubular necrosis (ATN) ([address] (10/8/2021)          Jennetta Lat, oral (10/8/2021)          Acute liver failure without hepatic coma (10/8/2021)        Chronic respiratory failure with hypoxia and hypercapnia ([address] (10/8/2021)          Mild intermittent asthma (10/8/2021)          HFrEF (heart failure with reduced ejection fraction) ([facility] (10/8/2021)          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 Thank you very much for this referral.  We appreciate the opportunity to participate in this patient's care. Will follow along with above stated plan.     Suresh Bean MD

## 2021-10-14 NOTE — PROGRESS NOTES
Bedside and verbal shift change report received from  [facility]  (offgoing nurse). Report included the following information SBAR, Intake/Output, Recent Results, Cardiac Rhythm Irregular, AV paced, Afib , Alarm Parameters  and Quality Measures. Dual skin assessment completed at bedside: Sacral wound      Dual verification of gtts completed (name of gtts verified): Law @ 190mcg  Dobutamine @ 10 mcg                                     .

## 2021-10-15 NOTE — PROGRESS NOTES
VANCO DAILY FOLLOW UP RENAL INSUFFICIENCY PATIENT   [address] [facility] Pharmacokinetic Monitoring Service - Vancomycin    Consulting Provider: Dr. Octavia Olivia   Indication: sepsis  Target Concentration: Random level ? 15 mg/L  Day of Therapy: 8  Additional Antimicrobials: cefepime    Pertinent Laboratory Values: Wt Readings from Last 1 Encounters:   10/14/21 125 kg (275 lb 9.2 oz)     Temp Readings from Last 1 Encounters:   10/15/21 99.2 °F (37.3 °C)     Recent Labs     10/15/21  0359 10/14/21  0320 10/14/21  0313 10/13/21  0313 10/12/21  0906   BUN 68*  --  65* 57*  --    CREA 1.92*  --  1.81* 1.64*  --    WBC 9.3  --  14.3* 13.4*  --    LAC  --  2.0  --   --  1.0     Lab Results   Component Value Date/Time    Vancomycin, random 22.0 10/15/2021 03:59 AM       MRSA Nasal Swab: N/A. Non-respiratory infection. .    Assessment:  Date:  Dose/Freq Admin Times Level/Time:   10/8 2500 mg x 1 1550    10/9 1250 mg x 1 [address] @ 6496 = 15.7   10/10 1250 mg q18h 14[address] Rd @ 0410 = 13.5   10/11 1000 mg q12h 0624, 1932    10/12  0856 Rd @ 0213 = 23.0   10/13 1000 mg x 1 [address] @ 0723 = 17.9   10/14 750 mg x 1 [address] @ 0906 = 19.6   10/15   Rd @ 0359 = 22   10/16 750 mg x 1 (06)            Plan:  Concentration-guided dosing due to renal impairment  Random level is supra-therapeutic   Will schedule 750 mg IV x 1 for tomorrow morning  Repeat vancomycin concentrations will be ordered as clinically indicated.   Pharmacy will continue to monitor patient and adjust therapy as indicated    Thank you for the consult,  Mili Khan, JACKIED

## 2021-10-15 NOTE — PROGRESS NOTES
LOS 7d  Chart reviewed by [facility] for discharge planning patient remains in the BMT/ICU on Bipap  Will continue to follow for discharge planning needs  Please consult  if any new issues arise  Discharge plan is undetermined at this time

## 2021-10-15 NOTE — PROGRESS NOTES
Pt. Continues to struggle. His nurse relates that pt is now comfort measures only. I spoke to his wife. We will be on standby.

## 2021-10-15 NOTE — PROGRESS NOTES
[facility] Critical Care Note[de-identified] 10/15/2021  Stephen Blanca  Admission Date: 10/8/2021     Length of Stay: 7 days    Background: 80 y.o.  male seen and evaluated at the request of Dr. Marquis Mata.      He has a history of covid in August 2021. Hospitalized from 8/18-24. H/o NICM previous EF 40-45%, GERD, HTN, a fib, hld. He was admitted to the ICU at that time. Intubated just for 1 day. It looks like he was on room air at the time of his discharge. This time he was admitted 80 with AMS, bradycardia, SOB. The H&P states that he was on 4L prior to admission, not sure if this was started while he was at the post acute care facility as there is no mention of that in his previous dc summary. He was found to have B infiltrates on CXR and started on abx. He has been labelled as sepsis. Infectious workup, other than initial UA with 2+ bacteria has been negative. WBC and pct were initially normal. WBC has since risen but is not left shifted. Cultures are negative. Of note, he has been found to have severe systolic heart failure with EF 15-20% and BNP of 9000 rising to 16,000. CXR this morning read as small left sided PTX for which pulmonary was consulted. His initial ABG was normal other than hypoxia, however, subsequent ones showed CO2 up to 88. He was DNR and was placed on bipap with response down to 57.      He has developed renal failure and liver failure since admission as well.      A fib with RVR currently on amio. Hypotension requiring levophed and tasha. Notable PMH:  has a past medical history of Abnormal EKG, Arrhythmia, CAD (coronary artery disease), Cardiomyopathy ([address] Edema, GERD (gastroesophageal reflux disease), Heart failure ([address] HLD (hyperlipidemia) (1/7/2016), Hypertension, ICD (implantable cardiac defibrillator) in place (2/2013), LBBB (left bundle branch block) (1/7/2016), and Prostatitis.     24 Hour events: pt has been on bipap all night and has been on levophed, dobutamine and tasha    ROS: unable to obtain/negative except as listed elsewhere. Lines: (insertion date)       Central line: in place      Drips: current dose (range)  Phenylephrine Dose (mcg/min): 250 mcg/min  Levophed Dose (mcg/kg/min): 0.0633 mcg/kg/min     Pertinent Exam:         Blood pressure (!) 81/60, pulse (!) 138, temperature 98.7 °F (37.1 °C), resp. rate (!) 32, height 6' 1\" (1.854 m), weight 275 lb 9.2 oz (125 kg), SpO2 99 %. Intake/Output Summary (Last 24 hours) at 10/15/2021 1002  Last data filed at 10/15/2021 0500  Gross per 24 hour   Intake 1953.25 ml   Output 700 ml   Net 1253.25 ml     Constitutional:  intubated and mechanically ventilated.   EENMT:  Sclera clear, pupils equal, oral mucosa moist  Respiratory: crackles   Cardiovascular:  RRR  Gastrointestinal:  soft with no tenderness; positive bowel sounds present  Musculoskeletal:  warm with no cyanosis, trace lower extremity edema  Skin:  no jaundice or ecchymosis  Neurologic:awake but confused  Psychiatric:  calm    CXR:  No ptx seen on cxr today      Recent Labs     10/15/21  0359 10/14/21  0320 10/14/21  0313 10/13/21  0313   WBC 9.3  --  14.3* 13.4*   HGB 10.8*  --  11.8* 11.0*   HCT 35.1*  --  38.9* 36.0*   *  --  143* 155   LAC  --  2.0  --   --      Recent Labs     10/15/21  0359 10/14/21  1947 10/14/21  0313 10/13/21  0313   *  --  134* 134*   K 4.7  --  5.3* 5.1     --  103 102   CO2 26  --  25 26   *  --  154* 151*   BUN 68*  --  65* 57*   CREA 1.92*  --  1.81* 1.64*   MG 2.5*  --   --   --    CA 9.3  --  9.6 9.4   ALB 2.6* 2.8*  --   --    * 336*  --   --    * 990*  --   --     117  --   --      Recent Labs     10/14/21  0320   LAC 2.0     Recent Labs     10/14/21  2217 10/14/21  1716 10/14/21  1108   GLUCPOC 130* 131* 148*     ECHO: Results from [place] encounter on 10/08/21    ECHO ADULT COMPLETE    Interpretation Summary  · Image quality for this study was technically difficult. · LV: Estimated LVEF is 15 - 20%. Normal wall thickness. Dilated left ventricle. Severely and globally reduced systolic function. Left ventricular diastolic dysfunction. · RV: Mildly dilated right ventricle. Mildly reduced systolic function. Pacer/ICD present. 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 Ascending aorta diameter = 4.3 cm. · TV: Mild tricuspid valve regurgitation is present. · AV: Mild aortic valve regurgitation is present. · MV: Mitral valve non-specific thickening. Mild to moderate mitral valve regurgitation is present. Results     Procedure Component Value Units Date/Time    CULTURE, BLOOD [[id number]] Collected: 10/14/21 1113    Order Status: Completed Specimen: Blood Updated: 10/14/21 1153    CULTURE, BLOOD [[id number]] Collected: 10/14/21 1057    Order Status: Completed Specimen: Blood Updated: 10/14/21 1117    COVID-19 RAPID TEST [[id number]] Collected: 10/11/21 1629    Order Status: Completed Specimen: Nasopharyngeal Updated: 10/11/21 1717     Specimen source Nasopharyngeal        COVID-19 rapid test Not detected        Comment:      The specimen is NEGATIVE for SARS-CoV-2, the novel coronavirus associated with COVID-19. A negative result does not rule out COVID-19. This test has been authorized by the FDA under an Emergency Use Authorization (EUA) for use by authorized laboratories.         Fact sheet for Healthcare Providers: ConventionUpdate.co.nz  Fact sheet for Patients: ConventionUpdate.co.nz       Methodology: Isothermal Nucleic Acid Amplification         BLOOD CULTURE [[id number]] Collected: 10/08/21 1535    Order Status: Completed Specimen: Blood Updated: 10/13/21 1214     Special Requests: --        LEFT  HAND       Culture result: NO GROWTH 5 DAYS       CULTURE, URINE [[id number]] Collected: 10/08/21 1356    Order Status: Completed Specimen: Cath Urine Updated: 10/11/21 6221     Special Requests: NO SPECIAL REQUESTS        Culture result:       <10,000 COLONIES/mL MIXED SKIN MARCOS ISOLATED          BLOOD CULTURE [[id number]] Collected: 10/08/21 1343    Order Status: Completed Specimen: Blood Updated: 10/13/21 1214     Special Requests: --        LEFT  FOREARM       Culture result: NO GROWTH 5 DAYS           Inpat Anti-Infectives (From admission, onward)     Start     Ordered Stop    10/16/21 0500  vancomycin (VANCOCIN) 750 mg in 0.9% sodium chloride 250 mL (VIAL-MATE)  750 mg,   IntraVENous,   ONCE      10/15/21 0827 10/16/21 1659    10/13/21 0600  cefepime (MAXIPIME) 2 g in 0.9% sodium chloride (MBP/ADV) 100 mL MBP  2 g,   IntraVENous,   EVERY 24 HOURS      10/12/21 1022 --    10/12/21 1016  Vancomycin Intermittent Dosing per Rx  Other,   RX DOSING/MONITORING      10/12/21 1018 --    10/09/21 1800  nystatin (MYCOSTATIN) 100,000 unit/mL oral suspension 500,000 Units  500,000 Units,   Swish and Spit,   4 TIMES DAILY      10/09/21 1436 --    10/08/21 1800  fluconazole (DIFLUCAN) 200mg/100 mL IVPB (premix)  200 mg,   IntraVENous,   ONCE      10/08/21 1621 10/09/21 0559              Ventilator Settings:  Ideal body weight: 79.9 kg (176 lb 2.4 oz)   Mode FIO2 Rate Tidal Volume Pressure PEEP      30 %               Peak airway pressure:         Minute ventilation: 20 l/min  ABG:  Recent Labs     10/15/21  0426 10/14/21  1328   PHI 7.39 7.27*   PCO2I 40.9 56.9*   PO2I 120* 241*   HCO3I 24.6 25.8     Assessment and Plan:  (Medical Decision Making)   Impression: 80 y.o. male with cardiogenic shock and resp failure.     NEURO:   Sedation: none  Analgesia: none    CV:   Volume Status:   Cardiogenic shock, ef 15 %   Septic shock:  Neg cultures , on antibx vanc and maxipime-  May be able to stop soon-will stop vanc since no staph    PULM:   Acute hypoxemic/hypercapneic respiratory failure:  Currently on bipap   Severe ARDS 2/2 COVID: cxr improved relative to previous when he had covid  RENAL:  AYLIN: worse -cr 1.92 Lytes ok  GI:   Nutrition: none -suggest tpn if not able to get off bipap  HEME:   Anemia:hg 10.8  Thrombocytopenia: 120  Anticoagulation: eliquis  ID:   COVID-19: had previous  ENDO:   DM   Skin: no decub, turns, preventive care  Prophy: eliquis, pepcid        DNR    The patient is critically ill with respiratory failure, circulatory failure and requires high complexity decision making for assessment and support including frequent ventilator adjustment , frequent evaluation and titration of therapies , application of advanced monitoring technologies and extensive interpretation of multiple databases    Cumulative time devoted to patient care services by me for day of service is 36 mins.     Janessa Arita MD

## 2021-10-15 NOTE — CONSULTS
Comprehensive Nutrition Assessment    Type and Reason for Visit: Initial, Consult  Poor intake/appetite 5 or more days (Hospitalist)    Nutrition Recommendations/Plan:   Meals and Snacks:  Continue current diet. Ad samia as tolerated  Nutrition Supplement Therapy:   Medical food supplement therapy:  Initiate Ensure Enlive three times per day (this provides 350 kcal and 20 grams protein per bottle)   If patient unable to take PO due to AMS/respiratory status and in line with goals of care, could consider nutrition support for primary needs. EN would be preferred option. If unable to obtain EN access, PN could be considered. Please place nutrition consult if nutrition support is pursued. Malnutrition Assessment:  Malnutrition Status: At risk for malnutrition (specify) (suspected poor PO last ~2 months, wt loss)    Nutrition Assessment:   Nutrition History: Daughter and wife report good appetite and intake prior to being admitted for COVID in August. They are unsure of PO intake since then. They state that have nto gotten any report from rehab regarding intake. Daughter reports patient was close to 300# prior to hospitalization and last report from rehab he was 250#. Nutrition Background: Patient with PMH signifcant for CAD, cardiomyopathy, GERD, HLD, HTN, ICD, chronic hypoxemic respiratory following COVID pneumonia. He presented from post acute facility with AMS and bradycardia. He was admitted with sepsis with encephalopathy, Afib, AYLIN, trush. He was placed on BiPAP and transferred to ICU 10/12 for increased lethargy and worsening respiratory status and started on Levo for hypotension. Course complicated by small left pneumothorax, renal failure, liver failure. Daily Update:  Patient seen with daughter and wife at bedside. Explained current mental status and respiratory status limiting PO intake. Explained options of nutrition support if unable to take PO.  Explained that any nutrition support would be up to them and the MD. Family states they would be okay with either. Discussed with RN. MD to see family after RD visit and family will likely be making comfort measures Sunday when more family can come visit. Discussed if GOC change and nutrition support warranted, place appropriate nutrition consult. Nutrition Related Findings:   No physical signs of malnutrition noted      Current Nutrition Therapies:  ADULT DIET Easy to Chew    Current Intake:   Average Meal Intake:  (variable % priot to transfer to ICU) Average Supplement Intake: None ordered      Anthropometric Measures:  Height: 6' 1\" (185.4 cm)  Current Body Wt: 125 kg (275 lb 9.2 oz) (10/14), Weight source: Not specified  BMI: 36.4,  (BMI likely skewed by fluid status)  Admission Body Weight: 253 lb 15.5 oz (bed scale 10/8)  Ideal Body Weight (lbs) (Calculated): 184 lbs (84 kg), 149.8 %  Usual Body Wt: 136.1 kg (300 lb) (per daughter, confirmed by outpt wts), Percent weight change: -8.1          Edema: Generalized: 1+ (10/15/2021  7:00 AM)  LLE: 1+ (10/15/2021  7:00 AM)  LUE: 1+ (10/15/2021  7:00 AM)  RLE: 1+ (10/15/2021  7:00 AM)  RUE: 1+ (10/15/2021  7:00 AM)     Estimated Daily Nutrient Needs:  Energy (kcal/day): 0032-4748 (Kcal/kg (15-20), Weight Used: Admission (115.2 kg))  Protein (g/day): 100-125 (1.2-1.5 g/kg) Weight Used: (Ideal (83.6 kg))  Fluid (ml/day):   (1 ml/kcal)    Nutrition Diagnosis:   · Inadequate oral intake related to cognitive or neurological impairment, impaired respiratory function as evidenced by  (BiPAP and confusion limiting PO)    Nutrition Interventions:   Food and/or Nutrient Delivery: Continue current diet, Start oral nutrition supplement     Coordination of Nutrition Care: Continue to monitor while inpatient  Plan of Care discussed with Gibson Borjas RN and Dr. Jalil Krishna    Goals:       Active Goal: Meet at least 50% nutrition needs by RD follow-up    Nutrition Monitoring and Evaluation:      Food/Nutrient Intake Outcomes: Food and nutrient intake, Supplement intake  Physical Signs/Symptoms Outcomes:  (respiratory status)    Discharge Planning:     Too soon to determine    [address] AWILDA Monreal on 10/15/2021 at 4:30 PM  Contact: [phone]        Disaster Mode Active

## 2021-10-15 NOTE — PROGRESS NOTES
[facility] Hospitalist Note     Admit Date:  10/8/2021 12:57 PM   Name:  Madeline Kothari   Age:  80 y.o.  :  3/5/1933   MRN:  [id number]   PCP:  Ceci Correa MD  Treatment Team: Attending Provider: Neeru Rosario MD; Hospitalist: Namita Romero DO; Consulting Provider: Thai Rm MD; Utilization Review: Nathaly Johnson RN; Consulting Provider: Caryle Ghee, MD; Care Manager: Shawna Morales RN; Consulting Provider: Jani Martino MD; Primary Nurse: Pito Wiley, [facility] Course/Subjective:   80 y. o. male with medical history of NICM, ICD, LBBB, HTN, HLD, GERD, BPH, pAF (eliquis/amio), obesity, T2DM, chronic hypoxemic respiratory failure (4L following covid pna) who presented via ems from post-acute facility with AMS, bradycardia (hr in 30s) and increased WOB. The day PTA, he was started on azithromycin and prednisone for LLL infiltrate.  In the ED, found to have LA 3.1, K+5.2, Scr 1.38, LFTS elevated.  CXR bilateral infiltrates. He was given abx and limited IVF due to his h/o CHF. He was found to be in AFIB RVR. rec'd dilt bolus 10 mg x1 f/b gitt. Patient transferred to ICU on 10/12 with worsening respiratory status, requiring BiPAP. Also found to be in shock, requiring pressor support. Patient started on broad-spectrum antibiotic. Sepsis work-up negative. Patient started on amiodarone for A. fib with RVR. Echocardiogram eith ejection fraction 15 to 20% and severel and globally reduced systolic function with left ventricular diastolic dysfunction. Likely patient in cardiogenic shock, requiring 3 pressor supports. Cardiology following and recommend conservative medical therapy. Lasix on hold due to persistent hypotension and worsening renal function. Overall prognosis poor. 10/15: Patient is seen at the bedside. Remained unresponsive, on BiPAP. On Levophed, dobutamine and tasha, not able to wean.   Patient remained critically sick with overall poor prognosis. I spoke to wife and daughter at bedside, updated on patient's current condition. Family is aware patient is critically sick and may further decline. Patient is DNR. Discussed in detail about pt's poor prognosis and  hospice/comfort measure. Wife and daughter tearful but understandable and wife stated \"He is ready to meet Perry\". Family opted comfort measure. They would like to wait for other family members to be in town before starting comfort measures, likely on Sunday. If pt in between decline further, family does not want aggressive measurements. Will allow family members to visit patient. 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 fib with RVR  Encephalopathy  Pronounced decline in cardiac function after Covid infection earlier this year  Recent echocardiogram with ejection fraction 50 to 20% and severe and globally reduced systolic function with left ventricular diastolic dysfunction, elevated BNP  Cardiology following and recommend conservative medical therapy, overall poor prognosis  Lasix on hold due to worsening hypotension and renal function  On amiodarone gtt. On Eliquis  On Levophed, dobutamine and tasha  10/15: Patient remained on Levophed, dobutamine and tasha, unable to wean. Urine output 300 cc overnight. Positive fluid balance. Not able to give Lasix due to persistent hypotension, requiring pressor. Patient also with worsening liver failure and AYLIN. Remained on amiodarone gtt. Will give albumin today. Acute on chronic hypoxic and hypercapnic respiratory failure:  Secondary to COVID-19 pneumonia  Brovana/budesonide nebs  10/15: Remained on BiPAP overnight. ABGs reviewed. We will try to transition to nasal cannula today. Pulmonology following, appreciate recommendation    Sepsis:  Source unclear, suspected urinary source initially. Thrush in mouth.  Procalcitonin negative, pneumonia less likely. Lactic acid resolved 10/9. Urine and blood culture negative. Patient on vancomycin/cefepime started on 10/8  10/15: No obvious source of infection. Discontinue vancomycin. On cefepime    AYLIN in the setting of shock:  10/15: Worsening, hold off on Lasix with concurrent hypotension. Nephrology consult. Acute liver failure:  suspect ischemic versus congestion in nature  prior hepatitis serologies normal  right upper quadrant ultrasound unremarkable, ammonia normal  trend transaminases    type 2 diabetes mellitus:  Controlled on Metformin at home  continue SSI    Oral thrush  Nystatin    Nutrition:  Nutrition consult, if not able to take p.o. may consider other ways of feeding    Discharge planning: To be determined      Critical care interventions: On pressors, BiPAP therapy     Total critical care time spent: 40 minutes. Time is indicative of direct patient attendance at bedside and on the patient's floor nearby. Includes time spent at bedside performing history and exam, performing chart review, discussing findings and treatment plan with patient and/or family, discussing patient with consultants and colleagues, ordering and reviewing pertinent laboratory and radiographic evaluations, and discussing patient with nursing staff. Time excludes procedures.     DVT ppx ordered  Code status:  DNR  Estimated LOS:  Greater than 2 midnights  Risk:  high    Hospital Problems as of 10/15/2021 Date Reviewed: 10/11/2021        Codes Class Noted - Resolved POA    Acute on chronic systolic heart failure ([facility] ICD-10-CM: I50.23  ICD-9-CM: 428.23  10/9/2021 - Present Yes        Acute on chronic respiratory failure with hypoxia and hypercapnia (HCC) ICD-10-CM: J96.21, J96.22  ICD-9-CM: 518.84, 786.09, 799.02  10/14/2021 - Present Unknown        Cardiogenic shock ([facility] ICD-10-CM: R57.0  ICD-9-CM: 785.51  10/14/2021 - Present Unknown        Acute pulmonary edema (HCC) ICD-10-CM: J81.0  ICD-9-CM: 518.4 10/14/2021 - Present Unknown        Hypercapnic respiratory failure ([facility] ICD-10-CM: J96.92  ICD-9-CM: 518.81  10/12/2021 - Present Unknown        COVID-19 vaccine series completed ICD-10-CM: Z92.29  ICD-9-CM: V87.49  10/11/2021 - Present Yes        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 [facility]) ICD-10-CM: U07.1, I43  ICD-9-CM: 425.9, 079.89  10/10/2021 - Present Yes        * (Principal) Sepsis with encephalopathy and septic shock ([facility] ICD-10-CM: A41.9, R65.21, G93.40  ICD-9-CM: 038.9, 995.92, 785.52, 348.30  10/8/2021 - Present Yes        Paroxysmal atrial fibrillation with RVR (HCC) ICD-10-CM: I48.0  ICD-9-CM: 427.31  10/8/2021 - Present Yes        Toxic metabolic encephalopathy [id number]: G92.8  ICD-9-CM: 349.82  10/8/2021 - Present Yes        Acute kidney injury (AYLIN) with acute tubular necrosis (ATN) ([facility] ICD-10-CM: N17.0  ICD-9-CM: 584.5  10/8/2021 - Present Yes        Dale Bulla, oral ICD-10-CM: B37.0  ICD-9-CM: 112.0  10/8/2021 - Present Yes        Acute liver failure without hepatic coma ICD-10-CM: K72.00  ICD-9-CM: 570  10/8/2021 - Present Yes        Chronic respiratory failure with hypoxia and hypercapnia (HCC) ICD-10-CM: J96.11, J96.12  ICD-9-CM: 518.83, 799.02, 786.09  10/8/2021 - Present         Mild intermittent asthma ICD-10-CM: J45.20  ICD-9-CM: 493.90  10/8/2021 - Present Yes        HFrEF (heart failure with reduced ejection fraction) (HCC) (Chronic) ICD-10-CM: I50.20  ICD-9-CM: 428.20  10/8/2021 - Present Yes        Type 2 diabetes mellitus, without long-term current use of insulin (HCC) ICD-10-CM: E11.9  ICD-9-CM: 250.00  8/24/2021 - Present Yes        Personal history of COVID-19 (Chronic) ICD-10-CM: Z86.16  ICD-9-CM: V12.09  8/18/2021 - Present Yes        Class 2 severe obesity due to excess calories with serious comorbidity and body mass index (BMI) of 35.0 to 35.9 in adult [facility]) (Chronic) ICD-10-CM: E66.01, Z68.35  ICD-9-CM: 278.01, V85.35  5/7/2018 - Present Yes        Benign non-nodular prostatic hyperplasia with lower urinary tract symptoms (Chronic) ICD-10-CM: N40.1  ICD-9-CM: 600.91  7/27/2017 - Present Yes        LBBB (left bundle branch block) (Chronic) ICD-10-CM: I44.7  ICD-9-CM: 426.3  1/7/2016 - Present Yes        HLD (hyperlipidemia) (Chronic) ICD-10-CM: E78.5  ICD-9-CM: 272.4  1/7/2016 - Present Yes        Nonischemic cardiomyopathy ([facility] (Chronic) ICD-10-CM: I42.8  ICD-9-CM: 425.4  2/21/2013 - Present Yes    Overview Signed 5/7/2018 10:18 AM by Charlie Taylor MD     EF 45% 2017   \"severe AI\"              Implantable cardioverter-defibrillator (ICD) in situ (Chronic) ICD-10-CM: Z95.810  ICD-9-CM: V45.02  2/21/2013 - Present Yes    Overview Signed 2/21/2013  7:22 AM by Tawny Bolanos. [place] biventricular ICD implanted 2/20/13             HTN (hypertension) (Chronic) ICD-10-CM: I10  ICD-9-CM: 401.9  2/21/2013 - Present Yes                10 systems reviewed and negative except as noted in HPI.   Past Medical History:   Diagnosis Date    Abnormal EKG     Arrhythmia     pacemaker/defib placed 2/20/13    CAD (coronary artery disease)     MINIMAL DISEASE 2010    Cardiomyopathy ([facility]     Edema     Ankle    GERD (gastroesophageal reflux disease)     Heart failure (HCC)     HLD (hyperlipidemia) 1/7/2016    Hypertension     ICD (implantable cardiac defibrillator) in place 2/2013    LBBB (left bundle branch block) 1/7/2016    Prostatitis       Past Surgical History:   Procedure Laterality Date    HX CHOLECYSTECTOMY      HX COLONOSCOPY      HX HEENT      t and     HX OTHER SURGICAL      ing hernia rpr    HX PACEMAKER  2/20/2013    [place] ICD    HX UROLOGICAL  7/2012    PROSTATE TUMT    DE ABDOMEN SURGERY PROC UNLISTED      cholecystectomy      Allergies   Allergen Reactions    Pcn [Penicillins] Itching and Swelling     DIFFICULTY SWALLOWING        Social History     Tobacco Use    Smoking status: Former Smoker     Packs/day: 0.25     Years: 10.00     Pack years: 2.50     Quit date: 1965     Years since quittin.1    Smokeless tobacco: Never Used   Substance Use Topics    Alcohol use: No      Family History   Problem Relation Age of Onset    Stroke Mother     Heart Surgery Brother 76        CABG      Family history reviewed and noncontributory. Immunization History   Administered Date(s) Administered    COVID-19, PFIZER, MRNA, LNP-S, PF, 30MCG/0.3ML DOSE 2021, 2021    TB Skin Test (PPD) Intradermal 2021, 10/11/2021     PTA Medications:  Prior to Admission Medications   Prescriptions Last Dose Informant Patient Reported? Taking? Eliquis 5 mg tablet   No No   Sig: TAKE 1 TABLET BY MOUTH TWICE DAILY   PROCTOZONE-HC 2.5 % rectal cream   Yes No   Sig: Insert 1 Applicator into rectum two (2) times daily as needed for Hemorrhoids. Indications: Pt takes as needed   ZINC SULFATE PO   Yes Yes   Sig: Take 50 mg by mouth daily. acetaminophen (TylenoL) 325 mg tablet   Yes Yes   Sig: Take 650 mg by mouth every six (6) hours as needed for Pain. albuterol (Ventolin HFA) 90 mcg/actuation inhaler   Yes Yes   Sig: Take 2 Puffs by inhalation every four (4) hours as needed for Wheezing or Shortness of Breath. Use with spacer. amiodarone (CORDARONE) 200 mg tablet   Yes Yes   Sig: Take 200 mg by mouth daily. ascorbic acid, vitamin C, (VITAMIN C) 500 mg tablet   Yes Yes   Sig: Take 1,000 mg by mouth two (2) times a day. azithromycin (Zithromax) 250 mg tablet   Yes Yes   Sig: Take 250 mg by mouth daily. cholecalciferol (Vitamin D3) (1000 Units /25 mcg) tablet   Yes No   Sig: Take 2,000 Units by mouth daily. colestipol (COLESTID) 1 gram tablet   Yes No   Sig: Take 2 g by mouth nightly. cyanocobalamin (VITAMIN B-12) 500 mcg tablet   Yes No   Sig: Take 500 mcg by mouth daily. doxycycline (MONODOX) 100 mg capsule   Yes Yes   Sig: Take 100 mg by mouth two (2) times a day.    furosemide (LASIX) 40 mg tablet   No No   Sig: TAKE 1 TABLET BY MOUTH DAILY   Patient taking differently: Take 20 mg by mouth daily. insulin glargine (Lantus U-100 Insulin) 100 unit/mL injection   Yes Yes   Si Units by SubCUTAneous route nightly. omeprazole (PRILOSEC) 20 mg capsule   Yes Yes   Sig: Take 20 mg by mouth daily. predniSONE (DELTASONE) 20 mg tablet   Yes Yes   Sig: Take 20 mg by mouth daily (with breakfast). tamsulosin (FLOMAX) 0.4 mg capsule   No No   Sig: Take 1 Cap by mouth daily.  Indications: enlarged prostate with urination problem      Facility-Administered Medications: None       Objective:     Patient Vitals for the past 24 hrs:   Temp Pulse Resp BP SpO2   10/15/21 1111 98.4 °F (36.9 °C) (!) 117 21 (!) 90/54 99 %   10/15/21 1100 98.4 °F (36.9 °C)       10/15/21 1057  (!) 104 16 99/60 99 %   10/15/21 1042  (!) 107 21 110/81 98 %   10/15/21 1026  (!) 136 20 (!) 94/57 98 %   10/15/21 1011  (!) 117 21 92/62 99 %   10/15/21 0957  (!) 135 20 (!) 97/55 99 %   10/15/21 0942  (!) 138 22 92/68 99 %   10/15/21 0926  (!) 133 20 (!) 91/57 99 %   10/15/21 0911  (!) 138 (!) 32 (!) 81/60 99 %   10/15/21 0856  (!) 129 20 105/67 97 %   10/15/21 0841  (!) 121 19 93/64 98 %   10/15/21 0826  (!) 116 23 95/72 97 %   10/15/21 0811  (!) 143 22 (!) 84/65 98 %   10/15/21 0757  (!) 137 18 109/61 100 %   10/15/21 0741  (!) 115 18 106/77 99 %   10/15/21 0726  65 13 91/67 99 %   10/15/21 0710 98.7 °F (37.1 °C) 78 17 99/68 100 %   10/15/21 0700 98.7 °F (37.1 °C)       10/15/21 0657  82 16 (!) 84/60 97 %   10/15/21 0621  (!) 137 16 97/65 98 %   10/15/21 0612  (!) 118 14 97/65 98 %   10/15/21 0537  (!) 133  (!) 86/66    10/15/21 0456  (!) 132 (!) 32 (!) 96/59 95 %   10/15/21 0441  (!) 108 23 (!) 102/54 98 %   10/15/21 0427  (!) 124 17 (!) 84/59 99 %   10/15/21 0418     100 %   10/15/21 0412 99.2 °F (37.3 °C) (!) 128 25 (!) 79/64 97 %   10/15/21 0357  74 21 (!) 78/59 99 %   10/15/21 0341  (!) 130 23 (!) 72/55 99 %   10/15/21 0327  (!) 138 20 (!) 83/60 99 % 10/15/21 0312  (!) 131 16 99/78 97 %   10/15/21 0241  (!) 121 24 (!) 92/57 100 %   10/15/21 0227  (!) 127 29 99/62 98 %   10/15/21 0212  (!) 121 22 100/60 99 %   10/15/21 0156  (!) 116 23 98/64 99 %   10/15/21 0141  (!) 126 27 96/61 99 %   10/15/21 0127  (!) 133 22 (!) 87/59 97 %   10/15/21 0111  (!) 135 26 (!) 102/51 98 %   10/15/21 0056  (!) 129 23 93/60 98 %   10/15/21 0041  (!) 143 22 (!) 84/63 98 %   10/15/21 0026  (!) 129 20 (!) 85/57 100 %   10/15/21 0012 98.7 °F (37.1 °C) (!) 128 14 (!) 80/64 100 %   10/14/21 2356  (!) 129 (!) 31 (!) 96/57 98 %   10/14/21 2341  (!) 124 16 (!) 86/61 99 %   10/14/21 2329  (!) 114 19 (!) 81/54 100 %   10/14/21 2311  (!) 129 20 (!) 89/48 99 %   10/14/21 2256  (!) 110 28 (!) 98/55 97 %   10/14/21 2241  (!) 111 19 (!) 92/51 99 %   10/14/21 2227  (!) 139 22 (!) 97/57 100 %   10/14/21 2221  (!) 142  (!) 95/59    10/14/21 2127  (!) 131 16 (!) 71/58 99 %   10/14/21 2116  (!) 118 15 (!) 90/58 100 %   10/14/21 2106  (!) 121 25 (!) 87/54 100 %   10/14/21 2102  (!) 123 19 (!) 81/49 100 %   10/14/21 2057  (!) 116 22 103/72 100 %   10/14/21 2051  (!) 133 (!) 32 (!) 94/57 100 %   10/14/21 2046  (!) 120 27 (!) 83/66 99 %   10/14/21 2044     100 %   10/14/21 2035  (!) 112  (!) 79/67    10/14/21 2015  (!) 124  (!) 70/46    10/14/21 1917 98.9 °F (37.2 °C) (!) 120 26 93/72 100 %   10/14/21 1902  (!) 120 21 (!) 77/61 97 %   10/14/21 1833  (!) 113 28 (!) 81/46 96 %   10/14/21 1816  (!) 114 27 98/76 97 %   10/14/21 1802  (!) 109 23 (!) 82/57 97 %   10/14/21 1747  (!) 126 26 (!) 76/54 97 %   10/14/21 1732  (!) 111 (!) 42 104/68 97 %   10/14/21 1717  (!) 118 13 (!) 84/64 95 %   10/14/21 1648  87 (!) 31 (!) 78/40 97 %   10/14/21 1632  (!) 105 (!) 37 (!) 84/55 97 %   10/14/21 1617  (!) 132 (!) 31 101/70 97 %   10/14/21 1608     97 %   10/14/21 1603  (!) 124 22 (!) 88/51 98 %   10/14/21 1547  (!) 109 20 96/61 98 %   10/14/21 1531  (!) 115 25 111/63 98 %   10/14/21 1516 98.2 °F (36.8 °C) (!) 117 27 101/65 99 %   10/14/21 1502  (!) 117 26 94/62 99 %   10/14/21 1446  (!) 130 (!) 40 110/62 99 %   10/14/21 1408  (!) 131 28 96/63 98 %   10/14/21 1402  (!) 124  (!) 82/63 98 %   10/14/21 1331  (!) 133 (!) 32 (!) 102/58 99 %   10/14/21 1302  (!) 135 (!) 33 (!) 79/52 99 %   10/14/21 1231  (!) 122 26 99/64 99 %   10/14/21 1202  (!) 133 17 94/78 97 %     Oxygen Therapy  O2 Sat (%): 99 % (10/15/21 1111)  Pulse via Oximetry: 116 beats per minute (10/15/21 1111)  O2 Device: BIPAP (10/15/21 1100)  Skin Assessment: Clean, dry, & intact (10/15/21 0700)  Skin Protection for O2 Device: No (10/12/21 1039)  O2 Flow Rate (L/min): 7 l/min (10/14/21 1131)  FIO2 (%): 30 % (10/15/21 0811)    Estimated body mass index is 36.36 kg/m² as calculated from the following:    Height as of this encounter: 6' 1\" (1.854 m). Weight as of this encounter: 125 kg (275 lb 9.2 oz). Intake/Output Summary (Last 24 hours) at 10/15/2021 1155  Last data filed at 10/15/2021 0500  Gross per 24 hour   Intake 1953.25 ml   Output 700 ml   Net 1253.25 ml       *Note that automatically entered I/Os may not be accurate; dependent on patient compliance with collection and accurate  by assistants. Physical Exam:  General:    Confused, not following commands, on BiPAP  Eyes:   Normal sclerae. Extraocular movements intact. HENT:  Normocephalic, atraumatic. Moist mucous membranes  CV:   RRR. No m/r/g. Lungs:  Decreased breath sounds at bases, scattered crackles  Abdomen: Soft, nontender, nondistended. Extremities: Warm and dry. Lower extremity edema 1+  Neurologic: not able to assess due to patient condition  Skin:     No rashes or jaundice. Normal coloration  Psych:  Confused      I reviewed the labs, imaging, EKGs, telemetry, and other studies done this admission.   Data Reviewed:   Recent Results (from the past 24 hour(s))   BLOOD GAS, ARTERIAL POC    Collection Time: 10/14/21 1:28 PM   Result Value Ref Range    Device: NASAL CANNULA      pH (POC) 7.27 (L) 7.35 - 7.45      pCO2 (POC) 56.9 (H) 35 - 45 MMHG    pO2 (POC) 241 (H) 75 - 100 MMHG    HCO3 (POC) 25.8 22 - 26 MMOL/L    sO2 (POC) 99.7 (H) 95 - 98 %    Base deficit (POC) 2.0 mmol/L    Allens test (POC) Positive      Site RIGHT RADIAL      Specimen type (POC) ARTERIAL      Performed by Becker (Wallace)     FIO2, L/min 10     GLUCOSE, POC    Collection Time: 10/14/21  5:16 PM   Result Value Ref Range    Glucose (POC) 131 (H) 65 - 100 mg/dL    Performed by Jerie Osgood    HEPATIC FUNCTION PANEL    Collection Time: 10/14/21  7:47 PM   Result Value Ref Range    Protein, total 5.5 (L) 6.3 - 8.2 g/dL    Albumin 2.8 (L) 3.2 - 4.6 g/dL    Globulin 2.7 2.3 - 3.5 g/dL    A-G Ratio 1.0 (L) 1.2 - 3.5      Bilirubin, total 1.7 (H) 0.2 - 1.1 MG/DL    Bilirubin, direct 0.9 (H) <0.4 MG/DL    Alk.  phosphatase 117 50 - 136 U/L    AST (SGOT) 336 (H) 15 - 37 U/L    ALT (SGPT) 990 (H) 12 - 65 U/L   GLUCOSE, POC    Collection Time: 10/14/21 10:17 PM   Result Value Ref Range    Glucose (POC) 130 (H) 65 - 100 mg/dL    Performed by Avery    METABOLIC PANEL, BASIC    Collection Time: 10/15/21  3:59 AM   Result Value Ref Range    Sodium 135 (L) 138 - 145 mmol/L    Potassium 4.7 3.5 - 5.1 mmol/L    Chloride 105 98 - 107 mmol/L    CO2 26 21 - 32 mmol/L    Anion gap 4 (L) 7 - 16 mmol/L    Glucose 122 (H) 65 - 100 mg/dL    BUN 68 (H) 8 - 23 MG/DL    Creatinine 1.92 (H) 0.8 - 1.5 MG/DL    GFR est AA 43 (L) >60 ml/min/1.73m2    GFR est non-AA 35 (L) >60 ml/min/1.73m2    Calcium 9.3 8.3 - 10.4 MG/DL   CBC W/O DIFF    Collection Time: 10/15/21  3:59 AM   Result Value Ref Range    WBC 9.3 4.3 - 11.1 K/uL    RBC 3.43 (L) 4.23 - 5.6 M/uL    HGB 10.8 (L) 13.6 - 17.2 g/dL    HCT 35.1 (L) 41.1 - 50.3 %    .3 (H) 79.6 - 97.8 FL    MCH 31.5 26.1 - 32.9 PG    MCHC 30.8 (L) 31.4 - 35.0 g/dL    RDW 16.5 (H) 11.9 - 14.6 %    PLATELET 982 (L) 828 - 450 K/uL    MPV 11.2 9.4 - 12.3 FL    ABSOLUTE NRBC 0.08 0.0 - 0.2 K/uL   VANCOMYCIN, RANDOM    Collection Time: 10/15/21  3:59 AM   Result Value Ref Range    Vancomycin, random 22.0 UG/ML   HEPATIC FUNCTION PANEL    Collection Time: 10/15/21  3:59 AM   Result Value Ref Range    Protein, total 5.4 (L) 6.3 - 8.2 g/dL    Albumin 2.6 (L) 3.2 - 4.6 g/dL    Globulin 2.8 2.3 - 3.5 g/dL    A-G Ratio 0.9 (L) 1.2 - 3.5      Bilirubin, total 1.7 (H) 0.2 - 1.1 MG/DL    Bilirubin, direct 0.9 (H) <0.4 MG/DL    Alk.  phosphatase 111 50 - 136 U/L    AST (SGOT) 241 (H) 15 - 37 U/L    ALT (SGPT) 891 (H) 12 - 65 U/L   MAGNESIUM    Collection Time: 10/15/21  3:59 AM   Result Value Ref Range    Magnesium 2.5 (H) 1.8 - 2.4 mg/dL   BLOOD GAS, ARTERIAL POC    Collection Time: 10/15/21  4:26 AM   Result Value Ref Range    Device: BIPAP MASK      FIO2 (POC) 35 %    pH (POC) 7.39 7.35 - 7.45      pCO2 (POC) 40.9 35 - 45 MMHG    pO2 (POC) 120 (H) 75 - 100 MMHG    HCO3 (POC) 24.6 22 - 26 MMOL/L    sO2 (POC) 98.6 (H) 95 - 98 %    Base deficit (POC) 0.5 mmol/L    PEEP/CPAP (POC) 8 cmH2O    PIP (POC) 16      Allens test (POC) Positive      Inspiratory Time 1 sec    Site LEFT RADIAL      Specimen type (POC) ARTERIAL      Performed by Jessica     Respiratory comment: 5    GLUCOSE, POC    Collection Time: 10/15/21 11:16 AM   Result Value Ref Range    Glucose (POC) 129 (H) 65 - 100 mg/dL    Performed by Heather        All Micro Results     Procedure Component Value Units Date/Time    CULTURE, BLOOD [[id number]] Collected: 10/14/21 1113    Order Status: Completed Specimen: Blood Updated: 10/14/21 1153    CULTURE, BLOOD [[id number]] Collected: 10/14/21 1057    Order Status: Completed Specimen: Blood Updated: 10/14/21 1117    BLOOD CULTURE [[id number]] Collected: 10/08/21 1343    Order Status: Completed Specimen: Blood Updated: 10/13/21 1214     Special Requests: --        LEFT  FOREARM       Culture result: NO GROWTH 5 DAYS       BLOOD CULTURE [[id number]] Collected: 10/08/21 1535    Order Status: Completed Specimen: Blood Updated: 10/13/21 1214     Special Requests: --        LEFT  HAND       Culture result: NO GROWTH 5 DAYS       COVID-19 RAPID TEST [[id number]] Collected: 10/11/21 1629    Order Status: Completed Specimen: Nasopharyngeal Updated: 10/11/21 1717     Specimen source Nasopharyngeal        COVID-19 rapid test Not detected        Comment:      The specimen is NEGATIVE for SARS-CoV-2, the novel coronavirus associated with COVID-19. A negative result does not rule out COVID-19. This test has been authorized by the FDA under an Emergency Use Authorization (EUA) for use by authorized laboratories.         Fact sheet for Healthcare Providers: Digital Reasoningdate.co.nz  Fact sheet for Patients: Digital ReasoningdaAntares Vision.[email or web address].nz       Methodology: Isothermal Nucleic Acid Amplification         CULTURE, URINE [[id number]] Collected: 10/08/21 1356    Order Status: Completed Specimen: Cath Urine Updated: 10/11/21 0747     Special Requests: NO SPECIAL REQUESTS        Culture result:       <10,000 COLONIES/mL MIXED SKIN MARCOS ISOLATED                Current Facility-Administered Medications   Medication Dose Route Frequency    NOREPINephrine (LEVOPHED) 16,000 mcg in dextrose 5% 250 mL infusion  0.5-30 mcg/min IntraVENous TITRATE    PHENYLephrine (HERMINIO-SYNEPHRINE) 90 mg in 0.9% sodium chloride 250 mL infusion   mcg/min IntraVENous TITRATE    DOBUTamine (DOBUTREX) 500 mg/250 mL (2,000 mcg/mL) infusion  0-10 mcg/kg/min IntraVENous TITRATE    apixaban (ELIQUIS) tablet 2.5 mg  2.5 mg Oral Q12H    famotidine (PEPCID) tablet 20 mg  20 mg Oral DAILY    cefepime (MAXIPIME) 2 g in 0.9% sodium chloride (MBP/ADV) 100 mL MBP  2 g IntraVENous Q24H    amiodarone (CORDARONE) 450 mg in dextrose 5% 250 mL infusion  0.5 mg/min IntraVENous CONTINUOUS    [Held by provider] furosemide (LASIX) injection 40 mg  40 mg IntraVENous Q12H    carvediloL (COREG) tablet 3.125 mg  3.125 mg Oral BID WITH MEALS    albuterol-ipratropium (DUO-NEB) 2.5 MG-0.5 MG/3 ML  3 mL Nebulization Q4H PRN    thiamine HCL (B-1) tablet 100 mg  100 mg Oral DAILY    insulin regular (NOVOLIN R, HUMULIN R) injection   SubCUTAneous AC&HS    nystatin (MYCOSTATIN) 100,000 unit/mL oral suspension 500,000 Units  500,000 Units Swish and Spit QID    sodium chloride (NS) flush 5-10 mL  5-10 mL IntraVENous Q8H    sodium chloride (NS) flush 5-10 mL  5-10 mL IntraVENous PRN    sodium chloride (NS) flush 5-40 mL  5-40 mL IntraVENous Q8H    sodium chloride (NS) flush 5-40 mL  5-40 mL IntraVENous PRN    acetaminophen (TYLENOL) tablet 650 mg  650 mg Oral Q6H PRN    Or    acetaminophen (TYLENOL) suppository 650 mg  650 mg Rectal Q6H PRN    polyethylene glycol (MIRALAX) packet 17 g  17 g Oral DAILY PRN    ondansetron (ZOFRAN ODT) tablet 4 mg  4 mg Oral Q8H PRN    Or    ondansetron (ZOFRAN) injection 4 mg  4 mg IntraVENous Q6H PRN    dextrose 40% (GLUTOSE) oral gel 1 Tube  15 g Oral PRN    glucagon (GLUCAGEN) injection 1 mg  1 mg IntraMUSCular PRN    dextrose (D50W) injection syrg 12.5-25 g  25-50 mL IntraVENous PRN    arformoteroL (BROVANA) neb solution 15 mcg  15 mcg Nebulization BID RT    budesonide (PULMICORT) 500 mcg/2 ml nebulizer suspension  500 mcg Nebulization BID RT       Other Studies:  No results found for this visit on 10/08/21. XR CHEST SNGL V    Result Date: 10/15/2021  CHEST X-RAY, one view. HISTORY:  Heart failure. Pneumothorax follow-up. TECHNIQUE:  AP portable semiupright view COMPARISON: Yesterday's exam FINDINGS: Lungs: Patchy infiltrate or atelectasis right base. Left hemidiaphragm is obscured due to atelectasis or infiltrate. Costophrenic angles: Blunted on the left. Heart size: is normal. Pulmonary vasculature: is unremarkable. Aorta: Unremarkable. Included portion of the upper abdomen: is unremarkable. Bones: No gross bony lesions.  Other: Left-sided cardiac pacemaker and right IJ central venous catheters are present     Stable bibasilar lung densities, atelectasis or infiltrate. XR CHEST SNGL V    Result Date: 10/14/2021  Chest X-ray INDICATION: Follow-up pneumothorax. COMPARISON: Chest x-ray 10/14/2021. A portable AP view of the chest was obtained. FINDINGS: Bibasilar atelectasis is present, left greater than right. This is stable in appearance. No pneumothorax or significant pleural fluid. The heart size is normal.  The bony thorax is intact. And implantable cardiac device left chest demonstrates 3 lesions which are unchanged in position. Right IJ central line is present and unchanged in position. Bibasilar atelectasis. No evidence of pneumothorax or significant pleural fluid. [unfilled]       Part of this note was written by using a voice dictation software and the note has been proof read but may still contain some grammatical/other typographical errors.     Signed:  Bibi Serna MD

## 2021-10-16 NOTE — PROGRESS NOTES
[facility] Critical Care Note[de-identified] 10/16/2021  Madeline Kothari  Admission Date: 10/8/2021     Length of Stay: 8 days    Background: 80 y.o.  male seen and evaluated at the request of Dr. Jeremias Siu.      He has a history of covid in August 2021. Hospitalized from 8/18-24. H/o NICM previous EF 40-45%, GERD, HTN, a fib, hld. He was admitted to the ICU at that time. Intubated just for 1 day. It looks like he was on room air at the time of his discharge. This time he was admitted 80 with AMS, bradycardia, SOB. The H&P states that he was on 4L prior to admission, not sure if this was started while he was at the post acute care facility as there is no mention of that in his previous dc summary. He was found to have B infiltrates on CXR and started on abx. He has been labelled as sepsis. Infectious workup, other than initial UA with 2+ bacteria has been negative. WBC and pct were initially normal. WBC has since risen but is not left shifted. Cultures are negative. Of note, he has been found to have severe systolic heart failure with EF 15-20% and BNP of 9000 rising to 16,000. CXR this morning read as small left sided PTX for which pulmonary was consulted. His initial ABG was normal other than hypoxia, however, subsequent ones showed CO2 up to 88. He was DNR and was placed on bipap with response down to 57.      He has developed renal failure and liver failure since admission as well.      A fib with RVR currently on amio. Hypotension requiring levophed and tasha. Notable PMH:  has a past medical history of Abnormal EKG, Arrhythmia, CAD (coronary artery disease), Cardiomyopathy ([address] Edema, GERD (gastroesophageal reflux disease), Heart failure ([address] HLD (hyperlipidemia) (1/7/2016), Hypertension, ICD (implantable cardiac defibrillator) in place (2/2013), LBBB (left bundle branch block) (1/7/2016), and Prostatitis.     24 Hour events: pt has been on bipap at night, now on Opti flow at 40% and has been on levophed, dobutamine and tasha. More alert today    ROS: unable to obtain/negative except as listed elsewhere. Lines: (insertion date)       Central line: in place      Drips: current dose (range)  Phenylephrine Dose (mcg/min): 250 mcg/min  Levophed Dose (mcg/kg/min): 0.0554 mcg/kg/min     Pertinent Exam:         Blood pressure (!) 77/62, pulse 94, temperature 98.4 °F (36.9 °C), resp. rate 20, height 6' 1\" (1.854 m), weight 283 lb 14.4 oz (128.8 kg), SpO2 100 %. Intake/Output Summary (Last 24 hours) at 10/16/2021 1303  Last data filed at 10/16/2021 0813  Gross per 24 hour   Intake 2635.11 ml   Output 700 ml   Net 1935.11 ml     Constitutional:  intubated and mechanically ventilated. EENMT:  Sclera clear, pupils equal, oral mucosa moist  Respiratory: crackles   Cardiovascular:  RRR  Gastrointestinal:  soft with no tenderness; positive bowel sounds present  Musculoskeletal:  warm with no cyanosis, trace lower extremity edema  Skin:  no jaundice or ecchymosis  Neurologic:awake but confused  Psychiatric:  calm    CXR:      10/16/21    Narrative & Impression   EXAM: Chest x-ray.     INDICATION: Dyspnea.     COMPARISON: Yesterday's chest x-ray.     TECHNIQUE: Frontal view chest x-ray.     FINDINGS: Bibasilar lung atelectasis or infiltrates are unchanged. Again noted  is cardiomegaly and a left chest wall biventricular pacemaker. A right jugular  central line remains in place. No pneumothorax or large pleural effusion is  seen.     IMPRESSION  Unchanged bibasilar lung atelectasis or infiltrates.            Recent Labs     10/15/21  1105 10/15/21  0359 10/14/21  0320 10/14/21  0313   WBC  --  9.3  --  14.3*   HGB  --  10.8*  --  11.8*   HCT  --  35.1*  --  38.9*   PLT  --  120*  --  143*   PCT 0.20  --   --   --    LAC  --   --  2.0  --      Recent Labs     10/16/21  0319 10/15/21  0359 10/14/21  1947 10/14/21  0313   NA  --  135*  --  134*   K  --  4.7  --  5.3*   CL  --  105  --  103   CO2  --  26  -- 25   GLU  --  122*  --  154*   BUN  --  68*  --  65*   CREA 1.65* 1.92*  --  1.81*   MG  --  2.5*  --   --    CA  --  9.3  --  9.6   ALB 2.8* 2.6* 2.8*  --    * 241* 336*  --    * 891* 990*  --    AP 92 111 117  --      Recent Labs     10/14/21  0320   LAC 2.0     Recent Labs     10/16/21  1100 10/16/21  0713 10/15/21  2154   GLUCPOC 138* 135* 135*     ECHO: Results from [facility] encounter on 10/08/21    ECHO ADULT COMPLETE    Interpretation Summary  · Image quality for this study was technically difficult. · LV: Estimated LVEF is 15 - 20%. Normal wall thickness. Dilated left ventricle. Severely and globally reduced systolic function. Left ventricular diastolic dysfunction. · RV: Mildly dilated right ventricle. Mildly reduced systolic function. Pacer/ICD present. 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 Ascending aorta diameter = 4.3 cm. · TV: Mild tricuspid valve regurgitation is present. · AV: Mild aortic valve regurgitation is present. · MV: Mitral valve non-specific thickening. Mild to moderate mitral valve regurgitation is present. Results     Procedure Component Value Units Date/Time    CULTURE, BLOOD [[id number]] Collected: 10/14/21 1113    Order Status: Completed Specimen: Blood Updated: 10/16/21 0708     Special Requests: --        NO SPECIAL REQUESTS  MEDIAL PORT       Culture result: NO GROWTH 2 DAYS       CULTURE, BLOOD [[id number]] Collected: 10/14/21 1057    Order Status: Completed Specimen: Blood Updated: 10/16/21 0708     Special Requests: --        RIGHT  HAND       Culture result: NO GROWTH 2 DAYS       COVID-19 RAPID TEST [[id number]] Collected: 10/11/21 1629    Order Status: Completed Specimen: Nasopharyngeal Updated: 10/11/21 1717     Specimen source Nasopharyngeal        COVID-19 rapid test Not detected        Comment:      The specimen is NEGATIVE for SARS-CoV-2, the novel coronavirus associated with COVID-19.   A negative result does not rule out COVID-19. This test has been authorized by the FDA under an Emergency Use Authorization (EUA) for use by authorized laboratories.         Fact sheet for Healthcare Providers: ConventionUpdate.co.nz  Fact sheet for Patients: ConventionUpdate.co.nz       Methodology: Isothermal Nucleic Acid Amplification         BLOOD CULTURE [[id number]] Collected: 10/08/21 1535    Order Status: Completed Specimen: Blood Updated: 10/13/21 1214     Special Requests: --        LEFT  HAND       Culture result: NO GROWTH 5 DAYS       CULTURE, URINE [[id number]] Collected: 10/08/21 1356    Order Status: Completed Specimen: Cath Urine Updated: 10/11/21 0744     Special Requests: NO SPECIAL REQUESTS        Culture result:       <10,000 COLONIES/mL MIXED SKIN MARCOS ISOLATED          BLOOD CULTURE [[id number]] Collected: 10/08/21 1343    Order Status: Completed Specimen: Blood Updated: 10/13/21 1214     Special Requests: --        LEFT  FOREARM       Culture result: NO GROWTH 5 DAYS           Inpat Anti-Infectives (From admission, onward)     Start     Ordered Stop    10/16/21 0500  vancomycin (VANCOCIN) 750 mg in 0.9% sodium chloride 250 mL (VIAL-MATE)  750 mg,   IntraVENous,   ONCE      10/15/21 0827 10/16/21 1659    10/13/21 0600  cefepime (MAXIPIME) 2 g in 0.9% sodium chloride (MBP/ADV) 100 mL MBP  2 g,   IntraVENous,   EVERY 24 HOURS      10/12/21 1022 --    10/12/21 1016  Vancomycin Intermittent Dosing per Rx  Other,   RX DOSING/MONITORING      10/12/21 1018 --    10/09/21 1800  nystatin (MYCOSTATIN) 100,000 unit/mL oral suspension 500,000 Units  500,000 Units,   Swish and Spit,   4 TIMES DAILY      10/09/21 1436 --    10/08/21 1800  fluconazole (DIFLUCAN) 200mg/100 mL IVPB (premix)  200 mg,   IntraVENous,   ONCE      10/08/21 1621 10/09/21 0559              Ventilator Settings:  Ideal body weight: 79.9 kg (176 lb 2.4 oz)   Mode FIO2 Rate Tidal Volume Pressure PEEP      40 % Peak airway pressure:         Minute ventilation: 19.1 l/min  ABG:  Recent Labs     10/16/21  0448 10/15/21  0426 10/14/21  1328   PHI 7.36 7.39 7.27*   PCO2I 44.7 40.9 56.9*   PO2I 87 120* 241*   HCO3I 25.2 24.6 25.8     Assessment and Plan:  (Medical Decision Making)   Impression: 80 y.o. male with cardiogenic shock and resp failure. NEURO:   Sedation: none  Analgesia: none    CV:   Volume Status:   Cardiogenic shock, EF 15 % . Will add Midodrine and wean Law as able   shock:  Neg cultures , on maxipime-     PULM:   Acute hypoxemic/hypercapneic respiratory failure:  Currently on Opti flow    Severe ARDS 2/2 COVID: cxr improved relative to previous when he had covid  RENAL:  AYLIN: worse -cr 1.65    Lytes ok  GI:   Nutrition: none -suggest tpn if not able to get off bipap  HEME:   Anemia:hg 10.8  Thrombocytopenia: 120  Anticoagulation: eliquis  ID:   COVID-19: had previous  ENDO:   DM   Skin: no decub, turns, preventive care  Prophy: eliquis, pepcid      DNR    The patient is critically ill with respiratory failure, circulatory failure and requires high complexity decision making for assessment and support including frequent ventilator adjustment , frequent evaluation and titration of therapies , application of advanced monitoring technologies and extensive interpretation of multiple databases    Cumulative time devoted to patient care services by me for day of service is 35 mins.     Desiree Walsh MD

## 2021-10-16 NOTE — ROUTINE PROCESS
Bedside and verbal shift change report received from  Blu Homes (offgoing nurse). Report included the following information SBAR, Kardex, Intake/Output and Recent Results.      Dual skin assessment completed at bedside: sacral wound (list pertinent skin assessment findings)    Dual verification of gtts completed (name of gtts verified): n/a

## 2021-10-16 NOTE — PROGRESS NOTES
[facility] Hospitalist Note     Admit Date:  10/8/2021 12:57 PM   Name:  Marnie Hartley   Age:  80 y.o.  :  3/5/1933   MRN:  [id number]   PCP:  Chad Charles MD  Treatment Team: Attending Provider: Binh Colin MD; Utilization Review: Antonette Borrego, RN; Consulting Provider: Steven Blankenship MD; Care Manager: Valerie Richardson, RN; Consulting Provider: Terrell Lux MD; Primary Nurse: Tom Sanchez RN; Primary Nurse: Kinza Durán, [facility] Course/Subjective:   80 y. o. male with medical history of NICM, ICD, LBBB, HTN, HLD, GERD, BPH, pAF (eliquis/amio), obesity, T2DM, chronic hypoxemic respiratory failure (4L following covid pna) who presented via ems from post-acute facility with AMS, bradycardia (hr in 30s) and increased WOB. The day PTA, he was started on azithromycin and prednisone for LLL infiltrate.  In the ED, found to have LA 3.1, K+5.2, Scr 1.38, LFTS elevated.  CXR bilateral infiltrates. He was given abx and limited IVF due to his h/o CHF. He was found to be in AFIB RVR. rec'd dilt bolus 10 mg x1 f/b gitt. Patient transferred to ICU on 10/12 with worsening respiratory status, requiring BiPAP. Also found to be in shock, requiring pressor support. Patient started on broad-spectrum antibiotic. Sepsis work-up negative. Patient started on amiodarone for A. fib with RVR. Echocardiogram eith ejection fraction 15 to 20% and severel and globally reduced systolic function with left ventricular diastolic dysfunction. Likely patient in cardiogenic shock, requiring 3 pressor supports. Cardiology following and recommend conservative medical therapy. Lasix on hold due to persistent hypotension and worsening renal function. Overall prognosis poor. 10/16: Patient is seen at the bedside. More alert today. On airvo. Remains on Levophed, dobutamine and tasha, not able to wean. Also on amiodarone. Family at bedside including sister, wife and daughter.   Updated them on patient's current condition. Plan to transition to comfort measures tomorrow. We will readdress CODE STATUS tomorrow before making him comfort measures. Patient currently DNR. We will continue current management. 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 fib with RVR  Encephalopathy  Pronounced decline in cardiac function after Covid infection earlier this year  Recent echocardiogram with ejection fraction 50 to 20% and severe and globally reduced systolic function with left ventricular diastolic dysfunction, elevated BNP  Cardiology following and recommend conservative medical therapy, overall poor prognosis  Lasix on hold due to worsening hypotension and renal function  On amiodarone gtt. On Eliquis  On Levophed, dobutamine and tasha  10/16: Patient remained on Levophed, dobutamine and tasha, unable to wean. Continue current care. Plan to transition to comfort measure tomorrow    Acute on chronic hypoxic and hypercapnic respiratory failure:  Secondary to COVID-19 pneumonia  Brovana/budesonide nebs  10/16: Weaned to [employer]. Continue weaning if tolerates    Sepsis:  Source unclear, suspected urinary source initially. Thrush in mouth. Procalcitonin negative, pneumonia less likely. Lactic acid resolved 10/9. Urine and blood culture negative. Patient on vancomycin/cefepime started on 10/8  Vancomycin discontinued on 10/15  10/16: No obvious source of infection.  I will discontinue cefepime    AYLIN in the setting of shock:  10/16: cr 1.65, hold off on Lasix with concurrent hypotension    Acute liver failure:  suspect ischemic versus congestion in nature  prior hepatitis serologies normal  right upper quadrant ultrasound unremarkable, ammonia normal  trend transaminases    type 2 diabetes mellitus:  Controlled on Metformin at home  continue SSI    Oral thrush  Nystatin    Nutrition:  Nutrition consult, if not able to take p.o. may consider other ways of feeding    Discharge planning:  To be determined    DVT ppx ordered  Code status:  DNR  Estimated LOS:  Greater than 2 midnights  Risk:  high    Hospital Problems as of 10/16/2021 Date Reviewed: 10/11/2021        Codes Class Noted - Resolved POA    Acute on chronic systolic heart failure ([facility] ICD-10-CM: I50.23  ICD-9-CM: 428.23  10/9/2021 - Present Yes        Acute on chronic respiratory failure with hypoxia and hypercapnia (HCC) ICD-10-CM: J96.21, J96.22  ICD-9-CM: 518.84, 786.09, 799.02  10/14/2021 - Present Unknown        Cardiogenic shock ([place] [address].) ICD-10-CM: R57.0  ICD-9-CM: 785.51  10/14/2021 - Present Unknown        Acute pulmonary edema (HCC) ICD-10-CM: J81.0  ICD-9-CM: 518.4  10/14/2021 - Present Unknown        Hypercapnic respiratory failure ([facility] ICD-10-CM: J96.92  ICD-9-CM: 518.81  10/12/2021 - Present Unknown        COVID-19 vaccine series completed ICD-10-CM: Z92.29  ICD-9-CM: V87.49  10/11/2021 - Present Yes        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 [facility]) ICD-10-CM: U07.1, I43  ICD-9-CM: 425.9, 079.89  10/10/2021 - Present Yes        * (Principal) Sepsis with encephalopathy and septic shock ([place] [address].) ICD-10-CM: A41.9, R65.21, G93.40  ICD-9-CM: 038.9, 995.92, 785.52, 348.30  10/8/2021 - Present Yes        Paroxysmal atrial fibrillation with RVR ([facility] ICD-10-CM: I48.0  ICD-9-CM: 427.31  10/8/2021 - Present Yes        Toxic metabolic encephalopathy [id number]-[id number]: G92.8  ICD-9-CM: 349.82  10/8/2021 - Present Yes        Acute kidney injury (AYLIN) with acute tubular necrosis (ATN) ([facility] ICD-10-CM: N17.0  ICD-9-CM: 584.5  10/8/2021 - Present Yes        Thrush, oral ICD-10-CM: B37.0  ICD-9-CM: 112.0  10/8/2021 - Present Yes        Acute liver failure without hepatic coma ICD-10-CM: K72.00  ICD-9-CM: 570  10/8/2021 - Present Yes        Chronic respiratory failure with hypoxia and hypercapnia (HCC) ICD-10-CM: J96.11, J96.12  ICD-9-CM: 518.83, 799.02, 786.09  10/8/2021 - Present Mild intermittent asthma ICD-10-CM: J45.20  ICD-9-CM: 493.90  10/8/2021 - Present Yes        HFrEF (heart failure with reduced ejection fraction) (HCC) (Chronic) ICD-10-CM: I50.20  ICD-9-CM: 428.20  10/8/2021 - Present Yes        Type 2 diabetes mellitus, without long-term current use of insulin (HCC) ICD-10-CM: E11.9  ICD-9-CM: 250.00  8/24/2021 - Present Yes        Personal history of COVID-19 (Chronic) ICD-10-CM: Z86.16  ICD-9-CM: V12.09  8/18/2021 - Present Yes        Class 2 severe obesity due to excess calories with serious comorbidity and body mass index (BMI) of 35.0 to 35.9 in [facility]) (Chronic) ICD-10-CM: E66.01, Z68.35  ICD-9-CM: 278.01, V85.35  5/7/2018 - Present Yes        Benign non-nodular prostatic hyperplasia with lower urinary tract symptoms (Chronic) ICD-10-CM: N40.1  ICD-9-CM: 600.91  7/27/2017 - Present Yes        LBBB (left bundle branch block) (Chronic) ICD-10-CM: I44.7  ICD-9-CM: 426.3  1/7/2016 - Present Yes        HLD (hyperlipidemia) (Chronic) ICD-10-CM: E78.5  ICD-9-CM: 272.4  1/7/2016 - Present Yes        Nonischemic cardiomyopathy ([facility] (Chronic) ICD-10-CM: I42.8  ICD-9-CM: 425.4  2/21/2013 - Present Yes    Overview Signed 5/7/2018 10:18 AM by Jaime Wayne MD     EF 45% 2017   \"severe AI\"              Implantable cardioverter-defibrillator (ICD) in situ (Chronic) ICD-10-CM: Z95.810  ICD-9-CM: V45.02  2/21/2013 - Present Yes    Overview Signed 2/21/2013  7:22 AM by Reshma Gens III     [place] biventricular ICD implanted 2/20/13             HTN (hypertension) (Chronic) ICD-10-CM: I10  ICD-9-CM: 401.9  2/21/2013 - Present Yes                10 systems reviewed and negative except as noted in HPI.   Past Medical History:   Diagnosis Date    Abnormal EKG     Arrhythmia     pacemaker/defib placed 2/20/13    CAD (coronary artery disease)     MINIMAL DISEASE 2010    Cardiomyopathy ([facility] [address]     Edema     Ankle    GERD (gastroesophageal reflux disease)     Heart failure ([facility]     HLD (hyperlipidemia) 2016    Hypertension     ICD (implantable cardiac defibrillator) in place 2013    LBBB (left bundle branch block) 2016    Prostatitis       Past Surgical History:   Procedure Laterality Date    HX CHOLECYSTECTOMY      HX COLONOSCOPY      HX HEENT      t and     HX OTHER SURGICAL      ing hernia rpr    HX PACEMAKER  2013    St. flora ICD    HX UROLOGICAL  2012    PROSTATE TUMT    OH ABDOMEN SURGERY PROC UNLISTED      cholecystectomy      Allergies   Allergen Reactions    Pcn [Penicillins] Itching and Swelling     DIFFICULTY SWALLOWING        Social History     Tobacco Use    Smoking status: Former Smoker     Packs/day: 0.25     Years: 10.00     Pack years: 2.50     Quit date: 1965     Years since quittin.1    Smokeless tobacco: Never Used   Substance Use Topics    Alcohol use: No      Family History   Problem Relation Age of Onset    Stroke Mother     Heart Surgery Brother 76        CABG      Family history reviewed and noncontributory. Immunization History   Administered Date(s) Administered    COVID-19, PFIZER, MRNA, LNP-S, PF, 30MCG/0.3ML DOSE 2021, 2021    TB Skin Test (PPD) Intradermal 2021, 10/11/2021     PTA Medications:  Prior to Admission Medications   Prescriptions Last Dose Informant Patient Reported? Taking? Eliquis 5 mg tablet   No No   Sig: TAKE 1 TABLET BY MOUTH TWICE DAILY   PROCTOZONE-HC 2.5 % rectal cream   Yes No   Sig: Insert 1 Applicator into rectum two (2) times daily as needed for Hemorrhoids. Indications: Pt takes as needed   ZINC SULFATE PO   Yes Yes   Sig: Take 50 mg by mouth daily. acetaminophen (TylenoL) 325 mg tablet   Yes Yes   Sig: Take 650 mg by mouth every six (6) hours as needed for Pain. albuterol (Ventolin HFA) 90 mcg/actuation inhaler   Yes Yes   Sig: Take 2 Puffs by inhalation every four (4) hours as needed for Wheezing or Shortness of Breath. Use with spacer.    amiodarone (CORDARONE) 200 mg tablet   Yes Yes   Sig: Take 200 mg by mouth daily. ascorbic acid, vitamin C, (VITAMIN C) 500 mg tablet   Yes Yes   Sig: Take 1,000 mg by mouth two (2) times a day. azithromycin (Zithromax) 250 mg tablet   Yes Yes   Sig: Take 250 mg by mouth daily. cholecalciferol (Vitamin D3) (1000 Units /25 mcg) tablet   Yes No   Sig: Take 2,000 Units by mouth daily. colestipol (COLESTID) 1 gram tablet   Yes No   Sig: Take 2 g by mouth nightly. cyanocobalamin (VITAMIN B-12) 500 mcg tablet   Yes No   Sig: Take 500 mcg by mouth daily. doxycycline (MONODOX) 100 mg capsule   Yes Yes   Sig: Take 100 mg by mouth two (2) times a day. furosemide (LASIX) 40 mg tablet   No No   Sig: TAKE 1 TABLET BY MOUTH DAILY   Patient taking differently: Take 20 mg by mouth daily. insulin glargine (Lantus U-100 Insulin) 100 unit/mL injection   Yes Yes   Si Units by SubCUTAneous route nightly. omeprazole (PRILOSEC) 20 mg capsule   Yes Yes   Sig: Take 20 mg by mouth daily. predniSONE (DELTASONE) 20 mg tablet   Yes Yes   Sig: Take 20 mg by mouth daily (with breakfast). tamsulosin (FLOMAX) 0.4 mg capsule   No No   Sig: Take 1 Cap by mouth daily.  Indications: enlarged prostate with urination problem      Facility-Administered Medications: None       Objective:     Patient Vitals for the past 24 hrs:   Temp Pulse Resp BP SpO2   10/16/21 1159     100 %   10/16/21 1143  98 23 91/66 100 %   10/16/21 1128  98 15 (!) 71/55 99 %   10/16/21 1113 98.4 °F (36.9 °C) 99 24 (!) 78/46 99 %   10/16/21 1100 98.4 °F (36.9 °C)       10/16/21 1058  90 24 (!) 83/59 100 %   10/16/21 1044  93 25 (!) 102/58 100 %   10/16/21 1028  (!) 116 24 (!) 101/59 99 %   10/16/21 1013  100 28 (!) 79/54 100 %   10/16/21 0958  96 21 103/61 100 %   10/16/21 0944  (!) 107 24 (!) 82/57 98 %   10/16/21 0928  96 9 101/76 100 %   10/16/21 0913  100 25 (!) 77/56 100 %   10/16/21 0857  (!) 108 9 (!) 87/62 100 %   10/16/21 0843  (!) 106 24 (!) 82/57 100 % 10/16/21 0841     100 %   10/16/21 0828  (!) 123 24 90/67 100 %   10/16/21 0813  (!) 118 19 95/60 100 %   10/16/21 0757  (!) 117 11 (!) 104/59 99 %   10/16/21 0743  (!) 123 16 116/87 100 %   10/16/21 0728  (!) 108 (!) 32 (!) 99/54 99 %   10/16/21 0713 98.3 °F (36.8 °C) (!) 116 24 99/64 100 %   10/16/21 0700 98.3 °F (36.8 °C)       10/16/21 0657  (!) 101 22 (!) 84/62 100 %   10/16/21 0352  (!) 108 24 92/66 100 %   10/16/21 0328   13 94/61 100 %   10/16/21 0312   25 98/68 100 %   10/16/21 0300 98.8 °F (37.1 °C) (!) 111 22 106/77 100 %   10/16/21 0258 98.8 °F (37.1 °C)  19 106/77 100 %   10/16/21 0210     100 %   10/16/21 0028  (!) 114 19 (!) 113/90 99 %   10/16/21 0013   17 98/62 97 %   10/15/21 2357  86 21 (!) 101/46 98 %   10/15/21 2343  97 20 90/60 100 %   10/15/21 2327  (!) 117 8 (!) 93/57 99 %   10/15/21 2312 98.5 °F (36.9 °C) (!) 108 22 (!) 81/58 100 %   10/15/21 2256  (!) 117 20 (!) 84/60 100 %   10/15/21 2243  (!) 122 15 (!) 80/59 99 %   10/15/21 2234  (!) 115 20 (!) 86/49 98 %   10/15/21 2231  96 22 (!) 85/61 99 %   10/15/21 2212  (!) 119 21 (!) 79/54 99 %   10/15/21 2157  (!) 131 23 (!) 82/63 97 %   10/15/21 2143  (!) 103 (!) 32 98/62 97 %   10/15/21 2127  (!) 122 21 (!) 104/56 96 %   10/15/21 2112  (!) 112 25 (!) 98/54 97 %   10/15/21 2056  (!) 116 23 91/60 98 %   10/15/21 2042  (!) 110 25 (!) 85/62 99 %   10/15/21 2027  (!) 110 24 (!) 87/61 99 %   10/15/21 2012  (!) 116 27 (!) 86/63 98 %   10/15/21 2010     98 %   10/15/21 1958  (!) 113 24 (!) 90/55 98 %   10/15/21 1957  93 22 (!) 80/57 99 %   10/15/21 1927 98.2 °F (36.8 °C) 98 19 97/61 99 %   10/15/21 1914  (!) 131 20 90/60 99 %   10/15/21 1904  (!) 105  (!) 84/53    10/15/21 1857  (!) 110 19 (!) 84/53 99 %   10/15/21 1842  (!) 113 20 (!) 96/59 100 %   10/15/21 1827  (!) 115 20 (!) 86/58 100 %   10/15/21 1812  (!) 114 20 (!) 90/56 99 %   10/15/21 1756  (!) 117 19 (!) 86/63 100 %   10/15/21 1742  (!) 117 19 (!) 106/58 100 %   10/15/21 1727  (!) 118 19 99/69 100 %   10/15/21 1711  (!) 110 22 (!) 84/54 98 %   10/15/21 1656  (!) 118 19 (!) 81/56 99 %   10/15/21 1643  (!) 119 20 (!) 76/59 98 %   10/15/21 1627  (!) 110 25 (!) 82/68 98 %   10/15/21 1611  (!) 112 9 96/65 99 %   10/15/21 1557  (!) 113 13 93/64 100 %   10/15/21 1542  (!) 114 9 (!) 82/59 99 %   10/15/21 1527  (!) 113 25 (!) 75/59 98 %   10/15/21 1512 97.1 °F (36.2 °C) (!) 114 10 (!) 81/64 99 %   10/15/21 1500 97.1 °F (36.2 °C)       10/15/21 1457  (!) 104 10 (!) 94/55 100 %   10/15/21 1442  (!) 104 (!) 31 92/67 98 %   10/15/21 1427  (!) 120 20 (!) 84/60 99 %   10/15/21 1412  (!) 102 15 (!) 86/64 99 %   10/15/21 1357  99 13 (!) 84/64 98 %   10/15/21 1342  97 23 (!) 85/59 99 %   10/15/21 1338  (!) 106  (!) 78/61    10/15/21 1327  90 23 (!) 78/61 99 %   10/15/21 1312  (!) 103 22 (!) 85/60 99 %   10/15/21 1257  99 20 90/63 98 %     Oxygen Therapy  O2 Sat (%): 100 % (10/16/21 1159)  Pulse via Oximetry: 97 beats per minute (10/16/21 1159)  O2 Device: Heated; Hi flow nasal cannula (10/16/21 1159)  Skin Assessment: Clean, dry, & intact (10/16/21 0210)  Skin Protection for O2 Device: No (10/15/21 1927)  O2 Flow Rate (L/min): 45 l/min (10/16/21 1159)  O2 Temperature: 87.8 °F (31 °C) (10/16/21 0841)  FIO2 (%): 40 % (10/16/21 1159)    Estimated body mass index is 37.46 kg/m² as calculated from the following:    Height as of this encounter: 6' 1\" (1.854 m). Weight as of this encounter: 128.8 kg (283 lb 14.4 oz). Intake/Output Summary (Last 24 hours) at 10/16/2021 1250  Last data filed at 10/16/2021 0813  Gross per 24 hour   Intake 2635.11 ml   Output 700 ml   Net 1935.11 ml       *Note that automatically entered I/Os may not be accurate; dependent on patient compliance with collection and accurate  by assistants. Physical Exam:  General:    Confused, not following commands, on BiPAP  Eyes:   Normal sclerae.   Extraocular movements intact. HENT:  Normocephalic, atraumatic. Moist mucous membranes  CV:   RRR. No m/r/g. Lungs:  Decreased breath sounds at bases, scattered crackles  Abdomen: Soft, nontender, nondistended. Extremities: Warm and dry. Lower extremity edema 1+  Neurologic: not able to assess due to patient condition  Skin:     No rashes or jaundice. Normal coloration  Psych:  Confused      I reviewed the labs, imaging, EKGs, telemetry, and other studies done this admission. Data Reviewed:   Recent Results (from the past 24 hour(s))   GLUCOSE, POC    Collection Time: 10/15/21  5:31 PM   Result Value Ref Range    Glucose (POC) 139 (H) 65 - 100 mg/dL    Performed by Heather    GLUCOSE, POC    Collection Time: 10/15/21  9:54 PM   Result Value Ref Range    Glucose (POC) 135 (H) 65 - 100 mg/dL    Performed by She    CREATININE    Collection Time: 10/16/21  3:19 AM   Result Value Ref Range    Creatinine 1.65 (H) 0.8 - 1.5 MG/DL   HEPATIC FUNCTION PANEL    Collection Time: 10/16/21  3:19 AM   Result Value Ref Range    Protein, total 5.2 (L) 6.3 - 8.2 g/dL    Albumin 2.8 (L) 3.2 - 4.6 g/dL    Globulin 2.4 2.3 - 3.5 g/dL    A-G Ratio 1.2 1.2 - 3.5      Bilirubin, total 1.9 (H) 0.2 - 1.1 MG/DL    Bilirubin, direct 1.0 (H) <0.4 MG/DL    Alk.  phosphatase 92 50 - 136 U/L    AST (SGOT) 139 (H) 15 - 37 U/L    ALT (SGPT) 648 (H) 12 - 65 U/L   BLOOD GAS, ARTERIAL POC    Collection Time: 10/16/21  4:48 AM   Result Value Ref Range    Device: BIPAP MASK      FIO2 (POC) 30 %    pH (POC) 7.36 7.35 - 7.45      pCO2 (POC) 44.7 35 - 45 MMHG    pO2 (POC) 87 75 - 100 MMHG    HCO3 (POC) 25.2 22 - 26 MMOL/L    sO2 (POC) 96.2 95 - 98 %    Base deficit (POC) 0.4 mmol/L    PIP (POC) 6      Allens test (POC) Positive      Site LEFT RADIAL      Specimen type (POC) ARTERIAL      Performed by Prasanth(Michael)Sujit    GLUCOSE, POC    Collection Time: 10/16/21  7:13 AM   Result Value Ref Range    Glucose (POC) 135 (H) 65 - 100 mg/dL Performed by Jimmie Madison POC    Collection Time: 10/16/21 11:00 AM   Result Value Ref Range    Glucose (POC) 138 (H) 65 - 100 mg/dL    Performed by Heather Morejon Micro Results     Procedure Component Value Units Date/Time    CULTURE, BLOOD [[id number]] Collected: 10/14/21 1113    Order Status: Completed Specimen: Blood Updated: 10/16/21 0708     Special Requests: --        NO SPECIAL REQUESTS  MEDIAL PORT       Culture result: NO GROWTH 2 DAYS       CULTURE, BLOOD [[id number]] Collected: 10/14/21 1057    Order Status: Completed Specimen: Blood Updated: 10/16/21 0708     Special Requests: --        RIGHT  HAND       Culture result: NO GROWTH 2 DAYS       BLOOD CULTURE [[id number]] Collected: 10/08/21 1343    Order Status: Completed Specimen: Blood Updated: 10/13/21 1214     Special Requests: --        LEFT  FOREARM       Culture result: NO GROWTH 5 DAYS       BLOOD CULTURE [[id number]] Collected: 10/08/21 1535    Order Status: Completed Specimen: Blood Updated: 10/13/21 1214     Special Requests: --        LEFT  HAND       Culture result: NO GROWTH 5 DAYS       COVID-19 RAPID TEST [[id number]] Collected: 10/11/21 1629    Order Status: Completed Specimen: Nasopharyngeal Updated: 10/11/21 1717     Specimen source Nasopharyngeal        COVID-19 rapid test Not detected        Comment:      The specimen is NEGATIVE for SARS-CoV-2, the novel coronavirus associated with COVID-19. A negative result does not rule out COVID-19. This test has been authorized by the FDA under an Emergency Use Authorization (EUA) for use by authorized laboratories.         Fact sheet for Healthcare Providers: ConventionUpdate.co.nz  Fact sheet for Patients: ConventionUpdate.co.nz       Methodology: Isothermal Nucleic Acid Amplification         CULTURE, URINE [[id number]] Collected: 10/08/21 1356    Order Status: Completed Specimen: Cath Urine Updated: 10/11/21 0744     Special Requests: NO SPECIAL REQUESTS        Culture result:       <10,000 COLONIES/mL MIXED SKIN MARCOS ISOLATED                Current Facility-Administered Medications   Medication Dose Route Frequency    NOREPINephrine (LEVOPHED) 16,000 mcg in dextrose 5% 250 mL infusion  0.5-30 mcg/min IntraVENous TITRATE    PHENYLephrine (HERMINIO-SYNEPHRINE) 90 mg in 0.9% sodium chloride 250 mL infusion   mcg/min IntraVENous TITRATE    DOBUTamine (DOBUTREX) 500 mg/250 mL (2,000 mcg/mL) infusion  0-10 mcg/kg/min IntraVENous TITRATE    apixaban (ELIQUIS) tablet 2.5 mg  2.5 mg Oral Q12H    famotidine (PEPCID) tablet 20 mg  20 mg Oral DAILY    cefepime (MAXIPIME) 2 g in 0.9% sodium chloride (MBP/ADV) 100 mL MBP  2 g IntraVENous Q24H    amiodarone (CORDARONE) 450 mg in dextrose 5% 250 mL infusion  0.5 mg/min IntraVENous CONTINUOUS    [Held by provider] furosemide (LASIX) injection 40 mg  40 mg IntraVENous Q12H    carvediloL (COREG) tablet 3.125 mg  3.125 mg Oral BID WITH MEALS    albuterol-ipratropium (DUO-NEB) 2.5 MG-0.5 MG/3 ML  3 mL Nebulization Q4H PRN    thiamine HCL (B-1) tablet 100 mg  100 mg Oral DAILY    insulin regular (NOVOLIN R, HUMULIN R) injection   SubCUTAneous AC&HS    nystatin (MYCOSTATIN) 100,000 unit/mL oral suspension 500,000 Units  500,000 Units Swish and Spit QID    sodium chloride (NS) flush 5-10 mL  5-10 mL IntraVENous Q8H    sodium chloride (NS) flush 5-10 mL  5-10 mL IntraVENous PRN    sodium chloride (NS) flush 5-40 mL  5-40 mL IntraVENous Q8H    sodium chloride (NS) flush 5-40 mL  5-40 mL IntraVENous PRN    acetaminophen (TYLENOL) tablet 650 mg  650 mg Oral Q6H PRN    Or    acetaminophen (TYLENOL) suppository 650 mg  650 mg Rectal Q6H PRN    polyethylene glycol (MIRALAX) packet 17 g  17 g Oral DAILY PRN    ondansetron (ZOFRAN ODT) tablet 4 mg  4 mg Oral Q8H PRN    Or    ondansetron (ZOFRAN) injection 4 mg  4 mg IntraVENous Q6H PRN    dextrose 40% (GLUTOSE) oral gel 1 Tube  15 g Oral PRN  glucagon (GLUCAGEN) injection 1 mg  1 mg IntraMUSCular PRN    dextrose (D50W) injection syrg 12.5-25 g  25-50 mL IntraVENous PRN    arformoteroL (BROVANA) neb solution 15 mcg  15 mcg Nebulization BID RT    budesonide (PULMICORT) 500 mcg/2 ml nebulizer suspension  500 mcg Nebulization BID RT       Other Studies:  No results found for this visit on 10/08/21. XR CHEST SNGL V    Result Date: 10/16/2021  EXAM: Chest x-ray. INDICATION: Dyspnea. COMPARISON: Yesterday's chest x-ray. TECHNIQUE: Frontal view chest x-ray. FINDINGS: Bibasilar lung atelectasis or infiltrates are unchanged. Again noted is cardiomegaly and a left chest wall biventricular pacemaker. A right jugular central line remains in place. No pneumothorax or large pleural effusion is seen. Unchanged bibasilar lung atelectasis or infiltrates. [unfilled]       Part of this note was written by using a voice dictation software and the note has been proof read but may still contain some grammatical/other typographical errors.     Signed:  Jhoan Chandler MD

## 2021-10-17 NOTE — PROGRESS NOTES
[facility] Critical Care Note[de-identified] 10/17/2021  Alexandria Michele  Admission Date: 10/8/2021     Length of Stay: 9 days    Background: 80 y.o.  male seen and evaluated at the request of Dr. José Freitas.      He has a history of covid in August 2021. Hospitalized from 8/18-24. H/o NICM previous EF 40-45%, GERD, HTN, a fib, hld. He was admitted to the ICU at that time. Intubated just for 1 day. It looks like he was on room air at the time of his discharge. This time he was admitted 80 with AMS, bradycardia, SOB. The H&P states that he was on 4L prior to admission, not sure if this was started while he was at the post acute care facility as there is no mention of that in his previous dc summary. He was found to have B infiltrates on CXR and started on abx. He has been labelled as sepsis. Infectious workup, other than initial UA with 2+ bacteria has been negative. WBC and pct were initially normal. WBC has since risen but is not left shifted. Cultures are negative. Of note, he has been found to have severe systolic heart failure with EF 15-20% and BNP of 9000 rising to 16,000. CXR this morning read as small left sided PTX for which pulmonary was consulted. His initial ABG was normal other than hypoxia, however, subsequent ones showed CO2 up to 88. He was DNR and was placed on bipap with response down to 57.      He has developed renal failure and liver failure since admission as well.      A fib with RVR currently on amio. Hypotension requiring levophed and tasha. Notable PMH:  has a past medical history of Abnormal EKG, Arrhythmia, CAD (coronary artery disease), Cardiomyopathy ([address] Edema, GERD (gastroesophageal reflux disease), Heart failure ([address] HLD (hyperlipidemia) (1/7/2016), Hypertension, ICD (implantable cardiac defibrillator) in place (2/2013), LBBB (left bundle branch block) (1/7/2016), and Prostatitis.     24 Hour events: pt has been on bipap at night, now on HF NC and has been on levophed, dobutamine and tasha. Family is here and discussed moving toward comfort care at this point    ROS: unable to obtain/negative except as listed elsewhere. Lines: (insertion date)     Central line: in place      Drips: current dose (range)  Phenylephrine Dose (mcg/min): 60 mcg/min (map 88)  Levophed Dose (mcg/kg/min): 0.095 mcg/kg/min (map 65)     Pertinent Exam:         Blood pressure 106/70, pulse (!) 102, temperature 96.9 °F (36.1 °C), resp. rate (!) 32, height 6' 1\" (1.854 m), weight 288 lb 1.6 oz (130.7 kg), SpO2 99 %. Intake/Output Summary (Last 24 hours) at 10/17/2021 1228  Last data filed at 10/17/2021 0900  Gross per 24 hour   Intake 2528.11 ml   Output 725 ml   Net 1803.11 ml     Constitutional:  intubated and mechanically ventilated. EENMT:  Sclera clear, pupils equal, oral mucosa moist  Respiratory: crackles   Cardiovascular:  RRR  Gastrointestinal:  soft with no tenderness; positive bowel sounds present  Musculoskeletal:  warm with no cyanosis, trace lower extremity edema  Skin:  no jaundice or ecchymosis  Neurologic:awake but confused  Psychiatric:  calm    CXR:      10/16/21    Narrative & Impression   EXAM: Chest x-ray.     INDICATION: Dyspnea.     COMPARISON: Yesterday's chest x-ray.     TECHNIQUE: Frontal view chest x-ray.     FINDINGS: Bibasilar lung atelectasis or infiltrates are unchanged. Again noted  is cardiomegaly and a left chest wall biventricular pacemaker. A right jugular  central line remains in place. No pneumothorax or large pleural effusion is  seen.     IMPRESSION  Unchanged bibasilar lung atelectasis or infiltrates.            Recent Labs     10/15/21  1105 10/15/21  0359   WBC  --  9.3   HGB  --  10.8*   HCT  --  35.1*   PLT  --  120*   PCT 0.20  --      Recent Labs     10/17/21  0348 10/16/21  0319 10/15/21  0359     --  135*   K 4.8  --  4.7     --  105   CO2 26  --  26   *  --  122*   BUN 59*  --  68*   CREA 1.48 1.65* 1.92*   MG  --   --  2.5* CA 9.5  --  9.3   ALB 2.6* 2.8* 2.6*   * 139* 241*   * 648* 891*   AP 90 92 111     No results for input(s): LAC, TROPHS, BNPNT, CRP in the last 72 hours. No lab exists for component: ESR  Recent Labs     10/17/21  1137 10/16/21  2105 10/16/21  1558   GLUCPOC 135* 148* 153*     ECHO: Results from [place] encounter on 10/08/21    ECHO ADULT COMPLETE    Interpretation Summary  · Image quality for this study was technically difficult. · LV: Estimated LVEF is 15 - 20%. Normal wall thickness. Dilated left ventricle. Severely and globally reduced systolic function. Left ventricular diastolic dysfunction. · RV: Mildly dilated right ventricle. Mildly reduced systolic function. Pacer/ICD present. 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 Ascending aorta diameter = 4.3 cm. · TV: Mild tricuspid valve regurgitation is present. · AV: Mild aortic valve regurgitation is present. · MV: Mitral valve non-specific thickening. Mild to moderate mitral valve regurgitation is present. Results     Procedure Component Value Units Date/Time    CULTURE, BLOOD [[id number]] Collected: 10/14/21 1113    Order Status: Completed Specimen: Blood Updated: 10/17/21 0828     Special Requests: --        NO SPECIAL REQUESTS  MEDIAL PORT       Culture result: NO GROWTH 3 DAYS       CULTURE, BLOOD [[id number]] Collected: 10/14/21 1057    Order Status: Completed Specimen: Blood Updated: 10/17/21 0828     Special Requests: --        RIGHT  HAND       Culture result: NO GROWTH 3 DAYS       COVID-19 RAPID TEST [[id number]] Collected: 10/11/21 1629    Order Status: Completed Specimen: Nasopharyngeal Updated: 10/11/21 1717     Specimen source Nasopharyngeal        COVID-19 rapid test Not detected        Comment:      The specimen is NEGATIVE for SARS-CoV-2, the novel coronavirus associated with COVID-19. A negative result does not rule out COVID-19.        This test has been authorized by the FDA under an Emergency Use Authorization (EUA) for use by authorized laboratories.         Fact sheet for Healthcare Providers: ConventionUpdate.co.nz  Fact sheet for Patients: ConventionUpdate.co.nz       Methodology: Isothermal Nucleic Acid Amplification         BLOOD CULTURE [[id number]] Collected: 10/08/21 1535    Order Status: Completed Specimen: Blood Updated: 10/13/21 1214     Special Requests: --        LEFT  HAND       Culture result: NO GROWTH 5 DAYS       CULTURE, URINE [[id number]] Collected: 10/08/21 1356    Order Status: Completed Specimen: Cath Urine Updated: 10/11/21 0744     Special Requests: NO SPECIAL REQUESTS        Culture result:       <10,000 COLONIES/mL MIXED SKIN MARCOS ISOLATED          BLOOD CULTURE [[id number]] Collected: 10/08/21 1343    Order Status: Completed Specimen: Blood Updated: 10/13/21 1214     Special Requests: --        LEFT  FOREARM       Culture result: NO GROWTH 5 DAYS           Inpat Anti-Infectives (From admission, onward)     Start     Ordered Stop    10/16/21 0500  vancomycin (VANCOCIN) 750 mg in 0.9% sodium chloride 250 mL (VIAL-MATE)  750 mg,   IntraVENous,   ONCE      10/15/21 0827 10/16/21 1659    10/13/21 0600  cefepime (MAXIPIME) 2 g in 0.9% sodium chloride (MBP/ADV) 100 mL MBP  2 g,   IntraVENous,   EVERY 24 HOURS      10/12/21 1022 --    10/12/21 1016  Vancomycin Intermittent Dosing per Rx  Other,   RX DOSING/MONITORING      10/12/21 1018 --    10/09/21 1800  nystatin (MYCOSTATIN) 100,000 unit/mL oral suspension 500,000 Units  500,000 Units,   Swish and Spit,   4 TIMES DAILY      10/09/21 1436 --    10/08/21 1800  fluconazole (DIFLUCAN) 200mg/100 mL IVPB (premix)  200 mg,   IntraVENous,   ONCE      10/08/21 1621 10/09/21 0559              ABG:  Recent Labs     10/16/21  0448 10/15/21  0426 10/14/21  1328   PHI 7.36 7.39 7.27*   PCO2I 44.7 40.9 56.9*   PO2I 87 120* 241*   HCO3I 25.2 24.6 25.8     Assessment and Plan:  (Medical Decision Making)   Impression: 80 y.o. male with cardiogenic shock and resp failure. NEURO:   Sedation: none  Analgesia: none    CV:   Volume Status:   Cardiogenic shock, EF 15 % . Will add Midodrine and wean Law as able   shock:  Neg cultures , on maxipime-     PULM:   Acute hypoxemic/hypercapneic respiratory failure:  Currently on Opti flow    Severe ARDS 2/2 COVID: cxr improved relative to previous when he had covid  RENAL:  AYLIN: worse -cr 1.65    Lytes ok  GI:   Nutrition: none -suggest tpn if not able to get off bipap  HEME:   Anemia:hg 10.8  Thrombocytopenia: 120  Anticoagulation: eliquis  ID:   COVID-19: had previous  ENDO:   DM   Skin: no decub, turns, preventive care  Prophy: eliquis, pepcid      DNR    The patient is critically ill with respiratory failure, circulatory failure and requires high complexity decision making for assessment and support including frequent ventilator adjustment , frequent evaluation and titration of therapies , application of advanced monitoring technologies and extensive interpretation of multiple databases    Cumulative time devoted to patient care services by me for day of service is 25 mins.     Clearance MD Amada

## 2021-10-17 NOTE — PROGRESS NOTES
Bedside and verbal shift change report received from  [employer] (offgoing nurse). Report included the following information SBAR, Kardex, Intake/Output, MAR, Recent Results and Quality Measures.      Dual skin assessment completed at bedside: wound to sacrum (list pertinent skin assessment findings)    Dual verification of gtts completed (name of gtts verified): n/a

## 2021-10-17 NOTE — PROGRESS NOTES
[facility] Hospitalist Note     Admit Date:  10/8/2021 12:57 PM   Name:  Alexandria Michele   Age:  80 y.o.  :  3/5/1933   MRN:  [id number]   PCP:  Ashish Dueñas MD  Treatment Team: Attending Provider: Aysha Ba MD; Utilization Review: Sarah Wang RN; Consulting Provider: Luciana Warner MD; Care Manager: Chanel Palafox RN; Consulting Provider: Deisy Villalobos MD; Primary Nurse: Harmony Dunn, [facility]/Subjective:   80 y. o. male with medical history of NICM, ICD, LBBB, HTN, HLD, GERD, BPH, pAF (eliquis/amio), obesity, T2DM, chronic hypoxemic respiratory failure (4L following covid pna) who presented via ems from post-acute facility with AMS, bradycardia (hr in 30s) and increased WOB. The day PTA, he was started on azithromycin and prednisone for LLL infiltrate.  In the ED, found to have LA 3.1, K+5.2, Scr 1.38, LFTS elevated.  CXR bilateral infiltrates. He was given abx and limited IVF due to his h/o CHF. He was found to be in AFIB RVR. rec'd dilt bolus 10 mg x1 f/b gitt. Patient transferred to ICU on 10/12 with worsening respiratory status, requiring BiPAP. Also found to be in shock, requiring pressor support. Patient started on broad-spectrum antibiotic. Sepsis work-up negative. Patient started on amiodarone for A. fib with RVR. Echocardiogram eith ejection fraction 15 to 20% and severel and globally reduced systolic function with left ventricular diastolic dysfunction. Likely patient in cardiogenic shock, requiring 3 pressor supports. Cardiology following and recommend conservative medical therapy. Lasix on hold due to persistent hypotension and worsening renal function. Overall prognosis poor. 10/17: Patient is seen at the bedside. Alert and oriented. Off airvo and weaned to 4 L NC. Remained on Levophed, dobutamine and tasha, not able to wean. Also on amiodarone.    Initial plan was to transition to comfort measures today, but with some improvement in patient's current condition I will arrange family meeting at 15 today and discuss CODE STATUS. Addendum: Wife, daughter and sisters at bedside. Discussed patient's current condition. Family opted for comfort measures only. Will start patient on comfort measure only when rest of the family arrives. 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 fib with RVR  Encephalopathy  Pronounced decline in cardiac function after Covid infection earlier this year  Recent echocardiogram with ejection fraction 50 to 20% and severe and globally reduced systolic function with left ventricular diastolic dysfunction, elevated BNP  Cardiology following and recommend conservative medical therapy, overall poor prognosis  Lasix on hold due to worsening hypotension and renal function  On amiodarone gtt. On Eliquis  On Levophed, dobutamine and tasha  10/17: Patient remained on Levophed, dobutamine and tasha, unable to wean. Continue current care. Acute on chronic hypoxic and hypercapnic respiratory failure:  Secondary to COVID-19 pneumonia  Brovana/budesonide nebs  10/17: Off airvo, on 4 L NC. Continue wean if tolerates    Sepsis:  Source unclear, suspected urinary source initially. Thrush in mouth. Procalcitonin negative, pneumonia less likely. Lactic acid resolved 10/9. Urine and blood culture negative.   Patient on vancomycin/cefepime started on 10/8  Vancomycin discontinued on 10/15  Discontinued cefepime on 10/16    AYLIN in the setting of shock:  10/17: cr 1.48, hold off on Lasix with concurrent hypotension    Acute liver failure:  suspect ischemic versus congestion in nature  prior hepatitis serologies normal  right upper quadrant ultrasound unremarkable, ammonia normal  trend transaminases  10/17: Improving    type 2 diabetes mellitus:  Controlled on Metformin at home  continue SSI    Oral thrush  Nystatin    Nutrition:  Nutrition consult, if not able to take p.o. may consider other ways of feeding. Discharge planning:  To be determined    DVT ppx ordered  Code status:  DNR  Estimated LOS:  Greater than 2 midnights  Risk:  high    Hospital Problems as of 10/17/2021 Date Reviewed: 10/11/2021        Codes Class Noted - Resolved POA    Acute on chronic systolic heart failure ([facility] ICD-10-CM: I50.23  ICD-9-CM: 428.23  10/9/2021 - Present Yes        Acute on chronic respiratory failure with hypoxia and hypercapnia (HCC) ICD-10-CM: J96.21, J96.22  ICD-9-CM: 518.84, 786.09, 799.02  10/14/2021 - Present Unknown        Cardiogenic shock ([facility] ICD-10-CM: R57.0  ICD-9-CM: 785.51  10/14/2021 - Present Unknown        Acute pulmonary edema (HCC) ICD-10-CM: J81.0  ICD-9-CM: 518.4  10/14/2021 - Present Unknown        Hypercapnic respiratory failure ([facility] ICD-10-CM: J96.92  ICD-9-CM: 518.81  10/12/2021 - Present Unknown        COVID-19 vaccine series completed ICD-10-CM: Z92.29  ICD-9-CM: V87.49  10/11/2021 - Present Yes        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 [facility]) ICD-10-CM: U07.1, I43  ICD-9-CM: 425.9, 079.89  10/10/2021 - Present Yes        * (Principal) Sepsis with encephalopathy and septic shock ([facility] ICD-10-CM: A41.9, R65.21, G93.40  ICD-9-CM: 038.9, 995.92, 785.52, 348.30  10/8/2021 - Present Yes        Paroxysmal atrial fibrillation with RVR ([facility] ICD-10-CM: I48.0  ICD-9-CM: 427.31  10/8/2021 - Present Yes        Toxic metabolic encephalopathy QZR-47-MB: G92.8  ICD-9-CM: 349.82  10/8/2021 - Present Yes        Acute kidney injury (AYLIN) with acute tubular necrosis (ATN) ([facility] ICD-10-CM: N17.0  ICD-9-CM: 584.5  10/8/2021 - Present Yes        Thrush, oral ICD-10-CM: B37.0  ICD-9-CM: 112.0  10/8/2021 - Present Yes        Acute liver failure without hepatic coma ICD-10-CM: K72.00  ICD-9-CM: 570  10/8/2021 - Present Yes        Chronic respiratory failure with hypoxia and hypercapnia (HCC) ICD-10-CM: J96.11, J37.40  ICD-9-CM: 518.83, 799.02, 786.09  10/8/2021 - Present         Mild intermittent asthma ICD-10-CM: J45.20  ICD-9-CM: 493.90  10/8/2021 - Present Yes        HFrEF (heart failure with reduced ejection fraction) (HCC) (Chronic) ICD-10-CM: I50.20  ICD-9-CM: 428.20  10/8/2021 - Present Yes        Type 2 diabetes mellitus, without long-term current use of insulin (HCC) ICD-10-CM: E11.9  ICD-9-CM: 250.00  8/24/2021 - Present Yes        Personal history of COVID-19 (Chronic) ICD-10-CM: Z86.16  ICD-9-CM: V12.09  8/18/2021 - Present Yes        Class 2 severe obesity due to excess calories with serious comorbidity and body mass index (BMI) of 35.0 to 35.9 in adult [facility]) (Chronic) ICD-10-CM: E66.01, Z68.35  ICD-9-CM: 278.01, V85.35  5/7/2018 - Present Yes        Benign non-nodular prostatic hyperplasia with lower urinary tract symptoms (Chronic) ICD-10-CM: N40.1  ICD-9-CM: 600.91  7/27/2017 - Present Yes        LBBB (left bundle branch block) (Chronic) ICD-10-CM: I44.7  ICD-9-CM: 426.3  1/7/2016 - Present Yes        HLD (hyperlipidemia) (Chronic) ICD-10-CM: E78.5  ICD-9-CM: 272.4  1/7/2016 - Present Yes        Nonischemic cardiomyopathy ([facility] (Chronic) ICD-10-CM: I42.8  ICD-9-CM: 425.4  2/21/2013 - Present Yes    Overview Signed 5/7/2018 10:18 AM by Christiana Winslow MD     EF 45% 2017   \"severe AI\"              Implantable cardioverter-defibrillator (ICD) in situ (Chronic) ICD-10-CM: Z95.810  ICD-9-CM: V45.02  2/21/2013 - Present Yes    Overview Signed 2/21/2013  7:22 AM by Trini Rodriguez III     [place] biventricular ICD implanted 2/20/13             HTN (hypertension) (Chronic) ICD-10-CM: I10  ICD-9-CM: 401.9  2/21/2013 - Present Yes                10 systems reviewed and negative except as noted in HPI.   Past Medical History:   Diagnosis Date    Abnormal EKG     Arrhythmia     pacemaker/defib placed 2/20/13    CAD (coronary artery disease)     MINIMAL DISEASE 2010    Cardiomyopathy (HCC)     Edema     Ankle    GERD (gastroesophageal reflux disease)     Heart failure (HCC)     HLD (hyperlipidemia) 2016    Hypertension     ICD (implantable cardiac defibrillator) in place 2013    LBBB (left bundle branch block) 2016    Prostatitis       Past Surgical History:   Procedure Laterality Date    HX CHOLECYSTECTOMY      HX COLONOSCOPY      HX HEENT      t and     HX OTHER SURGICAL      ing hernia rpr    HX PACEMAKER  2013    St. flora ICD    HX UROLOGICAL  2012    PROSTATE TUMT    [place] ABDOMEN SURGERY PROC UNLISTED      cholecystectomy      Allergies   Allergen Reactions    Pcn [Penicillins] Itching and Swelling     DIFFICULTY SWALLOWING        Social History     Tobacco Use    Smoking status: Former Smoker     Packs/day: 0.25     Years: 10.00     Pack years: 2.50     Quit date: 1965     Years since quittin.1    Smokeless tobacco: Never Used   Substance Use Topics    Alcohol use: No      Family History   Problem Relation Age of Onset    Stroke Mother     Heart Surgery Brother 76        CABG      Family history reviewed and noncontributory. Immunization History   Administered Date(s) Administered    COVID-19, PFIZER, MRNA, LNP-S, PF, 30MCG/0.3ML DOSE 2021, 2021    TB Skin Test (PPD) Intradermal 2021, 10/11/2021     PTA Medications:  Prior to Admission Medications   Prescriptions Last Dose Informant Patient Reported? Taking? Eliquis 5 mg tablet   No No   Sig: TAKE 1 TABLET BY MOUTH TWICE DAILY   PROCTOZONE-HC 2.5 % rectal cream   Yes No   Sig: Insert 1 Applicator into rectum two (2) times daily as needed for Hemorrhoids. Indications: Pt takes as needed   ZINC SULFATE PO   Yes Yes   Sig: Take 50 mg by mouth daily. acetaminophen (TylenoL) 325 mg tablet   Yes Yes   Sig: Take 650 mg by mouth every six (6) hours as needed for Pain.    albuterol (Ventolin HFA) 90 mcg/actuation inhaler   Yes Yes   Sig: Take 2 Puffs by inhalation every four (4) hours as needed for Wheezing or Shortness of Breath. Use with spacer. amiodarone (CORDARONE) 200 mg tablet   Yes Yes   Sig: Take 200 mg by mouth daily. ascorbic acid, vitamin C, (VITAMIN C) 500 mg tablet   Yes Yes   Sig: Take 1,000 mg by mouth two (2) times a day. azithromycin (Zithromax) 250 mg tablet   Yes Yes   Sig: Take 250 mg by mouth daily. cholecalciferol (Vitamin D3) (1000 Units /25 mcg) tablet   Yes No   Sig: Take 2,000 Units by mouth daily. colestipol (COLESTID) 1 gram tablet   Yes No   Sig: Take 2 g by mouth nightly. cyanocobalamin (VITAMIN B-12) 500 mcg tablet   Yes No   Sig: Take 500 mcg by mouth daily. doxycycline (MONODOX) 100 mg capsule   Yes Yes   Sig: Take 100 mg by mouth two (2) times a day. furosemide (LASIX) 40 mg tablet   No No   Sig: TAKE 1 TABLET BY MOUTH DAILY   Patient taking differently: Take 20 mg by mouth daily. insulin glargine (Lantus U-100 Insulin) 100 unit/mL injection   Yes Yes   Si Units by SubCUTAneous route nightly. omeprazole (PRILOSEC) 20 mg capsule   Yes Yes   Sig: Take 20 mg by mouth daily. predniSONE (DELTASONE) 20 mg tablet   Yes Yes   Sig: Take 20 mg by mouth daily (with breakfast). tamsulosin (FLOMAX) 0.4 mg capsule   No No   Sig: Take 1 Cap by mouth daily.  Indications: enlarged prostate with urination problem      Facility-Administered Medications: None       Objective:     Patient Vitals for the past 24 hrs:   Temp Pulse Resp BP SpO2   10/17/21 1129  88 28 (!) 86/72 99 %   10/17/21 1114  96 28 124/68 99 %   10/17/21 1100 96.9 °F (36.1 °C)       10/17/21 1058  91 25 96/68 99 %   10/17/21 1044  92 25 (!) 85/69 99 %   10/17/21 1029  89 30 96/71 98 %   10/17/21 1014  92 28 (!) 79/68 99 %   10/17/21 0959  88 (!) 32 (!) 71/60 98 %   10/17/21 0943  90 27 (!) 84/67 98 %   10/17/21 0929  (!) 115 29 95/70 94 %   10/17/21 0914  (!) 103 (!) 33 (!) 87/62 98 %   10/17/21 0901  100  99/78    10/17/21 0858  (!) 117 24 99/78 97 %   10/17/21 0843  (!) 103 25 90/71 97 % 10/17/21 0829  (!) 112 25 101/76 100 %   10/17/21 0813  (!) 115 30 105/71 96 %   10/17/21 0758  99 29 97/70 99 %   10/17/21 0743  (!) 116 29 95/73 98 %   10/17/21 0729  (!) 108 29 108/79 99 %   10/17/21 0713 98 °F (36.7 °C) (!) 122 26 104/76 98 %   10/17/21 0700 98 °F (36.7 °C) (!) 117 22  96 %   10/17/21 0658  (!) 102 29 98/76 97 %   10/17/21 0543  100 28 95/68 95 %   10/17/21 0530  (!) 106 27 104/74 96 %   10/17/21 0513  (!) 111 26 99/75 95 %   10/17/21 0458  89 (!) 34 105/75 96 %   10/17/21 0401  (!) 105 (!) 37 98/72 97 %   10/17/21 0345  (!) 117 (!) 31 107/76 97 %   10/17/21 0329  (!) 110 29 121/82 96 %   10/17/21 0313  (!) 113 28 98/61 95 %   10/17/21 0258 97.7 °F (36.5 °C) 99 29 95/66 96 %   10/17/21 0243  (!) 110 27 102/70 96 %   10/17/21 0231     94 %   10/17/21 0228  (!) 122 (!) 32 104/78 95 %   10/17/21 0213  (!) 116 27 101/72 94 %   10/17/21 0158  (!) 117 (!) 36 105/76 94 %   10/17/21 0143  (!) 111 30 105/70 93 %   10/17/21 0128  (!) 119 (!) 34 103/72 92 %   10/17/21 0113  100 30 100/67 94 %   10/17/21 0058  (!) 110 (!) 31 109/68 92 %   10/17/21 0043  (!) 106 (!) 32 113/77 94 %   10/17/21 0028  (!) 127 (!) 32 99/67 91 %   10/17/21 0013  (!) 109 23 98/73 93 %   10/16/21 2358  (!) 104 28 (!) 105/59 90 %   10/16/21 2343  (!) 115 27 110/75 93 %   10/16/21 2328  (!) 103 (!) 31 137/76 98 %   10/16/21 2313  (!) 103 (!) 37 108/76 94 %   10/16/21 2258 98.8 °F (37.1 °C) (!) 107 30 131/78 93 %   10/16/21 2243  (!) 107 (!) 36 111/83 94 %   10/16/21 2228  (!) 116 (!) 31 106/75 92 %   10/16/21 2214    93/70 96 %   10/16/21 2158  (!) 109 8 120/79 95 %   10/16/21 2143  70 (!) 38 108/71 90 %   10/16/21 2128  (!) 103  101/77 92 %   10/16/21 2113  (!) 113  112/82 93 %   10/16/21 2058  (!) 116  120/77 97 %   10/16/21 2043  100 30 116/80 93 %   10/16/21 2028  (!) 103 23 118/75 96 %   10/16/21 2013  (!) 101 28 111/71 95 %   10/16/21 1958  (!) 104 24 101/78 (!) 89 % 10/16/21 1943  (!) 115 29 118/89 (!) 88 %   10/16/21 1929  (!) 103 (!) 38 111/79 97 %   10/16/21 1927  (!) 101 (!) 33 110/66 98 %   10/16/21 1914  97 28 110/66 91 %   10/16/21 1913     94 %   10/16/21 1904  (!) 101 28 104/71 97 %   10/16/21 1900 98.8 °F (37.1 °C) 84 28 104/77 93 %   10/16/21 1858  84  104/77 93 %   10/16/21 1843  (!) 106  105/64    10/16/21 1830  (!) 111  108/74 96 %   10/16/21 1813  (!) 103  (!) 120/96 91 %   10/16/21 1758  (!) 107 10 102/62 92 %   10/16/21 1743  (!) 106 28 104/62 93 %   10/16/21 1729  (!) 104 12 98/65 94 %   10/16/21 1714  (!) 104 20 97/66 92 %   10/16/21 1702     94 %   10/16/21 1658  100 25 93/65 92 %   10/16/21 1643  94 28 (!) 82/71 95 %   10/16/21 1628  (!) 101 22 105/70 93 %   10/16/21 1613  (!) 117 27 99/70 93 %   10/16/21 1558  (!) 104 25 108/69 96 %   10/16/21 1543  (!) 109 27 (!) 97/59 95 %   10/16/21 1528  98 23 124/86 93 %   10/16/21 1513 97.7 °F (36.5 °C) (!) 112 28 115/86 97 %   10/16/21 1500 97.7 °F (36.5 °C)       10/16/21 1459  93 25 (!) 99/55 92 %   10/16/21 1434  100 (!) 59 91/66 98 %   10/16/21 1413  95 (!) 34 (!) 75/54 98 %   10/16/21 1358  98 26 (!) 74/57 97 %   10/16/21 1345  92 (!) 35 (!) 74/57 100 %   10/16/21 1329  100 22 (!) 81/49 98 %   10/16/21 1307  93 15 (!) 82/61 98 %   10/16/21 1244  94 20 (!) 77/62 100 %   10/16/21 1229  94 27 (!) 81/67 98 %   10/16/21 1217  (!) 102 24 (!) 83/58 97 %   10/16/21 1159     100 %   10/16/21 1158  (!) 105 (!) 36 (!) 72/59      Oxygen Therapy  O2 Sat (%): 99 % (10/17/21 1129)  Pulse via Oximetry: 89 beats per minute (10/17/21 1129)  O2 Device: Nasal cannula (10/17/21 0700)  Skin Assessment: Clean, dry, & intact (10/16/21 0210)  Skin Protection for O2 Device: No (10/15/21 1927)  O2 Flow Rate (L/min): 4 l/min (10/17/21 0812)  O2 Temperature: 95 °F (35 °C) (10/17/21 0812)  FIO2 (%): 40 % (10/16/21 1159)    Estimated body mass index is 38.01 kg/m² as calculated from the following:    Height as of this encounter: 6' 1\" (1.854 m). Weight as of this encounter: 130.7 kg (288 lb 1.6 oz). Intake/Output Summary (Last 24 hours) at 10/17/2021 1145  Last data filed at 10/17/2021 0900  Gross per 24 hour   Intake 2528.11 ml   Output 725 ml   Net 1803.11 ml       *Note that automatically entered I/Os may not be accurate; dependent on patient compliance with collection and accurate  by assistants. Physical Exam:  General:    Confused, not following commands, on BiPAP  Eyes:   Normal sclerae. Extraocular movements intact. HENT:  Normocephalic, atraumatic. Moist mucous membranes  CV:   RRR. No m/r/g. Lungs:  Decreased breath sounds at bases, scattered crackles  Abdomen: Soft, nontender, nondistended. Extremities: Warm and dry. Lower extremity edema 1+  Neurologic: not able to assess due to patient condition  Skin:     No rashes or jaundice. Normal coloration  Psych:  Confused      I reviewed the labs, imaging, EKGs, telemetry, and other studies done this admission. Data Reviewed:   Recent Results (from the past 24 hour(s))   GLUCOSE, POC    Collection Time: 10/16/21  3:58 PM   Result Value Ref Range    Glucose (POC) 153 (H) 65 - 100 mg/dL    Performed by Heather    GLUCOSE, POC    Collection Time: 10/16/21  9:05 PM   Result Value Ref Range    Glucose (POC) 148 (H) 65 - 100 mg/dL    Performed by Noemi    HEPATIC FUNCTION PANEL    Collection Time: 10/17/21  3:48 AM   Result Value Ref Range    Protein, total 5.5 (L) 6.3 - 8.2 g/dL    Albumin 2.6 (L) 3.2 - 4.6 g/dL    Globulin 2.9 2.3 - 3.5 g/dL    A-G Ratio 0.9 (L) 1.2 - 3.5      Bilirubin, total 1.6 (H) 0.2 - 1.1 MG/DL    Bilirubin, direct 0.8 (H) <0.4 MG/DL    Alk.  phosphatase 90 50 - 136 U/L    AST (SGOT) 110 (H) 15 - 37 U/L    ALT (SGPT) 537 (H) 12 - 65 U/L   METABOLIC PANEL, BASIC    Collection Time: 10/17/21  3:48 AM   Result Value Ref Range    Sodium 138 136 - 145 mmol/L    Potassium 4.8 3.5 - 5.1 mmol/L    Chloride 105 98 - 107 mmol/L    CO2 26 21 - 32 mmol/L    Anion gap 7 7 - 16 mmol/L    Glucose 135 (H) 65 - 100 mg/dL    BUN 59 (H) 8 - 23 MG/DL    Creatinine 1.48 0.8 - 1.5 MG/DL    GFR est AA 58 (L) >60 ml/min/1.73m2    GFR est non-AA 48 (L) >60 ml/min/1.73m2    Calcium 9.5 8.3 - 10.4 MG/DL       All Micro Results     Procedure Component Value Units Date/Time    CULTURE, BLOOD [[id number]] Collected: 10/14/21 1113    Order Status: Completed Specimen: Blood Updated: 10/17/21 0828     Special Requests: --        NO SPECIAL REQUESTS  MEDIAL PORT       Culture result: NO GROWTH 3 DAYS       CULTURE, BLOOD [[id number]] Collected: 10/14/21 1057    Order Status: Completed Specimen: Blood Updated: 10/17/21 0828     Special Requests: --        RIGHT  HAND       Culture result: NO GROWTH 3 DAYS       BLOOD CULTURE [[id number]] Collected: 10/08/21 1343    Order Status: Completed Specimen: Blood Updated: 10/13/21 1214     Special Requests: --        LEFT  FOREARM       Culture result: NO GROWTH 5 DAYS       BLOOD CULTURE [[id number]] Collected: 10/08/21 1535    Order Status: Completed Specimen: Blood Updated: 10/13/21 1214     Special Requests: --        LEFT  HAND       Culture result: NO GROWTH 5 DAYS       COVID-19 RAPID TEST [[id number]] Collected: 10/11/21 1629    Order Status: Completed Specimen: Nasopharyngeal Updated: 10/11/21 1717     Specimen source Nasopharyngeal        COVID-19 rapid test Not detected        Comment:      The specimen is NEGATIVE for SARS-CoV-2, the novel coronavirus associated with COVID-19. A negative result does not rule out COVID-19. This test has been authorized by the FDA under an Emergency Use Authorization (EUA) for use by authorized laboratories.         Fact sheet for Healthcare Providers: ConventionUpdate.co.nz  Fact sheet for Patients: ConventionUpdate.co.nz       Methodology: Isothermal Nucleic Acid Amplification         CULTURE, URINE [[id number]] Collected: 10/08/21 1356    Order Status: Completed Specimen: Cath Urine Updated: 10/11/21 5608     Special Requests: NO SPECIAL REQUESTS        Culture result:       <10,000 COLONIES/mL MIXED SKIN MARCOS ISOLATED                Current Facility-Administered Medications   Medication Dose Route Frequency    midodrine (PROAMATINE) tablet 10 mg  10 mg Oral Q8H    NOREPINephrine (LEVOPHED) 16,000 mcg in dextrose 5% 250 mL infusion  0.5-30 mcg/min IntraVENous TITRATE    PHENYLephrine (HERMINIO-SYNEPHRINE) 90 mg in 0.9% sodium chloride 250 mL infusion   mcg/min IntraVENous TITRATE    DOBUTamine (DOBUTREX) 500 mg/250 mL (2,000 mcg/mL) infusion  0-10 mcg/kg/min IntraVENous TITRATE    apixaban (ELIQUIS) tablet 2.5 mg  2.5 mg Oral Q12H    famotidine (PEPCID) tablet 20 mg  20 mg Oral DAILY    amiodarone (CORDARONE) 450 mg in dextrose 5% 250 mL infusion  0.5 mg/min IntraVENous CONTINUOUS    [Held by provider] furosemide (LASIX) injection 40 mg  40 mg IntraVENous Q12H    carvediloL (COREG) tablet 3.125 mg  3.125 mg Oral BID WITH MEALS    albuterol-ipratropium (DUO-NEB) 2.5 MG-0.5 MG/3 ML  3 mL Nebulization Q4H PRN    thiamine HCL (B-1) tablet 100 mg  100 mg Oral DAILY    insulin regular (NOVOLIN R, HUMULIN R) injection   SubCUTAneous AC&HS    nystatin (MYCOSTATIN) 100,000 unit/mL oral suspension 500,000 Units  500,000 Units Swish and Spit QID    sodium chloride (NS) flush 5-10 mL  5-10 mL IntraVENous Q8H    sodium chloride (NS) flush 5-10 mL  5-10 mL IntraVENous PRN    sodium chloride (NS) flush 5-40 mL  5-40 mL IntraVENous Q8H    sodium chloride (NS) flush 5-40 mL  5-40 mL IntraVENous PRN    acetaminophen (TYLENOL) tablet 650 mg  650 mg Oral Q6H PRN    Or    acetaminophen (TYLENOL) suppository 650 mg  650 mg Rectal Q6H PRN    polyethylene glycol (MIRALAX) packet 17 g  17 g Oral DAILY PRN    ondansetron (ZOFRAN ODT) tablet 4 mg  4 mg Oral Q8H PRN    Or    ondansetron (ZOFRAN) injection 4 mg  4 mg IntraVENous Q6H PRN    dextrose 40% (GLUTOSE) oral gel 1 Tube  15 g Oral PRN    glucagon (GLUCAGEN) injection 1 mg  1 mg IntraMUSCular PRN    dextrose (D50W) injection syrg 12.5-25 g  25-50 mL IntraVENous PRN    arformoteroL (BROVANA) neb solution 15 mcg  15 mcg Nebulization BID RT    budesonide (PULMICORT) 500 mcg/2 ml nebulizer suspension  500 mcg Nebulization BID RT       Other Studies:  No results found for this visit on 10/08/21. XR CHEST SNGL V    Result Date: 10/17/2021  EXAM: Chest x-ray. INDICATION: Dyspnea. COMPARISON: Yesterday's chest x-ray. TECHNIQUE: Frontal view chest x-ray. FINDINGS: Bibasilar lung infiltrates are unchanged. Again noted is cardiomegaly and a left chest wall pacemaker. No pneumothorax or significant pleural effusion is seen. A right jugular central line remains in place. Unchanged bibasilar lung infiltrates. [unfilled]       Part of this note was written by using a voice dictation software and the note has been proof read but may still contain some grammatical/other typographical errors.     Signed:  Juan Carlos Saldana MD

## 2021-10-18 NOTE — PROGRESS NOTES
Referral sent to [address] family is requesting Hospice House. Patient has now been placed on comfort care. Will continue to follow for discharge planning needs  Please consult  if any new issues arise. 0814 call from [address] to place a Hospice order  In addition to the referral.  Hospice Order placed. 1200 Patient accepted to [address]: [facility]  Report: Hospice House to call Donelda Meckel RN  Transport: [facility] [place]  Time: [place] with signed DNR at [employer] at [facility]. Milestones Met    Care Management Interventions  PCP Verified by CM: Yes  Mode of Transport at Discharge: S  Transition of Care Consult (CM Consult): Renard Beck 55  Partner SNF: Yes  Discharge Durable Medical Equipment: No  Physical Therapy Consult: Yes  Occupational Therapy Consult: Yes  Speech Therapy Consult: Yes  Support Systems: Spouse/Significant Other, Hospice  Confirm Follow Up Transport: Family  The Plan for Transition of Care is Related to the Following Treatment Goals : ongoing  needs  The Patient and/or Patient Representative was Provided with a Choice of Provider and Agrees with the Discharge Plan?: Yes  Name of the Patient Representative Who was Provided with a Choice of Provider and Agrees with the Discharge Plan: Patient, spouse  Freedom of Choice List was Provided with Basic Dialogue that Supports the Patient's Individualized Plan of Care/Goals, Treatment Preferences and Shares the Quality Data Associated with the Providers?: Yes   Resource Information Provided?: No  Discharge Location  Discharge Placement: Other: [facility])     [phone]  Patient  No longer requires hospice services. Ambulance services cancelled, Liaison with [facility] notified that patient had . No further needs from case management.

## 2021-10-18 NOTE — HOSPICE
Patient approved for GIP at [facility]. Consents signed at the bedside by wife Wade Nash. Will need transport DNR. [facility] transport set for 2:30 PM. Will update RN and CM on floor. Any questions, please call [phone].     We appreciate the referral,  Elias Ray RN BSN  [phone]

## 2021-10-18 NOTE — DISCHARGE SUMMARY
Hospitalist Discharge Summary for  Patient   Admit Date:  10/8/2021 12:57 PM   DC Note date: 10/18/2021  Name:  Shayne Boas   Age:  80 y.o.   Sex:  male  :  3/5/1933   MRN:  [id number]   Room:  [facility] [place]  PCP:  Jarrell Babcock MD    Problem List for this Hospitalization:  Hospital Problems as of 10/18/2021 Date Reviewed: 10/11/2021        Codes Class Noted - Resolved POA    Acute on chronic systolic heart failure ([email or web address] ICD-10-CM: I50.23  ICD-9-CM: 428.23  10/9/2021 - Present Yes        Acute on chronic respiratory failure with hypoxia and hypercapnia (HCC) ICD-10-CM: J96.21, J96.22  ICD-9-CM: 518.84, 786.09, 799.02  10/14/2021 - Present Unknown        Cardiogenic shock ([email or web address] 75.) ICD-10-CM: R57.0  ICD-9-CM: 785.51  10/14/2021 - Present Unknown        Acute pulmonary edema ([email or web address] 75.) ICD-10-CM: J81.0  ICD-9-CM: 518.4  10/14/2021 - Present Unknown        Hypercapnic respiratory failure ([facility] [[email or web address] 75.) ICD-10-CM: J96.92  ICD-9-CM: 518.81  10/12/2021 - Present Unknown        COVID-19 vaccine series completed ICD-10-CM: Z92.29  ICD-9-CM: V87.49  10/11/2021 - Present Yes        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 [facility]) ICD-10-CM: U07.1, I43  ICD-9-CM: 425.9, 079.89  10/10/2021 - Present Yes        * (Principal) Sepsis with encephalopathy and septic shock ([facility] [[email or web address] 75.) ICD-10-CM: A41.9, R65.21, G93.40  ICD-9-CM: 038.9, 995.92, 785.52, 348.30  10/8/2021 - Present Yes        Paroxysmal atrial fibrillation with RVR ([facility] [[email or web address] 75.) ICD-10-CM: I48.0  ICD-9-CM: 427.31  10 - Present Yes        Toxic metabolic encephalopathy XBA-22-PG: G92.8  ICD-9-CM: 349.82  10/8/2021 - Present Yes        Acute kidney injury (AYLIN) with acute tubular necrosis (ATN) ([facility] ICD-10-CM: N17.0  ICD-9-CM: 584.5  10/8/2021 - Present Yes        Thrush, oral ICD-10-CM: B37.0  ICD-9-CM: 112.0  10/8/2021 - Present Yes        Acute liver failure without hepatic coma ICD-10-CM: K72.00  ICD-9-CM: 570  10/8/2021 - Present Yes        Chronic respiratory failure with hypoxia and hypercapnia [facility]) ICD-10-CM: J96.11, J96.12  ICD-9-CM: 518.83, 799.02, 786.09  10/8/2021 - Present         Mild intermittent asthma ICD-10-CM: J45.20  ICD-9-CM: 493.90  10/8/2021 - Present Yes        HFrEF (heart failure with reduced ejection fraction) (HCC) (Chronic) ICD-10-CM: I50.20  ICD-9-CM: 428.20  10/8/2021 - Present Yes        Type 2 diabetes mellitus, without long-term current use of insulin (HCC) ICD-10-CM: E11.9  ICD-9-CM: 250.00  8/24/2021 - Present Yes        Personal history of COVID-19 (Chronic) ICD-10-CM: Z86.16  ICD-9-CM: V12.09  8/18/2021 - Present Yes        Class 2 severe obesity due to excess calories with serious comorbidity and body mass index (BMI) of 35.0 to 35.9 in adult [facility]) (Chronic) ICD-10-CM: E66.01, Z68.35  ICD-9-CM: 278.01, V85.35  5/7/2018 - Present Yes        Benign non-nodular prostatic hyperplasia with lower urinary tract symptoms (Chronic) ICD-10-CM: N40.1  ICD-9-CM: 600.91  7/27/2017 - Present Yes        LBBB (left bundle branch block) (Chronic) ICD-10-CM: I44.7  ICD-9-CM: 426.3  1/7/2016 - Present Yes        HLD (hyperlipidemia) (Chronic) ICD-10-CM: E78.5  ICD-9-CM: 272.4  1/7/2016 - Present Yes        Nonischemic cardiomyopathy ([address] (Chronic) ICD-10-CM: I42.8  ICD-9-CM: 425.4  2/21/2013 - Present Yes    Overview Signed 5/7/2018 10:18 AM by Flory Cheatham MD     EF 45% 2017   \"severe AI\"              Implantable cardioverter-defibrillator (ICD) in situ (Chronic) ICD-10-CM: Z95.810  ICD-9-CM: V45.02  2/21/2013 - Present Yes    Overview Signed 2/21/2013  7:22 AM by Lauren Ortiz III     [place] biventricular ICD implanted 2/20/13             HTN (hypertension) (Chronic) ICD-10-CM: I10  ICD-9-CM: 401.9  2/21/2013 - Present Yes              Admission HPI from 10/8/2021:    \"  [facility] a 80 y. o. male with medical history of NICM, ICD, LBBB, HTN, HLD, GERD, BPH, pAF (eliquis/amio), obesity, T2DM, chronic hypoxemic respiratory failure (4L following covid pna) who presented via ems from post-acute facility with AMS, bradycardia (hr in 30s) and increased WOB. The day PTA, he was started on azithromycin and prednisone for LLL infiltrate.  In the ED, found to have LA 3.1, K+5.2, Scr 1.38, LFTS elevated.  CXR bilateral infiltrates. He was given abx and limited IVF due to his h/o CHF. He was found to be in AFIB RVR. rec'd dilt bolus 10 mg x1 f/b gitt. \"     Hospital Course:     80 y. o. male with medical history of NICM, ICD, LBBB, HTN, HLD, GERD, BPH, pAF (eliquis/amio), obesity, T2DM, chronic hypoxemic respiratory failure (4L following covid pna) who presented via ems from post-acute facility with AMS, bradycardia (hr in 30s) and increased WOB. The day PTA, he was started on azithromycin and prednisone for LLL infiltrate.  In the ED, found to have LA 3.1, K+5.2, Scr 1.38, LFTS elevated.  CXR bilateral infiltrates. He was given abx and limited IVF due to his h/o CHF. He was found to be in AFIB RVR. rec'd dilt bolus 10 mg x1 f/b gitt.  Patient transferred to ICU on 10/12 with worsening respiratory status, requiring BiPAP.  Also found to be in shock, requiring pressor support.  Patient started on broad-spectrum antibiotic.  Sepsis work-up negative. Antibiotics discontinued on 10/16. Nitin Fitzgerald Patient started on amiodarone for A. fib with RVR.    Echocardiogram eith ejection fraction 15 to 20% and severel and globally reduced systolic function with left ventricular diastolic dysfunction.  Likely patient in cardiogenic shock, requiring 3 pressor supports (Levophed, tasha and dobutamine). Pronounced decline in cardiac function after Covid infection earlier this year. Cardiology following and recommend conservative medical therapy.  Lasix on hold due to persistent hypotension and worsening renal function. Patient remained on 3 pressors, not able to wean. Overall prognosis poor. Advance care plan discussed with family. At this time family opted for comfort measures ONLY.   Patient started on comfort measures. Hospice consulted and patient approved for GIP at [facility]. Plan was to discharge patient to hospice but patient passed away prior to discharge. Wife and daughter present at bedside at the time of death. Time of Death:   16:04    Cause of Death:   Cardiogenic shock     Time spent in patient discharge work and death certification 45 minutes. Procedures done this admission:  * No surgery found *    Consults this admission:  IP CONSULT TO CARDIOLOGY  IP CONSULT  [address] TO PULMONOLOGY    Echocardiogram/EKG results:  Results from Hospital Encounter encounter on 10/08/21    ECHO ADULT COMPLETE    Interpretation Summary  · Image quality for this study was technically difficult. · LV: Estimated LVEF is 15 - 20%. Normal wall thickness. Dilated left ventricle. Severely and globally reduced systolic function. Left ventricular diastolic dysfunction. · RV: Mildly dilated right ventricle. Mildly reduced systolic function. Pacer/ICD present. 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 Ascending aorta diameter = 4.3 cm. · TV: Mild tricuspid valve regurgitation is present. · AV: Mild aortic valve regurgitation is present. · MV: Mitral valve non-specific thickening. Mild to moderate mitral valve regurgitation is present.        EKG Results     Procedure 720 Value Units Date/Time    EKG, 12 LEAD, SUBSEQUENT [[id number]] Collected: 10/12/21 1807    Order Status: Completed Updated: 10/13/21 1403     Ventricular Rate 109 BPM      Atrial Rate 125 BPM      QRS Duration 154 ms      Q-T Interval 342 ms      QTC Calculation (Bezet) 460 ms      Calculated R Axis -24 degrees      Calculated T Axis 150 degrees      Diagnosis --     Atrial fibrillation with rapid ventricular response with occasional   ventricular-paced complexes  Left bundle branch block  Abnormal ECG  When compared with ECG of 12-OCT-2021 11:19,  Previous ECG has undetermined rhythm, needs review  Confirmed by Francisco Newton [id number]) on 10/13/2021 2:03:19 PM      EKG, 12 LEAD, SUBSEQUENT [[id number]] Collected: 10/12/21 1119    Order Status: Completed Updated: 10/12/21 1310     Ventricular Rate 109 BPM      Atrial Rate 102 BPM      QRS Duration 152 ms      Q-T Interval 398 ms      QTC Calculation (Bezet) 535 ms      Calculated R Axis -60 degrees      Calculated T Axis 115 degrees      Diagnosis --     Underlying rhyhtm atrial fibrillation with RVR with demand pacing  Left axis deviation  Non-specific intra-ventricular conduction block  Possible Lateral infarct , age undetermined  Abnormal ECG  When compared with ECG of 12-OCT-2021 11:18,  Demand pacing noted  Confirmed by Rocio Christopher MD (), NAOMI ([id number]) on 10/12/2021 1:00:47 PM      EKG, 12 LEAD, INITIAL [[id number]] Collected: 10/08/21 1301    Order Status: Completed Updated: 10/09/21 0944     Ventricular Rate 142 BPM      Atrial Rate 394 BPM      QRS Duration 136 ms      Q-T Interval 342 ms      QTC Calculation (Bezet) 526 ms      Calculated R Axis -177 degrees      Calculated T Axis 26 degrees      Diagnosis --     Atrial fibrillation with rapid ventricular response  Right superior axis deviation  Non-specific intra-ventricular conduction block  Cannot rule out Septal infarct , age undetermined  Abnormal ECG  When compared with ECG of 18-AUG-2021 07:39,  Questionable change in QRS axis  T wave inversion no longer evident in Lateral leads  Confirmed by Francisco Newton ([id number]) on 10/9/2021 9:44:00 AM            Diagnostic Imaging/Tests:   XR CHEST PA LAT    Result Date: 10/9/2021  Chest 2 view CLINICAL INDICATION: Persisting severe subacute shortness of breath, follow-up of bilateral lung infiltrates, meets SIRS criteria, septic COMPARISON: Radiograph yesterday TECHNIQUE: Upright AP and lateral views chest at 8:32 AM upright. FINDINGS: There are stable mediastinal and hilar contours, left side pacemaker/ICD. Stable lung volumes.  Persisting diffuse bilateral lung infiltrates, and small posterior layering pleural effusions in both bases. No pneumothorax. Partial consolidations and/or atelectasis in the retrocardiac left lower lung have not changed     1. Unchanged cardiac enlargement and bilateral airspace opacities. 2. Small bilateral pleural effusions. CT HEAD WO CONT    Result Date: 10/8/2021  Head CT INDICATION: Altered mental status Multiple axial images obtained through the brain without intravenous contrast. Radiation dose reduction techniques were used for this study: All CT scans performed at this facility use one or all of the following: Automated exposure control, adjustment of the mA and/or kVp according to patient's size, iterative reconstruction. FINDINGS: No areas of abnormal attenuation are seen in the brain. There is no CT evidence of acute hemorrhage or infarction. The ventricles are normal in size. There are no extra-axial fluid collections. No masses are seen. There is partial opacification of the right maxillary sinus and near complete opacification of the left sphenoid sinus. There are no bony lesions. 1.  No CT evidence of acute intracranial abnormality. 2.  Paranasal sinus disease. US ABD COMP    Result Date: 10/9/2021  EXAM: US ABD COMP HISTORY: AYLIN, elevated LFTs. TECHNIQUE: Grayscale and limited color Doppler ultrasound of the upper abdomen. COMPARISON: None. FINDINGS: This examination is somewhat limited as the patient was unable to cooperate with breathing and positioning instructions. Aorta/IVC: There is an aortic aneurysm distally measuring 3.3 x 3.7 x 3.5 cm./Visualized portions are within normal limits. Pancreas: Obscured by overlying bowel gas. Liver: Liver is normal in size and echogenicity. No discrete hepatic lesions are demonstrated on the provided images. The left lobe was not well visualized. Gallbladder: The gallbladder has been removed. Portal vein: The portal vein shows hepatopedal flow.  CBD: Normal in caliber and measures 4.7 mm. Spleen: The spleen measures 11.7  cm. There are no discrete masses. Right kidney: 11.4 cm in length and without evidence of obstruction. It contains 2 anechoic avascular structures that appear to represent simple cysts. The larger measures 1.8 x 2.6 x 2.2 cm. The second measures 1.0 x 1.2 x 1.3 cm. Left kidney: 12.9 cm in length and without evidence of obstruction. The kidney appears somewhat echogenic. It contains 2 hypoechoic avascular structures. The larger measures 7.9 x 7.5 x 7.8 cm. The second measures 1.4 x 1.3 x 1.3 cm. These have the appearance of simple cysts. Note was made of a right pleural effusion. Bilateral renal cysts. The left kidney is echogenic. This can be seen with medical renal disease. Status post cholecystectomy. The pancreas was not visualized. The left lobe of the liver was not very well seen. Note was made of a right pleural effusion. Abdominal aortic aneurysm measuring up to 3.7 cm. XR CHEST PORT    Result Date: 10/8/2021  EXAMINATION: XR CHEST PORT 10/8/2021 1:34 PM ACCESSION NUMBER: [id number] COMPARISON: 08/23/2021 INDICATION: meets SIRS criteria TECHNIQUE: A single view of the chest was obtained. FINDINGS: Support Devices: There is a left chest wall defibrillator. The wires are unchanged from the comparison exam. Lungs: Chronic bilateral airspace opacities are seen. These are similar to the comparison exam. Cardiac Silhouette: Heart size is enlarged. Mediastinum: The aorta is normal. Upper Abdomen: Normal Miscellaneous: There are no lytic and blastic lesions. 1.  Chronic bilateral airspace opacities. This could be pulmonary edema. Scarring or recurrent pneumonia are additional considerations. ECHO ADULT COMPLETE    Result Date: 10/9/2021  · Image quality for this study was technically difficult. · LV: Estimated LVEF is 15 - 20%. Normal wall thickness. Dilated left ventricle. Severely and globally reduced systolic function.  Left ventricular diastolic dysfunction. · RV: Mildly dilated right ventricle. Mildly reduced systolic function. Pacer/ICD present. 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 Ascending aorta diameter = 4.3 cm. · TV: Mild tricuspid valve regurgitation is present. · AV: Mild aortic valve regurgitation is present. · MV: Mitral valve non-specific thickening. Mild to moderate mitral valve regurgitation is present. All Micro Results     Procedure Component Value Units Date/Time    CULTURE, BLOOD [[id number]] Collected: 10/14/21 1113    Order Status: Completed Specimen: Blood Updated: 10/18/21 0842     Special Requests: --        NO SPECIAL REQUESTS  MEDIAL PORT       Culture result: NO GROWTH 4 DAYS       CULTURE, BLOOD [[id number]] Collected: 10/14/21 1057    Order Status: Completed Specimen: Blood Updated: 10/18/21 0842     Special Requests: --        RIGHT  HAND       Culture result: NO GROWTH 4 DAYS       BLOOD CULTURE [[id number]] Collected: 10/08/21 1343    Order Status: Completed Specimen: Blood Updated: 10/13/21 1214     Special Requests: --        LEFT  FOREARM       Culture result: NO GROWTH 5 DAYS       BLOOD CULTURE [[id number]] Collected: 10/08/21 1535    Order Status: Completed Specimen: Blood Updated: 10/13/21 1214     Special Requests: --        LEFT  HAND       Culture result: NO GROWTH 5 DAYS       COVID-19 RAPID TEST [[id number]] Collected: 10/11/21 1629    Order Status: Completed Specimen: Nasopharyngeal Updated: 10/11/21 1717     Specimen source Nasopharyngeal        COVID-19 rapid test Not detected        Comment:      The specimen is NEGATIVE for SARS-CoV-2, the novel coronavirus associated with COVID-19. A negative result does not rule out COVID-19. This test has been authorized by the FDA under an Emergency Use Authorization (EUA) for use by authorized laboratories.         Fact sheet for Healthcare Providers: ConventionUpdate.co.nz  Fact sheet for Patients: [facility].co.[facility]       Methodology: Isothermal Nucleic Acid Amplification         CULTURE, URINE [[id number]] Collected: 10/08/21 1356    Order Status: Completed Specimen: Cath Urine Updated: 10/11/21 0744     Special Requests: NO SPECIAL REQUESTS        Culture result:       <10,000 COLONIES/mL MIXED SKIN MARCOS ISOLATED                Labs: Results:       BMP, Mg, Phos Recent Labs     10/17/21  0348 10/16/21  0319     --    K 4.8  --      --    CO2 26  --    AGAP 7  --    BUN 59*  --    CREA 1.48 1.65*   CA 9.5  --    *  --       CBC No results for input(s): WBC, RBC, HGB, HCT, PLT, GRANS, LYMPH, EOS, MONOS, BASOS, IG, ANEU, ABL, VALENTINA, ABM, ABB, AIG, HGBEXT, HCTEXT, PLTEXT in the last 72 hours.    LFT Recent Labs     10/17/21  0348 10/16/21  0319   * 648*   AP 90 92   TP 5.5* 5.2*   ALB 2.6* 2.8*   GLOB 2.9 2.4   AGRAT 0.9* 1.2      Cardiac Testing Lab Results   Component Value Date/Time     09/06/2013 01:11 AM      Coagulation Tests Lab Results   Component Value Date/Time    Prothrombin time 17.2 (H) 08/18/2021 07:52 AM    Prothrombin time 11.4 (H) 09/06/2013 02:00 AM    Prothrombin time 10.6 02/20/2013 10:22 AM    INR 1.4 08/18/2021 07:52 AM    INR 1.1 09/06/2013 02:00 AM    INR 1.0 02/20/2013 10:22 AM    aPTT 33.4 08/18/2021 07:52 AM    aPTT 28.2 09/06/2013 02:00 AM    aPTT 24.5 02/20/2013 10:22 AM      A1c Lab Results   Component Value Date/Time    Hemoglobin A1c 6.5 (H) 08/21/2021 06:47 AM      Lipid Panel No results found for: CHOL, CHOLPOCT, CHOLX, CHLST, CHOLV, 844127, HDL, HDLP, LDL, LDLC, DLDLP, 584196, VLDLC, VLDL, TGLX, TRIGL, TRIGP, TGLPOCT, CH[facility], [facility]   Thyroid Panel Lab Results   Component Value Date/Time    TSH 1.840 10/08/2021 07:59 PM    TSH 0.989 08/21/2021 06:47 AM    T4, Total 8.3 08/25/2010 10:00 AM    T4, Free 1.3 08/21/2021 06:47 AM    T3 Uptake 37 08/25/2010 10:00 AM        Most Recent UA Lab Results   Component Value Date/Time    Color ORANGE 10/08/2021 01:56 PM    Appearance CLEAR 10/08/2021 01:56 PM    pH (UA) 6.0 10/08/2021 01:56 PM    Protein 30 (A) 10/08/2021 01:56 PM    Glucose Negative 10/08/2021 01:56 PM    Ketone TRACE (A) 10/08/2021 01:56 PM    Bilirubin Negative 10/08/2021 01:56 PM    Blood MODERATE (A) 10/08/2021 01:56 PM    Urobilinogen 1.0 10/08/2021 01:56 PM    Nitrites Negative 10/08/2021 01:56 PM    Leukocyte Esterase TRACE (A) 10/08/2021 01:56 PM    WBC 10-20 10/08/2021 01:56 PM    RBC 20-50 10/08/2021 01:56 PM    Epithelial cells 0-3 10/08/2021 01:56 PM    Bacteria 2+ (H) 10/08/2021 01:56 PM    Casts HYALINE 10/08/2021 01:56 PM    Crystals, urine CA OXALATE 10/08/2021 01:56 PM    Mucus 4+ (H) 10/08/2021 01:56 PM    Other observations RESULTS VERIFIED MANUALLY 10/08/2021 01:56 PM          All Labs from Last 24 Hrs:  No results found for this or any previous visit (from the past 24 hour(s)).     Current Med List in Hospital:   Current Facility-Administered Medications   Medication Dose Route Frequency    LORazepam (ATIVAN) injection 1 mg  1 mg IntraVENous Q15MIN PRN    ondansetron (ZOFRAN) injection 4 mg  4 mg IntraVENous Q4H PRN    albuterol (PROVENTIL VENTOLIN) nebulizer solution 2.5 mg  2.5 mg Nebulization Q2H PRN    morphine injection 2 mg  2 mg IntraVENous Q15MIN PRN    atropine 1 % ophthalmic solution 3 Drop  3 Drop SubLINGual TID PRN    acetaminophen (TYLENOL) suppository 650 mg  650 mg Rectal Q4H PRN    albuterol-ipratropium (DUO-NEB) 2.5 MG-0.5 MG/3 ML  3 mL Nebulization Q4H PRN    sodium chloride (NS) flush 5-10 mL  5-10 mL IntraVENous PRN    sodium chloride (NS) flush 5-40 mL  5-40 mL IntraVENous PRN    acetaminophen (TYLENOL) tablet 650 mg  650 mg Oral Q6H PRN    Or    acetaminophen (TYLENOL) suppository 650 mg  650 mg Rectal Q6H PRN    polyethylene glycol (MIRALAX) packet 17 g  17 g Oral DAILY PRN    dextrose 40% (GLUTOSE) oral gel 1 Tube  15 g Oral PRN    glucagon (GLUCAGEN) injection 1 mg  1 mg IntraMUSCular PRN    dextrose (D50W) injection syrg 12.5-25 g  25-50 mL IntraVENous PRN       Allergies   Allergen Reactions    Pcn [Penicillins] Itching and Swelling     DIFFICULTY SWALLOWING       Immunization History   Administered Date(s) Administered    COVID-19, PFIZER, MRNA, LNP-S, PF, 30MCG/0.3ML DOSE 01/21/2021, 02/09/2021    TB Skin Test (PPD) Intradermal 08/20/2021, 10/11/2021       Recent Vital Data:  Patient Vitals for the past 24 hrs:   Temp Pulse Resp BP SpO2   10/18/21 1200  86 (!) 35     10/18/21 1100  94 (!) 33     10/18/21 1000  92 (!) 36     10/18/21 0900  (!) 103 (!) 36     10/18/21 0800  95 30     10/18/21 0705  90 28 110/68    10/18/21 0700 98.3 °F (36.8 °C) 92 30  99 %   10/17/21 1936 97.8 °F (36.6 °C)       10/17/21 1930 97.8 °F (36.6 °C) 79 30 (!) 73/57 98 %   10/17/21 1714  (!) 107 (!) 35 94/71 97 %   10/17/21 1658  (!) 111 (!) 35 104/76 96 %   10/17/21 1643  (!) 101 27 91/72 98 %   10/17/21 1629  (!) 110 27 (!) 84/69 96 %     Oxygen Therapy  O2 Sat (%): 99 % (10/18/21 0700)  Pulse via Oximetry: 82 beats per minute (10/18/21 0700)  O2 Device: Nasal cannula (10/18/21 0700)  Skin Assessment: Clean, dry, & intact (10/16/21 0210)  Skin Protection for O2 Device: No (10/15/21 1927)  O2 Flow Rate (L/min): 4 l/min (10/18/21 0700)  O2 Temperature: 95 °F (35 °C) (10/17/21 0812)  FIO2 (%): 40 % (10/16/21 1159)    Estimated body mass index is 38.01 kg/m² as calculated from the following:    Height as of this encounter: 6' 1\" (1.854 m). Weight as of this encounter: 130.7 kg (288 lb 1.6 oz).     Intake/Output Summary (Last 24 hours) at 10/18/2021 1623  Last data filed at 10/18/2021 1432  Gross per 24 hour   Intake    Output 575 ml   Net -575 ml         Discharge Exam:  General: Lifeless  Eyes: Pupils fixed/nonreactive  Lungs: No breath sounds  Heart:  No heart sounds  Neurologic: unresponsive    Signed:  Boubacar Horner MD    Part of this note may have been written by using a voice dictation software. The note has been proof read but may still contain some grammatical/other typographical errors.

## 2021-10-18 NOTE — PROGRESS NOTES
Follow-up visit to continue care as patient appears to be nearing death.      Sumit Trinidad 68  Board Certified

## 2021-10-18 NOTE — PROGRESS NOTES
Wife and daughter present at bedside, repositioned pt in bed. Family states, \"pt look's comfortable\". No needs expressed at this time. Will continue to monitor pt.

## 2021-10-18 NOTE — PROGRESS NOTES
Spoke with Carlos Lindsey at hospice house to give report. Pt scheduled to be transported at 1430. All questions answered. Will continue to monitor pt.

## 2021-10-18 NOTE — PROGRESS NOTES
Late entry progress note for 's visit on 10.17.21: Visited family at bedside conveying care and concern and offering spiritual interventions including affirmation of mildred and prayer.      Sumit Craven 68  Board Certified

## 2021-10-18 NOTE — DEATH NOTE
Hospitalist Discharge Summary for  Patient   Admit Date:  10/8/2021 12:57 PM   DC Note date: 10/18/2021  Name:  Larisa Crabtree   Age:  80 y.o.   Sex:  male  :  3/5/1933   MRN:  [id number]   Room:  [facility] [place]  PCP:  Kaylan Zamudio MD    Problem List for this Hospitalization:  Hospital Problems as of 10/18/2021 Date Reviewed: 10/11/2021        Codes Class Noted - Resolved POA    Acute on chronic systolic heart failure ([facility] ICD-10-CM: I50.23  ICD-9-CM: 428.23  10/9/2021 - Present Yes        Acute on chronic respiratory failure with hypoxia and hypercapnia (HCC) ICD-10-CM: J96.21, J96.22  ICD-9-CM: 518.84, 786.09, 799.02  10/14/2021 - Present Unknown        Cardiogenic shock ([facility] ICD-10-CM: R57.0  ICD-9-CM: 785.51  10/14/2021 - Present Unknown        Acute pulmonary edema ([facility] ICD-10-CM: J81.0  ICD-9-CM: 518.4  10/14/2021 - Present Unknown        Hypercapnic respiratory failure ([facility] ICD-10-CM: J96.92  ICD-9-CM: 518.81  10/12/2021 - Present Unknown        COVID-19 vaccine series completed ICD-10-CM: Z92.29  ICD-9-CM: V87.49  10/11/2021 - Present Yes        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 [facility]) ICD-10-CM: U07.1, I43  ICD-9-CM: 425.9, 079.89  10/10/2021 - Present Yes        * (Principal) Sepsis with encephalopathy and septic shock ([facility] ICD-10-CM: A41.9, R65.21, G93.40  ICD-9-CM: 038.9, 995.92, 785.52, 348.30  10/8/2021 - Present Yes        Paroxysmal atrial fibrillation with RVR ([facility] ICD-10-CM: I48.0  ICD-9-CM: 427.31  10 - Present Yes        Toxic metabolic encephalopathy LLR-28-[place]: G92.8  ICD-9-CM: 349.82  10/8/2021 - Present Yes        Acute kidney injury (AYLIN) with acute tubular necrosis (ATN) ([facility] ICD-10-CM: N17.0  ICD-9-CM: 584.5  10 - Present Yes        Thrush, oral ICD-10-CM: B37.0  ICD-9-CM: 112.0  10/8/2021 - Present Yes        Acute liver failure without hepatic coma ICD-10-CM: K72.00  ICD-9-CM: 570  10/8/2021 - Present Yes        Chronic respiratory failure with hypoxia and hypercapnia [facility]) ICD-10-CM: J96.11, J96.12  ICD-9-CM: 518.83, 799.02, 786.09  10/8/2021 - Present         Mild intermittent asthma ICD-10-CM: J45.20  ICD-9-CM: 493.90  10/8/2021 - Present Yes        HFrEF (heart failure with reduced ejection fraction) (HCC) (Chronic) ICD-10-CM: I50.20  ICD-9-CM: 428.20  10/8/2021 - Present Yes        Type 2 diabetes mellitus, without long-term current use of insulin (HCC) ICD-10-CM: E11.9  ICD-9-CM: 250.00  8/24/2021 - Present Yes        Personal history of COVID-19 (Chronic) ICD-10-CM: Z86.16  ICD-9-CM: V12.09  8/18/2021 - Present Yes        Class 2 severe obesity due to excess calories with serious comorbidity and body mass index (BMI) of 35.0 to 35.9 in adult [facility]) (Chronic) ICD-10-CM: E66.01, Z68.35  ICD-9-CM: 278.01, V85.35  5/7/2018 - Present Yes        Benign non-nodular prostatic hyperplasia with lower urinary tract symptoms (Chronic) ICD-10-CM: N40.1  ICD-9-CM: 600.91  7/27/2017 - Present Yes        LBBB (left bundle branch block) (Chronic) ICD-10-CM: I44.7  ICD-9-CM: 426.3  1/7/2016 - Present Yes        HLD (hyperlipidemia) (Chronic) ICD-10-CM: E78.5  ICD-9-CM: 272.4  1/7/2016 - Present Yes        Nonischemic cardiomyopathy ([address] (Chronic) ICD-10-CM: I42.8  ICD-9-CM: 425.4  2/21/2013 - Present Yes    Overview Signed 5/7/2018 10:18 AM by Porfirio Herman MD     EF 45% 2017   \"severe AI\"              Implantable cardioverter-defibrillator (ICD) in situ (Chronic) ICD-10-CM: Z95.810  ICD-9-CM: V45.02  2/21/2013 - Present Yes    Overview Signed 2/21/2013  7:22 AM by Kylah Restrepo III     [place] biventricular ICD implanted 2/20/13             HTN (hypertension) (Chronic) ICD-10-CM: I10  ICD-9-CM: 401.9  2/21/2013 - Present Yes              Admission HPI from 10/8/2021:    \"  Armando Gross a 80 y. o. male with medical history of NICM, ICD, LBBB, HTN, HLD, GERD, BPH, pAF (eliquis/amio), obesity, T2DM, chronic hypoxemic respiratory failure (4L following covid pna) who presented via ems from post-acute facility with AMS, bradycardia (hr in 30s) and increased WOB. The day PTA, he was started on azithromycin and prednisone for LLL infiltrate.  In the ED, found to have LA 3.1, K+5.2, Scr 1.38, LFTS elevated.  CXR bilateral infiltrates. He was given abx and limited IVF due to his h/o CHF. He was found to be in AFIB RVR. rec'd dilt bolus 10 mg x1 f/b gitt. \"     Hospital Course:     80 y. o. male with medical history of NICM, ICD, LBBB, HTN, HLD, GERD, BPH, pAF (eliquis/amio), obesity, T2DM, chronic hypoxemic respiratory failure (4L following covid pna) who presented via ems from post-acute facility with AMS, bradycardia (hr in 30s) and increased WOB. The day PTA, he was started on azithromycin and prednisone for LLL infiltrate.  In the ED, found to have LA 3.1, K+5.2, Scr 1.38, LFTS elevated.  CXR bilateral infiltrates. He was given abx and limited IVF due to his h/o CHF. He was found to be in AFIB RVR. rec'd dilt bolus 10 mg x1 f/b gitt.  Patient transferred to ICU on 10/12 with worsening respiratory status, requiring BiPAP.  Also found to be in shock, requiring pressor support.  Patient started on broad-spectrum antibiotic.  Sepsis work-up negative. Antibiotics discontinued on 10/16. Antoni Badillo Patient started on amiodarone for A. fib with RVR.    Echocardiogram eith ejection fraction 15 to 20% and severel and globally reduced systolic function with left ventricular diastolic dysfunction.  Likely patient in cardiogenic shock, requiring 3 pressor supports (Levophed, tasha and dobutamine). Pronounced decline in cardiac function after Covid infection earlier this year. Cardiology following and recommend conservative medical therapy.  Lasix on hold due to persistent hypotension and worsening renal function. Patient remained on 3 pressors, not able to wean. Overall prognosis poor. Advance care plan discussed with family. At this time family opted for comfort measures ONLY.   Patient started on comfort measures. Hospice consulted and patient approved for GIP at [facility]. Plan was to discharge patient to hospice but patient passed away prior to discharge. Wife and daughter present at bedside at the time of death. Time of Death:   16:04    Cause of Death:   Cardiogenic shock     Time spent in patient discharge work and death certification 45 minutes. Procedures done this admission:  * No surgery found *    Consults this admission:  IP CONSULT TO CARDIOLOGY  IP CONSULT  [address] TO PULMONOLOGY    Echocardiogram/EKG results:  Results from Hospital Encounter encounter on 10/08/21    ECHO ADULT COMPLETE    Interpretation Summary  · Image quality for this study was technically difficult. · LV: Estimated LVEF is 15 - 20%. Normal wall thickness. Dilated left ventricle. Severely and globally reduced systolic function. Left ventricular diastolic dysfunction. · RV: Mildly dilated right ventricle. Mildly reduced systolic function. Pacer/ICD present. 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 Ascending aorta diameter = 4.3 cm. · TV: Mild tricuspid valve regurgitation is present. · AV: Mild aortic valve regurgitation is present. · MV: Mitral valve non-specific thickening. Mild to moderate mitral valve regurgitation is present.        EKG Results     Procedure 720 Value Units Date/Time    EKG, 12 LEAD, SUBSEQUENT [[id number]] Collected: 10/12/21 1807    Order Status: Completed Updated: 10/13/21 1403     Ventricular Rate 109 BPM      Atrial Rate 125 BPM      QRS Duration 154 ms      Q-T Interval 342 ms      QTC Calculation (Bezet) 460 ms      Calculated R Axis -24 degrees      Calculated T Axis 150 degrees      Diagnosis --     Atrial fibrillation with rapid ventricular response with occasional   ventricular-paced complexes  Left bundle branch block  Abnormal ECG  When compared with ECG of 12-OCT-2021 11:19,  Previous ECG has undetermined rhythm, needs review  Confirmed by Alva Simpson ([id number]) on 10/13/2021 2:03:19 PM      EKG, 12 LEAD, SUBSEQUENT [[id number]] Collected: 10/12/21 1119    Order Status: Completed Updated: 10/12/21 1310     Ventricular Rate 109 BPM      Atrial Rate 102 BPM      QRS Duration 152 ms      Q-T Interval 398 ms      QTC Calculation (Bezet) 535 ms      Calculated R Axis -60 degrees      Calculated T Axis 115 degrees      Diagnosis --     Underlying rhyhtm atrial fibrillation with RVR with demand pacing  Left axis deviation  Non-specific intra-ventricular conduction block  Possible Lateral infarct , age undetermined  Abnormal ECG  When compared with ECG of 12-OCT-2021 11:18,  Demand pacing noted  Confirmed by Jesi Hanson MD (), NAOMI ([id number]) on 10/12/2021 1:00:47 PM      EKG, 12 LEAD, INITIAL [[id number]] Collected: 10/08/21 1301    Order Status: Completed Updated: 10/09/21 0944     Ventricular Rate 142 BPM      Atrial Rate 394 BPM      QRS Duration 136 ms      Q-T Interval 342 ms      QTC Calculation (Bezet) 526 ms      Calculated R Axis -177 degrees      Calculated T Axis 26 degrees      Diagnosis --     Atrial fibrillation with rapid ventricular response  Right superior axis deviation  Non-specific intra-ventricular conduction block  Cannot rule out Septal infarct , age undetermined  Abnormal ECG  When compared with ECG of 18-AUG-2021 07:39,  Questionable change in QRS axis  T wave inversion no longer evident in Lateral leads  Confirmed by Alva Simpson ([id number]) on 10/9/2021 9:44:00 AM            Diagnostic Imaging/Tests:   XR CHEST PA LAT    Result Date: 10/9/2021  Chest 2 view CLINICAL INDICATION: Persisting severe subacute shortness of breath, follow-up of bilateral lung infiltrates, meets SIRS criteria, septic COMPARISON: Radiograph yesterday TECHNIQUE: Upright AP and lateral views chest at 8:32 AM upright. FINDINGS: There are stable mediastinal and hilar contours, left side pacemaker/ICD. Stable lung volumes.  Persisting diffuse bilateral lung infiltrates, and small posterior layering pleural effusions in both bases. No pneumothorax. Partial consolidations and/or atelectasis in the retrocardiac left lower lung have not changed     1. Unchanged cardiac enlargement and bilateral airspace opacities. 2. Small bilateral pleural effusions. CT HEAD WO CONT    Result Date: 10/8/2021  Head CT INDICATION: Altered mental status Multiple axial images obtained through the brain without intravenous contrast. Radiation dose reduction techniques were used for this study: All CT scans performed at this facility use one or all of the following: Automated exposure control, adjustment of the mA and/or kVp according to patient's size, iterative reconstruction. FINDINGS: No areas of abnormal attenuation are seen in the brain. There is no CT evidence of acute hemorrhage or infarction. The ventricles are normal in size. There are no extra-axial fluid collections. No masses are seen. There is partial opacification of the right maxillary sinus and near complete opacification of the left sphenoid sinus. There are no bony lesions. 1.  No CT evidence of acute intracranial abnormality. 2.  Paranasal sinus disease. US ABD COMP    Result Date: 10/9/2021  EXAM: US ABD COMP HISTORY: AYLIN, elevated LFTs. TECHNIQUE: Grayscale and limited color Doppler ultrasound of the upper abdomen. COMPARISON: None. FINDINGS: This examination is somewhat limited as the patient was unable to cooperate with breathing and positioning instructions. Aorta/IVC: There is an aortic aneurysm distally measuring 3.3 x 3.7 x 3.5 cm./Visualized portions are within normal limits. Pancreas: Obscured by overlying bowel gas. Liver: Liver is normal in size and echogenicity. No discrete hepatic lesions are demonstrated on the provided images. The left lobe was not well visualized. Gallbladder: The gallbladder has been removed. Portal vein: The portal vein shows hepatopedal flow.  CBD: Normal in caliber and measures 4.7 mm. Spleen: The spleen measures 11.7  cm. There are no discrete masses. Right kidney: 11.4 cm in length and without evidence of obstruction. It contains 2 anechoic avascular structures that appear to represent simple cysts. The larger measures 1.8 x 2.6 x 2.2 cm. The second measures 1.0 x 1.2 x 1.3 cm. Left kidney: 12.9 cm in length and without evidence of obstruction. The kidney appears somewhat echogenic. It contains 2 hypoechoic avascular structures. The larger measures 7.9 x 7.5 x 7.8 cm. The second measures 1.4 x 1.3 x 1.3 cm. These have the appearance of simple cysts. Note was made of a right pleural effusion. Bilateral renal cysts. The left kidney is echogenic. This can be seen with medical renal disease. Status post cholecystectomy. The pancreas was not visualized. The left lobe of the liver was not very well seen. Note was made of a right pleural effusion. Abdominal aortic aneurysm measuring up to 3.7 cm. XR CHEST PORT    Result Date: 10/8/2021  EXAMINATION: XR CHEST PORT 10/8/2021 1:34 PM ACCESSION NUMBER: [id number] COMPARISON: 08/23/2021 INDICATION: meets SIRS criteria TECHNIQUE: A single view of the chest was obtained. FINDINGS: Support Devices: There is a left chest wall defibrillator. The wires are unchanged from the comparison exam. Lungs: Chronic bilateral airspace opacities are seen. These are similar to the comparison exam. Cardiac Silhouette: Heart size is enlarged. Mediastinum: The aorta is normal. Upper Abdomen: Normal Miscellaneous: There are no lytic and blastic lesions. 1.  Chronic bilateral airspace opacities. This could be pulmonary edema. Scarring or recurrent pneumonia are additional considerations. ECHO ADULT COMPLETE    Result Date: 10/9/2021  · Image quality for this study was technically difficult. · LV: Estimated LVEF is 15 - 20%. Normal wall thickness. Dilated left ventricle. Severely and globally reduced systolic function.  Left ventricular diastolic dysfunction. · RV: Mildly dilated right ventricle. Mildly reduced systolic function. Pacer/ICD present. 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 Ascending aorta diameter = 4.3 cm. · TV: Mild tricuspid valve regurgitation is present. · AV: Mild aortic valve regurgitation is present. · MV: Mitral valve non-specific thickening. Mild to moderate mitral valve regurgitation is present. All Micro Results     Procedure Component Value Units Date/Time    CULTURE, BLOOD [[id number]] Collected: 10/14/21 1113    Order Status: Completed Specimen: Blood Updated: 10/18/21 0842     Special Requests: --        NO SPECIAL REQUESTS  MEDIAL PORT       Culture result: NO GROWTH 4 DAYS       CULTURE, BLOOD [[id number]] Collected: 10/14/21 1057    Order Status: Completed Specimen: Blood Updated: 10/18/21 0842     Special Requests: --        RIGHT  HAND       Culture result: NO GROWTH 4 DAYS       BLOOD CULTURE [[id number]] Collected: 10/08/21 1343    Order Status: Completed Specimen: Blood Updated: 10/13/21 1214     Special Requests: --        LEFT  FOREARM       Culture result: NO GROWTH 5 DAYS       BLOOD CULTURE [[id number]] Collected: 10/08/21 1535    Order Status: Completed Specimen: Blood Updated: 10/13/21 1214     Special Requests: --        LEFT  HAND       Culture result: NO GROWTH 5 DAYS       COVID-19 RAPID TEST [[id number]] Collected: 10/11/21 1629    Order Status: Completed Specimen: Nasopharyngeal Updated: 10/11/21 1717     Specimen source Nasopharyngeal        COVID-19 rapid test Not detected        Comment:      The specimen is NEGATIVE for SARS-CoV-2, the novel coronavirus associated with COVID-19. A negative result does not rule out COVID-19. This test has been authorized by the FDA under an Emergency Use Authorization (EUA) for use by authorized laboratories.         Fact sheet for Healthcare Providers: ConventionUpdate.co.nz  Fact sheet for Patients: [facility]       Methodology: Isothermal Nucleic Acid Amplification         CULTURE, URINE [[id number]] Collected: 10/08/21 1356    Order Status: Completed Specimen: Cath Urine Updated: 10/11/21 0744     Special Requests: NO SPECIAL REQUESTS        Culture result:       <10,000 COLONIES/mL MIXED SKIN MARCOS ISOLATED                Labs: Results:       BMP, Mg, Phos Recent Labs     10/17/21  0348 10/16/21  0319     --    K 4.8  --      --    CO2 26  --    AGAP 7  --    BUN 59*  --    CREA 1.48 1.65*   CA 9.5  --    *  --       CBC No results for input(s): WBC, RBC, HGB, HCT, PLT, GRANS, LYMPH, EOS, MONOS, BASOS, IG, ANEU, ABL, VALENT[place], ABM, ABB, AIG, HGBEXT, HCTEXT, PLTEXT, HGBEXT, HCTEXT, PLTEXT in the last 72 hours.    LFT Recent Labs     10/17/21  0348 10/16/21  0319   * 648*   AP 90 92   TP 5.5* 5.2*   ALB 2.6* 2.8*   GLOB 2.9 2.4   AGRAT 0.9* 1.2      Cardiac Testing Lab Results   Component Value Date/Time     09/06/2013 01:11 AM      Coagulation Tests Lab Results   Component Value Date/Time    Prothrombin time 17.2 (H) 08/18/2021 07:52 AM    Prothrombin time 11.4 (H) 09/06/2013 02:00 AM    Prothrombin time 10.6 02/20/2013 10:22 AM    INR 1.4 08/18/2021 07:52 AM    INR 1.1 09/06/2013 02:00 AM    INR 1.0 02/20/2013 10:22 AM    aPTT 33.4 08/18/2021 07:52 AM    aPTT 28.2 09/06/2013 02:00 AM    aPTT 24.5 02/20/2013 10:22 AM      A1c Lab Results   Component Value Date/Time    Hemoglobin A1c 6.5 (H) 08/21/2021 06:47 AM      Lipid Panel No results found for: CHOL, CHOLPOCT, CHOLX, CHLST, CHOLV, 952625, HDL, HDLP, LDL, LDLC, DLDLP, 138281, VLDLC, VLDL, TGLX, TRIGL, TRIGP, TGLPOCT, CH[facility], [facility]   Thyroid Panel Lab Results   Component Value Date/Time    TSH 1.840 10/08/2021 07:59 PM    TSH 0.989 08/21/2021 06:47 AM    T4, Total 8.3 08/25/2010 10:00 AM    T4, Free 1.3 08/21/2021 06:47 AM    T3 Uptake 37 08/25/2010 10:00 AM        Most Recent UA Lab Results   Component Value Date/Time    Color ORANGE 10/08/2021 01:56 PM    Appearance CLEAR 10/08/2021 01:56 PM    pH (UA) 6.0 10/08/2021 01:56 PM    Protein 30 (A) 10/08/2021 01:56 PM    Glucose Negative 10/08/2021 01:56 PM    Ketone TRACE (A) 10/08/2021 01:56 PM    Bilirubin Negative 10/08/2021 01:56 PM    Blood MODERATE (A) 10/08/2021 01:56 PM    Urobilinogen 1.0 10/08/2021 01:56 PM    Nitrites Negative 10/08/2021 01:56 PM    Leukocyte Esterase TRACE (A) 10/08/2021 01:56 PM    WBC 10-20 10/08/2021 01:56 PM    RBC 20-50 10/08/2021 01:56 PM    Epithelial cells 0-3 10/08/2021 01:56 PM    Bacteria 2+ (H) 10/08/2021 01:56 PM    Casts HYALINE 10/08/2021 01:56 PM    Crystals, urine CA OXALATE 10/08/2021 01:56 PM    Mucus 4+ (H) 10/08/2021 01:56 PM    Other observations RESULTS VERIFIED MANUALLY 10/08/2021 01:56 PM          All Labs from Last 24 Hrs:  No results found for this or any previous visit (from the past 24 hour(s)).     Current Med List in Hospital:   Current Facility-Administered Medications   Medication Dose Route Frequency    LORazepam (ATIVAN) injection 1 mg  1 mg IntraVENous Q15MIN PRN    ondansetron (ZOFRAN) injection 4 mg  4 mg IntraVENous Q4H PRN    albuterol (PROVENTIL VENTOLIN) nebulizer solution 2.5 mg  2.5 mg Nebulization Q2H PRN    morphine injection 2 mg  2 mg IntraVENous Q15MIN PRN    atropine 1 % ophthalmic solution 3 Drop  3 Drop SubLINGual TID PRN    acetaminophen (TYLENOL) suppository 650 mg  650 mg Rectal Q4H PRN    albuterol-ipratropium (DUO-NEB) 2.5 MG-0.5 MG/3 ML  3 mL Nebulization Q4H PRN    sodium chloride (NS) flush 5-10 mL  5-10 mL IntraVENous PRN    sodium chloride (NS) flush 5-40 mL  5-40 mL IntraVENous PRN    acetaminophen (TYLENOL) tablet 650 mg  650 mg Oral Q6H PRN    Or    acetaminophen (TYLENOL) suppository 650 mg  650 mg Rectal Q6H PRN    polyethylene glycol (MIRALAX) packet 17 g  17 g Oral DAILY PRN    dextrose 40% (GLUTOSE) oral gel 1 Tube  15 g Oral PRN    glucagon (GLUCAGEN) injection 1 mg  1 mg IntraMUSCular PRN    dextrose (D50W) injection syrg 12.5-25 g  25-50 mL IntraVENous PRN       Allergies   Allergen Reactions    Pcn [Penicillins] Itching and Swelling     DIFFICULTY SWALLOWING       Immunization History   Administered Date(s) Administered    COVID-19, PFIZER, MRNA, LNP-S, PF, 30MCG/0.3ML DOSE 01/21/2021, 02/09/2021    TB Skin Test (PPD) Intradermal 08/20/2021, 10/11/2021       Recent Vital Data:  Patient Vitals for the past 24 hrs:   Temp Pulse Resp BP SpO2   10/18/21 1200  86 (!) 35     10/18/21 1100  94 (!) 33     10/18/21 1000  92 (!) 36     10/18/21 0900  (!) 103 (!) 36     10/18/21 0800  95 30     10/18/21 0705  90 28 110/68    10/18/21 0700 98.3 °F (36.8 °C) 92 30  99 %   10/17/21 1936 97.8 °F (36.6 °C)       10/17/21 1930 97.8 °F (36.6 °C) 79 30 (!) 73/57 98 %   10/17/21 1714  (!) 107 (!) 35 94/71 97 %   10/17/21 1658  (!) 111 (!) 35 104/76 96 %   10/17/21 1643  (!) 101 27 91/72 98 %     Oxygen Therapy  O2 Sat (%): 99 % (10/18/21 0700)  Pulse via Oximetry: 82 beats per minute (10/18/21 0700)  O2 Device: Nasal cannula (10/18/21 0700)  Skin Assessment: Clean, dry, & intact (10/16/21 0210)  Skin Protection for O2 Device: No (10/15/21 1927)  O2 Flow Rate (L/min): 4 l/min (10/18/21 0700)  O2 Temperature: 95 °F (35 °C) (10/17/21 0812)  FIO2 (%): 40 % (10/16/21 1159)    Estimated body mass index is 38.01 kg/m² as calculated from the following:    Height as of this encounter: 6' 1\" (1.854 m). Weight as of this encounter: 130.7 kg (288 lb 1.6 oz).     Intake/Output Summary (Last 24 hours) at 10/18/2021 1629  Last data filed at 10/18/2021 1432  Gross per 24 hour   Intake    Output 575 ml   Net -575 ml         Discharge Exam:  General: Lifeless  Eyes: Pupils fixed/nonreactive  Lungs: No breath sounds  Heart:  No heart sounds  Neurologic: unresponsive    Signed:  Jhonny Gonzalez MD    Part of this note may have been written by using a voice dictation software. The note has been proof read but may still contain some grammatical/other typographical errors.

## 2021-10-18 NOTE — PROGRESS NOTES
Patient is transitioning to comfort care and pending discharge to hospice house this afternoon. We will sign off. Please call with questions.     Oscar Call MD

## 2021-10-18 NOTE — PROGRESS NOTES
Patient  with his family at the bedside. His family members are grieving appropriately. His wife and daughter were present.  provided empathy, comfort, a spiritual presence, emotional support and prayer.         JOHN Barkerin

## 2021-10-19 LAB
BACTERIA SPEC CULT: NORMAL
BACTERIA SPEC CULT: NORMAL
SERVICE CMNT-IMP: NORMAL
SERVICE CMNT-IMP: NORMAL